# Patient Record
Sex: MALE | Race: WHITE | NOT HISPANIC OR LATINO | Employment: FULL TIME | URBAN - METROPOLITAN AREA
[De-identification: names, ages, dates, MRNs, and addresses within clinical notes are randomized per-mention and may not be internally consistent; named-entity substitution may affect disease eponyms.]

---

## 2021-05-24 ENCOUNTER — HISTORICAL (OUTPATIENT)
Dept: NEPHROLOGY | Facility: CLINIC | Age: 61
End: 2021-05-24

## 2021-05-24 LAB
ABS NEUT (OLG): 4.91 X10(3)/MCL (ref 2.1–9.2)
ALBUMIN SERPL-MCNC: 3.8 GM/DL (ref 3.5–5)
ALBUMIN/GLOB SERPL: 0.8 RATIO (ref 1.1–2)
ALP SERPL-CCNC: 78 UNIT/L (ref 40–150)
ALT SERPL-CCNC: 10 UNIT/L (ref 0–55)
APPEARANCE, UA: ABNORMAL
AST SERPL-CCNC: 11 UNIT/L (ref 5–34)
BACTERIA #/AREA URNS AUTO: ABNORMAL /HPF
BASOPHILS # BLD AUTO: 0.1 X10(3)/MCL (ref 0–0.2)
BASOPHILS NFR BLD AUTO: 1 %
BILIRUB SERPL-MCNC: 0.5 MG/DL
BILIRUB UR QL STRIP: NEGATIVE
BILIRUBIN DIRECT+TOT PNL SERPL-MCNC: 0.2 MG/DL (ref 0–0.5)
BILIRUBIN DIRECT+TOT PNL SERPL-MCNC: 0.3 MG/DL (ref 0–0.8)
BUN SERPL-MCNC: 53.4 MG/DL (ref 8.4–25.7)
CALCIUM SERPL-MCNC: 8.9 MG/DL (ref 8.4–10.2)
CHLORIDE SERPL-SCNC: 108 MMOL/L (ref 98–107)
CO2 SERPL-SCNC: 19 MMOL/L (ref 22–29)
COLOR UR: ABNORMAL
CREAT SERPL-MCNC: 4.39 MG/DL (ref 0.73–1.18)
CREAT UR-MCNC: 53.6 MG/DL (ref 58–161)
DEPRECATED CALCIDIOL+CALCIFEROL SERPL-MC: 21.6 NG/ML (ref 30–80)
EOSINOPHIL # BLD AUTO: 0.3 X10(3)/MCL (ref 0–0.9)
EOSINOPHIL NFR BLD AUTO: 4 %
ERYTHROCYTE [DISTWIDTH] IN BLOOD BY AUTOMATED COUNT: 13.9 % (ref 11.5–14.5)
GLOBULIN SER-MCNC: 4.5 GM/DL (ref 2.4–3.5)
GLUCOSE (UA): NEGATIVE
GLUCOSE SERPL-MCNC: 85 MG/DL (ref 74–100)
HCT VFR BLD AUTO: 40.7 % (ref 40–51)
HGB BLD-MCNC: 13.1 GM/DL (ref 13.5–17.5)
HGB UR QL STRIP: 1 MG/DL
HYALINE CASTS #/AREA URNS LPF: ABNORMAL /LPF
IMM GRANULOCYTES # BLD AUTO: 0.03 10*3/UL
IMM GRANULOCYTES NFR BLD AUTO: 0 %
KETONES UR QL STRIP: NEGATIVE
LEUKOCYTE ESTERASE UR QL STRIP: 500 LEU/UL
LYMPHOCYTES # BLD AUTO: 1.6 X10(3)/MCL (ref 0.6–4.6)
LYMPHOCYTES NFR BLD AUTO: 21 %
MAGNESIUM SERPL-MCNC: 2.3 MG/DL (ref 1.6–2.6)
MCH RBC QN AUTO: 29.6 PG (ref 26–34)
MCHC RBC AUTO-ENTMCNC: 32.2 GM/DL (ref 31–37)
MCV RBC AUTO: 92.1 FL (ref 80–100)
MONOCYTES # BLD AUTO: 0.6 X10(3)/MCL (ref 0.1–1.3)
MONOCYTES NFR BLD AUTO: 8 %
MUCOUS THREADS URNS QL MICRO: SLIGHT
NEUTROPHILS # BLD AUTO: 4.91 X10(3)/MCL (ref 2.1–9.2)
NEUTROPHILS NFR BLD AUTO: 66 %
NITRITE UR QL STRIP: NEGATIVE
PH UR STRIP: 6 [PH] (ref 4.5–8)
PHOSPHATE SERPL-MCNC: 3.6 MG/DL (ref 2.3–4.7)
PLATELET # BLD AUTO: 247 X10(3)/MCL (ref 130–400)
PMV BLD AUTO: 10 FL (ref 7.4–10.4)
POTASSIUM SERPL-SCNC: 4.5 MMOL/L (ref 3.5–5.1)
PROT SERPL-MCNC: 8.3 GM/DL (ref 6.4–8.3)
PROT UR QL STRIP: 70 MG/DL
PROT UR STRIP-MCNC: 42 MG/DL
PROT/CREAT UR-RTO: 783.6 MG/GM CR
PTH-INTACT SERPL-MCNC: 342 PG/ML (ref 8.7–77)
RBC # BLD AUTO: 4.42 X10(6)/MCL (ref 4.5–5.9)
RBC #/AREA URNS AUTO: ABNORMAL /HPF
SODIUM SERPL-SCNC: 138 MMOL/L (ref 136–145)
SP GR UR STRIP: 1 (ref 1–1.03)
SQUAMOUS #/AREA URNS LPF: ABNORMAL /LPF
URATE SERPL-MCNC: 9 MG/DL (ref 3.5–7.2)
UROBILINOGEN UR STRIP-ACNC: NORMAL
WBC # SPEC AUTO: 7.5 X10(3)/MCL (ref 4.5–11)
WBC #/AREA URNS AUTO: >=100 /HPF

## 2021-06-14 ENCOUNTER — HISTORICAL (OUTPATIENT)
Dept: NEPHROLOGY | Facility: CLINIC | Age: 61
End: 2021-06-14

## 2021-06-14 LAB
BUN SERPL-MCNC: 39.9 MG/DL (ref 8.4–25.7)
CALCIUM SERPL-MCNC: 8.2 MG/DL (ref 8.4–10.2)
CHLORIDE SERPL-SCNC: 107 MMOL/L (ref 98–107)
CO2 SERPL-SCNC: 23 MMOL/L (ref 22–29)
CREAT SERPL-MCNC: 3.34 MG/DL (ref 0.73–1.18)
CREAT/UREA NIT SERPL: 12
GLUCOSE SERPL-MCNC: 81 MG/DL (ref 74–100)
POTASSIUM SERPL-SCNC: 5.1 MMOL/L (ref 3.5–5.1)
SODIUM SERPL-SCNC: 140 MMOL/L (ref 136–145)

## 2021-06-17 ENCOUNTER — HISTORICAL (OUTPATIENT)
Dept: RADIOLOGY | Facility: HOSPITAL | Age: 61
End: 2021-06-17

## 2022-04-10 ENCOUNTER — HISTORICAL (OUTPATIENT)
Dept: ADMINISTRATIVE | Facility: HOSPITAL | Age: 62
End: 2022-04-10

## 2022-04-11 ENCOUNTER — HISTORICAL (OUTPATIENT)
Dept: ADMINISTRATIVE | Facility: HOSPITAL | Age: 62
End: 2022-04-11

## 2022-04-28 VITALS
WEIGHT: 211.63 LBS | HEIGHT: 71 IN | BODY MASS INDEX: 29.63 KG/M2 | OXYGEN SATURATION: 100 % | SYSTOLIC BLOOD PRESSURE: 118 MMHG | DIASTOLIC BLOOD PRESSURE: 73 MMHG

## 2022-04-28 VITALS
DIASTOLIC BLOOD PRESSURE: 73 MMHG | SYSTOLIC BLOOD PRESSURE: 118 MMHG | OXYGEN SATURATION: 100 % | WEIGHT: 211.63 LBS | HEIGHT: 71 IN | BODY MASS INDEX: 29.63 KG/M2

## 2023-07-18 DIAGNOSIS — R33.9 URINARY RETENTION: Primary | ICD-10-CM

## 2023-08-11 ENCOUNTER — OFFICE VISIT (OUTPATIENT)
Dept: UROLOGY | Facility: CLINIC | Age: 63
End: 2023-08-11
Payer: COMMERCIAL

## 2023-08-11 VITALS
TEMPERATURE: 98 F | OXYGEN SATURATION: 100 % | WEIGHT: 186.81 LBS | HEIGHT: 71 IN | HEART RATE: 67 BPM | BODY MASS INDEX: 26.15 KG/M2 | DIASTOLIC BLOOD PRESSURE: 68 MMHG | RESPIRATION RATE: 20 BRPM | SYSTOLIC BLOOD PRESSURE: 114 MMHG

## 2023-08-11 DIAGNOSIS — R82.90 ABNORMAL URINALYSIS: Primary | ICD-10-CM

## 2023-08-11 DIAGNOSIS — R33.9 URINARY RETENTION: ICD-10-CM

## 2023-08-11 LAB
BILIRUB SERPL-MCNC: NEGATIVE MG/DL
BLOOD URINE, POC: NORMAL
COLOR, POC UA: NORMAL
GLUCOSE UR QL STRIP: NEGATIVE
KETONES UR QL STRIP: NEGATIVE
LEUKOCYTE ESTERASE URINE, POC: NORMAL
NITRITE, POC UA: POSITIVE
PH, POC UA: 6.5
PROTEIN, POC: 300
SPECIFIC GRAVITY, POC UA: 1.02
UROBILINOGEN, POC UA: 0.2

## 2023-08-11 PROCEDURE — 3078F PR MOST RECENT DIASTOLIC BLOOD PRESSURE < 80 MM HG: ICD-10-PCS | Mod: CPTII,,, | Performed by: NURSE PRACTITIONER

## 2023-08-11 PROCEDURE — 3074F SYST BP LT 130 MM HG: CPT | Mod: CPTII,,, | Performed by: NURSE PRACTITIONER

## 2023-08-11 PROCEDURE — 1159F MED LIST DOCD IN RCRD: CPT | Mod: CPTII,,, | Performed by: NURSE PRACTITIONER

## 2023-08-11 PROCEDURE — 99204 OFFICE O/P NEW MOD 45 MIN: CPT | Mod: S$PBB,,, | Performed by: NURSE PRACTITIONER

## 2023-08-11 PROCEDURE — 1160F PR REVIEW ALL MEDS BY PRESCRIBER/CLIN PHARMACIST DOCUMENTED: ICD-10-PCS | Mod: CPTII,,, | Performed by: NURSE PRACTITIONER

## 2023-08-11 PROCEDURE — 87077 CULTURE AEROBIC IDENTIFY: CPT | Performed by: NURSE PRACTITIONER

## 2023-08-11 PROCEDURE — 3008F PR BODY MASS INDEX (BMI) DOCUMENTED: ICD-10-PCS | Mod: CPTII,,, | Performed by: NURSE PRACTITIONER

## 2023-08-11 PROCEDURE — 99204 PR OFFICE/OUTPT VISIT, NEW, LEVL IV, 45-59 MIN: ICD-10-PCS | Mod: S$PBB,,, | Performed by: NURSE PRACTITIONER

## 2023-08-11 PROCEDURE — 3078F DIAST BP <80 MM HG: CPT | Mod: CPTII,,, | Performed by: NURSE PRACTITIONER

## 2023-08-11 PROCEDURE — 1159F PR MEDICATION LIST DOCUMENTED IN MEDICAL RECORD: ICD-10-PCS | Mod: CPTII,,, | Performed by: NURSE PRACTITIONER

## 2023-08-11 PROCEDURE — 3008F BODY MASS INDEX DOCD: CPT | Mod: CPTII,,, | Performed by: NURSE PRACTITIONER

## 2023-08-11 PROCEDURE — 81001 URINALYSIS AUTO W/SCOPE: CPT | Mod: PBBFAC | Performed by: NURSE PRACTITIONER

## 2023-08-11 PROCEDURE — 1160F RVW MEDS BY RX/DR IN RCRD: CPT | Mod: CPTII,,, | Performed by: NURSE PRACTITIONER

## 2023-08-11 PROCEDURE — 99214 OFFICE O/P EST MOD 30 MIN: CPT | Mod: PBBFAC | Performed by: NURSE PRACTITIONER

## 2023-08-11 PROCEDURE — 3074F PR MOST RECENT SYSTOLIC BLOOD PRESSURE < 130 MM HG: ICD-10-PCS | Mod: CPTII,,, | Performed by: NURSE PRACTITIONER

## 2023-08-11 NOTE — PROGRESS NOTES
Chief Complaint:   Chief Complaint   Patient presents with    Urinary Retention       HPI:  Patient is a 62-year-old male referred to Urology for urinary retention.  Patient currently on self-intermittent catheterization due to a bladder cancer in 2018 in which he had his bladder removed and a total prostatectomy.  Patient reconstructed bladder was from his stomach tissue.  Patient admits to doing self-catheterizations 4-5 times daily.  Patient recently had a UTI 1 month ago was treated with clindamycin from a urgent care.  Patient feels he is having much darker urine he suspects another UTI this time.  Allergies:  Review of patient's allergies indicates:  No Known Allergies    Medications:  No current outpatient medications on file.     No current facility-administered medications for this visit.       Review of Systems:  General: No fever, chills, fatigability, or weight loss.  Skin: No rashes, itching, or changes in color or texture of skin.  Chest: Denies DIAZ, cyanosis, wheezing, cough, and sputum production.  Abdomen: Appetite fine. No weight loss. Denies diarrhea, abdominal pain, hematemesis, or blood in stool.  Musculoskeletal: No joint stiffness or swelling. Denies back pain.  : As above.  All other review of systems negative.    PMH:  No past medical history on file.    PSH:  No past surgical history on file.    FamHx:  No family history on file.    SocHx:  Social History     Socioeconomic History    Marital status:    Tobacco Use    Smoking status: Former     Current packs/day: 1.00     Types: Cigarettes     Passive exposure: Current    Smokeless tobacco: Current     Types: Chew       Physical Exam:  Vitals:    08/11/23 0945   BP: 114/68   Pulse: 67   Resp: 20   Temp: 98.2 °F (36.8 °C)     General: A&Ox3, no apparent distress, no deformities  Neck: No masses, normal thyroid  Lungs: CTA margie, no use of accessory muscles  Heart: RRR, no arrhythmias  Abdomen: Soft, NT, ND, no masses, no hernias, no  hepatosplenomegaly  Lymphatic: Neck and groin nodes negative  Skin: The skin is warm and dry. No jaundice.  Ext: No c/c/e.    Urinalysis:        Impression:  1. Urinary retention  - Ambulatory referral/consult to Urology  - POCT URINE DIPSTICK WITH MICROSCOPE, AUTOMATED  2. Abnormal urinalysis.    Plan:  Instructed patient to continue self-intermittent catheterization 5-6 times daily.  Instructed patient will get a renal ultrasound and a ultrasound non OB, BMP to evaluate kidney function, will notify patient results.  Instructed patient return to clinic 6 months with BMP, renal ultrasound, ultrasound non OB..

## 2023-08-11 NOTE — LETTER
August 11, 2023      [facility]  [address] [address]  Phone: [phone]       Patient: Bridget Cardenas   YOB: 1960  Date of Visit: 08/11/2023    To Whom It May Concern:    Nick Cardenas  was at [facility] on 08/11/2023. The patient may return to work/school on 08/14/2023. If you have any questions or concerns, or if I can be of further assistance, please do not hesitate to contact me.    Sincerely,    Lizbeth Johnson LPN

## 2023-08-15 ENCOUNTER — TELEPHONE (OUTPATIENT)
Dept: UROLOGY | Facility: CLINIC | Age: 63
End: 2023-08-15
Payer: COMMERCIAL

## 2023-08-15 DIAGNOSIS — N30.01 ACUTE CYSTITIS WITH HEMATURIA: Primary | ICD-10-CM

## 2023-08-15 RX ORDER — LEVOFLOXACIN 750 MG/1
750 TABLET ORAL DAILY
Qty: 7 TABLET | Refills: 0 | Status: ON HOLD | OUTPATIENT
Start: 2023-08-15 | End: 2023-10-26 | Stop reason: HOSPADM

## 2023-08-15 NOTE — TELEPHONE ENCOUNTER
His wife notified of culture and sensitivity positive Klebsiella pneumoniae SSP ozaenae and Proteus vulgaris, sensitive to Levaquin, Augmentin, Macrobid, Bactrim.  In discussing with his wife we decided to go with Levaquin 750 mg p.o. daily x7 days.  Instructed patient's wife who is on the patient's chart to notify of any healthcare issues to complete meds as initiated and notify clinic if symptoms persist greater than 10-14 days.

## 2023-08-19 LAB — MAYO GENERIC ORDERABLE RESULT: ABNORMAL

## 2023-08-20 LAB
BACTERIA UR CULT: ABNORMAL
BACTERIA UR CULT: ABNORMAL

## 2023-10-20 ENCOUNTER — HOSPITAL ENCOUNTER (INPATIENT)
Facility: HOSPITAL | Age: 63
LOS: 6 days | Discharge: HOME | DRG: 375 | End: 2023-10-26
Attending: STUDENT IN AN ORGANIZED HEALTH CARE EDUCATION/TRAINING PROGRAM | Admitting: STUDENT IN AN ORGANIZED HEALTH CARE EDUCATION/TRAINING PROGRAM
Payer: COMMERCIAL

## 2023-10-20 DIAGNOSIS — R00.1 BRADYCARDIA: ICD-10-CM

## 2023-10-20 DIAGNOSIS — D64.9 SYMPTOMATIC ANEMIA: ICD-10-CM

## 2023-10-20 DIAGNOSIS — D64.9 ACUTE ANEMIA: ICD-10-CM

## 2023-10-20 DIAGNOSIS — N18.9 CHRONIC KIDNEY DISEASE, UNSPECIFIED CKD STAGE: ICD-10-CM

## 2023-10-20 DIAGNOSIS — Z85.51 HISTORY OF BLADDER CANCER: ICD-10-CM

## 2023-10-20 DIAGNOSIS — C18.4 MALIGNANT NEOPLASM OF TRANSVERSE COLON: ICD-10-CM

## 2023-10-20 DIAGNOSIS — R07.9 CHEST PAIN: ICD-10-CM

## 2023-10-20 DIAGNOSIS — R06.09 DYSPNEA ON EXERTION: Primary | ICD-10-CM

## 2023-10-20 DIAGNOSIS — I12.0 BENIGN HYPERTENSIVE CKD, STAGE 5 CHRONIC KIDNEY DISEASE OR END STAGE RENAL DISEASE: ICD-10-CM

## 2023-10-20 PROBLEM — N17.9 AKI (ACUTE KIDNEY INJURY): Status: ACTIVE | Noted: 2023-10-20

## 2023-10-20 LAB
ALBUMIN SERPL-MCNC: 3.7 G/DL (ref 3.4–4.8)
ALBUMIN/GLOB SERPL: 1.2 RATIO (ref 1.1–2)
ALP SERPL-CCNC: 52 UNIT/L (ref 40–150)
ALT SERPL-CCNC: 11 UNIT/L (ref 0–55)
ANISOCYTOSIS BLD QL SMEAR: ABNORMAL
APPEARANCE UR: CLEAR
AST SERPL-CCNC: 11 UNIT/L (ref 5–34)
BACTERIA #/AREA URNS AUTO: ABNORMAL /HPF
BASOPHILS # BLD AUTO: 0.02 X10(3)/MCL
BASOPHILS NFR BLD AUTO: 0.5 %
BILIRUB SERPL-MCNC: 0.4 MG/DL
BILIRUB UR QL STRIP.AUTO: NEGATIVE
BILIRUBIN DIRECT+TOT PNL SERPL-MCNC: 0.2 MG/DL (ref 0–?)
BNP BLD-MCNC: 197.2 PG/ML
BUN SERPL-MCNC: 75.7 MG/DL (ref 8.4–25.7)
CALCIUM SERPL-MCNC: 7.5 MG/DL (ref 8.8–10)
CHLORIDE SERPL-SCNC: 114 MMOL/L (ref 98–107)
CO2 SERPL-SCNC: 14 MMOL/L (ref 23–31)
COLOR UR AUTO: COLORLESS
CREAT SERPL-MCNC: 5.08 MG/DL (ref 0.73–1.18)
EOSINOPHIL # BLD AUTO: 0.15 X10(3)/MCL (ref 0–0.9)
EOSINOPHIL NFR BLD AUTO: 3.4 %
ERYTHROCYTE [DISTWIDTH] IN BLOOD BY AUTOMATED COUNT: 19.3 % (ref 11.5–17)
FERRITIN SERPL-MCNC: <1.98 NG/ML (ref 21.81–274.66)
FLUAV AG UPPER RESP QL IA.RAPID: NOT DETECTED
FLUBV AG UPPER RESP QL IA.RAPID: NOT DETECTED
GFR SERPLBLD CREATININE-BSD FMLA CKD-EPI: 12 MLS/MIN/1.73/M2
GLOBULIN SER-MCNC: 3 GM/DL (ref 2.4–3.5)
GLUCOSE SERPL-MCNC: 96 MG/DL (ref 82–115)
GLUCOSE UR QL STRIP.AUTO: NORMAL
GROUP & RH: NORMAL
GROUP & RH: NORMAL
HCT VFR BLD AUTO: 13.7 % (ref 42–52)
HGB BLD-MCNC: 3.5 G/DL (ref 14–18)
HYALINE CASTS #/AREA URNS LPF: ABNORMAL /LPF
HYPOCHROMIA BLD QL SMEAR: ABNORMAL
IMM GRANULOCYTES # BLD AUTO: 0.02 X10(3)/MCL (ref 0–0.04)
IMM GRANULOCYTES NFR BLD AUTO: 0.5 %
INDIRECT COOMBS GEL: NORMAL
INR PPP: 1.2
IRON SATN MFR SERPL: 3 % (ref 20–50)
IRON SERPL-MCNC: 13 UG/DL (ref 65–175)
KETONES UR QL STRIP.AUTO: NEGATIVE
LEUKOCYTE ESTERASE UR QL STRIP.AUTO: NEGATIVE
LYMPHOCYTES # BLD AUTO: 0.98 X10(3)/MCL (ref 0.6–4.6)
LYMPHOCYTES NFR BLD AUTO: 22.4 %
MCH RBC QN AUTO: 16.7 PG (ref 27–31)
MCHC RBC AUTO-ENTMCNC: 25.5 G/DL (ref 33–36)
MCV RBC AUTO: 65.6 FL (ref 80–94)
MICROCYTES BLD QL SMEAR: ABNORMAL
MONOCYTES # BLD AUTO: 0.41 X10(3)/MCL (ref 0.1–1.3)
MONOCYTES NFR BLD AUTO: 9.4 %
MUCOUS THREADS URNS QL MICRO: ABNORMAL /LPF
NEUTROPHILS # BLD AUTO: 2.79 X10(3)/MCL (ref 2.1–9.2)
NEUTROPHILS NFR BLD AUTO: 63.8 %
NITRITE UR QL STRIP.AUTO: NEGATIVE
NRBC BLD AUTO-RTO: 1.4 %
PH UR STRIP.AUTO: 6 [PH]
PLATELET # BLD AUTO: 204 X10(3)/MCL (ref 130–400)
PLATELET # BLD EST: ADEQUATE 10*3/UL
PMV BLD AUTO: 9.3 FL (ref 7.4–10.4)
POTASSIUM SERPL-SCNC: 4.7 MMOL/L (ref 3.5–5.1)
PROT SERPL-MCNC: 6.7 GM/DL (ref 5.8–7.6)
PROT UR QL STRIP.AUTO: ABNORMAL
PROTHROMBIN TIME: 15.1 SECONDS (ref 11.4–14)
RBC # BLD AUTO: 2.09 X10(6)/MCL (ref 4.7–6.1)
RBC #/AREA URNS AUTO: ABNORMAL /HPF
RBC MORPH BLD: ABNORMAL
RBC UR QL AUTO: NEGATIVE
RET# (OHS): 0.06 X10E6/UL (ref 0.03–0.1)
RETICULOCYTE COUNT AUTOMATED (OLG): 2.72 % (ref 1.1–2.1)
SARS-COV-2 RNA RESP QL NAA+PROBE: NOT DETECTED
SCHISTOCYTE (OLG): ABNORMAL
SODIUM SERPL-SCNC: 138 MMOL/L (ref 136–145)
SP GR UR STRIP.AUTO: 1.01 (ref 1–1.03)
SPECIMEN OUTDATE: NORMAL
SQUAMOUS #/AREA URNS LPF: ABNORMAL /HPF
TIBC SERPL-MCNC: 365 UG/DL (ref 69–240)
TIBC SERPL-MCNC: 378 UG/DL (ref 250–450)
TRANSFERRIN SERPL-MCNC: 338 MG/DL (ref 163–344)
TROPONIN I SERPL-MCNC: 0.03 NG/ML (ref 0–0.04)
UROBILINOGEN UR STRIP-ACNC: NORMAL
WBC # SPEC AUTO: 4.37 X10(3)/MCL (ref 4.5–11.5)
WBC #/AREA URNS AUTO: ABNORMAL /HPF

## 2023-10-20 PROCEDURE — 84484 ASSAY OF TROPONIN QUANT: CPT | Performed by: PHYSICIAN ASSISTANT

## 2023-10-20 PROCEDURE — 86900 BLOOD TYPING SEROLOGIC ABO: CPT | Mod: 91 | Performed by: STUDENT IN AN ORGANIZED HEALTH CARE EDUCATION/TRAINING PROGRAM

## 2023-10-20 PROCEDURE — 21400001 HC TELEMETRY ROOM

## 2023-10-20 PROCEDURE — 82248 BILIRUBIN DIRECT: CPT | Performed by: STUDENT IN AN ORGANIZED HEALTH CARE EDUCATION/TRAINING PROGRAM

## 2023-10-20 PROCEDURE — 86900 BLOOD TYPING SEROLOGIC ABO: CPT | Performed by: STUDENT IN AN ORGANIZED HEALTH CARE EDUCATION/TRAINING PROGRAM

## 2023-10-20 PROCEDURE — 83880 ASSAY OF NATRIURETIC PEPTIDE: CPT | Performed by: PHYSICIAN ASSISTANT

## 2023-10-20 PROCEDURE — 99285 EMERGENCY DEPT VISIT HI MDM: CPT | Mod: 25

## 2023-10-20 PROCEDURE — 0240U COVID/FLU A&B PCR: CPT | Performed by: PHYSICIAN ASSISTANT

## 2023-10-20 PROCEDURE — 86920 COMPATIBILITY TEST SPIN: CPT | Performed by: STUDENT IN AN ORGANIZED HEALTH CARE EDUCATION/TRAINING PROGRAM

## 2023-10-20 PROCEDURE — 82272 OCCULT BLD FECES 1-3 TESTS: CPT

## 2023-10-20 PROCEDURE — 93005 ELECTROCARDIOGRAM TRACING: CPT

## 2023-10-20 PROCEDURE — 36430 TRANSFUSION BLD/BLD COMPNT: CPT

## 2023-10-20 PROCEDURE — 81001 URINALYSIS AUTO W/SCOPE: CPT | Performed by: STUDENT IN AN ORGANIZED HEALTH CARE EDUCATION/TRAINING PROGRAM

## 2023-10-20 PROCEDURE — 11000001 HC ACUTE MED/SURG PRIVATE ROOM

## 2023-10-20 PROCEDURE — 85610 PROTHROMBIN TIME: CPT | Performed by: STUDENT IN AN ORGANIZED HEALTH CARE EDUCATION/TRAINING PROGRAM

## 2023-10-20 PROCEDURE — 85045 AUTOMATED RETICULOCYTE COUNT: CPT

## 2023-10-20 PROCEDURE — 51702 INSERT TEMP BLADDER CATH: CPT | Mod: 59

## 2023-10-20 PROCEDURE — 80053 COMPREHEN METABOLIC PANEL: CPT | Performed by: PHYSICIAN ASSISTANT

## 2023-10-20 PROCEDURE — 82728 ASSAY OF FERRITIN: CPT

## 2023-10-20 PROCEDURE — 85025 COMPLETE CBC W/AUTO DIFF WBC: CPT | Performed by: PHYSICIAN ASSISTANT

## 2023-10-20 PROCEDURE — P9016 RBC LEUKOCYTES REDUCED: HCPCS | Performed by: STUDENT IN AN ORGANIZED HEALTH CARE EDUCATION/TRAINING PROGRAM

## 2023-10-20 PROCEDURE — 83540 ASSAY OF IRON: CPT

## 2023-10-20 RX ORDER — GLUCAGON 1 MG
1 KIT INJECTION
Status: DISCONTINUED | OUTPATIENT
Start: 2023-10-20 | End: 2023-10-26 | Stop reason: HOSPADM

## 2023-10-20 RX ORDER — ACETAMINOPHEN 325 MG/1
650 TABLET ORAL EVERY 4 HOURS PRN
Status: DISCONTINUED | OUTPATIENT
Start: 2023-10-20 | End: 2023-10-26 | Stop reason: HOSPADM

## 2023-10-20 RX ORDER — MUPIROCIN 20 MG/G
OINTMENT TOPICAL 2 TIMES DAILY
Status: COMPLETED | OUTPATIENT
Start: 2023-10-20 | End: 2023-10-25

## 2023-10-20 RX ORDER — SODIUM CHLORIDE, SODIUM LACTATE, POTASSIUM CHLORIDE, CALCIUM CHLORIDE 600; 310; 30; 20 MG/100ML; MG/100ML; MG/100ML; MG/100ML
INJECTION, SOLUTION INTRAVENOUS CONTINUOUS
Status: DISCONTINUED | OUTPATIENT
Start: 2023-10-20 | End: 2023-10-26 | Stop reason: HOSPADM

## 2023-10-20 RX ORDER — TALC
6 POWDER (GRAM) TOPICAL NIGHTLY PRN
Status: DISCONTINUED | OUTPATIENT
Start: 2023-10-20 | End: 2023-10-26 | Stop reason: HOSPADM

## 2023-10-20 RX ORDER — SODIUM CHLORIDE 0.9 % (FLUSH) 0.9 %
10 SYRINGE (ML) INJECTION EVERY 12 HOURS PRN
Status: DISCONTINUED | OUTPATIENT
Start: 2023-10-20 | End: 2023-10-26 | Stop reason: HOSPADM

## 2023-10-20 RX ORDER — IBUPROFEN 200 MG
16 TABLET ORAL
Status: DISCONTINUED | OUTPATIENT
Start: 2023-10-20 | End: 2023-10-26 | Stop reason: HOSPADM

## 2023-10-20 RX ORDER — IBUPROFEN 200 MG
24 TABLET ORAL
Status: DISCONTINUED | OUTPATIENT
Start: 2023-10-20 | End: 2023-10-26 | Stop reason: HOSPADM

## 2023-10-20 RX ORDER — ONDANSETRON 2 MG/ML
4 INJECTION INTRAMUSCULAR; INTRAVENOUS EVERY 8 HOURS PRN
Status: DISCONTINUED | OUTPATIENT
Start: 2023-10-20 | End: 2023-10-26 | Stop reason: HOSPADM

## 2023-10-20 RX ORDER — HEPARIN SODIUM 5000 [USP'U]/ML
5000 INJECTION, SOLUTION INTRAVENOUS; SUBCUTANEOUS EVERY 8 HOURS
Status: DISCONTINUED | OUTPATIENT
Start: 2023-10-20 | End: 2023-10-21

## 2023-10-20 RX ORDER — PROCHLORPERAZINE EDISYLATE 5 MG/ML
5 INJECTION INTRAMUSCULAR; INTRAVENOUS EVERY 6 HOURS PRN
Status: DISCONTINUED | OUTPATIENT
Start: 2023-10-20 | End: 2023-10-26 | Stop reason: HOSPADM

## 2023-10-20 RX ORDER — NALOXONE HCL 0.4 MG/ML
0.02 VIAL (ML) INJECTION
Status: DISCONTINUED | OUTPATIENT
Start: 2023-10-20 | End: 2023-10-26 | Stop reason: HOSPADM

## 2023-10-20 NOTE — LETTER
October 26, 2023         [address] [address]  Phone: [phone]  Fax: [phone]       Patient: Bridget Cardenas   YOB: 1960  Date of Visit: 10/26/2023    To Whom It May Concern:    Nick Cardenas  was at [facility] on 10/26/2023. The patient may return to work/school on 10/27/2023 with no restrictions. If you have any questions or concerns, or if I can be of further assistance, please do not hesitate to contact me.    Sincerely,    Dorothy Guerrero RN

## 2023-10-20 NOTE — ED PROVIDER NOTES
Encounter Date: 10/20/2023       History     Chief Complaint   Patient presents with    Shortness of Breath     PT REPORTS SOB UPON EXERTION X 2 WKS.  DENIES CP.  HX OF BLADDER CA, FINISHED TX. PT PALE CAP REFILL > 3 SEC. EKG OBTAINED.  VSS.      62-year-old male presents to ED for dyspnea on exertion.  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  States he self catheterizes daily.  States if he is just sitting there he has no difficulty however when he attempts to ambulate he has significant shortness of breath.  No chest pain, no pleuritic component, no fever chills cough congestion.  No abdominal discomfort, no change in urination, reported mild diarrhea and stool still brown.  Family states he appears mildly jaundice.  He was no other complaints or concerns at this time.      Review of patient's allergies indicates:   Allergen Reactions    Pcn [penicillins] Hives     Past Medical History:   Diagnosis Date    Cancer      History reviewed. No pertinent surgical history.  History reviewed. No pertinent family history.  Social History     Tobacco Use    Smoking status: Former     Current packs/day: 0.00     Types: Cigarettes     Quit date:      Years since quittin.8     Passive exposure: Current    Smokeless tobacco: Current     Types: Chew     Review of Systems   Constitutional:  Negative for chills and fever.   HENT:  Negative for congestion, rhinorrhea and sore throat.    Eyes:  Negative for pain, discharge and itching.   Respiratory:  Positive for shortness of breath. Negative for chest tightness.    Cardiovascular:  Negative for chest pain and palpitations.   Gastrointestinal:  Negative for abdominal pain, nausea and vomiting.   Genitourinary:  Negative for dysuria and hematuria.   Musculoskeletal:  Negative for myalgias and neck pain.   Skin:  Negative for color change and rash.   Neurological:  Negative for dizziness, weakness and headaches.   Psychiatric/Behavioral:  Negative  for confusion. The patient is not hyperactive.        Physical Exam     Initial Vitals [10/20/23 1210]   BP Pulse Resp Temp SpO2   123/62 70 16 97.9 °F (36.6 °C) 100 %      MAP       --         Physical Exam    Constitutional: He appears well-developed and well-nourished. He is not diaphoretic. No distress.   Jaundice   HENT:   Head: Normocephalic and atraumatic.   Eyes: Conjunctivae and EOM are normal. Pupils are equal, round, and reactive to light.   Neck: Neck supple. No tracheal deviation present.   Normal range of motion.  Cardiovascular:  Normal rate, regular rhythm and normal heart sounds.           Pulmonary/Chest: Breath sounds normal. No respiratory distress.   Abdominal: Abdomen is soft. There is no abdominal tenderness. There is no rebound.   Musculoskeletal:         General: No tenderness. Normal range of motion.      Cervical back: Normal range of motion and neck supple.     Neurological: He is alert and oriented to person, place, and time. He has normal strength. GCS score is 15. GCS eye subscore is 4. GCS verbal subscore is 5. GCS motor subscore is 6.   Skin: Skin is warm and dry. Capillary refill takes less than 2 seconds. No rash noted.   Psychiatric: He has a normal mood and affect. His behavior is normal. Judgment and thought content normal.         ED Course   Critical Care    Date/Time: 10/20/2023 5:03 PM    Performed by: Wilton Carlos MD  Authorized by: Wilton Carlos MD  Total critical care time (exclusive of procedural time) : 30 minutes  Critical care time was exclusive of separately billable procedures and treating other patients.  Critical care was time spent personally by me on the following activities: evaluation of patient's response to treatment, obtaining history from patient or surrogate, ordering and review of laboratory studies, pulse oximetry, review of old charts, development of treatment plan with patient or surrogate, examination of patient, ordering and performing treatments  and interventions, ordering and review of radiographic studies and re-evaluation of patient's condition.        Labs Reviewed   COMPREHENSIVE METABOLIC PANEL - Abnormal; Notable for the following components:       Result Value    Chloride 114 (*)     Carbon Dioxide 14 (*)     Blood Urea Nitrogen 75.7 (*)     Creatinine 5.08 (*)     Calcium Level Total 7.5 (*)     All other components within normal limits   B-TYPE NATRIURETIC PEPTIDE - Abnormal; Notable for the following components:    Natriuretic Peptide 197.2 (*)     All other components within normal limits   CBC WITH DIFFERENTIAL - Abnormal; Notable for the following components:    WBC 4.37 (*)     RBC 2.09 (*)     Hgb 3.5 (*)     Hct 13.7 (*)     MCV 65.6 (*)     MCH 16.7 (*)     MCHC 25.5 (*)     RDW 19.3 (*)     All other components within normal limits    Narrative:     This is an appended report.  These results have been appended to a previously verified report.   BLOOD SMEAR MICROSCOPIC EXAM ([facility]) - Abnormal; Notable for the following components:    RBC Morph Abnormal (*)     Anisocytosis 1+ (*)     Hypochromasia 1+ (*)     Microcytosis 1+ (*)     Schistocytes 1+ (*)     All other components within normal limits   PROTIME-INR - Abnormal; Notable for the following components:    PT 15.1 (*)     All other components within normal limits   TROPONIN I - Normal   COVID/FLU A&B PCR - Normal    Narrative:     The Xpert Xpress SARS-CoV-2/FLU/RSV plus is a rapid, multiplexed real-time PCR test intended for the simultaneous qualitative detection and differentiation of SARS-CoV-2, Influenza A, Influenza B, and respiratory syncytial virus (RSV) viral RNA in either nasopharyngeal swab or nasal swab specimens.         BILIRUBIN, DIRECT - Normal   CBC W/ AUTO DIFFERENTIAL    Narrative:     The following orders were created for panel order CBC auto differential.  Procedure                               Abnormality         Status                     ---------                                -----------         ------                     CBC with Differential[[id number]]        Abnormal            Final result                 Please view results for these tests on the individual orders.   EXTRA TUBES    Narrative:     The following orders were created for panel order EXTRA TUBES.  Procedure                               Abnormality         Status                     ---------                               -----------         ------                     Light Blue Top Hold[[id number]]                             In process                 Gold Top Hold[[id number]]                                   In process                   Please view results for these tests on the individual orders.   LIGHT BLUE TOP HOLD   GOLD TOP HOLD   URINALYSIS, REFLEX TO URINE CULTURE   TYPE & SCREEN   GROUP & RH   PREPARE RBC SOFT     EKG Readings: (Independently Interpreted)   Initial Reading: No STEMI. Rhythm: Normal Sinus Rhythm. Ectopy: No Ectopy. Conduction: Normal. Axis: Normal. Clinical Impression: Normal Sinus Rhythm     ECG Results              EKG 12-lead (Shortness of Breath) Age > 50 (In process)  Result time 10/20/23 14:05:55      In process by Interface, Lab In [facility] (10/20/23 14:05:55)                   Narrative:    Test Reason : R06.02,    Vent. Rate : 070 BPM     Atrial Rate : 070 BPM     P-R Int : 124 ms          QRS Dur : 094 ms      QT Int : 412 ms       P-R-T Axes : 068 067 069 degrees     QTc Int : 444 ms    Normal sinus rhythm  Normal ECG  No previous ECGs available    Referred By: [email or web address]   SELF           Confirmed By:                                   Imaging Results              US Retroperitoneal Complete (Final result)  Result time 10/20/23 15:23:14      Final result by Cecile Agosto MD (10/20/23 15:23:14)                   Impression:      Bilateral hydronephrosis    Distended neobladder.  Correlate for urinary retention.      Electronically signed by: Cecile  Surendra  Date:    10/20/2023  Time:    15:23               Narrative:    EXAMINATION:  US RETROPERITONEAL COMPLETE    CLINICAL HISTORY:  Domenica;    TECHNIQUE:  Ultrasound of the kidneys and urinary bladder was performed including color flow and grayscale evaluation of the kidneys.    COMPARISON:  CT abdomen pelvis 06/17/2021    FINDINGS:  RIGHT KIDNEY: The right kidney measures 10.5 cm.  Moderate to severe hydronephrosis.  Renal pelvis measures 3.4 cm anterior-posterior.  Right ureter is dilated where visible proximally.  No appreciable shadowing calculus.    LEFT KIDNEY: The left kidney measures 12.4 cm. Moderate hydronephrosis.Renal pelvis measures 3.3 cm anterior-posterior.  No appreciable shadowing renal calculus.    BLADDER: Distended neobladder with calculated volume of 1000 mL.                                       X-Ray Chest AP Portable (Final result)  Result time 10/20/23 13:15:42      Final result by Kash Gunderson MD (10/20/23 13:15:42)                   Impression:      No acute pulmonary process identified.      Electronically signed by: Kash Gunderson  Date:    10/20/2023  Time:    13:15               Narrative:    EXAMINATION:  XR CHEST AP PORTABLE    CLINICAL HISTORY:  Shortness of breath    TECHNIQUE:  Frontal view(s) of the chest.    COMPARISON:  No relevant comparison studies available at the time of dictation.    FINDINGS:  Left-sided MediPort catheter tip near the SVC/RA junction.  Normal cardiac silhouette.  Mild left diaphragm elevation.  No consolidation identified.  No significant pleural effusion or discernible pneumothorax.                                       Medications - No data to display  Medical Decision Making  62-year-old male with history of bladder cancer presents ED for exertional dyspnea and jaundice    Differential.  Acute anemia, hematuria, GI bleed, anemia of chronic disease, cellular lysis syndrome    Patient hemodynamically stable.  Mildly jaundice.  No other acute exam  findings.  Labs demonstrate critical anemia of 3 and 13.  2 units ordered.  Reports having a neobladder and having to straight cath.  Baseline CKD now worsened from 3 to 5.  Medicine consulted for continued workup and admission. retroperitoneal ultrasound demonstrates significant hydro and urine retained in the bladder.  Manzo ordered and immediately output 1 L. patient remains without abdominal discomfort or pressure.  CT abdomen ordered after Manzo and awaiting results.  Did speak with Dr. Irene concerning the patient as well as resident team.  Care formally transitioned to Dr. Huddleston upon conclusion of my shift for final evaluation and disposition. (Terrance)     Amount and/or Complexity of Data Reviewed  Labs: ordered.  Radiology: ordered.               ED Course as of 10/20/23 1703   Fri Oct 20, 2023   1550 Medicine called voicing concern for hydronephrosis and elevated bladder volume.  Neobladder.  I called urologist Dr. Irene and discussed the case.  Found reasonable to place a Manzo and trend the creatinine however will not be back in the office for 4 days.  Awaiting Manzo patient on the patient for output [RZ]      ED Course User Index  [RZ] Wilton Carlos MD                    Clinical Impression:   Final diagnoses:  [R06.09] Dyspnea on exertion (Primary)  [D64.9] Acute anemia  [N18.9] Chronic kidney disease, unspecified CKD stage  [Z85.51] History of bladder cancer        ED Disposition Condition    Observation Stable                Wilton Carlos MD  10/20/23 1707

## 2023-10-20 NOTE — CONSULTS
[facility] Consult Note    Date of Admit: 10/20/2023    Chief Complaint     Shortness of Breath (PT REPORTS SOB UPON EXERTION X 2 WKS.  DENIES CP.  HX OF BLADDER CA, FINISHED TX. PT PALE CAP REFILL > 3 SEC. EKG OBTAINED.  VSS. )      Subjective:      History of Present Illness:  Bridget Cardenas Jr. is a 62 y.o. male with past medical history of bladder cancer status post total cystectomy and prostatectomy in 2018 with neobladder creation, CKD stage 4/5, chronic hydronephrosis.  He presented to the emergency room with chief complaint of shortness a breath and dyspnea on exertion ongoing for the past 2 weeks.  He denies any chest pain during these episodes.  Denies any hematochezia, hematemesis, melena.  He denies any fever or chills.  After his cystectomy, patient reports of having to self catheterization 4-5 times daily without issues reported.  He endorses having some pink tinged urine ongoing around this time but denies any gross hematuria.  Urology NP at our facility, last seen in August 2023.    In the emergency room, patient was hemodynamically stable with normal vitals.  CBC with leukopenia of 4.37, hemoglobin of 3.5 and 13.7, MCV of 65.6.  CMP with non-anion gap metabolic acidosis with bicarb of 14, BENITO with BUN/creatinine of 75.7/5.08.  BNP mildly elevated at 197.2.  UA with 0 5 RBCs, trace protein trace bacteria.  Ultrasound retroperitoneal shows bilateral hydronephrosis with distended neobladder.  Manzo catheter placed with 1200 mL of urine output.  CT abdomen without contrast pursued showing symmetric moderate bilateral hydronephrosis and hydroureter without obstructing stone which appears to be chronic per Radiology read.  Internal medicine consulted for symptomatic anemia and dyspnea on exertion.    Given patient's drop in hemoglobin without known baseline and BENITO on CKD with hydronephrosis, recommend transfer to higher level of care for urology services.  We do not have Urology Services until  10/24/2023.      Past Medical History:  Past Medical History:   Diagnosis Date    Cancer        Past Surgical History:  History reviewed. No pertinent surgical history.    Allergies:  Review of patient's allergies indicates:   Allergen Reactions    Pcn [penicillins] Hives       Home Medications:  Prior to Admission medications    Medication Sig Start Date End Date Taking? Authorizing Provider   levoFLOXacin (LEVAQUIN) 750 MG tablet Take 1 tablet (750 mg total) by mouth once daily. 8/15/23   Charles Larkin NP       Family History:  History reviewed. No pertinent family history.    Social History:  Social History     Tobacco Use    Smoking status: Former     Current packs/day: 0.00     Types: Cigarettes     Quit date:      Years since quittin.8     Passive exposure: Current    Smokeless tobacco: Current     Types: Chew       Review of Systems:  Pertinent positives and negatives listed in HPI. All other systems are reviewed and are negative.       Objective:   Last 24 Hour Vital Signs:  Vitals  BP: 122/70  Temp: 98.2 °F (36.8 °C)  Temp Source: Oral  Pulse: 88  Resp: 18  SpO2: 100 %  Weight: 87 kg (191 lb 12.8 oz)    Physical Examination:  Physical Exam  Vitals and nursing note reviewed.   Constitutional:       Appearance: He is ill-appearing.   HENT:      Head: Normocephalic and atraumatic.      Mouth/Throat:      Mouth: Mucous membranes are moist.      Pharynx: Oropharynx is clear.   Cardiovascular:      Rate and Rhythm: Normal rate and regular rhythm.      Pulses: Normal pulses.      Heart sounds: Normal heart sounds.   Pulmonary:      Effort: No respiratory distress.      Breath sounds: Normal breath sounds. No stridor.   Abdominal:      General: Bowel sounds are normal. There is no distension.      Palpations: Abdomen is soft. There is no mass.      Tenderness: There is no abdominal tenderness.      Hernia: No hernia is present.   Musculoskeletal:      Right lower leg: No edema.      Left lower leg: No  "edema.   Skin:     General: Skin is warm.      Capillary Refill: Capillary refill takes 2 to 3 seconds.      Coloration: Skin is pale.   Neurological:      General: No focal deficit present.      Mental Status: He is alert and oriented to person, place, and time. Mental status is at baseline.             Laboratory:  Most Recent Data:  CMP:  Recent Labs   Lab 10/20/23  1250   CHLORIDE 114*   CO2 14*   BUN 75.7*   CREATININE 5.08*   GLUCOSE 96   ALBUMIN 3.7   BILIDIR 0.2   AST 11   ALT 11   ALKPHOS 52     CBC:  Recent Labs   Lab 10/20/23  1250   WBC 4.37*   ABSNEUTRO 2.79   RBC 2.09*   HGB 3.5*   HCT 13.7*   MCV 65.6*   RDW 19.3*     Coags:   Lab Results   Component Value Date    INR 1.2 10/20/2023    PROTIME 15.1 (H) 10/20/2023     FLP: No results found for: "CHOL", "HDL", "LDLCALC", "TRIG", "CHOLHDL"  DM:   Lab Results   Component Value Date    CREATININE 5.08 (H) 10/20/2023     Thyroid: No results found for: "TSH", "FREET4", "J3CBRZV", "X2TUSHJ", "THYROIDAB"  Anemia: No results found for: "IRON", "TIBC", "FERRITIN", "[id number]", "FOLATE"  Cardiac:   Lab Results   Component Value Date    TROPONINI 0.028 10/20/2023    .2 (H) 10/20/2023     Urinalysis:   Lab Results   Component Value Date    LABURIN (A) 08/11/2023     >/= 100,000 colonies/ml Klebsiella pneumoniae ssp ozaenae    LABURIN 10,000 - 25,000 colonies/ml Proteus vulgaris (A) 08/11/2023    COLORU Dark Luisa 08/11/2023    SPECGRAV 1.020 08/11/2023    NITRITE Positive 08/11/2023    KETONESU Negative 08/11/2023    UROBILINOGEN 0.2 08/11/2023    WBCUA >=100 (A) 05/24/2021       Trended Cardiac Data:  Recent Labs   Lab 10/20/23  1250   TROPONINI 0.028   .2*         Radiology:  Imaging Results              CT Abdomen Pelvis  Without Contrast (Final result)  Result time 10/20/23 17:34:12      Final result by Cecile Agosto MD (10/20/23 17:34:12)                   Impression:      1. Symmetric moderate bilateral hydronephrosis and hydroureter.  " No obstructing stone.  This appears to be chronic, similar to previous exam.  2. Neobladder collapsed about a Manzo catheter.      Electronically signed by: Cecile Agosto  Date:    10/20/2023  Time:    17:34               Narrative:    EXAMINATION:  CT ABDOMEN PELVIS WITHOUT CONTRAST    CLINICAL HISTORY:  neobladder, acute anemia, worsening BENITO;    TECHNIQUE:  Helically acquired axial images, sagittal and coronal reformations were obtained from the lung bases to the pubic symphysis without the IV administration of contrast.    Automated tube current modulation, weight-based exposure dosing, and/or iterative reconstruction technique utilized to reach lowest reasonably achievable exposure rate.    DLP: 214 mGy*cm    COMPARISON:  Retroperitoneal sonogram 10/20/2023, CT abdomen pelvis 06/17/2021    FINDINGS:  HEART: There are coronary artery calcifications.    LUNG BASES: Chronic elevation the left hemidiaphragm with basilar atelectasis.    LIVER: Normal attenuation. No appreciable focal hepatic lesion.    BILIARY: No calcified gallstones.    PANCREAS: No inflammatory change.    SPLEEN: Normal in size    ADRENALS: No mass.    KIDNEYS/URETERS: Moderate bilateral hydronephrosis.  AP diameter at the right renal pelvis is 2.4 cm.  AP diameter at the left renal pelvis is 2.5 cm.  There is bilateral hydroureter.  No obstructing calculus.    GI TRACT/MESENTERY: Evaluation of the bowel is limited without contrast.  No evidence of bowel obstruction or inflammation.    PERITONEUM: No free fluid.No free air.    LYMPH NODES: There are small retroperitoneal lymph nodes, similar to previous exam.    VASCULATURE: No significant atherosclerosis or aneurysm.    BLADDER: Patient reportedly status post cystectomy with creation of neobladder.  There is a Manzo catheter within the urinary bladder which is largely collapsed.  There is expected post instrumentation intraluminal gas.    REPRODUCTIVE ORGANS: Presumed  prostatectomy.    ABDOMINAL WALL: Unremarkable.    BONES: Degenerative changes at the hips.                                       US Retroperitoneal Complete (Final result)  Result time 10/20/23 15:23:14      Final result by Cecile Agosto MD (10/20/23 15:23:14)                   Impression:      Bilateral hydronephrosis    Distended neobladder.  Correlate for urinary retention.      Electronically signed by: Cecile Agosto  Date:    10/20/2023  Time:    15:23               Narrative:    EXAMINATION:  US RETROPERITONEAL COMPLETE    CLINICAL HISTORY:  Domenica;    TECHNIQUE:  Ultrasound of the kidneys and urinary bladder was performed including color flow and grayscale evaluation of the kidneys.    COMPARISON:  CT abdomen pelvis 06/17/2021    FINDINGS:  RIGHT KIDNEY: The right kidney measures 10.5 cm.  Moderate to severe hydronephrosis.  Renal pelvis measures 3.4 cm anterior-posterior.  Right ureter is dilated where visible proximally.  No appreciable shadowing calculus.    LEFT KIDNEY: The left kidney measures 12.4 cm. Moderate hydronephrosis.Renal pelvis measures 3.3 cm anterior-posterior.  No appreciable shadowing renal calculus.    BLADDER: Distended neobladder with calculated volume of 1000 mL.                                       X-Ray Chest AP Portable (Final result)  Result time 10/20/23 13:15:42      Final result by Kash Gunderson MD (10/20/23 13:15:42)                   Impression:      No acute pulmonary process identified.      Electronically signed by: Kash Gunderson  Date:    10/20/2023  Time:    13:15               Narrative:    EXAMINATION:  XR CHEST AP PORTABLE    CLINICAL HISTORY:  Shortness of breath    TECHNIQUE:  Frontal view(s) of the chest.    COMPARISON:  No relevant comparison studies available at the time of dictation.    FINDINGS:  Left-sided MediPort catheter tip near the SVC/RA junction.  Normal cardiac silhouette.  Mild left diaphragm elevation.  No consolidation identified.  No  significant pleural effusion or discernible pneumothorax.                                           Assessment & Plan:     SOB and DIAZ  Severe microcytic anemia of H&H 3.5&13.7  Unsure chronic versus acute; patient denies any jeremiah signs of bleeding  Reports blood-tinged urine for the past week  Status post 2 units of packed red blood cells in ED  Repeat H&H post transfusion  Recommend obtaining iron panel, ferritin, retic count    Hx of bladder cancer s/p total cystectomy and prostatectomy  BENITO on CKD Stage GV  Hx of UTI's  UA w/o evidence of UTI  CT abd/pel w/o con showing symmetric moderate bilateral hydronephrosis and hydroureter; which appears to be chronic in nature  BUN/Cr of 75.7/5.08 today from 39.9/3.34 6/14/23    Elevated BNP  Consider pursuing TTE if dyspnea on exertion persists after stabilization of hemoglobin    Disposition:  Recommend higher level of care in the setting of BENITO with hydronephrosis requiring Urology Services.       Olivier Boggs DO  [facility] PGY-II

## 2023-10-20 NOTE — ED PROVIDER NOTES
Encounter Date: 10/20/2023       History     Chief Complaint   Patient presents with    Shortness of Breath     PT REPORTS SOB UPON EXERTION X 2 WKS.  DENIES CP.  HX OF BLADDER CA, FINISHED TX. PT PALE CAP REFILL > 3 SEC. EKG OBTAINED.  VSS.      HPI  Review of patient's allergies indicates:   Allergen Reactions    Pcn [penicillins] Hives     Past Medical History:   Diagnosis Date    Cancer      History reviewed. No pertinent surgical history.  History reviewed. No pertinent family history.  Social History     Tobacco Use    Smoking status: Former     Current packs/day: 0.00     Types: Cigarettes     Quit date:      Years since quittin.8     Passive exposure: Current    Smokeless tobacco: Current     Types: Chew     Review of Systems    Physical Exam     Initial Vitals [10/20/23 1210]   BP Pulse Resp Temp SpO2   123/62 70 16 97.9 °F (36.6 °C) 100 %      MAP       --         Physical Exam    ED Course   Procedures  Labs Reviewed   COMPREHENSIVE METABOLIC PANEL - Abnormal; Notable for the following components:       Result Value    Chloride 114 (*)     Carbon Dioxide 14 (*)     Blood Urea Nitrogen 75.7 (*)     Creatinine 5.08 (*)     Calcium Level Total 7.5 (*)     All other components within normal limits   B-TYPE NATRIURETIC PEPTIDE - Abnormal; Notable for the following components:    Natriuretic Peptide 197.2 (*)     All other components within normal limits   CBC WITH DIFFERENTIAL - Abnormal; Notable for the following components:    WBC 4.37 (*)     RBC 2.09 (*)     Hgb 3.5 (*)     Hct 13.7 (*)     MCV 65.6 (*)     MCH 16.7 (*)     MCHC 25.5 (*)     RDW 19.3 (*)     All other components within normal limits    Narrative:     This is an appended report.  These results have been appended to a previously verified report.   BLOOD SMEAR MICROSCOPIC EXAM ([facility]G) - Abnormal; Notable for the following components:    RBC Morph Abnormal (*)     Anisocytosis 1+ (*)     Hypochromasia 1+ (*)     Microcytosis 1+ (*)      Schistocytes 1+ (*)     All other components within normal limits   PROTIME-INR - Abnormal; Notable for the following components:    PT 15.1 (*)     All other components within normal limits   URINALYSIS, REFLEX TO URINE CULTURE - Abnormal; Notable for the following components:    Color, UA Colorless (*)     Protein, UA Trace (*)     Bacteria, UA Trace (*)     Mucous, UA Trace (*)     All other components within normal limits   TROPONIN I - Normal   COVID/FLU A&B PCR - Normal    Narrative:     The Xpert Xpress SARS-CoV-2/FLU/RSV plus is a rapid, multiplexed real-time PCR test intended for the simultaneous qualitative detection and differentiation of SARS-CoV-2, Influenza A, Influenza B, and respiratory syncytial virus (RSV) viral RNA in either nasopharyngeal swab or nasal swab specimens.         BILIRUBIN, DIRECT - Normal   CBC W/ AUTO DIFFERENTIAL    Narrative:     The following orders were created for panel order CBC auto differential.  Procedure                               Abnormality         Status                     ---------                               -----------         ------                     CBC with Differential[[id number]]        Abnormal            Final result                 Please view results for these tests on the individual orders.   EXTRA TUBES    Narrative:     The following orders were created for panel order EXTRA TUBES.  Procedure                               Abnormality         Status                     ---------                               -----------         ------                     Light Blue Top Hold[[id number]]                             In process                 Gold Top Hold[[id number]]                                   In process                   Please view results for these tests on the individual orders.   LIGHT BLUE TOP HOLD   GOLD TOP HOLD   TYPE & SCREEN   GROUP & RH   PREPARE RBC SOFT        ECG Results              EKG 12-lead (Shortness of Breath) Age > 50  (In process)  Result time 10/20/23 14:05:55      In process by Interface, Lab In [facility] (10/20/23 14:05:55)                   Narrative:    Test Reason : R06.02,    Vent. Rate : 070 BPM     Atrial Rate : 070 BPM     P-R Int : 124 ms          QRS Dur : 094 ms      QT Int : 412 ms       P-R-T Axes : 068 067 069 degrees     QTc Int : 444 ms    Normal sinus rhythm  Normal ECG  No previous ECGs available    Referred By: [email or web address]   SELF           Confirmed By:                                   Imaging Results              CT Abdomen Pelvis  Without Contrast (Final result)  Result time 10/20/23 17:34:12      Final result by Cecile Agosto MD (10/20/23 17:34:12)                   Impression:      1. Symmetric moderate bilateral hydronephrosis and hydroureter.  No obstructing stone.  This appears to be chronic, similar to previous exam.  2. Neobladder collapsed about a Manzo catheter.      Electronically signed by: Cecile Agosto  Date:    10/20/2023  Time:    17:34               Narrative:    EXAMINATION:  CT ABDOMEN PELVIS WITHOUT CONTRAST    CLINICAL HISTORY:  neobladder, acute anemia, worsening BENITO;    TECHNIQUE:  Helically acquired axial images, sagittal and coronal reformations were obtained from the lung bases to the pubic symphysis without the IV administration of contrast.    Automated tube current modulation, weight-based exposure dosing, and/or iterative reconstruction technique utilized to reach lowest reasonably achievable exposure rate.    DLP: 214 mGy*cm    COMPARISON:  Retroperitoneal sonogram 10/20/2023, CT abdomen pelvis 06/17/2021    FINDINGS:  HEART: There are coronary artery calcifications.    LUNG BASES: Chronic elevation the left hemidiaphragm with basilar atelectasis.    LIVER: Normal attenuation. No appreciable focal hepatic lesion.    BILIARY: No calcified gallstones.    PANCREAS: No inflammatory change.    SPLEEN: Normal in size    ADRENALS: No mass.    KIDNEYS/URETERS: Moderate  bilateral hydronephrosis.  AP diameter at the right renal pelvis is 2.4 cm.  AP diameter at the left renal pelvis is 2.5 cm.  There is bilateral hydroureter.  No obstructing calculus.    GI TRACT/MESENTERY: Evaluation of the bowel is limited without contrast.  No evidence of bowel obstruction or inflammation.    PERITONEUM: No free fluid.No free air.    LYMPH NODES: There are small retroperitoneal lymph nodes, similar to previous exam.    VASCULATURE: No significant atherosclerosis or aneurysm.    BLADDER: Patient reportedly status post cystectomy with creation of neobladder.  There is a Manzo catheter within the urinary bladder which is largely collapsed.  There is expected post instrumentation intraluminal gas.    REPRODUCTIVE ORGANS: Presumed prostatectomy.    ABDOMINAL WALL: Unremarkable.    BONES: Degenerative changes at the hips.                                       US Retroperitoneal Complete (Final result)  Result time 10/20/23 15:23:14      Final result by Cecile Agosto MD (10/20/23 15:23:14)                   Impression:      Bilateral hydronephrosis    Distended neobladder.  Correlate for urinary retention.      Electronically signed by: Cecile Agosto  Date:    10/20/2023  Time:    15:23               Narrative:    EXAMINATION:  US RETROPERITONEAL COMPLETE    CLINICAL HISTORY:  Domenica;    TECHNIQUE:  Ultrasound of the kidneys and urinary bladder was performed including color flow and grayscale evaluation of the kidneys.    COMPARISON:  CT abdomen pelvis 06/17/2021    FINDINGS:  RIGHT KIDNEY: The right kidney measures 10.5 cm.  Moderate to severe hydronephrosis.  Renal pelvis measures 3.4 cm anterior-posterior.  Right ureter is dilated where visible proximally.  No appreciable shadowing calculus.    LEFT KIDNEY: The left kidney measures 12.4 cm. Moderate hydronephrosis.Renal pelvis measures 3.3 cm anterior-posterior.  No appreciable shadowing renal calculus.    BLADDER: Distended neobladder with  calculated volume of 1000 mL.                                       X-Ray Chest AP Portable (Final result)  Result time 10/20/23 13:15:42      Final result by Kash Gunderson MD (10/20/23 13:15:42)                   Impression:      No acute pulmonary process identified.      Electronically signed by: Kash Gunderson  Date:    10/20/2023  Time:    13:15               Narrative:    EXAMINATION:  XR CHEST AP PORTABLE    CLINICAL HISTORY:  Shortness of breath    TECHNIQUE:  Frontal view(s) of the chest.    COMPARISON:  No relevant comparison studies available at the time of dictation.    FINDINGS:  Left-sided MediPort catheter tip near the SVC/RA junction.  Normal cardiac silhouette.  Mild left diaphragm elevation.  No consolidation identified.  No significant pleural effusion or discernible pneumothorax.                                       Medications - No data to display  Medical Decision Making  Patient signed out to me at end of shift pending CT which Radiology states is chronic in appearance with regards to his hydronephrosis.  Medicine to admit for further medical evaluation or treatment.    Amount and/or Complexity of Data Reviewed  Labs: ordered.  Radiology: ordered.               ED Course as of 10/20/23 1759   Fri Oct 20, 2023   1550 Medicine called voicing concern for hydronephrosis and elevated bladder volume.  Neobladder.  I called urologist Dr. Irene and discussed the case.  Found reasonable to place a Manzo and trend the creatinine however will not be back in the office for 4 days.  Awaiting Manzo patient on the patient for output [RZ]      ED Course User Index  [RZ] Wilton Carlos MD                    Clinical Impression:   Final diagnoses:  [R06.09] Dyspnea on exertion (Primary)  [D64.9] Acute anemia  [N18.9] Chronic kidney disease, unspecified CKD stage  [Z85.51] History of bladder cancer        ED Disposition Condition    Observation Stable                Clayton Huddleston, DO  10/20/23 1759

## 2023-10-21 LAB
ABO + RH BLD: NORMAL
ALBUMIN SERPL-MCNC: 3.4 G/DL (ref 3.4–4.8)
ALBUMIN/GLOB SERPL: 1.3 RATIO (ref 1.1–2)
ALP SERPL-CCNC: 47 UNIT/L (ref 40–150)
ALT SERPL-CCNC: 9 UNIT/L (ref 0–55)
ANION GAP SERPL CALC-SCNC: 10 MEQ/L
ANISOCYTOSIS BLD QL SMEAR: ABNORMAL
AST SERPL-CCNC: 9 UNIT/L (ref 5–34)
BASOPHILS # BLD AUTO: 0.04 X10(3)/MCL
BASOPHILS NFR BLD AUTO: 0.8 %
BILIRUB SERPL-MCNC: 0.9 MG/DL
BLD PROD TYP BPU: NORMAL
BLOOD UNIT EXPIRATION DATE: NORMAL
BLOOD UNIT TYPE CODE: 7300
BUN SERPL-MCNC: 60.7 MG/DL (ref 8.4–25.7)
BUN SERPL-MCNC: 69.1 MG/DL (ref 8.4–25.7)
CALCIUM SERPL-MCNC: 7.3 MG/DL (ref 8.8–10)
CALCIUM SERPL-MCNC: 7.3 MG/DL (ref 8.8–10)
CHLORIDE SERPL-SCNC: 112 MMOL/L (ref 98–107)
CHLORIDE SERPL-SCNC: 114 MMOL/L (ref 98–107)
CO2 SERPL-SCNC: 15 MMOL/L (ref 23–31)
CO2 SERPL-SCNC: 18 MMOL/L (ref 23–31)
CREAT SERPL-MCNC: 4.71 MG/DL (ref 0.73–1.18)
CREAT SERPL-MCNC: 4.74 MG/DL (ref 0.73–1.18)
CREAT/UREA NIT SERPL: 13
CROSSMATCH INTERPRETATION: NORMAL
DISPENSE STATUS: NORMAL
EOSINOPHIL # BLD AUTO: 0.27 X10(3)/MCL (ref 0–0.9)
EOSINOPHIL NFR BLD AUTO: 5.4 %
ERYTHROCYTE [DISTWIDTH] IN BLOOD BY AUTOMATED COUNT: 20.8 % (ref 11.5–17)
GFR SERPLBLD CREATININE-BSD FMLA CKD-EPI: 13 MLS/MIN/1.73/M2
GFR SERPLBLD CREATININE-BSD FMLA CKD-EPI: 13 MLS/MIN/1.73/M2
GLOBULIN SER-MCNC: 2.7 GM/DL (ref 2.4–3.5)
GLUCOSE SERPL-MCNC: 103 MG/DL (ref 82–115)
GLUCOSE SERPL-MCNC: 90 MG/DL (ref 82–115)
HCT VFR BLD AUTO: 17.3 % (ref 42–52)
HCT VFR BLD AUTO: 23 % (ref 42–52)
HEMOCCULT SP1 STL QL: POSITIVE
HGB BLD-MCNC: 4.8 G/DL (ref 14–18)
HGB BLD-MCNC: 6.7 G/DL (ref 14–18)
HYPOCHROMIA BLD QL SMEAR: ABNORMAL
IMM GRANULOCYTES # BLD AUTO: 0.02 X10(3)/MCL (ref 0–0.04)
IMM GRANULOCYTES NFR BLD AUTO: 0.4 %
LYMPHOCYTES # BLD AUTO: 1.03 X10(3)/MCL (ref 0.6–4.6)
LYMPHOCYTES NFR BLD AUTO: 20.6 %
MAGNESIUM SERPL-MCNC: 2 MG/DL (ref 1.6–2.6)
MCH RBC QN AUTO: 19 PG (ref 27–31)
MCHC RBC AUTO-ENTMCNC: 27.7 G/DL (ref 33–36)
MCV RBC AUTO: 68.7 FL (ref 80–94)
MONOCYTES # BLD AUTO: 0.43 X10(3)/MCL (ref 0.1–1.3)
MONOCYTES NFR BLD AUTO: 8.6 %
NEUTROPHILS # BLD AUTO: 3.22 X10(3)/MCL (ref 2.1–9.2)
NEUTROPHILS NFR BLD AUTO: 64.2 %
NRBC BLD AUTO-RTO: 0 %
PHOSPHATE SERPL-MCNC: 4.7 MG/DL (ref 2.3–4.7)
PLATELET # BLD AUTO: 188 X10(3)/MCL (ref 130–400)
PLATELET # BLD EST: NORMAL 10*3/UL
PMV BLD AUTO: 9.6 FL (ref 7.4–10.4)
POIKILOCYTOSIS BLD QL SMEAR: ABNORMAL
POTASSIUM SERPL-SCNC: 4.2 MMOL/L (ref 3.5–5.1)
POTASSIUM SERPL-SCNC: 4.3 MMOL/L (ref 3.5–5.1)
PROT SERPL-MCNC: 6.1 GM/DL (ref 5.8–7.6)
RBC # BLD AUTO: 2.52 X10(6)/MCL (ref 4.7–6.1)
RBC MORPH BLD: ABNORMAL
SODIUM SERPL-SCNC: 139 MMOL/L (ref 136–145)
SODIUM SERPL-SCNC: 140 MMOL/L (ref 136–145)
UNIT NUMBER: NORMAL
WBC # SPEC AUTO: 5.01 X10(3)/MCL (ref 4.5–11.5)

## 2023-10-21 PROCEDURE — 63600175 PHARM REV CODE 636 W HCPCS

## 2023-10-21 PROCEDURE — 25000003 PHARM REV CODE 250

## 2023-10-21 PROCEDURE — 94761 N-INVAS EAR/PLS OXIMETRY MLT: CPT

## 2023-10-21 PROCEDURE — 80053 COMPREHEN METABOLIC PANEL: CPT

## 2023-10-21 PROCEDURE — 85025 COMPLETE CBC W/AUTO DIFF WBC: CPT

## 2023-10-21 PROCEDURE — 25000003 PHARM REV CODE 250: Performed by: STUDENT IN AN ORGANIZED HEALTH CARE EDUCATION/TRAINING PROGRAM

## 2023-10-21 PROCEDURE — 21400001 HC TELEMETRY ROOM

## 2023-10-21 PROCEDURE — 36430 TRANSFUSION BLD/BLD COMPNT: CPT

## 2023-10-21 PROCEDURE — 86920 COMPATIBILITY TEST SPIN: CPT | Performed by: STUDENT IN AN ORGANIZED HEALTH CARE EDUCATION/TRAINING PROGRAM

## 2023-10-21 PROCEDURE — P9016 RBC LEUKOCYTES REDUCED: HCPCS | Performed by: STUDENT IN AN ORGANIZED HEALTH CARE EDUCATION/TRAINING PROGRAM

## 2023-10-21 PROCEDURE — 85018 HEMOGLOBIN: CPT

## 2023-10-21 PROCEDURE — 86920 COMPATIBILITY TEST SPIN: CPT

## 2023-10-21 PROCEDURE — 82270 OCCULT BLOOD FECES: CPT

## 2023-10-21 PROCEDURE — 83735 ASSAY OF MAGNESIUM: CPT

## 2023-10-21 PROCEDURE — 84100 ASSAY OF PHOSPHORUS: CPT

## 2023-10-21 RX ORDER — HYDROCODONE BITARTRATE AND ACETAMINOPHEN 500; 5 MG/1; MG/1
TABLET ORAL
Status: DISCONTINUED | OUTPATIENT
Start: 2023-10-21 | End: 2023-10-26 | Stop reason: HOSPADM

## 2023-10-21 RX ORDER — TRIAMCINOLONE ACETONIDE 1 MG/G
CREAM TOPICAL 2 TIMES DAILY
COMMUNITY
Start: 2023-10-12 | End: 2023-10-30 | Stop reason: SDUPTHER

## 2023-10-21 RX ORDER — HEPARIN SODIUM 5000 [USP'U]/ML
5000 INJECTION, SOLUTION INTRAVENOUS; SUBCUTANEOUS EVERY 12 HOURS
Status: DISCONTINUED | OUTPATIENT
Start: 2023-10-21 | End: 2023-10-24

## 2023-10-21 RX ORDER — TRIAMCINOLONE ACETONIDE 1 MG/G
CREAM TOPICAL 2 TIMES DAILY PRN
Status: DISCONTINUED | OUTPATIENT
Start: 2023-10-21 | End: 2023-10-26 | Stop reason: HOSPADM

## 2023-10-21 RX ADMIN — MELATONIN TAB 3 MG 6 MG: 3 TAB at 11:10

## 2023-10-21 RX ADMIN — MUPIROCIN: 20 OINTMENT TOPICAL at 08:10

## 2023-10-21 RX ADMIN — HEPARIN SODIUM 5000 UNITS: 5000 INJECTION, SOLUTION INTRAVENOUS; SUBCUTANEOUS at 12:10

## 2023-10-21 RX ADMIN — MUPIROCIN: 20 OINTMENT TOPICAL at 12:10

## 2023-10-21 RX ADMIN — SODIUM CHLORIDE, POTASSIUM CHLORIDE, SODIUM LACTATE AND CALCIUM CHLORIDE: 600; 310; 30; 20 INJECTION, SOLUTION INTRAVENOUS at 07:10

## 2023-10-21 RX ADMIN — SODIUM CHLORIDE, POTASSIUM CHLORIDE, SODIUM LACTATE AND CALCIUM CHLORIDE: 600; 310; 30; 20 INJECTION, SOLUTION INTRAVENOUS at 12:10

## 2023-10-21 RX ADMIN — SODIUM CHLORIDE 125 MG: 9 INJECTION, SOLUTION INTRAVENOUS at 08:10

## 2023-10-21 RX ADMIN — HEPARIN SODIUM 5000 UNITS: 5000 INJECTION, SOLUTION INTRAVENOUS; SUBCUTANEOUS at 05:10

## 2023-10-21 NOTE — CARE UPDATE
Day Team Care Update         HPI:  Bridget Cardenas Jr. is a 62 y.o. male with past medical history of bladder cancer status post total cystectomy and prostatectomy in 2018 with neobladder creation, CKD stage 4/5, chronic hydronephrosis.  He presented to the emergency room with chief complaint of shortness a breath and dyspnea on exertion ongoing for the past 2 weeks.  He denies any chest pain during these episodes.  Denies any hematochezia, hematemesis, melena.  He denies any fever or chills.  After his cystectomy, patient reports of having to self catheterization 4-5 times daily without issues reported.  He endorses having some pink tinged urine ongoing around this time but denies any gross hematuria.  Urology NP at our facility, last seen in August 2023.     In the emergency room, patient was hemodynamically stable with normal vitals.  CBC with leukopenia of 4.37, hemoglobin of 3.5 and 13.7, MCV of 65.6.  CMP with non-anion gap metabolic acidosis with bicarb of 14, BENITO with BUN/creatinine of 75.7/5.08.  BNP mildly elevated at 197.2.  UA with 0 5 RBCs, trace protein trace bacteria.  Ultrasound retroperitoneal shows bilateral hydronephrosis with distended neobladder.  Manzo catheter placed with 1200 mL of urine output.  CT abdomen without contrast pursued showing symmetric moderate bilateral hydronephrosis and hydroureter without obstructing stone which appears to be chronic per Radiology read.  Internal medicine consulted for symptomatic anemia and dyspnea on exertion.     Given patient's drop in hemoglobin without known baseline and BENITO on CKD with hydronephrosis, we recommended transfer to higher level of care for urology services, as we do not have Urology Services until 10/24/2023. However, transfer request was denied and patient is to be admitted for monitoring and Urology consult when available.     Course:   Transfusion with hemoglobin only going up to 4.8.  Severe iron-deficiency anemia.  We will transfuse 2  additional units along with IV iron.  We will continue LR for fluid resuscitation.  Patient is urinating well.  Last colonoscopy more than 10 years ago but does not recall findings.  Denies being told of having polyps at that time.  We will plan to have discussion with GI given being male and having iron-deficiency anemia which also could be secondary to CKD.        PE:  Physical Exam  Vitals and nursing note reviewed.   Constitutional:       Appearance: Normal appearance.   Cardiovascular:      Rate and Rhythm: Normal rate and regular rhythm.      Pulses: Normal pulses.      Heart sounds: Normal heart sounds.   Pulmonary:      Effort: Pulmonary effort is normal. No respiratory distress.      Breath sounds: Normal breath sounds. No stridor.   Abdominal:      General: Abdomen is flat. Bowel sounds are normal. There is no distension.      Palpations: Abdomen is soft. There is no mass.      Tenderness: There is no abdominal tenderness.      Hernia: No hernia is present.   Genitourinary:     Comments: Manzo catheter in place with clear urine in drain  Musculoskeletal:      Right lower leg: No edema.      Left lower leg: No edema.   Skin:     General: Skin is warm and dry.   Neurological:      General: No focal deficit present.      Mental Status: He is alert and oriented to person, place, and time. Mental status is at baseline.         Assessment & Plan:  Symptomatic Anemia- Severe microcytic anemia of H&H 3.5&13.7  Unsure chronic versus acute; patient denies any jeremiah signs of bleeding  Reports blood-tinged urine for the past week  Status post 2 units of packed red blood cells in ED  FOBT. Unknown hx of colonoscopy. Denies overt bleeding aside from above mentioned blood-tinged urine. No blood in UA.   Repeat H&H post transfusion  Iron panel, ferritin, retic count consistent with LILIANA     Hx of bladder cancer s/p total cystectomy and prostatectomy  Hydronephrosis, urinary retention  BENITO on CKD Stage GV  Hx of  UTI's  Non-anion gap metabolic acidosis with uremia  UA w/o evidence of UTI  Manzo to gravity  CT abd/pel w/o con showing symmetric moderate bilateral hydronephrosis and hydroureter; which appears to be chronic in nature  BUN/Cr of 69.1/4.74 10/21 from 75.7/5.08 on admission from 39.9/3.34 6/14/21  Urology consult when available   cc/h     Elevated BNP  Consider pursuing TTE if dyspnea on exertion persists after stabilization of hemoglobin        CODE STATUS: Full  Access: PIV  Antibiotics:   Diet: renal  DVT Prophylaxis: heparin ppx  Fluids: no        Disposition:  Admit for symptomatic anemia and urolgoy services for BENITO with hydronephrosis on pt with bladder cancer s/p bladder resection.  Transfusion additional 2 units along with IV iron.  Repeat H and H post transfusion.          Olivier Boggs DO  [facility] PGY-II

## 2023-10-21 NOTE — PLAN OF CARE
Problem: Infection  Goal: Absence of Infection Signs and Symptoms  Outcome: Ongoing, Progressing     Problem: Fluid and Electrolyte Imbalance (Acute Kidney Injury/Impairment)  Goal: Fluid and Electrolyte Balance  Outcome: Ongoing, Progressing     Problem: Adult Inpatient Plan of Care  Goal: Plan of Care Review  Outcome: Ongoing, Progressing  Goal: Patient-Specific Goal (Individualized)  Outcome: Ongoing, Progressing  Goal: Absence of Hospital-Acquired Illness or Injury  Outcome: Ongoing, Progressing  Goal: Optimal Comfort and Wellbeing  Outcome: Ongoing, Progressing  Goal: Readiness for Transition of Care  Outcome: Ongoing, Progressing     Problem: Renal Function Impairment (Acute Kidney Injury/Impairment)  Goal: Effective Renal Function  Outcome: Ongoing, Progressing     Problem: Oral Intake Inadequate (Acute Kidney Injury/Impairment)  Goal: Optimal Nutrition Intake  Outcome: Ongoing, Progressing

## 2023-10-21 NOTE — NURSING
Nurse notified team, spoke with Anbar about patient positive occult stool results. Patient H/H 6.7/23 post receiving 2 units of blood today. No new orders at this time.

## 2023-10-21 NOTE — H&P
[facility] History & Physical Note     Resident Team: [facility] Medicine List 3  Attending Physician: CASSY Berry MD  Resident: Jaqui    Date of Admit: 10/20/2023    Chief Complaint     Shortness of Breath (PT REPORTS SOB UPON EXERTION X 2 WKS.  DENIES CP.  HX OF BLADDER CA, FINISHED TX. PT PALE CAP REFILL > 3 SEC. EKG OBTAINED.  VSS. )     Subjective:      History of Present Illness:  Bridget Cardenas Jr. is a 62 y.o. male with past medical history of bladder cancer status post total cystectomy and prostatectomy in 2018 with neobladder creation, CKD stage 4/5, chronic hydronephrosis.  He presented to the emergency room with chief complaint of shortness a breath and dyspnea on exertion ongoing for the past 2 weeks.  He denies any chest pain during these episodes.  Denies any hematochezia, hematemesis, melena.  He denies any fever or chills.  After his cystectomy, patient reports of having to self catheterization 4-5 times daily without issues reported.  He endorses having some pink tinged urine ongoing around this time but denies any gross hematuria.  Urology NP at our facility, last seen in August 2023.     In the emergency room, patient was hemodynamically stable with normal vitals.  CBC with leukopenia of 4.37, hemoglobin of 3.5 and 13.7, MCV of 65.6.  CMP with non-anion gap metabolic acidosis with bicarb of 14, BENITO with BUN/creatinine of 75.7/5.08.  BNP mildly elevated at 197.2.  UA with 0 5 RBCs, trace protein trace bacteria.  Ultrasound retroperitoneal shows bilateral hydronephrosis with distended neobladder.  Manzo catheter placed with 1200 mL of urine output.  CT abdomen without contrast pursued showing symmetric moderate bilateral hydronephrosis and hydroureter without obstructing stone which appears to be chronic per Radiology read.  Internal medicine consulted for symptomatic anemia and dyspnea on exertion.     Given patient's drop in hemoglobin without known baseline and BENITO on CKD with hydronephrosis, we  recommended transfer to higher level of care for urology services, as we do not have Urology Services until 10/24/2023. However, transfer request was denied and patient is to be admitted for monitoring and Urology consult when available.       Past Medical History:  Past Medical History:   Diagnosis Date    Cancer        Past Surgical History:  History reviewed. No pertinent surgical history.    Family History:  History reviewed. No pertinent family history.    Social History:  Social History     Tobacco Use    Smoking status: Former     Current packs/day: 0.00     Types: Cigarettes     Quit date:      Years since quittin.8     Passive exposure: Current    Smokeless tobacco: Current     Types: Chew       Allergies:  Review of patient's allergies indicates:   Allergen Reactions    Pcn [penicillins] Hives       Home Medications:  Prior to Admission medications    Medication Sig Start Date End Date Taking? Authorizing Provider   levoFLOXacin (LEVAQUIN) 750 MG tablet Take 1 tablet (750 mg total) by mouth once daily. 8/15/23   Charles Larkin NP         Review of Systems:  See HPI.       Objective:   Last 24 Hour Vital Signs:  BP  Min: 118/64  Max: 132/72  Temp  Av.4 °F (34.1 °C)  Min: 44.2 °F (6.8 °C)  Max: 98.9 °F (37.2 °C)  Pulse  Av.6  Min: 68  Max: 94  Resp  Av.1  Min: 10  Max: 20  SpO2  Av.7 %  Min: 97 %  Max: 100 %  Weight  Av kg (191 lb 12.8 oz)  Min: 87 kg (191 lb 12.8 oz)  Max: 87 kg (191 lb 12.8 oz)  Body mass index is 26.85 kg/m².  I/O last 3 completed shifts:  In: 900 [Blood:900]  Out: 1200 [Urine:1200]    Physical Examination:  Physical Exam  Vitals and nursing note reviewed.   Constitutional:       Appearance: He is ill-appearing.   HENT:      Head: Normocephalic and atraumatic.      Mouth/Throat:      Mouth: Mucous membranes are moist.      Pharynx: Oropharynx is clear.   Cardiovascular:      Rate and Rhythm: Normal rate and regular rhythm.      Pulses: Normal pulses.       "Heart sounds: Normal heart sounds.   Pulmonary:      Effort: No respiratory distress.      Breath sounds: Normal breath sounds. No stridor.   Abdominal:      General: Bowel sounds are normal. There is no distension.      Palpations: Abdomen is soft. There is no mass.      Tenderness: There is no abdominal tenderness.      Hernia: No hernia is present.   Musculoskeletal:      Right lower leg: No edema.      Left lower leg: No edema.   Skin:     General: Skin is warm.      Capillary Refill: Capillary refill takes 2 to 3 seconds.      Coloration: Skin is pale.   Neurological:      General: No focal deficit present.      Mental Status: He is alert and oriented to person, place, and time. Mental status is at baseline.     Laboratory:  Most Recent Data:  CBC:   Lab Results   Component Value Date    WBC 4.37 (L) 10/20/2023    HGB 3.5 (LL) 10/20/2023    HCT 13.7 (LL) 10/20/2023     10/20/2023    MCV 65.6 (L) 10/20/2023    RDW 19.3 (H) 10/20/2023     WBC Differential:   Recent Labs   Lab 10/20/23  1250   WBC 4.37*   HGB 3.5*   HCT 13.7*      MCV 65.6*     BMP:   Lab Results   Component Value Date     10/20/2023    K 4.7 10/20/2023    CO2 14 (L) 10/20/2023    BUN 75.7 (H) 10/20/2023    CREATININE 5.08 (H) 10/20/2023    CALCIUM 7.5 (L) 10/20/2023    MG 2.30 05/24/2021    PHOS 3.6 05/24/2021     LFTs:   Lab Results   Component Value Date    ALBUMIN 3.7 10/20/2023    BILITOT 0.4 10/20/2023    AST 11 10/20/2023    ALKPHOS 52 10/20/2023    ALT 11 10/20/2023     Coags:   Lab Results   Component Value Date    INR 1.2 10/20/2023    PROTIME 15.1 (H) 10/20/2023     FLP: No results found for: "CHOL", "HDL", "LDLCALC", "TRIG", "CHOLHDL"  DM:   Lab Results   Component Value Date    CREATININE 5.08 (H) 10/20/2023     Thyroid: No results found for: "TSH", "Y2MPXOZ", "P7JUJND", "THYROIDAB", "FREET4"   Anemia:   Lab Results   Component Value Date    IRON 13 (L) 10/20/2023    TIBC 378 10/20/2023    FERRITIN <1.98 (L) " "10/20/2023     No results found for: "PEOJPHIV56"  No results found for: "FOLATE"     Cardiac:   Lab Results   Component Value Date    TROPONINI 0.028 10/20/2023    .2 (H) 10/20/2023     Urinalysis:   Lab Results   Component Value Date    LABURIN (A) 08/11/2023     >/= 100,000 colonies/ml Klebsiella pneumoniae ssp ozaenae    LABURIN 10,000 - 25,000 colonies/ml Proteus vulgaris (A) 08/11/2023    COLORU Dark Luisa 08/11/2023    PHUA 6.0 10/20/2023    SPECGRAV 1.020 08/11/2023    NITRITE Positive 08/11/2023    KETONESU Negative 08/11/2023    UROBILINOGEN Normal 10/20/2023    WBCUA 0-5 10/20/2023       Trended Lab Data:  Recent Labs   Lab 10/20/23  1250   WBC 4.37*   HGB 3.5*   HCT 13.7*      MCV 65.6*   RDW 19.3*      K 4.7   CO2 14*   BUN 75.7*   CREATININE 5.08*   ALBUMIN 3.7   BILITOT 0.4   AST 11   ALKPHOS 52   ALT 11       Trended Cardiac Data:  Recent Labs   Lab 10/20/23  1250   TROPONINI 0.028   .2*       Radiology:  Imaging Results              CT Abdomen Pelvis  Without Contrast (Final result)  Result time 10/20/23 17:34:12      Final result by Cecile Agosto MD (10/20/23 17:34:12)                   Impression:      1. Symmetric moderate bilateral hydronephrosis and hydroureter.  No obstructing stone.  This appears to be chronic, similar to previous exam.  2. Neobladder collapsed about a Manzo catheter.      Electronically signed by: Cecile Agosto  Date:    10/20/2023  Time:    17:34               Narrative:    EXAMINATION:  CT ABDOMEN PELVIS WITHOUT CONTRAST    CLINICAL HISTORY:  neobladder, acute anemia, worsening BENITO;    TECHNIQUE:  Helically acquired axial images, sagittal and coronal reformations were obtained from the lung bases to the pubic symphysis without the IV administration of contrast.    Automated tube current modulation, weight-based exposure dosing, and/or iterative reconstruction technique utilized to reach lowest reasonably achievable exposure " rate.    DLP: 214 mGy*cm    COMPARISON:  Retroperitoneal sonogram 10/20/2023, CT abdomen pelvis 06/17/2021    FINDINGS:  HEART: There are coronary artery calcifications.    LUNG BASES: Chronic elevation the left hemidiaphragm with basilar atelectasis.    LIVER: Normal attenuation. No appreciable focal hepatic lesion.    BILIARY: No calcified gallstones.    PANCREAS: No inflammatory change.    SPLEEN: Normal in size    ADRENALS: No mass.    KIDNEYS/URETERS: Moderate bilateral hydronephrosis.  AP diameter at the right renal pelvis is 2.4 cm.  AP diameter at the left renal pelvis is 2.5 cm.  There is bilateral hydroureter.  No obstructing calculus.    GI TRACT/MESENTERY: Evaluation of the bowel is limited without contrast.  No evidence of bowel obstruction or inflammation.    PERITONEUM: No free fluid.No free air.    LYMPH NODES: There are small retroperitoneal lymph nodes, similar to previous exam.    VASCULATURE: No significant atherosclerosis or aneurysm.    BLADDER: Patient reportedly status post cystectomy with creation of neobladder.  There is a Manzo catheter within the urinary bladder which is largely collapsed.  There is expected post instrumentation intraluminal gas.    REPRODUCTIVE ORGANS: Presumed prostatectomy.    ABDOMINAL WALL: Unremarkable.    BONES: Degenerative changes at the hips.                                       US Retroperitoneal Complete (Final result)  Result time 10/20/23 15:23:14      Final result by Cecile Agosto MD (10/20/23 15:23:14)                   Impression:      Bilateral hydronephrosis    Distended neobladder.  Correlate for urinary retention.      Electronically signed by: Cecile Agosto  Date:    10/20/2023  Time:    15:23               Narrative:    EXAMINATION:  US RETROPERITONEAL COMPLETE    CLINICAL HISTORY:  Domenica;    TECHNIQUE:  Ultrasound of the kidneys and urinary bladder was performed including color flow and grayscale evaluation of the  kidneys.    COMPARISON:  CT abdomen pelvis 06/17/2021    FINDINGS:  RIGHT KIDNEY: The right kidney measures 10.5 cm.  Moderate to severe hydronephrosis.  Renal pelvis measures 3.4 cm anterior-posterior.  Right ureter is dilated where visible proximally.  No appreciable shadowing calculus.    LEFT KIDNEY: The left kidney measures 12.4 cm. Moderate hydronephrosis.Renal pelvis measures 3.3 cm anterior-posterior.  No appreciable shadowing renal calculus.    BLADDER: Distended neobladder with calculated volume of 1000 mL.                                       X-Ray Chest AP Portable (Final result)  Result time 10/20/23 13:15:42      Final result by Kash Gunderson MD (10/20/23 13:15:42)                   Impression:      No acute pulmonary process identified.      Electronically signed by: Kash Gunderson  Date:    10/20/2023  Time:    13:15               Narrative:    EXAMINATION:  XR CHEST AP PORTABLE    CLINICAL HISTORY:  Shortness of breath    TECHNIQUE:  Frontal view(s) of the chest.    COMPARISON:  No relevant comparison studies available at the time of dictation.    FINDINGS:  Left-sided MediPort catheter tip near the SVC/RA junction.  Normal cardiac silhouette.  Mild left diaphragm elevation.  No consolidation identified.  No significant pleural effusion or discernible pneumothorax.                                       Assessment & Plan:     Symptomatic Anemia- Severe microcytic anemia of H&H 3.5&13.7  Unsure chronic versus acute; patient denies any jeremiah signs of bleeding  Reports blood-tinged urine for the past week  Status post 2 units of packed red blood cells in ED  FOBT. Unknown hx of colonoscopy. Denies overt bleeding aside from above mentioned blood-tinged urine. No blood in UA.   Repeat H&H post transfusion  Iron panel, ferritin, retic count consistent with LILIANA     Hx of bladder cancer s/p total cystectomy and prostatectomy  Hydronephrosis, urinary retention  BENITO on CKD Stage GV  Hx of UTI's  Non-anion  gap metabolic acidosis with uremia  UA w/o evidence of UTI  Manzo to gravity  CT abd/pel w/o con showing symmetric moderate bilateral hydronephrosis and hydroureter; which appears to be chronic in nature  BUN/Cr of 75.7/5.08 today from 39.9/3.34 6/14/23  Urology consult when available   cc/h     Elevated BNP  Consider pursuing TTE if dyspnea on exertion persists after stabilization of hemoglobin       CODE STATUS: Full  Access: PIV  Antibiotics:   Diet: renal  DVT Prophylaxis: heparin ppx  Fluids: no      Disposition:  Admit for symptomatic anemia and urolgoy services for BENITO with hydronephrosis on pt with bladder cancer s/p bladder resection.     Kasie Nails MD  [facility] HO-2

## 2023-10-22 LAB
ABO + RH BLD: NORMAL
ABO + RH BLD: NORMAL
ALBUMIN SERPL-MCNC: 3.7 G/DL (ref 3.4–4.8)
ALBUMIN/GLOB SERPL: 1.2 RATIO (ref 1.1–2)
ALP SERPL-CCNC: 52 UNIT/L (ref 40–150)
ALT SERPL-CCNC: 9 UNIT/L (ref 0–55)
AST SERPL-CCNC: 9 UNIT/L (ref 5–34)
BASOPHILS # BLD AUTO: 0.03 X10(3)/MCL
BASOPHILS NFR BLD AUTO: 0.6 %
BILIRUB SERPL-MCNC: 1.4 MG/DL
BLD PROD TYP BPU: NORMAL
BLD PROD TYP BPU: NORMAL
BLOOD UNIT EXPIRATION DATE: NORMAL
BLOOD UNIT EXPIRATION DATE: NORMAL
BLOOD UNIT TYPE CODE: 1700
BLOOD UNIT TYPE CODE: 9500
BUN SERPL-MCNC: 56.2 MG/DL (ref 8.4–25.7)
CALCIUM SERPL-MCNC: 7.7 MG/DL (ref 8.8–10)
CHLORIDE SERPL-SCNC: 110 MMOL/L (ref 98–107)
CO2 SERPL-SCNC: 18 MMOL/L (ref 23–31)
COLOR STL: ABNORMAL
CONSISTENCY STL: ABNORMAL
CREAT SERPL-MCNC: 4.71 MG/DL (ref 0.73–1.18)
CROSSMATCH INTERPRETATION: NORMAL
CROSSMATCH INTERPRETATION: NORMAL
DISPENSE STATUS: NORMAL
DISPENSE STATUS: NORMAL
EOSINOPHIL # BLD AUTO: 0.26 X10(3)/MCL (ref 0–0.9)
EOSINOPHIL NFR BLD AUTO: 4.9 %
ERYTHROCYTE [DISTWIDTH] IN BLOOD BY AUTOMATED COUNT: 21.8 % (ref 11.5–17)
GFR SERPLBLD CREATININE-BSD FMLA CKD-EPI: 13 MLS/MIN/1.73/M2
GLOBULIN SER-MCNC: 3.2 GM/DL (ref 2.4–3.5)
GLUCOSE SERPL-MCNC: 119 MG/DL (ref 82–115)
HCT VFR BLD AUTO: 30 % (ref 42–52)
HEMOCCULT SP2 STL QL: POSITIVE
HGB BLD-MCNC: 8.8 G/DL (ref 14–18)
IMM GRANULOCYTES # BLD AUTO: 0.02 X10(3)/MCL (ref 0–0.04)
IMM GRANULOCYTES NFR BLD AUTO: 0.4 %
LYMPHOCYTES # BLD AUTO: 0.83 X10(3)/MCL (ref 0.6–4.6)
LYMPHOCYTES NFR BLD AUTO: 15.5 %
MAGNESIUM SERPL-MCNC: 1.9 MG/DL (ref 1.6–2.6)
MCH RBC QN AUTO: 22.1 PG (ref 27–31)
MCHC RBC AUTO-ENTMCNC: 29.3 G/DL (ref 33–36)
MCV RBC AUTO: 75.4 FL (ref 80–94)
MONOCYTES # BLD AUTO: 0.53 X10(3)/MCL (ref 0.1–1.3)
MONOCYTES NFR BLD AUTO: 9.9 %
NEUTROPHILS # BLD AUTO: 3.67 X10(3)/MCL (ref 2.1–9.2)
NEUTROPHILS NFR BLD AUTO: 68.7 %
NRBC BLD AUTO-RTO: 0.7 %
PHOSPHATE SERPL-MCNC: 3.9 MG/DL (ref 2.3–4.7)
PLATELET # BLD AUTO: 203 X10(3)/MCL (ref 130–400)
PMV BLD AUTO: 9.6 FL (ref 7.4–10.4)
POTASSIUM SERPL-SCNC: 4.2 MMOL/L (ref 3.5–5.1)
PROT SERPL-MCNC: 6.9 GM/DL (ref 5.8–7.6)
RBC # BLD AUTO: 3.98 X10(6)/MCL (ref 4.7–6.1)
SODIUM SERPL-SCNC: 139 MMOL/L (ref 136–145)
UNIT NUMBER: NORMAL
UNIT NUMBER: NORMAL
WBC # SPEC AUTO: 5.34 X10(3)/MCL (ref 4.5–11.5)

## 2023-10-22 PROCEDURE — 63600175 PHARM REV CODE 636 W HCPCS

## 2023-10-22 PROCEDURE — P9017 PLASMA 1 DONOR FRZ W/IN 8 HR: HCPCS

## 2023-10-22 PROCEDURE — P9035 PLATELET PHERES LEUKOREDUCED: HCPCS

## 2023-10-22 PROCEDURE — 80053 COMPREHEN METABOLIC PANEL: CPT

## 2023-10-22 PROCEDURE — 85025 COMPLETE CBC W/AUTO DIFF WBC: CPT

## 2023-10-22 PROCEDURE — 25000003 PHARM REV CODE 250

## 2023-10-22 PROCEDURE — 86920 COMPATIBILITY TEST SPIN: CPT

## 2023-10-22 PROCEDURE — 84100 ASSAY OF PHOSPHORUS: CPT

## 2023-10-22 PROCEDURE — 36430 TRANSFUSION BLD/BLD COMPNT: CPT

## 2023-10-22 PROCEDURE — 51702 INSERT TEMP BLADDER CATH: CPT

## 2023-10-22 PROCEDURE — 21400001 HC TELEMETRY ROOM

## 2023-10-22 PROCEDURE — 83735 ASSAY OF MAGNESIUM: CPT

## 2023-10-22 PROCEDURE — 94761 N-INVAS EAR/PLS OXIMETRY MLT: CPT

## 2023-10-22 RX ORDER — HYDROCODONE BITARTRATE AND ACETAMINOPHEN 500; 5 MG/1; MG/1
TABLET ORAL
Status: DISCONTINUED | OUTPATIENT
Start: 2023-10-22 | End: 2023-10-26 | Stop reason: HOSPADM

## 2023-10-22 RX ORDER — SODIUM BICARBONATE 650 MG/1
650 TABLET ORAL DAILY
Status: DISCONTINUED | OUTPATIENT
Start: 2023-10-22 | End: 2023-10-23

## 2023-10-22 RX ORDER — SODIUM BICARBONATE 650 MG/1
650 TABLET ORAL DAILY
Status: DISCONTINUED | OUTPATIENT
Start: 2023-10-22 | End: 2023-10-22

## 2023-10-22 RX ADMIN — SODIUM CHLORIDE, POTASSIUM CHLORIDE, SODIUM LACTATE AND CALCIUM CHLORIDE: 600; 310; 30; 20 INJECTION, SOLUTION INTRAVENOUS at 08:10

## 2023-10-22 RX ADMIN — SODIUM BICARBONATE 650 MG TABLET 650 MG: at 04:10

## 2023-10-22 RX ADMIN — SODIUM CHLORIDE, POTASSIUM CHLORIDE, SODIUM LACTATE AND CALCIUM CHLORIDE: 600; 310; 30; 20 INJECTION, SOLUTION INTRAVENOUS at 10:10

## 2023-10-22 RX ADMIN — MUPIROCIN: 20 OINTMENT TOPICAL at 08:10

## 2023-10-22 RX ADMIN — MUPIROCIN: 20 OINTMENT TOPICAL at 09:10

## 2023-10-22 RX ADMIN — HEPARIN SODIUM 5000 UNITS: 5000 INJECTION, SOLUTION INTRAVENOUS; SUBCUTANEOUS at 08:10

## 2023-10-22 NOTE — PROGRESS NOTES
[facility] Internal Medicine Wards Progress Note     Resident Team: [facility] Medicine List 3  Attending Physician: Jose Berry MD  Resident: Olivier Boggs DO  Intern: Wilton Rocha MD     Subjective:      Brief HPI:  Bridget Cardenas Jr. is a 62 y.o. male with past medical history of bladder cancer status post total cystectomy and prostatectomy in 2018 with neobladder creation, CKD stage 4/5, chronic hydronephrosis.  He presented to the emergency room with chief complaint of shortness a breath and dyspnea on exertion ongoing for the past 2 weeks.  He denies any chest pain during these episodes.  Denies any hematochezia, hematemesis, melena.  He denies any fever or chills.  After his cystectomy, patient reports of having to self catheterization 4-5 times daily without issues reported.  He endorses having some pink tinged urine ongoing around this time but denies any gross hematuria.  Urology NP at our facility, last seen in August 2023.     In the emergency room, patient was hemodynamically stable with normal vitals.  CBC with leukopenia of 4.37, hemoglobin of 3.5 and 13.7, MCV of 65.6.  CMP with non-anion gap metabolic acidosis with bicarb of 14, BENITO with BUN/creatinine of 75.7/5.08.  BNP mildly elevated at 197.2.  UA with 0 5 RBCs, trace protein trace bacteria.  Ultrasound retroperitoneal shows bilateral hydronephrosis with distended neobladder.  Manzo catheter placed with 1200 mL of urine output.  CT abdomen without contrast pursued showing symmetric moderate bilateral hydronephrosis and hydroureter without obstructing stone which appears to be chronic per Radiology read.  Internal medicine consulted for symptomatic anemia and dyspnea on exertion.     Given patient's drop in hemoglobin without known baseline and BENITO on CKD with hydronephrosis, recommend transfer to higher level of care for urology services.  We do not have Urology Services until 10/24/2023.    Interval History:  Intermittently bradycardic but  asymptomatic.  Vital signs otherwise stable.  Patient initially reported dark, sticky stools yesterday evening but later backtracked saying stools were dark (green) but not sticky. Unclear nature/character of stool at this point. FOBT positive nonetheless. Continues to have some lightheadedness and weakness likely associated with his anemia (hemoglobin 6.7 today).  Will transfuse 2 units PRBCs and recheck H/H.  GI consulted for tomorrow (10/23/2023) for evaluation for possible GI bleed. Patient otherwise continues to urinate well. We will continue LR for fluid resuscitation.      Review of Systems:  Review of Systems   Constitutional:  Negative for chills, diaphoresis, fatigue and fever.   HENT:  Negative for ear pain, hearing loss, rhinorrhea, sore throat, tinnitus and trouble swallowing.    Eyes:  Negative for pain, redness and visual disturbance.   Respiratory:  Negative for cough, chest tightness and shortness of breath.    Cardiovascular:  Negative for chest pain and palpitations.   Gastrointestinal:  Positive for blood in stool. Negative for abdominal pain, constipation, diarrhea, nausea and vomiting.   Genitourinary:  Negative for difficulty urinating, dysuria, frequency, hematuria and urgency.   Musculoskeletal:  Negative for arthralgias and myalgias.   Skin:  Negative for rash and wound.   Neurological:  Negative for dizziness, seizures, syncope, weakness, light-headedness, numbness and headaches.   Psychiatric/Behavioral:  Negative for confusion.         Objective:     Last 24 Hour Vital Signs:  BP  Min: 105/69  Max: 128/61  Temp  Av °F (36.7 °C)  Min: 97 °F (36.1 °C)  Max: 98.7 °F (37.1 °C)  Pulse  Av.8  Min: 53  Max: 76  Resp  Avg: 15.6  Min: 13  Max: 20  SpO2  Av.7 %  Min: 96 %  Max: 100 %  I/O last 3 completed shifts:  In: 5090 [P.O.:540; I.V.:2340.2; Blood:2111.7; IV Piggyback:98.1]  Out: 7150 [Urine:7150]    Physical Examination:  Physical Exam  Vitals reviewed.   Constitutional:        General: He is not in acute distress.     Appearance: Normal appearance. He is normal weight. He is not ill-appearing.   HENT:      Head: Normocephalic and atraumatic.      Right Ear: External ear normal.      Left Ear: External ear normal.   Eyes:      General: No scleral icterus.        Right eye: No discharge.         Left eye: No discharge.      Extraocular Movements: Extraocular movements intact.      Conjunctiva/sclera: Conjunctivae normal.      Pupils: Pupils are equal, round, and reactive to light.   Cardiovascular:      Rate and Rhythm: Normal rate and regular rhythm.      Pulses: Normal pulses.      Heart sounds: Normal heart sounds. No murmur heard.     No friction rub. No gallop.   Pulmonary:      Effort: Pulmonary effort is normal. No respiratory distress.      Breath sounds: Normal breath sounds. No stridor. No wheezing, rhonchi or rales.   Abdominal:      General: Abdomen is flat. Bowel sounds are normal. There is no distension.      Palpations: Abdomen is soft.      Tenderness: There is no abdominal tenderness. There is no guarding.   Musculoskeletal:         General: No swelling, tenderness, deformity or signs of injury. Normal range of motion.      Right lower leg: No edema.      Left lower leg: No edema.   Skin:     General: Skin is warm and dry.      Capillary Refill: Capillary refill takes less than 2 seconds.      Coloration: Skin is not jaundiced.      Findings: No bruising, erythema or rash.   Neurological:      Mental Status: He is alert.      Comments: AAO to person, place, time, and situation; CN II-XII grossly intact         Laboratory:  Most Recent Data:  CBC:   Lab Results   Component Value Date    WBC 5.01 10/21/2023    HGB 6.7 (L) 10/21/2023    HCT 23.0 (L) 10/21/2023     10/21/2023    MCV 68.7 (L) 10/21/2023    RDW 20.8 (H) 10/21/2023     WBC Differential:   Recent Labs   Lab 10/20/23  1250 10/21/23  0503 10/21/23  1747   WBC 4.37* 5.01  --    HGB 3.5* 4.8* 6.7*   HCT 13.7*  "17.3* 23.0*    188  --    MCV 65.6* 68.7*  --      BMP:   Lab Results   Component Value Date     10/21/2023    K 4.2 10/21/2023    CO2 18 (L) 10/21/2023    BUN 60.7 (H) 10/21/2023    CREATININE 4.71 (H) 10/21/2023    CALCIUM 7.3 (L) 10/21/2023    MG 2.00 10/21/2023    PHOS 4.7 10/21/2023     LFTs:   Lab Results   Component Value Date    ALBUMIN 3.4 10/21/2023    BILITOT 0.9 10/21/2023    AST 9 10/21/2023    ALKPHOS 47 10/21/2023    ALT 9 10/21/2023     Coags:   Lab Results   Component Value Date    INR 1.2 10/20/2023    PROTIME 15.1 (H) 10/20/2023     FLP: No results found for: "CHOL", "HDL", "LDLCALC", "TRIG", "CHOLHDL"  DM:   Lab Results   Component Value Date    CREATININE 4.71 (H) 10/21/2023     Thyroid: No results found for: "TSH", "FREET4", "D5QKIML", "N9XOILL", "THYROIDAB"  Anemia:   Lab Results   Component Value Date    IRON 13 (L) 10/20/2023    TIBC 378 10/20/2023    FERRITIN <1.98 (L) 10/20/2023     Cardiac:   Lab Results   Component Value Date    TROPONINI 0.028 10/20/2023    .2 (H) 10/20/2023     Urinalysis:   Lab Results   Component Value Date    LABURIN (A) 08/11/2023     >/= 100,000 colonies/ml Klebsiella pneumoniae ssp ozaenae    LABURIN 10,000 - 25,000 colonies/ml Proteus vulgaris (A) 08/11/2023    COLORU Dark Luisa 08/11/2023    PHUA 6.0 10/20/2023    SPECGRAV 1.020 08/11/2023    NITRITE Positive 08/11/2023    KETONESU Negative 08/11/2023    UROBILINOGEN Normal 10/20/2023    WBCUA 0-5 10/20/2023       Radiology:  Imaging Results              CT Abdomen Pelvis  Without Contrast (Final result)  Result time 10/20/23 17:34:12      Final result by Cecile Agosto MD (10/20/23 17:34:12)                   Impression:      1. Symmetric moderate bilateral hydronephrosis and hydroureter.  No obstructing stone.  This appears to be chronic, similar to previous exam.  2. Neobladder collapsed about a Manzo catheter.      Electronically signed by: Cecile" Surendra  Date:    10/20/2023  Time:    17:34               Narrative:    EXAMINATION:  CT ABDOMEN PELVIS WITHOUT CONTRAST    CLINICAL HISTORY:  neobladder, acute anemia, worsening BENITO;    TECHNIQUE:  Helically acquired axial images, sagittal and coronal reformations were obtained from the lung bases to the pubic symphysis without the IV administration of contrast.    Automated tube current modulation, weight-based exposure dosing, and/or iterative reconstruction technique utilized to reach lowest reasonably achievable exposure rate.    DLP: 214 mGy*cm    COMPARISON:  Retroperitoneal sonogram 10/20/2023, CT abdomen pelvis 06/17/2021    FINDINGS:  HEART: There are coronary artery calcifications.    LUNG BASES: Chronic elevation the left hemidiaphragm with basilar atelectasis.    LIVER: Normal attenuation. No appreciable focal hepatic lesion.    BILIARY: No calcified gallstones.    PANCREAS: No inflammatory change.    SPLEEN: Normal in size    ADRENALS: No mass.    KIDNEYS/URETERS: Moderate bilateral hydronephrosis.  AP diameter at the right renal pelvis is 2.4 cm.  AP diameter at the left renal pelvis is 2.5 cm.  There is bilateral hydroureter.  No obstructing calculus.    GI TRACT/MESENTERY: Evaluation of the bowel is limited without contrast.  No evidence of bowel obstruction or inflammation.    PERITONEUM: No free fluid.No free air.    LYMPH NODES: There are small retroperitoneal lymph nodes, similar to previous exam.    VASCULATURE: No significant atherosclerosis or aneurysm.    BLADDER: Patient reportedly status post cystectomy with creation of neobladder.  There is a Manzo catheter within the urinary bladder which is largely collapsed.  There is expected post instrumentation intraluminal gas.    REPRODUCTIVE ORGANS: Presumed prostatectomy.    ABDOMINAL WALL: Unremarkable.    BONES: Degenerative changes at the hips.                                       US Retroperitoneal Complete (Final result)  Result time  10/20/23 15:23:14      Final result by Cecile Agosto MD (10/20/23 15:23:14)                   Impression:      Bilateral hydronephrosis    Distended neobladder.  Correlate for urinary retention.      Electronically signed by: Cecile Agosto  Date:    10/20/2023  Time:    15:23               Narrative:    EXAMINATION:  US RETROPERITONEAL COMPLETE    CLINICAL HISTORY:  Domenica;    TECHNIQUE:  Ultrasound of the kidneys and urinary bladder was performed including color flow and grayscale evaluation of the kidneys.    COMPARISON:  CT abdomen pelvis 06/17/2021    FINDINGS:  RIGHT KIDNEY: The right kidney measures 10.5 cm.  Moderate to severe hydronephrosis.  Renal pelvis measures 3.4 cm anterior-posterior.  Right ureter is dilated where visible proximally.  No appreciable shadowing calculus.    LEFT KIDNEY: The left kidney measures 12.4 cm. Moderate hydronephrosis.Renal pelvis measures 3.3 cm anterior-posterior.  No appreciable shadowing renal calculus.    BLADDER: Distended neobladder with calculated volume of 1000 mL.                                       X-Ray Chest AP Portable (Final result)  Result time 10/20/23 13:15:42      Final result by Kash Gunderson MD (10/20/23 13:15:42)                   Impression:      No acute pulmonary process identified.      Electronically signed by: Kash Gunderson  Date:    10/20/2023  Time:    13:15               Narrative:    EXAMINATION:  XR CHEST AP PORTABLE    CLINICAL HISTORY:  Shortness of breath    TECHNIQUE:  Frontal view(s) of the chest.    COMPARISON:  No relevant comparison studies available at the time of dictation.    FINDINGS:  Left-sided MediPort catheter tip near the SVC/RA junction.  Normal cardiac silhouette.  Mild left diaphragm elevation.  No consolidation identified.  No significant pleural effusion or discernible pneumothorax.                                    Current Medications:     Infusions:   lactated ringers Stopped (10/21/23 2016)         Scheduled:   heparin (porcine)  5,000 Units Subcutaneous Q12H    mupirocin   Nasal BID        PRN:  0.9%  NaCl infusion (for blood administration), 0.9%  NaCl infusion (for blood administration), 0.9%  NaCl infusion (for blood administration), 0.9%  NaCl infusion (for blood administration), 0.9%  NaCl infusion (for blood administration), 0.9%  NaCl infusion (for blood administration), acetaminophen, dextrose 10%, dextrose 10%, glucagon (human recombinant), glucose, glucose, influenza, melatonin, naloxone, ondansetron, pneumoc 20-stanford conj-dip cr(PF), prochlorperazine, sodium chloride 0.9%, triamcinolone acetonide 0.1%  Assessment & Plan:     Symptomatic iron-deficiency anemia  Hemoglobin 6.7  MCV 68.7  -iron panel, ferritin, reticulocyte count consistent with iron-deficiency anemia  -reported blood-tinged urine that ceased approximately 2 weeks prior to presentation; now reporting melanotic stools?  -FOBT positive  -transfused 2 units of packed red blood cells overnight; status post 6 units PRBCs, 1 unit platelets, and 1 unit FFP total to date  -consulted GI for tomorrow (10/23/2023) for evaluation of melanotic stools     History of bladder cancer status post total cystectomy and prostatectomy in 2018 with neobladder creation  Urinary retention  Chronic bilateral hydronephrosis of ureter and kidneys  BENITO on CKD stage V  History of UTIs  -urinary analysis without evidence of UTI  -CT abdomen-pelvis without contrast showed symmetric, moderate bilateral hydronephrosis and hydroureter which appears to be chronic in nature  -continue Manzo to gravity    Normal anion gap metabolic acidosis  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    Elevated BNP  .2  Troponin 0.028  -no history of heart disease per patient and chart review  -consider pursuing  TTE if DIAZ persists to evaluate heart function in the setting of elevated BNP with unknown baseline    Rash  -patient presented with rash that was being treated as outpatient  -rash has since markedly improved such that can only minimally appreciate prior areas of prior rash on back  -continuing outpatient steroid ointment as prescribed    Code Status:  Full code  GI Access:  PO  Feeding:  Renal nondialysis diet  IV Access:  PIV  Fluids:  None  Thromboprophylaxis:  Heparin    Disposition:  Continue inpatient status.  Patient hemoglobin improving but still requiring blood transfusions.  Fecal occult blood positive.  Consulted GI for evaluation of possible GI bleed.    The patient was seen with and plan was discussed with Dr. Berry, who agrees.    Wilton Rocha MD  [facility] Internal Medicine, PGY-1

## 2023-10-22 NOTE — NURSING
Discussed orders for PRBCs placed this am with Dr Boggs, stated to hold off until am labs rechecked at 0900. Called blood bank to make aware

## 2023-10-22 NOTE — NURSING
1st unit of PRBCs started, pt wife at bedside with cream that other provider ordered prior to admit.  Stated they apply it at home as needed for rash and itching.  Area to upper back, shoulder blades.  No raised area however pt stated it itches.  Wife to apply cream.  MD was notified and is ok with pt using home med. Discussed signs of transfusion reaction and pt is aware to call staff for any changes.

## 2023-10-22 NOTE — NURSING
Patient reported to Nurse he was feeling bloated with abdominal distension. No output in jones catheter, patient has history of bladder ca and urinary retention. Notified Team, given order to remove jones catheter and insert new one. Patient tolerated removal and insertion of catheter well. Output after insertion 2600ml.

## 2023-10-23 PROBLEM — I12.0 BENIGN HYPERTENSIVE CKD, STAGE 5 CHRONIC KIDNEY DISEASE OR END STAGE RENAL DISEASE: Status: ACTIVE | Noted: 2023-10-23

## 2023-10-23 LAB
ALBUMIN SERPL-MCNC: 3.1 G/DL (ref 3.4–4.8)
ALBUMIN/GLOB SERPL: 1.1 RATIO (ref 1.1–2)
ALP SERPL-CCNC: 45 UNIT/L (ref 40–150)
ALT SERPL-CCNC: 8 UNIT/L (ref 0–55)
AST SERPL-CCNC: 9 UNIT/L (ref 5–34)
BASOPHILS # BLD AUTO: 0.06 X10(3)/MCL
BASOPHILS NFR BLD AUTO: 1.1 %
BILIRUB SERPL-MCNC: 0.6 MG/DL
BUN SERPL-MCNC: 54.5 MG/DL (ref 8.4–25.7)
CALCIUM SERPL-MCNC: 7.3 MG/DL (ref 8.8–10)
CHLORIDE SERPL-SCNC: 112 MMOL/L (ref 98–107)
CO2 SERPL-SCNC: 18 MMOL/L (ref 23–31)
CREAT SERPL-MCNC: 4.76 MG/DL (ref 0.73–1.18)
EOSINOPHIL # BLD AUTO: 0.36 X10(3)/MCL (ref 0–0.9)
EOSINOPHIL NFR BLD AUTO: 6.5 %
ERYTHROCYTE [DISTWIDTH] IN BLOOD BY AUTOMATED COUNT: 22.6 % (ref 11.5–17)
GFR SERPLBLD CREATININE-BSD FMLA CKD-EPI: 13 MLS/MIN/1.73/M2
GLOBULIN SER-MCNC: 2.7 GM/DL (ref 2.4–3.5)
GLUCOSE SERPL-MCNC: 88 MG/DL (ref 82–115)
HCT VFR BLD AUTO: 27.8 % (ref 42–52)
HEMOCCULT SP3 STL QL: POSITIVE
HGB BLD-MCNC: 8.2 G/DL (ref 14–18)
IMM GRANULOCYTES # BLD AUTO: 0.02 X10(3)/MCL (ref 0–0.04)
IMM GRANULOCYTES NFR BLD AUTO: 0.4 %
LYMPHOCYTES # BLD AUTO: 1.79 X10(3)/MCL (ref 0.6–4.6)
LYMPHOCYTES NFR BLD AUTO: 32.5 %
MAGNESIUM SERPL-MCNC: 1.8 MG/DL (ref 1.6–2.6)
MCH RBC QN AUTO: 22.2 PG (ref 27–31)
MCHC RBC AUTO-ENTMCNC: 29.5 G/DL (ref 33–36)
MCV RBC AUTO: 75.3 FL (ref 80–94)
MONOCYTES # BLD AUTO: 0.68 X10(3)/MCL (ref 0.1–1.3)
MONOCYTES NFR BLD AUTO: 12.4 %
NEUTROPHILS # BLD AUTO: 2.59 X10(3)/MCL (ref 2.1–9.2)
NEUTROPHILS NFR BLD AUTO: 47.1 %
NRBC BLD AUTO-RTO: 0.5 %
PHOSPHATE SERPL-MCNC: 4.7 MG/DL (ref 2.3–4.7)
PLATELET # BLD AUTO: 195 X10(3)/MCL (ref 130–400)
PMV BLD AUTO: 10.2 FL (ref 7.4–10.4)
POTASSIUM SERPL-SCNC: 4.6 MMOL/L (ref 3.5–5.1)
PROT SERPL-MCNC: 5.8 GM/DL (ref 5.8–7.6)
RBC # BLD AUTO: 3.69 X10(6)/MCL (ref 4.7–6.1)
SODIUM SERPL-SCNC: 140 MMOL/L (ref 136–145)
WBC # SPEC AUTO: 5.5 X10(3)/MCL (ref 4.5–11.5)

## 2023-10-23 PROCEDURE — 63600175 PHARM REV CODE 636 W HCPCS

## 2023-10-23 PROCEDURE — 84100 ASSAY OF PHOSPHORUS: CPT

## 2023-10-23 PROCEDURE — 83735 ASSAY OF MAGNESIUM: CPT

## 2023-10-23 PROCEDURE — 25000003 PHARM REV CODE 250

## 2023-10-23 PROCEDURE — 80053 COMPREHEN METABOLIC PANEL: CPT

## 2023-10-23 PROCEDURE — 25000003 PHARM REV CODE 250: Performed by: INTERNAL MEDICINE

## 2023-10-23 PROCEDURE — 94761 N-INVAS EAR/PLS OXIMETRY MLT: CPT

## 2023-10-23 PROCEDURE — 21400001 HC TELEMETRY ROOM

## 2023-10-23 PROCEDURE — 85025 COMPLETE CBC W/AUTO DIFF WBC: CPT

## 2023-10-23 RX ORDER — MAGNESIUM SULFATE 1 G/100ML
1 INJECTION INTRAVENOUS ONCE
Status: COMPLETED | OUTPATIENT
Start: 2023-10-23 | End: 2023-10-23

## 2023-10-23 RX ORDER — POLYETHYLENE GLYCOL 3350, SODIUM SULFATE ANHYDROUS, SODIUM BICARBONATE, SODIUM CHLORIDE, POTASSIUM CHLORIDE 236; 22.74; 6.74; 5.86; 2.97 G/4L; G/4L; G/4L; G/4L; G/4L
2000 POWDER, FOR SOLUTION ORAL
Status: DISPENSED | OUTPATIENT
Start: 2023-10-24 | End: 2023-10-25

## 2023-10-23 RX ORDER — POLYETHYLENE GLYCOL 3350, SODIUM SULFATE ANHYDROUS, SODIUM BICARBONATE, SODIUM CHLORIDE, POTASSIUM CHLORIDE 236; 22.74; 6.74; 5.86; 2.97 G/4L; G/4L; G/4L; G/4L; G/4L
4000 POWDER, FOR SOLUTION ORAL ONCE
Status: DISCONTINUED | OUTPATIENT
Start: 2023-10-24 | End: 2023-10-23

## 2023-10-23 RX ORDER — SODIUM BICARBONATE 650 MG/1
650 TABLET ORAL 2 TIMES DAILY
Status: DISCONTINUED | OUTPATIENT
Start: 2023-10-23 | End: 2023-10-24

## 2023-10-23 RX ADMIN — SODIUM BICARBONATE 650 MG TABLET 650 MG: at 08:10

## 2023-10-23 RX ADMIN — SODIUM CHLORIDE, POTASSIUM CHLORIDE, SODIUM LACTATE AND CALCIUM CHLORIDE: 600; 310; 30; 20 INJECTION, SOLUTION INTRAVENOUS at 06:10

## 2023-10-23 RX ADMIN — MUPIROCIN: 20 OINTMENT TOPICAL at 09:10

## 2023-10-23 RX ADMIN — HEPARIN SODIUM 5000 UNITS: 5000 INJECTION, SOLUTION INTRAVENOUS; SUBCUTANEOUS at 09:10

## 2023-10-23 RX ADMIN — HEPARIN SODIUM 5000 UNITS: 5000 INJECTION, SOLUTION INTRAVENOUS; SUBCUTANEOUS at 08:10

## 2023-10-23 RX ADMIN — SODIUM BICARBONATE 650 MG TABLET 650 MG: at 09:10

## 2023-10-23 RX ADMIN — SODIUM CHLORIDE, POTASSIUM CHLORIDE, SODIUM LACTATE AND CALCIUM CHLORIDE: 600; 310; 30; 20 INJECTION, SOLUTION INTRAVENOUS at 04:10

## 2023-10-23 RX ADMIN — MAGNESIUM SULFATE IN DEXTROSE 1 G: 10 INJECTION, SOLUTION INTRAVENOUS at 09:10

## 2023-10-23 NOTE — CONSULTS
Nephrology Consult Note    Chief Complaint:    Chief Complaint   Patient presents with    Shortness of Breath     PT REPORTS SOB UPON EXERTION X 2 WKS.  DENIES CP.  HX OF BLADDER CA, FINISHED TX. PT PALE CAP REFILL > 3 SEC. EKG OBTAINED.  VSS.       Reason for Consult:  CKD    HPI:   Bridget Cardenas Jr. is a 62 y.o. year old male with PMH of bladder cancer status post total cystectomy and prostatectomy in 2018 with neobladder creation, chronic hydronephrosis, CKD stage 5.  Patient was admitted to the hospital for shortness of breath dyspnea on exertion.  Nephrology is being consulted for CKD.        PMH:   Past Medical History:   Diagnosis Date    Cancer      PSH: History reviewed. No pertinent surgical history.  FH:History reviewed. No pertinent family history.  SH:   Social History     Socioeconomic History    Marital status:    Tobacco Use    Smoking status: Former     Current packs/day: 0.00     Types: Cigarettes     Quit date:      Years since quittin.8     Passive exposure: Current    Smokeless tobacco: Current     Types: Chew     Social Determinants of Health     Financial Resource Strain: Low Risk  (10/23/2023)    Overall Financial Resource Strain (CARDIA)     Difficulty of Paying Living Expenses: Not hard at all   Food Insecurity: No Food Insecurity (10/23/2023)    Hunger Vital Sign     Worried About Running Out of Food in the Last Year: Never true     Ran Out of Food in the Last Year: Never true   Transportation Needs: No Transportation Needs (10/23/2023)    PRAPARE - Transportation     Lack of Transportation (Medical): No     Lack of Transportation (Non-Medical): No   Physical Activity: Inactive (10/23/2023)    Exercise Vital Sign     Days of Exercise per Week: 0 days     Minutes of Exercise per Session: 0 min   Stress: No Stress Concern Present (10/23/2023)    [place] [employer] - Occupational Stress Questionnaire     Feeling of Stress : Not at all   Social  Connections: Moderately Isolated (10/23/2023)    Social Connection and Isolation Panel [NHANES]     Frequency of Communication with Friends and Family: More than three times a week     Frequency of Social Gatherings with Friends and Family: More than three times a week     Attends [employer] Services: Never     Active Member of Clubs or Organizations: No     Attends Club or Organization Meetings: Never     Marital Status:    Housing Stability: Unknown (10/23/2023)    Housing Stability Vital Sign     Unable to Pay for Housing in the Last Year: No     Unstable Housing in the Last Year: No     Allergies:   Review of patient's allergies indicates:   Allergen Reactions    Pcn [penicillins] Hives      Medications:   Scheduled Meds:   heparin (porcine)  5,000 Units Subcutaneous Q12H    mupirocin   Nasal BID    sodium bicarbonate  650 mg Oral Daily      PRN Meds: 0.9%  NaCl infusion (for blood administration), 0.9%  NaCl infusion (for blood administration), 0.9%  NaCl infusion (for blood administration), 0.9%  NaCl infusion (for blood administration), 0.9%  NaCl infusion (for blood administration), 0.9%  NaCl infusion (for blood administration), acetaminophen, dextrose 10%, dextrose 10%, glucagon (human recombinant), glucose, glucose, influenza, melatonin, naloxone, ondansetron, pneumoc 20-stanford conj-dip cr(PF), prochlorperazine, sodium chloride 0.9%, triamcinolone acetonide 0.1%  Continuous Infusions:   lactated ringers 100 mL/hr at 10/23/23 0614        Review of Systems   Constitutional:  Negative for chills, fever, malaise/fatigue and weight loss.   Respiratory:  Negative for shortness of breath.    Cardiovascular:  Negative for chest pain and leg swelling.   Gastrointestinal:  Negative for abdominal pain, blood in stool, constipation, diarrhea, melena, nausea and vomiting.   Genitourinary:  Negative for dysuria and hematuria.       Physical Exam  Vitals:    10/23/23 1153   BP: 135/76   Pulse: (!) 58   Resp: 18    Temp: 97.9 °F (36.6 °C)     Physical Exam  Vitals and nursing note reviewed.   Constitutional:       General: He is not in acute distress.     Appearance: Normal appearance. He is normal weight. He is not ill-appearing.   HENT:      Head: Normocephalic and atraumatic.   Eyes:      Extraocular Movements: Extraocular movements intact.      Conjunctiva/sclera: Conjunctivae normal.      Pupils: Pupils are equal, round, and reactive to light.   Cardiovascular:      Rate and Rhythm: Normal rate and regular rhythm.      Pulses: Normal pulses.      Heart sounds: No murmur heard.  Pulmonary:      Effort: Pulmonary effort is normal. No respiratory distress.   Musculoskeletal:         General: No swelling.      Right lower leg: No edema.      Left lower leg: No edema.   Skin:     General: Skin is warm and dry.   Neurological:      General: No focal deficit present.      Mental Status: He is alert and oriented to person, place, and time.         Labs:  Recent Results (from the past 24 hour(s))   Comprehensive Metabolic Panel (CMP)    Collection Time: 10/23/23  3:36 AM   Result Value Ref Range    Sodium Level 140 136 - 145 mmol/L    Potassium Level 4.6 3.5 - 5.1 mmol/L    Chloride 112 (H) 98 - 107 mmol/L    Carbon Dioxide 18 (L) 23 - 31 mmol/L    Glucose Level 88 82 - 115 mg/dL    Blood Urea Nitrogen 54.5 (H) 8.4 - 25.7 mg/dL    Creatinine 4.76 (H) 0.73 - 1.18 mg/dL    Calcium Level Total 7.3 (L) 8.8 - 10.0 mg/dL    Protein Total 5.8 5.8 - 7.6 gm/dL    Albumin Level 3.1 (L) 3.4 - 4.8 g/dL    Globulin 2.7 2.4 - 3.5 gm/dL    Albumin/Globulin Ratio 1.1 1.1 - 2.0 ratio    Bilirubin Total 0.6 <=1.5 mg/dL    Alkaline Phosphatase 45 40 - 150 unit/L    Alanine Aminotransferase 8 0 - 55 unit/L    Aspartate Aminotransferase 9 5 - 34 unit/L    eGFR 13 mls/min/1.73/m2   Magnesium    Collection Time: 10/23/23  3:36 AM   Result Value Ref Range    Magnesium Level 1.80 1.60 - 2.60 mg/dL   Phosphorus    Collection Time: 10/23/23  3:36 AM    Result Value Ref Range    Phosphorus Level 4.7 2.3 - 4.7 mg/dL   CBC with Differential    Collection Time: 10/23/23  3:36 AM   Result Value Ref Range    WBC 5.50 4.50 - 11.50 x10(3)/mcL    RBC 3.69 (L) 4.70 - 6.10 x10(6)/mcL    Hgb 8.2 (L) 14.0 - 18.0 g/dL    Hct 27.8 (L) 42.0 - 52.0 %    MCV 75.3 (L) 80.0 - 94.0 fL    MCH 22.2 (L) 27.0 - 31.0 pg    MCHC 29.5 (L) 33.0 - 36.0 g/dL    RDW 22.6 (H) 11.5 - 17.0 %    Platelet 195 130 - 400 x10(3)/mcL    MPV 10.2 7.4 - 10.4 fL    Neut % 47.1 %    Lymph % 32.5 %    Mono % 12.4 %    Eos % 6.5 %    Basophil % 1.1 %    Lymph # 1.79 0.6 - 4.6 x10(3)/mcL    Neut # 2.59 2.1 - 9.2 x10(3)/mcL    Mono # 0.68 0.1 - 1.3 x10(3)/mcL    Eos # 0.36 0 - 0.9 x10(3)/mcL    Baso # 0.06 <=0.2 x10(3)/mcL    IG# 0.02 0 - 0.04 x10(3)/mcL    IG% 0.4 %    NRBC% 0.5 %   OCCULT BLOOD STOOL, CA SCREEN 3RD SPEC    Collection Time: 10/23/23 12:38 PM   Result Value Ref Range    Occult Blood Stool 3 Positive (A) Negative, N/A       Imaging:  None in past 24 hours  CT abdomen pelvis without contrast 10/20/2023 with bilateral hydronephrosis that appears chronic  Ultrasound retroperitoneal 10/20/2023 again with bilateral hydronephrosis    Assessment:  CKD stage 5  Iron-deficiency anemia  Non-anion gap metabolic acidosis  Hx of Prostate and Bladder cancer s/p bladder and prostate resection with reconstruction of bladder (2018)    Plan:  Pretty significant iron def Anemia. Primary consult to GI in place  Agree with Sodium Bicarb but would do BID  Continue IVF  CKD was being followed by our clinic here but patient was lost to follow-up.  Creatinine appears to be at baseline.  Primary team appears to be doing appropriate management with additional above.  Will need to discuss renal replacement therapy options for the future.  Will need follow up in clinic    Staff: Dr. Kodi Plaza, DO  Internal Medicine - PGY-3       Addendum:  Agree NAGMA likely RTA possibly type 1.  Significant iron  deficiency: consider IV iron.  Ivette Mariee M.D.  Nephrology

## 2023-10-23 NOTE — PROGRESS NOTES
"Inpatient Nutrition Evaluation    Admit Date: 10/20/2023   Total duration of encounter: 3 days    Nutrition Recommendation/Prescription     Renal, 80 gm protein diet  Monitor Weight Biweekly     Nutrition Assessment     Chart Review    Reason Seen: continuous nutrition monitoring    Malnutrition Screening Tool Results   Have you recently lost weight without trying?: No  Have you been eating poorly because of a decreased appetite?: No   MST Score: 0     Diagnosis:  Symptomatic Iron Deficiency Anemia, h/o bladder cancer, Urinary retention, Chronic margie hydronephrosis of ureter & kidneys, BENITO on CKD, Rash, Elevated BNP    Relevant Medical History: Bladder cancer, CKD, Chronic hydronephrosis    Nutrition-Related Medications: LR @ 100 ml/hr, Mag Sulfate    Nutrition-Related Labs:  10/23/23 -- H/H 8.2/27.8 L, Glu 88, K 4.6, BUN 54.5 H, Cr 4.7 H, Phos 4.7    Diet Order: DIET RENAL NON-DIALYSIS  Oral Supplement Order: none  Appetite/Oral Intake: good/% of meals  Factors Affecting Nutritional Intake: none identified  Food/Gnosticist/Cultural Preferences: none reported  Food Allergies: none reported       Wound(s):   skin intact    Comments    10/23/23 -- Pt reports good appetite, requesting more food; will order 80 gm protein Renal diet; pt denies wt loss reporting UBW ~190 lb; no n/v, LBM 10/22; H/H (L) - GI Consulted for possible GI bleed noted    Anthropometrics    Height: 5' 10" (177.8 cm) Height Method: Stated  Last Weight: 86.7 kg (191 lb 3.2 oz) (10/21/23 0041) Weight Method: Standard Scale  BMI (Calculated): 27.4  BMI Classification: overweight (BMI 25-29.9)        Ideal Body Weight (IBW), Male: 166 lb     % Ideal Body Weight, Male (lb): 115.18 %                 Usual Body Weight (UBW), k.4 kg  % Usual Body Weight: 100.59     Usual Weight Provided By: patient and patient denies unintentional weight loss    Wt Readings from Last 5 Encounters:   10/21/23 86.7 kg (191 lb 3.2 oz)   23 84.7 kg (186 lb 12.8 " oz)   07/29/21 96 kg (211 lb 10.3 oz)   07/29/21 96 kg (211 lb 10.3 oz)     Weight Change(s) Since Admission:  Admit Weight: 87 kg (191 lb 12.8 oz) (10/20/23 1210)      Patient Education    Not applicable.    Monitoring & Evaluation     Dietitian will monitor food and beverage intake, weight change, electrolyte/renal panel, and gastrointestinal profile.  Nutrition Risk/Follow-Up: low (follow-up in 5-7 days)  Patients assigned 'low nutrition risk' status do not qualify for a full nutritional assessment but will be monitored and re-evaluated in a 5-7 day time period. Please consult if re-evaluation needed sooner.

## 2023-10-23 NOTE — CONSULTS
Gastroenterology/Hepatology Initial Consult Note    Patient Name: Bridget Cardenas Jr.  Age: 62 y.o.  : 1960  MRN: [id number]  Admission Date: 10/20/2023    Reason for Consult:      Medical Management  Chief Complaint   Patient presents with    Shortness of Breath     PT REPORTS SOB UPON EXERTION X 2 WKS.  DENIES CP.  HX OF BLADDER CA, FINISHED TX. PT PALE CAP REFILL > 3 SEC. EKG OBTAINED.  VSS.          Self, Aaareferral    HPI:     Bridget Cardenas Jr. is a 62 y.o. male with a complex PMH significant for bladder cancer status post total cystectomy and prostatectomy in 2018 with neobladder creation, CKD stage 5, and chronic hydronephrosis who presented to [facility] ED on 10/20/23 with a complaint of SOB/DIAZ x3 weeks as well as leg cramps x6 months, often worse at night, and rash on back x3 weeks which is improving with prescribed steroid cream. He denies chest pain, palpitations, n/v, diaphoresis, syncope, abdominal pain, trouble swallowing, weight loss, melena, hematochezia as well as fever, chills, or HA. Pt maintains good appetite and denies any family history of colon cancer. Normal daily BM's which he states have been dark/green recently. He does note that he had mild hematuria (pink tinged) but no gross hematuria. 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    In the ED, labs were concerning for extreme microcytic anemia, H&H 3.5/13.7 with MCV 65.6; normal platelet 204 and mild leukopenia 4.37. Iron panel consistent with LILIANA, ferritin undetectable, retic count WNL. NAGMA with CO2 14 as well, and possible BENITO on CKD with BUN 75.7 and Cr 5.08 though baseline appears around 40/4. Chart review shows labs from  with CKD 4/5, at that time had mild normocytic anemia  13.1/41.5. CT imaging of his abdomen/pelvis and US retroperitoneal both showed symmetric moderate hydronephrosis. The patient was admitted for symptomatic anemia and has received a total of 6 unit pRBC, 1 unit platelet and 1 FFP since admission bringing H&H up to 8.8/30 yesterday which is 8.2/27.8 today. During his stay, there has been questionable BM that were dark and tarry, and FOBT was performed x2 which were hemoccult positive so GI has been consulted for possible GI bleed.    Patient denies every having colonoscopy or EGD performed.         PCP: [facility] -    Past Medical History:   Diagnosis Date    Cancer         History reviewed. No pertinent surgical history.     History reviewed. No pertinent family history.    Social History     Tobacco Use    Smoking status: Former     Current packs/day: 0.00     Types: Cigarettes     Quit date:      Years since quittin.8     Passive exposure: Current    Smokeless tobacco: Current     Types: Chew   Substance Use Topics    Alcohol use: Not on file         Review of patient's allergies indicates:   Allergen Reactions    Pcn [penicillins] Hives        Medications Prior to Admission   Medication Sig Dispense Refill Last Dose    levoFLOXacin (LEVAQUIN) 750 MG tablet Take 1 tablet (750 mg total) by mouth once daily. 7 tablet 0     triamcinolone acetonide 0.1% (KENALOG) 0.1 % cream Apply topically 2 (two) times daily.            INPATIENT MEDICATIONS    Scheduled Meds:   heparin (porcine)  5,000 Units Subcutaneous Q12H    magnesium sulfate IVPB  1 g Intravenous Once    mupirocin   Nasal BID    sodium bicarbonate  650 mg Oral Daily     Continuous Infusions:   lactated ringers 100 mL/hr at 10/23/23 0614     PRN Meds:  0.9%  NaCl infusion (for blood administration), 0.9%  NaCl infusion (for blood administration), 0.9%  NaCl infusion (for blood administration), 0.9%  NaCl infusion (for blood administration), 0.9%  NaCl infusion (for blood  administration), 0.9%  NaCl infusion (for blood administration), acetaminophen, dextrose 10%, dextrose 10%, glucagon (human recombinant), glucose, glucose, influenza, melatonin, naloxone, ondansetron, pneumoc 20-stanford conj-dip cr(PF), prochlorperazine, sodium chloride 0.9%, triamcinolone acetonide 0.1%          Review of Systems:       Review of Systems   Constitutional:  Positive for fatigue. Negative for activity change, appetite change, chills, diaphoresis, fever and unexpected weight change.   Respiratory:  Positive for shortness of breath. Negative for apnea, cough, choking, chest tightness, wheezing and stridor.    Cardiovascular:  Negative for chest pain.   Gastrointestinal:  Negative for abdominal distention, abdominal pain, anal bleeding, blood in stool, change in bowel habit, constipation, diarrhea, nausea, rectal pain, vomiting, reflux and fecal incontinence.   Genitourinary:  Positive for difficulty urinating (self caths several times per day) and hematuria. Negative for dysuria, flank pain and urgency.   Musculoskeletal:  Positive for leg pain (occasional leg cramping worse at night x 6 months).   Neurological:  Negative for syncope and light-headedness.          Objective:       VITAL SIGNS: 24 HR MIN & MAX LAST    Temp  Min: 97.4 °F (36.3 °C)  Max: 98.2 °F (36.8 °C)  97.6 °F (36.4 °C)        BP  Min: 108/70  Max: 128/75  112/70     Pulse  Min: 54  Max: 67  (!) 55     Resp  Min: 14  Max: 18  16    SpO2  Min: 97 %  Max: 98 %  97 %        Physical Exam  Constitutional:       General: He is not in acute distress.     Appearance: Normal appearance. He is normal weight. He is not ill-appearing, toxic-appearing or diaphoretic.   HENT:      Head: Normocephalic and atraumatic.      Nose: Nose normal.      Mouth/Throat:      Mouth: Mucous membranes are moist.   Eyes:      Extraocular Movements: Extraocular movements intact.      Conjunctiva/sclera: Conjunctivae normal.      Pupils: Pupils are equal, round, and  reactive to light.   Cardiovascular:      Rate and Rhythm: Normal rate and regular rhythm.      Pulses: Normal pulses.      Heart sounds: Normal heart sounds. No murmur heard.     No friction rub. No gallop.   Pulmonary:      Effort: Pulmonary effort is normal. No respiratory distress.      Breath sounds: Normal breath sounds. No wheezing, rhonchi or rales.   Abdominal:      General: Abdomen is flat. Bowel sounds are normal. There is no distension.      Palpations: Abdomen is soft.      Tenderness: There is no abdominal tenderness. There is no right CVA tenderness, left CVA tenderness or guarding.   Genitourinary:     Comments: Manzo catheter in place  Musculoskeletal:         General: No swelling, deformity or signs of injury. Normal range of motion.      Cervical back: Normal range of motion.      Right lower leg: No edema.      Left lower leg: No edema.   Skin:     General: Skin is warm and dry.      Capillary Refill: Capillary refill takes 2 to 3 seconds.      Findings: No lesion or rash.   Neurological:      General: No focal deficit present.      Mental Status: He is alert and oriented to person, place, and time. Mental status is at baseline.      Cranial Nerves: No cranial nerve deficit.      Sensory: No sensory deficit.   Psychiatric:         Mood and Affect: Mood normal.         Behavior: Behavior normal.              LABS:      Recent Labs   Lab 10/20/23  1250 10/21/23  0503 10/21/23  1747 10/22/23  0927 10/23/23  0336   WBC 4.37* 5.01  --  5.34 5.50   HGB 3.5* 4.8* 6.7* 8.8* 8.2*   HCT 13.7* 17.3* 23.0* 30.0* 27.8*    188  --  203 195   MCV 65.6* 68.7*  --  75.4* 75.3*       Recent Labs   Lab 10/20/23  1250 10/21/23  0503 10/21/23  1747 10/22/23  0927 10/23/23  0336   HGB 3.5* 4.8* 6.7* 8.8* 8.2*   HCT 13.7* 17.3* 23.0* 30.0* 27.8*        Recent Labs   Lab 10/20/23  1250 10/21/23  0503 10/21/23  1747 10/22/23  0927 10/23/23  0336    139 140 139 140   K 4.7 4.3 4.2 4.2 4.6   CO2 14* 15* 18*  18* 18*   BUN 75.7* 69.1* 60.7* 56.2* 54.5*   CREATININE 5.08* 4.74* 4.71* 4.71* 4.76*   BILITOT 0.4 0.9  --  1.4 0.6   BILIDIR 0.2  --   --   --   --    ALKPHOS 52 47  --  52 45   AST 11 9  --  9 9   ALT 11 9  --  9 8   LABPROT 6.7 6.1  --  6.9 5.8   ALBUMIN 3.7 3.4  --  3.7 3.1*        Recent Labs   Lab 10/20/23  1350   INR 1.2   PROTIME 15.1*        Recent Labs   Lab 10/20/23  1250   IRON 13*   FERRITIN <1.98*          IMAGING:   CT Abdomen Pelvis  Without Contrast    Result Date: 10/20/2023  EXAMINATION: CT ABDOMEN PELVIS WITHOUT CONTRAST CLINICAL HISTORY: neobladder, acute anemia, worsening BENITO; TECHNIQUE: Helically acquired axial images, sagittal and coronal reformations were obtained from the lung bases to the pubic symphysis without the IV administration of contrast. Automated tube current modulation, weight-based exposure dosing, and/or iterative reconstruction technique utilized to reach lowest reasonably achievable exposure rate. DLP: 214 mGy*cm COMPARISON: Retroperitoneal sonogram 10/20/2023, CT abdomen pelvis 06/17/2021 FINDINGS: HEART: There are coronary artery calcifications. LUNG BASES: Chronic elevation the left hemidiaphragm with basilar atelectasis. LIVER: Normal attenuation. No appreciable focal hepatic lesion. BILIARY: No calcified gallstones. PANCREAS: No inflammatory change. SPLEEN: Normal in size ADRENALS: No mass. KIDNEYS/URETERS: Moderate bilateral hydronephrosis.  AP diameter at the right renal pelvis is 2.4 cm.  AP diameter at the left renal pelvis is 2.5 cm.  There is bilateral hydroureter.  No obstructing calculus. GI TRACT/MESENTERY: Evaluation of the bowel is limited without contrast.  No evidence of bowel obstruction or inflammation. PERITONEUM: No free fluid.No free air. LYMPH NODES: There are small retroperitoneal lymph nodes, similar to previous exam. VASCULATURE: No significant atherosclerosis or aneurysm. BLADDER: Patient reportedly status post cystectomy with creation of  neobladder.  There is a Manzo catheter within the urinary bladder which is largely collapsed.  There is expected post instrumentation intraluminal gas. REPRODUCTIVE ORGANS: Presumed prostatectomy. ABDOMINAL WALL: Unremarkable. BONES: Degenerative changes at the hips.     1. Symmetric moderate bilateral hydronephrosis and hydroureter.  No obstructing stone.  This appears to be chronic, similar to previous exam. 2. Neobladder collapsed about a Manzo catheter. Electronically signed by: Cecile Agosto Date:    10/20/2023 Time:    17:34    US Retroperitoneal Complete    Result Date: 10/20/2023  EXAMINATION: US RETROPERITONEAL COMPLETE CLINICAL HISTORY: Domenica; TECHNIQUE: Ultrasound of the kidneys and urinary bladder was performed including color flow and grayscale evaluation of the kidneys. COMPARISON: CT abdomen pelvis 06/17/2021 FINDINGS: RIGHT KIDNEY: The right kidney measures 10.5 cm.  Moderate to severe hydronephrosis.  Renal pelvis measures 3.4 cm anterior-posterior.  Right ureter is dilated where visible proximally.  No appreciable shadowing calculus. LEFT KIDNEY: The left kidney measures 12.4 cm. Moderate hydronephrosis.Renal pelvis measures 3.3 cm anterior-posterior.  No appreciable shadowing renal calculus. BLADDER: Distended neobladder with calculated volume of 1000 mL.     Bilateral hydronephrosis Distended neobladder.  Correlate for urinary retention. Electronically signed by: Cecile Agosto Date:    10/20/2023 Time:    15:23    X-Ray Chest AP Portable    Result Date: 10/20/2023  EXAMINATION: XR CHEST AP PORTABLE CLINICAL HISTORY: Shortness of breath TECHNIQUE: Frontal view(s) of the chest. COMPARISON: No relevant comparison studies available at the time of dictation. FINDINGS: Left-sided MediPort catheter tip near the SVC/RA junction.  Normal cardiac silhouette.  Mild left diaphragm elevation.  No consolidation identified.  No significant pleural effusion or discernible pneumothorax.     No acute pulmonary  process identified. Electronically signed by: Kash Gunderson Date:    10/20/2023 Time:    13:15        Assessment / Plan:       Iron Deficient Anemia  - H&H 3.5/13.7 with MCV 65.6 ---> 8.2/27.8 MCV 75.3  - s/p 6 units pRBC and 1 FFP, 1 platelet  - Iron 13, Ferritin undetectable, iron sat 3, TIBC 365; normal absolute retic count 0.0588  - S/p Ferrlecit 125 mg on 10/21 and again today 10/23; would recommend an additional IV iron while inpatient. Then would benefit from PO iron upon discharge  - S/p 2018 during cystectomy and prostatectomy/neobladder creation  - Pt has had persistent/chronic uremia which could increase likelihood of GI angiodysplasia; no recent scopes to date  - Given severe iron deficient anemia, would likely benefit from urgent endoscopy/colonoscopy  - Will plan for clear liquid diet tomorrow and colon prep, EGD/colonoscopy on 10/25/23. If no clear site of bleeding will likely follow up with pill-camera endoscopy as well      Enrike Coleman MD  [employer] IM PGY III

## 2023-10-23 NOTE — PROGRESS NOTES
[facility] Wards Progress Note     Resident Team: [facility] Medicine List 3  Attending Physician: Claudette Mitchell MD   Resident: Caitlin  Intern: Bertin    Subjective:      Brief HPI:  Bridget Cardenas Jr. is a 62 y.o. male with past medical history of bladder cancer status post total cystectomy and prostatectomy in 2018 with neobladder creation, CKD stage 4/5, chronic hydronephrosis.  He presented to the emergency room with chief complaint of shortness a breath and dyspnea on exertion ongoing for the past 2 weeks.  He denies any chest pain during these episodes.  Denies any hematochezia, hematemesis, melena.  He denies any fever or chills.  After his cystectomy, patient reports of having to self catheterization 4-5 times daily without issues reported.  He endorses having some pink tinged urine ongoing around this time but denies any gross hematuria.  Urology NP at our facility, last seen in August 2023.     In the emergency room, patient was hemodynamically stable with normal vitals.  CBC with leukopenia of 4.37, hemoglobin of 3.5 and 13.7, MCV of 65.6.  CMP with non-anion gap metabolic acidosis with bicarb of 14, BENITO with BUN/creatinine of 75.7/5.08.  BNP mildly elevated at 197.2.  UA with 0 5 RBCs, trace protein trace bacteria.  Ultrasound retroperitoneal shows bilateral hydronephrosis with distended neobladder.  Manzo catheter placed with 1200 mL of urine output.  CT abdomen without contrast pursued showing symmetric moderate bilateral hydronephrosis and hydroureter without obstructing stone which appears to be chronic per Radiology read.  Internal medicine consulted for symptomatic anemia and dyspnea on exertion.     Given patient's drop in hemoglobin without known baseline and BENITO on CKD with hydronephrosis, recommend transfer to higher level of care for urology services.  We do not have Urology Services until 10/24/2023.    Interval History:  Mildly bradycardic this AM but asymptomatic, VSS otherwise and  AF. NAEO documented. Patient resting in bed this AM, reports no change in lightheadedness and dizziness upon standing from seated position. Manzo draining clear yellow urine. RUBY negative for melena and rectal bleeding. Denies chest pain, SOB, abdominal pain, hematuria, CVA tenderness, melena and rectal bleeding.      Review of Systems:  Review of Systems   Constitutional:  Negative for chills, diaphoresis, fatigue and fever.   HENT:  Negative for ear pain, hearing loss, rhinorrhea, sore throat, tinnitus and trouble swallowing.    Eyes:  Negative for pain, redness and visual disturbance.   Respiratory:  Negative for cough, chest tightness and shortness of breath.    Cardiovascular:  Negative for chest pain and palpitations.   Gastrointestinal:  Positive for blood in stool. Negative for abdominal pain, constipation, diarrhea, nausea and vomiting.   Genitourinary:  Negative for difficulty urinating, dysuria, frequency, hematuria and urgency.   Musculoskeletal:  Negative for arthralgias and myalgias.   Skin:  Negative for rash and wound.   Neurological:  Negative for dizziness, seizures, syncope, weakness, light-headedness, numbness and headaches.   Psychiatric/Behavioral:  Negative for confusion.         Objective:     Last 24 Hour Vital Signs:  BP  Min: 108/70  Max: 135/76  Temp  Av.9 °F (36.6 °C)  Min: 97.5 °F (36.4 °C)  Max: 98.2 °F (36.8 °C)  Pulse  Av.6  Min: 54  Max: 65  Resp  Av.4  Min: 14  Max: 18  SpO2  Av.3 %  Min: 97 %  Max: 99 %  I/O last 3 completed shifts:  In: 3586.6 [P.O.:500; I.V.:2493.6; Blood:593]  Out: 87389 [Urine:82732]    Physical Examination:  Physical Exam  Vitals reviewed.   Constitutional:       General: He is not in acute distress.     Appearance: Normal appearance. He is normal weight. He is not ill-appearing.   HENT:      Head: Normocephalic and atraumatic.      Right Ear: External ear normal.      Left Ear: External ear normal.   Eyes:      General: No scleral  icterus.        Right eye: No discharge.         Left eye: No discharge.      Extraocular Movements: Extraocular movements intact.      Conjunctiva/sclera: Conjunctivae normal.      Pupils: Pupils are equal, round, and reactive to light.   Cardiovascular:      Rate and Rhythm: Normal rate and regular rhythm.      Pulses: Normal pulses.      Heart sounds: Normal heart sounds. No murmur heard.     No friction rub. No gallop.   Pulmonary:      Effort: Pulmonary effort is normal. No respiratory distress.      Breath sounds: Normal breath sounds. No stridor. No wheezing, rhonchi or rales.   Abdominal:      General: Abdomen is flat. Bowel sounds are normal. There is no distension.      Palpations: Abdomen is soft.      Tenderness: There is no abdominal tenderness. There is no guarding.   Musculoskeletal:         General: No swelling, tenderness, deformity or signs of injury. Normal range of motion.      Right lower leg: No edema.      Left lower leg: No edema.   Skin:     General: Skin is warm and dry.      Capillary Refill: Capillary refill takes less than 2 seconds.      Coloration: Skin is not jaundiced.      Findings: No bruising, erythema or rash.   Neurological:      Mental Status: He is alert.      Comments: AAO to person, place, time, and situation; CN II-XII grossly intact         Laboratory:  Most Recent Data:  CBC:   Lab Results   Component Value Date    WBC 5.50 10/23/2023    HGB 8.2 (L) 10/23/2023    HCT 27.8 (L) 10/23/2023     10/23/2023    MCV 75.3 (L) 10/23/2023    RDW 22.6 (H) 10/23/2023     WBC Differential:   Recent Labs   Lab 10/20/23  1250 10/21/23  0503 10/21/23  1747 10/22/23  0927 10/23/23  0336   WBC 4.37* 5.01  --  5.34 5.50   HGB 3.5* 4.8* 6.7* 8.8* 8.2*   HCT 13.7* 17.3* 23.0* 30.0* 27.8*    188  --  203 195   MCV 65.6* 68.7*  --  75.4* 75.3*       BMP:   Lab Results   Component Value Date     10/23/2023    K 4.6 10/23/2023    CO2 18 (L) 10/23/2023    BUN 54.5 (H) 10/23/2023  "   CREATININE 4.76 (H) 10/23/2023    CALCIUM 7.3 (L) 10/23/2023    MG 1.80 10/23/2023    PHOS 4.7 10/23/2023     LFTs:   Lab Results   Component Value Date    ALBUMIN 3.1 (L) 10/23/2023    BILITOT 0.6 10/23/2023    AST 9 10/23/2023    ALKPHOS 45 10/23/2023    ALT 8 10/23/2023     Coags:   Lab Results   Component Value Date    INR 1.2 10/20/2023    PROTIME 15.1 (H) 10/20/2023     FLP: No results found for: "CHOL", "HDL", "LDLCALC", "TRIG", "CHOLHDL"  DM:   Lab Results   Component Value Date    CREATININE 4.76 (H) 10/23/2023     Thyroid: No results found for: "TSH", "FREET4", "N6BILDX", "Q9PKKLB", "THYROIDAB"  Anemia:   Lab Results   Component Value Date    IRON 13 (L) 10/20/2023    TIBC 378 10/20/2023    FERRITIN <1.98 (L) 10/20/2023     Cardiac:   Lab Results   Component Value Date    TROPONINI 0.028 10/20/2023    .2 (H) 10/20/2023     Urinalysis:   Lab Results   Component Value Date    LABURIN (A) 08/11/2023     >/= 100,000 colonies/ml Klebsiella pneumoniae ssp ozaenae    LABURIN 10,000 - 25,000 colonies/ml Proteus vulgaris (A) 08/11/2023    COLORU Dark Luisa 08/11/2023    PHUA 6.0 10/20/2023    SPECGRAV 1.020 08/11/2023    NITRITE Positive 08/11/2023    KETONESU Negative 08/11/2023    UROBILINOGEN Normal 10/20/2023    WBCUA 0-5 10/20/2023       Radiology:  Imaging Results              CT Abdomen Pelvis  Without Contrast (Final result)  Result time 10/20/23 17:34:12      Final result by Cecile Agosto MD (10/20/23 17:34:12)                   Impression:      1. Symmetric moderate bilateral hydronephrosis and hydroureter.  No obstructing stone.  This appears to be chronic, similar to previous exam.  2. Neobladder collapsed about a Manzo catheter.      Electronically signed by: Cecile Agosto  Date:    10/20/2023  Time:    17:34               Narrative:    EXAMINATION:  CT ABDOMEN PELVIS WITHOUT CONTRAST    CLINICAL HISTORY:  neobladder, acute anemia, worsening BENITO;    TECHNIQUE:  Helically acquired " axial images, sagittal and coronal reformations were obtained from the lung bases to the pubic symphysis without the IV administration of contrast.    Automated tube current modulation, weight-based exposure dosing, and/or iterative reconstruction technique utilized to reach lowest reasonably achievable exposure rate.    DLP: 214 mGy*cm    COMPARISON:  Retroperitoneal sonogram 10/20/2023, CT abdomen pelvis 06/17/2021    FINDINGS:  HEART: There are coronary artery calcifications.    LUNG BASES: Chronic elevation the left hemidiaphragm with basilar atelectasis.    LIVER: Normal attenuation. No appreciable focal hepatic lesion.    BILIARY: No calcified gallstones.    PANCREAS: No inflammatory change.    SPLEEN: Normal in size    ADRENALS: No mass.    KIDNEYS/URETERS: Moderate bilateral hydronephrosis.  AP diameter at the right renal pelvis is 2.4 cm.  AP diameter at the left renal pelvis is 2.5 cm.  There is bilateral hydroureter.  No obstructing calculus.    GI TRACT/MESENTERY: Evaluation of the bowel is limited without contrast.  No evidence of bowel obstruction or inflammation.    PERITONEUM: No free fluid.No free air.    LYMPH NODES: There are small retroperitoneal lymph nodes, similar to previous exam.    VASCULATURE: No significant atherosclerosis or aneurysm.    BLADDER: Patient reportedly status post cystectomy with creation of neobladder.  There is a Manzo catheter within the urinary bladder which is largely collapsed.  There is expected post instrumentation intraluminal gas.    REPRODUCTIVE ORGANS: Presumed prostatectomy.    ABDOMINAL WALL: Unremarkable.    BONES: Degenerative changes at the hips.                                       US Retroperitoneal Complete (Final result)  Result time 10/20/23 15:23:14      Final result by Cecile Agosto MD (10/20/23 15:23:14)                   Impression:      Bilateral hydronephrosis    Distended neobladder.  Correlate for urinary retention.      Electronically  signed by: Cecile Agosto  Date:    10/20/2023  Time:    15:23               Narrative:    EXAMINATION:  US RETROPERITONEAL COMPLETE    CLINICAL HISTORY:  Domenica;    TECHNIQUE:  Ultrasound of the kidneys and urinary bladder was performed including color flow and grayscale evaluation of the kidneys.    COMPARISON:  CT abdomen pelvis 06/17/2021    FINDINGS:  RIGHT KIDNEY: The right kidney measures 10.5 cm.  Moderate to severe hydronephrosis.  Renal pelvis measures 3.4 cm anterior-posterior.  Right ureter is dilated where visible proximally.  No appreciable shadowing calculus.    LEFT KIDNEY: The left kidney measures 12.4 cm. Moderate hydronephrosis.Renal pelvis measures 3.3 cm anterior-posterior.  No appreciable shadowing renal calculus.    BLADDER: Distended neobladder with calculated volume of 1000 mL.                                       X-Ray Chest AP Portable (Final result)  Result time 10/20/23 13:15:42      Final result by Kash Gunderson MD (10/20/23 13:15:42)                   Impression:      No acute pulmonary process identified.      Electronically signed by: Kash Gunderson  Date:    10/20/2023  Time:    13:15               Narrative:    EXAMINATION:  XR CHEST AP PORTABLE    CLINICAL HISTORY:  Shortness of breath    TECHNIQUE:  Frontal view(s) of the chest.    COMPARISON:  No relevant comparison studies available at the time of dictation.    FINDINGS:  Left-sided MediPort catheter tip near the SVC/RA junction.  Normal cardiac silhouette.  Mild left diaphragm elevation.  No consolidation identified.  No significant pleural effusion or discernible pneumothorax.                                    Current Medications:     Infusions:   lactated ringers 100 mL/hr at 10/23/23 0614        Scheduled:   heparin (porcine)  5,000 Units Subcutaneous Q12H    mupirocin   Nasal BID    sodium bicarbonate  650 mg Oral Daily        PRN:  0.9%  NaCl infusion (for blood administration), 0.9%  NaCl infusion (for blood  administration), 0.9%  NaCl infusion (for blood administration), 0.9%  NaCl infusion (for blood administration), 0.9%  NaCl infusion (for blood administration), 0.9%  NaCl infusion (for blood administration), acetaminophen, dextrose 10%, dextrose 10%, glucagon (human recombinant), glucose, glucose, influenza, melatonin, naloxone, ondansetron, pneumoc 20-stanford conj-dip cr(PF), prochlorperazine, sodium chloride 0.9%, triamcinolone acetonide 0.1%  Assessment & Plan:   Symptomatic iron-deficiency anemia  -H/H stable this AM, currently 8.2/27.8   -Iron panel, ferritin, reticulocyte count consistent with iron-deficiency anemia, will require additional IV iron supplementation   -Reported blood-tinged urine that ceased approximately 2 weeks prior to presentation; now reporting melanotic stools vs green stools?   -FOBT positive x 3, however RUBY negative for melena or rectal bleeding   -S/p 6 units PRBCs, 1 unit platelets, and 1 unit FFP total to date, did not require any blood products overnight   -GI consulted for possible GI bleed     History of bladder cancer status post total cystectomy and prostatectomy in 2018 with neobladder creation  Urinary retention  Chronic bilateral hydronephrosis of ureter and kidneys  BENITO on CKD stage V  History of UTIs  -CT abdomen-pelvis without contrast showed symmetric, moderate bilateral hydronephrosis and hydroureter which appears to be chronic in nature  -UA negative for UTI, Manzo output is clear and yellow   -Continue Manzo to gravity  -Consulted Nephrology for CKD   -Plan to consult Urology tomorrow AM     Normal anion gap metabolic acidosis  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    Elevated BNP  -.2 and Troponin 0.028 on admission, possibly related to renal status   -EKG wnl   -No history of heart disease per patient and chart review  -Consider pursuing TTE if DIAZ persists to evaluate heart function     Rash  -Continue  outpatient steroid ointment as prescribed    Access: Peripheral   Antibiotics: None   Diet: Renal Non-dialysis   DVT Prophylaxis: Heparin   GI Prophylaxis: None  Fluids: LR 100cc/hr   Code Status: Full     Disposition:  62 y.o. male with past medical history of bladder cancer status post total cystectomy and prostatectomy in 2018 with neobladder creation, CKD stage 4/5, chronic hydronephrosis admitted for symptomatic anemia. H/H stable this AM. GI and Nephrology consulted today, plan to consult Urology tomorrow AM.       Syed Denton MD  [facility] HO-1

## 2023-10-23 NOTE — PLAN OF CARE
10/23/23 1100   Discharge Assessment   Assessment Type Discharge Planning Assessment   Confirmed/corrected address, phone number and insurance Yes   Confirmed Demographics Correct on Facesheet   Source of Information patient   When was your last doctors appointment?   ([facility])   Reason For Admission CP, dyspnea on exertion   People in Home spouse  (Isadora Cardenas (Spouse)   [phone])   Facility Arrived From: home   Do you expect to return to your current living situation? Yes   Do you have help at home or someone to help you manage your care at home? Yes   Who are your caregiver(s) and their phone number(s)? Isadora Cardenas (Spouse)   [phone]   Prior to hospitilization cognitive status: Unable to Assess   Current cognitive status: Alert/Oriented   Equipment Currently Used at Home none   Readmission within 30 days? No   Patient currently being followed by outpatient case management? No   Do you currently have service(s) that help you manage your care at home? No   Do you take prescription medications? Yes   Do you have prescription coverage? Yes   Coverage BLUE CROSS BLUE SHIELD - [employer] BLUE SAVER PPO - [place]   Do you have any problems affording any of your prescribed medications? No   Is the patient taking medications as prescribed? yes   Who is going to help you get home at discharge? spouse   How do you get to doctors appointments? car, drives self   Are you on dialysis? No   Do you take coumadin? No   DME Needed Upon Discharge  none   Discharge Plan discussed with: Patient   Discharge Plan A Home with family   Physical Activity   On average, how many days per week do you engage in moderate to strenuous exercise (like a brisk walk)? 0 days   On average, how many minutes do you engage in exercise at this level? 0 min   Financial Resource Strain   How hard is it for you to pay for the very basics like food, housing, medical care, and heating? Not hard   Housing Stability   In the last 12  months, was there a time when you were not able to pay the mortgage or rent on time? N   In the last 12 months, was there a time when you did not have a steady place to sleep or slept in a shelter (including now)? N   Transportation Needs   In the past 12 months, has lack of transportation kept you from medical appointments or from getting medications? no   In the past 12 months, has lack of transportation kept you from meetings, work, or from getting things needed for daily living? No   Food Insecurity   Within the past 12 months, you worried that your food would run out before you got the money to buy more. Never true   Within the past 12 months, the food you bought just didn't last and you didn't have money to get more. Never true   Stress   Do you feel stress - tense, restless, nervous, or anxious, or unable to sleep at night because your mind is troubled all the time - these days? Not at all   Social Connections   In a typical week, how many times do you talk on the phone with family, friends, or neighbors? More than 3   How often do you get together with friends or relatives? More than 3   How often do you attend Latter day or Voodoo services? Never   Do you belong to any clubs or organizations such as Latter day groups, unions, fraternal or athletic groups, or school groups? No   How often do you attend meetings of the clubs or organizations you belong to? Never   Are you , , , , never , or living with a partner?    Alcohol Use   Q1: How often do you have a drink containing alcohol? Never   Q2: How many drinks containing alcohol do you have on a typical day when you are drinking? None   Q3: How often do you have six or more drinks on one occasion? Never

## 2023-10-24 PROBLEM — Z85.51 HISTORY OF BLADDER CANCER: Status: ACTIVE | Noted: 2023-10-24

## 2023-10-24 PROBLEM — D64.9 ACUTE ANEMIA: Status: ACTIVE | Noted: 2023-10-24

## 2023-10-24 PROBLEM — N18.9 CHRONIC KIDNEY DISEASE: Status: ACTIVE | Noted: 2023-10-24

## 2023-10-24 PROBLEM — R06.09 DYSPNEA ON EXERTION: Status: ACTIVE | Noted: 2023-10-24

## 2023-10-24 LAB
ABO + RH BLD: NORMAL
ABO + RH BLD: NORMAL
ALBUMIN SERPL-MCNC: 3.1 G/DL (ref 3.4–4.8)
ALBUMIN/GLOB SERPL: 1.1 RATIO (ref 1.1–2)
ALP SERPL-CCNC: 47 UNIT/L (ref 40–150)
ALT SERPL-CCNC: 7 UNIT/L (ref 0–55)
AST SERPL-CCNC: 10 UNIT/L (ref 5–34)
BASOPHILS # BLD AUTO: 0.05 X10(3)/MCL
BASOPHILS NFR BLD AUTO: 0.9 %
BILIRUB SERPL-MCNC: 0.5 MG/DL
BLD PROD TYP BPU: NORMAL
BLD PROD TYP BPU: NORMAL
BLOOD UNIT EXPIRATION DATE: NORMAL
BLOOD UNIT EXPIRATION DATE: NORMAL
BLOOD UNIT TYPE CODE: 7300
BLOOD UNIT TYPE CODE: 7300
BUN SERPL-MCNC: 51.5 MG/DL (ref 8.4–25.7)
CALCIUM SERPL-MCNC: 7.6 MG/DL (ref 8.8–10)
CHLORIDE SERPL-SCNC: 110 MMOL/L (ref 98–107)
CO2 SERPL-SCNC: 19 MMOL/L (ref 23–31)
CREAT SERPL-MCNC: 4.39 MG/DL (ref 0.73–1.18)
CROSSMATCH INTERPRETATION: NORMAL
CROSSMATCH INTERPRETATION: NORMAL
DISPENSE STATUS: NORMAL
DISPENSE STATUS: NORMAL
EOSINOPHIL # BLD AUTO: 0.34 X10(3)/MCL (ref 0–0.9)
EOSINOPHIL NFR BLD AUTO: 6 %
ERYTHROCYTE [DISTWIDTH] IN BLOOD BY AUTOMATED COUNT: 23.2 % (ref 11.5–17)
GFR SERPLBLD CREATININE-BSD FMLA CKD-EPI: 15 MLS/MIN/1.73/M2
GLOBULIN SER-MCNC: 2.7 GM/DL (ref 2.4–3.5)
GLUCOSE SERPL-MCNC: 90 MG/DL (ref 82–115)
HCT VFR BLD AUTO: 28.6 % (ref 42–52)
HGB BLD-MCNC: 8.3 G/DL (ref 14–18)
IMM GRANULOCYTES # BLD AUTO: 0.02 X10(3)/MCL (ref 0–0.04)
IMM GRANULOCYTES NFR BLD AUTO: 0.4 %
LYMPHOCYTES # BLD AUTO: 1.73 X10(3)/MCL (ref 0.6–4.6)
LYMPHOCYTES NFR BLD AUTO: 30.4 %
MAGNESIUM SERPL-MCNC: 1.8 MG/DL (ref 1.6–2.6)
MCH RBC QN AUTO: 22 PG (ref 27–31)
MCHC RBC AUTO-ENTMCNC: 29 G/DL (ref 33–36)
MCV RBC AUTO: 75.7 FL (ref 80–94)
MONOCYTES # BLD AUTO: 0.6 X10(3)/MCL (ref 0.1–1.3)
MONOCYTES NFR BLD AUTO: 10.5 %
NEUTROPHILS # BLD AUTO: 2.95 X10(3)/MCL (ref 2.1–9.2)
NEUTROPHILS NFR BLD AUTO: 51.8 %
NRBC BLD AUTO-RTO: 0.4 %
PHOSPHATE SERPL-MCNC: 4.7 MG/DL (ref 2.3–4.7)
PLATELET # BLD AUTO: 204 X10(3)/MCL (ref 130–400)
PMV BLD AUTO: 10.1 FL (ref 7.4–10.4)
POTASSIUM SERPL-SCNC: 4.6 MMOL/L (ref 3.5–5.1)
PROT SERPL-MCNC: 5.8 GM/DL (ref 5.8–7.6)
RBC # BLD AUTO: 3.78 X10(6)/MCL (ref 4.7–6.1)
RBCS: NORMAL
SODIUM SERPL-SCNC: 138 MMOL/L (ref 136–145)
UNIT NUMBER: NORMAL
UNIT NUMBER: NORMAL
WBC # SPEC AUTO: 5.69 X10(3)/MCL (ref 4.5–11.5)

## 2023-10-24 PROCEDURE — 80053 COMPREHEN METABOLIC PANEL: CPT

## 2023-10-24 PROCEDURE — S4991 NICOTINE PATCH NONLEGEND: HCPCS

## 2023-10-24 PROCEDURE — 25000003 PHARM REV CODE 250

## 2023-10-24 PROCEDURE — 83735 ASSAY OF MAGNESIUM: CPT

## 2023-10-24 PROCEDURE — 25000003 PHARM REV CODE 250: Performed by: INTERNAL MEDICINE

## 2023-10-24 PROCEDURE — 25000003 PHARM REV CODE 250: Performed by: STUDENT IN AN ORGANIZED HEALTH CARE EDUCATION/TRAINING PROGRAM

## 2023-10-24 PROCEDURE — 85025 COMPLETE CBC W/AUTO DIFF WBC: CPT

## 2023-10-24 PROCEDURE — 94761 N-INVAS EAR/PLS OXIMETRY MLT: CPT

## 2023-10-24 PROCEDURE — 21400001 HC TELEMETRY ROOM

## 2023-10-24 PROCEDURE — 84100 ASSAY OF PHOSPHORUS: CPT

## 2023-10-24 PROCEDURE — 63600175 PHARM REV CODE 636 W HCPCS

## 2023-10-24 RX ORDER — SODIUM BICARBONATE 650 MG/1
1300 TABLET ORAL 2 TIMES DAILY
Status: DISCONTINUED | OUTPATIENT
Start: 2023-10-24 | End: 2023-10-26 | Stop reason: HOSPADM

## 2023-10-24 RX ORDER — IBUPROFEN 200 MG
1 TABLET ORAL DAILY
Status: DISCONTINUED | OUTPATIENT
Start: 2023-10-24 | End: 2023-10-26 | Stop reason: HOSPADM

## 2023-10-24 RX ORDER — MAGNESIUM SULFATE 1 G/100ML
1 INJECTION INTRAVENOUS ONCE
Status: DISCONTINUED | OUTPATIENT
Start: 2023-10-24 | End: 2023-10-24

## 2023-10-24 RX ADMIN — MUPIROCIN: 20 OINTMENT TOPICAL at 09:10

## 2023-10-24 RX ADMIN — SODIUM BICARBONATE 650 MG TABLET 650 MG: at 08:10

## 2023-10-24 RX ADMIN — SODIUM CHLORIDE, POTASSIUM CHLORIDE, SODIUM LACTATE AND CALCIUM CHLORIDE: 600; 310; 30; 20 INJECTION, SOLUTION INTRAVENOUS at 12:10

## 2023-10-24 RX ADMIN — NICOTINE 1 PATCH: 21 PATCH, EXTENDED RELEASE TRANSDERMAL at 09:10

## 2023-10-24 RX ADMIN — SODIUM CHLORIDE, POTASSIUM CHLORIDE, SODIUM LACTATE AND CALCIUM CHLORIDE: 600; 310; 30; 20 INJECTION, SOLUTION INTRAVENOUS at 10:10

## 2023-10-24 RX ADMIN — MUPIROCIN: 20 OINTMENT TOPICAL at 08:10

## 2023-10-24 RX ADMIN — HEPARIN SODIUM 5000 UNITS: 5000 INJECTION, SOLUTION INTRAVENOUS; SUBCUTANEOUS at 08:10

## 2023-10-24 RX ADMIN — SODIUM BICARBONATE 650 MG TABLET 1300 MG: at 08:10

## 2023-10-24 RX ADMIN — POLYETHYLENE GLYCOL 3350, SODIUM SULFATE ANHYDROUS, SODIUM BICARBONATE, SODIUM CHLORIDE, POTASSIUM CHLORIDE 2000 ML: 236; 22.74; 6.74; 5.86; 2.97 POWDER, FOR SOLUTION ORAL at 05:10

## 2023-10-24 NOTE — PROGRESS NOTES
Gastroenterology/Hepatology Initial Consult Note    Patient Name: Bridget Cardenas Jr.  Age: 62 y.o.  : 1960  MRN: [id number]  Admission Date: 10/20/2023    Reason for Consult:      Medical Management  Chief Complaint   Patient presents with    Shortness of Breath     PT REPORTS SOB UPON EXERTION X 2 WKS.  DENIES CP.  HX OF BLADDER CA, FINISHED TX. PT PALE CAP REFILL > 3 SEC. EKG OBTAINED.  VSS.          Self, Aaareferral    HPI:     Bridget Cardenas Jr. is a 62 y.o. male with a complex PMH significant for bladder cancer status post total cystectomy and prostatectomy in 2018 with neobladder creation, CKD stage 5, and chronic hydronephrosis who presented to [facility] ED on 10/20/23 with a complaint of SOB/DIAZ x3 weeks as well as leg cramps x6 months, often worse at night, and rash on back x3 weeks which is improving with prescribed steroid cream. He denies chest pain, palpitations, n/v, diaphoresis, syncope, abdominal pain, trouble swallowing, weight loss, melena, hematochezia as well as fever, chills, or HA. Pt maintains good appetite and denies any family history of colon cancer. Normal daily BM's which he states have been dark/green recently. He does note that he had mild hematuria (pink tinged) but no gross hematuria. 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    In the ED, labs were concerning for extreme microcytic anemia, H&H 3.5/13.7 with MCV 65.6; normal platelet 204 and mild leukopenia 4.37. Iron panel consistent with LILIANA, ferritin undetectable, retic count WNL. NAGMA with CO2 14 as well, and possible BENITO on CKD with BUN 75.7 and Cr 5.08 though baseline appears around 40/4. Chart review shows labs from  with CKD 4/5, at that time had mild normocytic anemia  13.1/41.5. CT imaging of his abdomen/pelvis and US retroperitoneal both showed symmetric moderate hydronephrosis. The patient was admitted for symptomatic anemia and has received a total of 6 unit pRBC, 1 unit platelet and 1 FFP since admission bringing H&H up to 8.8/30 yesterday which is 8.2/27.8 today. During his stay, there has been questionable BM that were dark and tarry, and FOBT was performed x2 which were hemoccult positive so GI has been consulted for possible GI bleed.    Patient denies every having colonoscopy or EGD performed.     Interval Hx:  NAEO. Patient denies any complaints at this time; denies abdominal pain, melena, hematochezia. On clear liquids, plan for EGD/colonoscopy tomorrow.      PCP: [facility] -    Past Medical History:   Diagnosis Date    Cancer         History reviewed. No pertinent surgical history.     History reviewed. No pertinent family history.    Social History     Tobacco Use    Smoking status: Former     Current packs/day: 0.00     Types: Cigarettes     Quit date:      Years since quittin.8     Passive exposure: Current    Smokeless tobacco: Current     Types: Chew   Substance Use Topics    Alcohol use: Not on file         Review of patient's allergies indicates:   Allergen Reactions    Pcn [penicillins] Hives        Medications Prior to Admission   Medication Sig Dispense Refill Last Dose    levoFLOXacin (LEVAQUIN) 750 MG tablet Take 1 tablet (750 mg total) by mouth once daily. 7 tablet 0     triamcinolone acetonide 0.1% (KENALOG) 0.1 % cream Apply topically 2 (two) times daily.            INPATIENT MEDICATIONS    Scheduled Meds:   heparin (porcine)  5,000 Units Subcutaneous Q12H    mupirocin   Nasal BID    nicotine  1 patch Transdermal Daily    polyethylene glycol  2,000 mL Oral Q8H    sodium bicarbonate  650 mg Oral BID     Continuous Infusions:   lactated ringers 100 mL/hr at 10/24/23 0656     PRN Meds:  0.9%  NaCl infusion (for blood  administration), 0.9%  NaCl infusion (for blood administration), 0.9%  NaCl infusion (for blood administration), 0.9%  NaCl infusion (for blood administration), 0.9%  NaCl infusion (for blood administration), 0.9%  NaCl infusion (for blood administration), acetaminophen, dextrose 10%, dextrose 10%, glucagon (human recombinant), glucose, glucose, influenza, melatonin, naloxone, ondansetron, pneumoc 20-stanford conj-dip cr(PF), prochlorperazine, sodium chloride 0.9%, triamcinolone acetonide 0.1%          Review of Systems:       Review of Systems   Constitutional:  Negative for activity change, appetite change, chills, diaphoresis, fatigue, fever and unexpected weight change.   Respiratory:  Negative for apnea, cough, choking, chest tightness, shortness of breath, wheezing and stridor.    Cardiovascular:  Negative for chest pain.   Gastrointestinal:  Negative for abdominal distention, abdominal pain, anal bleeding, blood in stool, change in bowel habit, constipation, diarrhea, nausea, rectal pain, vomiting, reflux and fecal incontinence.   Genitourinary:  Positive for difficulty urinating (self caths several times per day) and hematuria. Negative for dysuria, flank pain and urgency.   Musculoskeletal:  Positive for leg pain (occasional leg cramping worse at night x 6 months).   Neurological:  Negative for syncope and light-headedness.          Objective:       VITAL SIGNS: 24 HR MIN & MAX LAST    Temp  Min: 97.5 °F (36.4 °C)  Max: 98.4 °F (36.9 °C)  97.5 °F (36.4 °C)        BP  Min: 112/70  Max: 145/79  (!) 145/79     Pulse  Min: 54  Max: 65  (!) 56     Resp  Min: 16  Max: 18  18    SpO2  Min: 97 %  Max: 99 %  98 %        Physical Exam  Constitutional:       General: He is not in acute distress.     Appearance: Normal appearance. He is normal weight. He is not ill-appearing, toxic-appearing or diaphoretic.   HENT:      Head: Normocephalic and atraumatic.      Nose: Nose normal.      Mouth/Throat:      Mouth: Mucous  membranes are moist.   Eyes:      Extraocular Movements: Extraocular movements intact.      Conjunctiva/sclera: Conjunctivae normal.      Pupils: Pupils are equal, round, and reactive to light.   Cardiovascular:      Rate and Rhythm: Normal rate and regular rhythm.      Pulses: Normal pulses.      Heart sounds: Normal heart sounds. No murmur heard.     No friction rub. No gallop.   Pulmonary:      Effort: Pulmonary effort is normal. No respiratory distress.      Breath sounds: Normal breath sounds. No wheezing, rhonchi or rales.   Abdominal:      General: Abdomen is flat. Bowel sounds are normal. There is no distension.      Palpations: Abdomen is soft.      Tenderness: There is no abdominal tenderness. There is no right CVA tenderness, left CVA tenderness or guarding.   Genitourinary:     Comments: Manzo catheter in place  Musculoskeletal:         General: No swelling, deformity or signs of injury. Normal range of motion.      Cervical back: Normal range of motion.      Right lower leg: No edema.      Left lower leg: No edema.   Skin:     General: Skin is warm and dry.      Capillary Refill: Capillary refill takes 2 to 3 seconds.      Findings: No lesion or rash.   Neurological:      General: No focal deficit present.      Mental Status: He is alert and oriented to person, place, and time. Mental status is at baseline.      Cranial Nerves: No cranial nerve deficit.      Sensory: No sensory deficit.   Psychiatric:         Mood and Affect: Mood normal.         Behavior: Behavior normal.              LABS:      Recent Labs   Lab 10/20/23  1250 10/21/23  0503 10/21/23  1747 10/22/23  0927 10/23/23  0336 10/24/23  0408   WBC 4.37* 5.01  --  5.34 5.50 5.69   HGB 3.5* 4.8* 6.7* 8.8* 8.2* 8.3*   HCT 13.7* 17.3* 23.0* 30.0* 27.8* 28.6*    188  --  203 195 204   MCV 65.6* 68.7*  --  75.4* 75.3* 75.7*         Recent Labs   Lab 10/20/23  1250 10/21/23  0503 10/21/23  1747 10/22/23  0927 10/23/23  0336 10/24/23  0408    HGB 3.5* 4.8* 6.7* 8.8* 8.2* 8.3*   HCT 13.7* 17.3* 23.0* 30.0* 27.8* 28.6*          Recent Labs   Lab 10/20/23  1250 10/21/23  0503 10/21/23  1747 10/22/23  0927 10/23/23  0336 10/24/23  0408    139 140 139 140 138   K 4.7 4.3 4.2 4.2 4.6 4.6   CO2 14* 15* 18* 18* 18* 19*   BUN 75.7* 69.1* 60.7* 56.2* 54.5* 51.5*   CREATININE 5.08* 4.74* 4.71* 4.71* 4.76* 4.39*   BILITOT 0.4 0.9  --  1.4 0.6 0.5   BILIDIR 0.2  --   --   --   --   --    ALKPHOS 52 47  --  52 45 47   AST 11 9  --  9 9 10   ALT 11 9  --  9 8 7   LABPROT 6.7 6.1  --  6.9 5.8 5.8   ALBUMIN 3.7 3.4  --  3.7 3.1* 3.1*          Recent Labs   Lab 10/20/23  1350   INR 1.2   PROTIME 15.1*          Recent Labs   Lab 10/20/23  1250   IRON 13*   FERRITIN <1.98*            IMAGING:   CT Abdomen Pelvis  Without Contrast    Result Date: 10/20/2023  EXAMINATION: CT ABDOMEN PELVIS WITHOUT CONTRAST CLINICAL HISTORY: neobladder, acute anemia, worsening BENITO; TECHNIQUE: Helically acquired axial images, sagittal and coronal reformations were obtained from the lung bases to the pubic symphysis without the IV administration of contrast. Automated tube current modulation, weight-based exposure dosing, and/or iterative reconstruction technique utilized to reach lowest reasonably achievable exposure rate. DLP: 214 mGy*cm COMPARISON: Retroperitoneal sonogram 10/20/2023, CT abdomen pelvis 06/17/2021 FINDINGS: HEART: There are coronary artery calcifications. LUNG BASES: Chronic elevation the left hemidiaphragm with basilar atelectasis. LIVER: Normal attenuation. No appreciable focal hepatic lesion. BILIARY: No calcified gallstones. PANCREAS: No inflammatory change. SPLEEN: Normal in size ADRENALS: No mass. KIDNEYS/URETERS: Moderate bilateral hydronephrosis.  AP diameter at the right renal pelvis is 2.4 cm.  AP diameter at the left renal pelvis is 2.5 cm.  There is bilateral hydroureter.  No obstructing calculus. GI TRACT/MESENTERY: Evaluation of the bowel is limited  without contrast.  No evidence of bowel obstruction or inflammation. PERITONEUM: No free fluid.No free air. LYMPH NODES: There are small retroperitoneal lymph nodes, similar to previous exam. VASCULATURE: No significant atherosclerosis or aneurysm. BLADDER: Patient reportedly status post cystectomy with creation of neobladder.  There is a Manzo catheter within the urinary bladder which is largely collapsed.  There is expected post instrumentation intraluminal gas. REPRODUCTIVE ORGANS: Presumed prostatectomy. ABDOMINAL WALL: Unremarkable. BONES: Degenerative changes at the hips.     1. Symmetric moderate bilateral hydronephrosis and hydroureter.  No obstructing stone.  This appears to be chronic, similar to previous exam. 2. Neobladder collapsed about a Manzo catheter. Electronically signed by: Cecile Agosto Date:    10/20/2023 Time:    17:34    US Retroperitoneal Complete    Result Date: 10/20/2023  EXAMINATION: US RETROPERITONEAL COMPLETE CLINICAL HISTORY: Domenica; TECHNIQUE: Ultrasound of the kidneys and urinary bladder was performed including color flow and grayscale evaluation of the kidneys. COMPARISON: CT abdomen pelvis 06/17/2021 FINDINGS: RIGHT KIDNEY: The right kidney measures 10.5 cm.  Moderate to severe hydronephrosis.  Renal pelvis measures 3.4 cm anterior-posterior.  Right ureter is dilated where visible proximally.  No appreciable shadowing calculus. LEFT KIDNEY: The left kidney measures 12.4 cm. Moderate hydronephrosis.Renal pelvis measures 3.3 cm anterior-posterior.  No appreciable shadowing renal calculus. BLADDER: Distended neobladder with calculated volume of 1000 mL.     Bilateral hydronephrosis Distended neobladder.  Correlate for urinary retention. Electronically signed by: Cecile Agosto Date:    10/20/2023 Time:    15:23    X-Ray Chest AP Portable    Result Date: 10/20/2023  EXAMINATION: XR CHEST AP PORTABLE CLINICAL HISTORY: Shortness of breath TECHNIQUE: Frontal view(s) of the chest.  COMPARISON: No relevant comparison studies available at the time of dictation. FINDINGS: Left-sided MediPort catheter tip near the SVC/RA junction.  Normal cardiac silhouette.  Mild left diaphragm elevation.  No consolidation identified.  No significant pleural effusion or discernible pneumothorax.     No acute pulmonary process identified. Electronically signed by: Kash Gunderson Date:    10/20/2023 Time:    13:15        Assessment / Plan:       Iron Deficient Anemia  - H&H 3.5/13.7 with MCV 65.6 ---> 8.3/28.6 MCV 75.7; stable H&H  - s/p 6 units pRBC and 1 FFP, 1 platelet  - Iron 13, Ferritin undetectable, iron sat 3, TIBC 365; normal absolute retic count 0.0588  - S/p Ferrlecit 125 mg on 10/21 and again today 10/23; would recommend an additional IV iron while inpatient. Then would benefit from PO iron upon discharge  - S/p 2018 during cystectomy and prostatectomy/neobladder creation  - Pt has had persistent/chronic uremia which could increase likelihood of GI angiodysplasia; no recent scopes to date  - Given severe iron deficient anemia, would likely benefit from urgent endoscopy/colonoscopy  - Clear liquids today, colon prep tonight, EGD/colonoscopy tomorrow 10/25/23. If no clear site of bleeding will likely follow up with pill-camera endoscopy as well      Enrike Coleman MD  [employer] IM PGY III

## 2023-10-24 NOTE — PROGRESS NOTES
[facility] Wards Progress Note     Resident Team: [facility] Medicine List 3  Attending Physician: Claudette Mitchell MD   Resident: Caitlin  Intern: Betrin    Subjective:      Brief HPI:  Bridget Cardenas Jr. is a 62 y.o. male with past medical history of bladder cancer status post total cystectomy and prostatectomy in 2018 with neobladder creation, CKD stage 4/5, chronic hydronephrosis.  He presented to the emergency room with chief complaint of shortness a breath and dyspnea on exertion ongoing for the past 2 weeks.  He denies any chest pain during these episodes.  Denies any hematochezia, hematemesis, melena.  He denies any fever or chills.  After his cystectomy, patient reports of having to self catheterization 4-5 times daily without issues reported.  He endorses having some pink tinged urine ongoing around this time but denies any gross hematuria.  Urology NP at our facility, last seen in August 2023.     In the emergency room, patient was hemodynamically stable with normal vitals.  CBC with leukopenia of 4.37, hemoglobin of 3.5 and 13.7, MCV of 65.6.  CMP with non-anion gap metabolic acidosis with bicarb of 14, BENITO with BUN/creatinine of 75.7/5.08.  BNP mildly elevated at 197.2.  UA with 0 5 RBCs, trace protein trace bacteria.  Ultrasound retroperitoneal shows bilateral hydronephrosis with distended neobladder.  Manzo catheter placed with 1200 mL of urine output.  CT abdomen without contrast pursued showing symmetric moderate bilateral hydronephrosis and hydroureter without obstructing stone which appears to be chronic per Radiology read.  Internal medicine consulted for symptomatic anemia and dyspnea on exertion.     Given patient's drop in hemoglobin without known baseline and BENITO on CKD with hydronephrosis, recommend transfer to higher level of care for urology services.  We do not have Urology Services until 10/24/2023.    Interval History:  VSS and AF. NAEO documented. Patient resting in bed this AM, no  change to symptoms. Manzo draining clear yellow urine. GI and Nephrology following. Denies chest pain, SOB, abdominal pain, hematuria, CVA tenderness, melena and rectal bleeding.      Review of Systems:  Review of Systems   Constitutional:  Negative for chills, diaphoresis, fatigue and fever.   HENT:  Negative for ear pain, hearing loss, rhinorrhea, sore throat, tinnitus and trouble swallowing.    Eyes:  Negative for pain, redness and visual disturbance.   Respiratory:  Negative for cough, chest tightness and shortness of breath.    Cardiovascular:  Negative for chest pain and palpitations.   Gastrointestinal:  Positive for blood in stool. Negative for abdominal pain, constipation, diarrhea, nausea and vomiting.   Genitourinary:  Negative for difficulty urinating, dysuria, frequency, hematuria and urgency.   Musculoskeletal:  Negative for arthralgias and myalgias.   Skin:  Negative for rash and wound.   Neurological:  Negative for dizziness, seizures, syncope, weakness, light-headedness, numbness and headaches.   Psychiatric/Behavioral:  Negative for confusion.         Objective:     Last 24 Hour Vital Signs:  BP  Min: 117/72  Max: 145/81  Temp  Av °F (36.7 °C)  Min: 97.5 °F (36.4 °C)  Max: 98.4 °F (36.9 °C)  Pulse  Av.6  Min: 54  Max: 65  Resp  Av.3  Min: 16  Max: 18  SpO2  Av.4 %  Min: 97 %  Max: 100 %  I/O last 3 completed shifts:  In: 3688.2 [I.V.:3688.2]  Out: 8200 [Urine:8200]    Physical Examination:  Physical Exam  Vitals reviewed.   Constitutional:       General: He is not in acute distress.     Appearance: Normal appearance. He is normal weight. He is not ill-appearing.   HENT:      Head: Normocephalic and atraumatic.      Right Ear: External ear normal.      Left Ear: External ear normal.   Eyes:      General: No scleral icterus.        Right eye: No discharge.         Left eye: No discharge.      Extraocular Movements: Extraocular movements intact.      Conjunctiva/sclera:  Conjunctivae normal.      Pupils: Pupils are equal, round, and reactive to light.   Cardiovascular:      Rate and Rhythm: Normal rate and regular rhythm.      Pulses: Normal pulses.      Heart sounds: Normal heart sounds. No murmur heard.     No friction rub. No gallop.   Pulmonary:      Effort: Pulmonary effort is normal. No respiratory distress.      Breath sounds: Normal breath sounds. No stridor. No wheezing, rhonchi or rales.   Abdominal:      General: Abdomen is flat. Bowel sounds are normal. There is no distension.      Palpations: Abdomen is soft.      Tenderness: There is no abdominal tenderness. There is no guarding.   Musculoskeletal:         General: No swelling, tenderness, deformity or signs of injury. Normal range of motion.      Right lower leg: No edema.      Left lower leg: No edema.   Skin:     General: Skin is warm and dry.      Capillary Refill: Capillary refill takes less than 2 seconds.      Coloration: Skin is not jaundiced.      Findings: No bruising, erythema or rash.   Neurological:      Mental Status: He is alert.      Comments: AAO to person, place, time, and situation; CN II-XII grossly intact         Laboratory:  Most Recent Data:  CBC:   Lab Results   Component Value Date    WBC 5.69 10/24/2023    HGB 8.3 (L) 10/24/2023    HCT 28.6 (L) 10/24/2023     10/24/2023    MCV 75.7 (L) 10/24/2023    RDW 23.2 (H) 10/24/2023     WBC Differential:   Recent Labs   Lab 10/20/23  1250 10/21/23  0503 10/21/23  1747 10/22/23  0927 10/23/23  0336 10/24/23  0408   WBC 4.37* 5.01  --  5.34 5.50 5.69   HGB 3.5* 4.8* 6.7* 8.8* 8.2* 8.3*   HCT 13.7* 17.3* 23.0* 30.0* 27.8* 28.6*    188  --  203 195 204   MCV 65.6* 68.7*  --  75.4* 75.3* 75.7*       BMP:   Lab Results   Component Value Date     10/24/2023    K 4.6 10/24/2023    CO2 19 (L) 10/24/2023    BUN 51.5 (H) 10/24/2023    CREATININE 4.39 (H) 10/24/2023    CALCIUM 7.6 (L) 10/24/2023    MG 1.80 10/24/2023    PHOS 4.7 10/24/2023  "    LFTs:   Lab Results   Component Value Date    ALBUMIN 3.1 (L) 10/24/2023    BILITOT 0.5 10/24/2023    AST 10 10/24/2023    ALKPHOS 47 10/24/2023    ALT 7 10/24/2023     Coags:   Lab Results   Component Value Date    INR 1.2 10/20/2023    PROTIME 15.1 (H) 10/20/2023     FLP: No results found for: "CHOL", "HDL", "LDLCALC", "TRIG", "CHOLHDL"  DM:   Lab Results   Component Value Date    CREATININE 4.39 (H) 10/24/2023     Thyroid: No results found for: "TSH", "FREET4", "A0DUXMK", "A7HCHGO", "THYROIDAB"  Anemia:   Lab Results   Component Value Date    IRON 13 (L) 10/20/2023    TIBC 378 10/20/2023    FERRITIN <1.98 (L) 10/20/2023     Cardiac:   Lab Results   Component Value Date    TROPONINI 0.028 10/20/2023    .2 (H) 10/20/2023     Urinalysis:   Lab Results   Component Value Date    LABURIN (A) 08/11/2023     >/= 100,000 colonies/ml Klebsiella pneumoniae ssp ozaenae    LABURIN 10,000 - 25,000 colonies/ml Proteus vulgaris (A) 08/11/2023    COLORU Dark Luisa 08/11/2023    PHUA 6.0 10/20/2023    SPECGRAV 1.020 08/11/2023    NITRITE Positive 08/11/2023    KETONESU Negative 08/11/2023    UROBILINOGEN Normal 10/20/2023    WBCUA 0-5 10/20/2023       Radiology:  Imaging Results              CT Abdomen Pelvis  Without Contrast (Final result)  Result time 10/20/23 17:34:12      Final result by Cecile Agosto MD (10/20/23 17:34:12)                   Impression:      1. Symmetric moderate bilateral hydronephrosis and hydroureter.  No obstructing stone.  This appears to be chronic, similar to previous exam.  2. Neobladder collapsed about a Manzo catheter.      Electronically signed by: Cecile Agosto  Date:    10/20/2023  Time:    17:34               Narrative:    EXAMINATION:  CT ABDOMEN PELVIS WITHOUT CONTRAST    CLINICAL HISTORY:  neobladder, acute anemia, worsening BENITO;    TECHNIQUE:  Helically acquired axial images, sagittal and coronal reformations were obtained from the lung bases to the pubic symphysis " without the IV administration of contrast.    Automated tube current modulation, weight-based exposure dosing, and/or iterative reconstruction technique utilized to reach lowest reasonably achievable exposure rate.    DLP: 214 mGy*cm    COMPARISON:  Retroperitoneal sonogram 10/20/2023, CT abdomen pelvis 06/17/2021    FINDINGS:  HEART: There are coronary artery calcifications.    LUNG BASES: Chronic elevation the left hemidiaphragm with basilar atelectasis.    LIVER: Normal attenuation. No appreciable focal hepatic lesion.    BILIARY: No calcified gallstones.    PANCREAS: No inflammatory change.    SPLEEN: Normal in size    ADRENALS: No mass.    KIDNEYS/URETERS: Moderate bilateral hydronephrosis.  AP diameter at the right renal pelvis is 2.4 cm.  AP diameter at the left renal pelvis is 2.5 cm.  There is bilateral hydroureter.  No obstructing calculus.    GI TRACT/MESENTERY: Evaluation of the bowel is limited without contrast.  No evidence of bowel obstruction or inflammation.    PERITONEUM: No free fluid.No free air.    LYMPH NODES: There are small retroperitoneal lymph nodes, similar to previous exam.    VASCULATURE: No significant atherosclerosis or aneurysm.    BLADDER: Patient reportedly status post cystectomy with creation of neobladder.  There is a Manzo catheter within the urinary bladder which is largely collapsed.  There is expected post instrumentation intraluminal gas.    REPRODUCTIVE ORGANS: Presumed prostatectomy.    ABDOMINAL WALL: Unremarkable.    BONES: Degenerative changes at the hips.                                       US Retroperitoneal Complete (Final result)  Result time 10/20/23 15:23:14      Final result by Cecile Agosto MD (10/20/23 15:23:14)                   Impression:      Bilateral hydronephrosis    Distended neobladder.  Correlate for urinary retention.      Electronically signed by: Cecile Agosto  Date:    10/20/2023  Time:    15:23               Narrative:     EXAMINATION:  US RETROPERITONEAL COMPLETE    CLINICAL HISTORY:  Domenica;    TECHNIQUE:  Ultrasound of the kidneys and urinary bladder was performed including color flow and grayscale evaluation of the kidneys.    COMPARISON:  CT abdomen pelvis 06/17/2021    FINDINGS:  RIGHT KIDNEY: The right kidney measures 10.5 cm.  Moderate to severe hydronephrosis.  Renal pelvis measures 3.4 cm anterior-posterior.  Right ureter is dilated where visible proximally.  No appreciable shadowing calculus.    LEFT KIDNEY: The left kidney measures 12.4 cm. Moderate hydronephrosis.Renal pelvis measures 3.3 cm anterior-posterior.  No appreciable shadowing renal calculus.    BLADDER: Distended neobladder with calculated volume of 1000 mL.                                       X-Ray Chest AP Portable (Final result)  Result time 10/20/23 13:15:42      Final result by Kash Gunderson MD (10/20/23 13:15:42)                   Impression:      No acute pulmonary process identified.      Electronically signed by: Kash Gunderson  Date:    10/20/2023  Time:    13:15               Narrative:    EXAMINATION:  XR CHEST AP PORTABLE    CLINICAL HISTORY:  Shortness of breath    TECHNIQUE:  Frontal view(s) of the chest.    COMPARISON:  No relevant comparison studies available at the time of dictation.    FINDINGS:  Left-sided MediPort catheter tip near the SVC/RA junction.  Normal cardiac silhouette.  Mild left diaphragm elevation.  No consolidation identified.  No significant pleural effusion or discernible pneumothorax.                                    Current Medications:     Infusions:   lactated ringers 100 mL/hr at 10/24/23 1007        Scheduled:   heparin (porcine)  5,000 Units Subcutaneous Q12H    mupirocin   Nasal BID    nicotine  1 patch Transdermal Daily    polyethylene glycol  2,000 mL Oral Q8H    sodium bicarbonate  1,300 mg Oral BID        PRN:  0.9%  NaCl infusion (for blood administration), 0.9%  NaCl infusion (for blood administration),  0.9%  NaCl infusion (for blood administration), 0.9%  NaCl infusion (for blood administration), 0.9%  NaCl infusion (for blood administration), 0.9%  NaCl infusion (for blood administration), acetaminophen, dextrose 10%, dextrose 10%, glucagon (human recombinant), glucose, glucose, influenza, melatonin, naloxone, ondansetron, pneumoc 20-stanford conj-dip cr(PF), prochlorperazine, sodium chloride 0.9%, triamcinolone acetonide 0.1%  Assessment & Plan:   Symptomatic iron-deficiency anemia  -H/H stable this AM, currently 8.3/28.6   -Iron panel, ferritin, reticulocyte count consistent with iron-deficiency anemia, will require additional IV iron supplementation   -FOBT positive x 3, however RUBY negative for melena or rectal bleeding   -S/p 6 units PRBCs, 1 unit platelets, and 1 unit FFP total to date, did not require any blood products overnight   -GI consulted, recommended clear liquid diet today, Coliprep for colonoscopy tomorrow, NPO at midnight. Appreciate assistance.     History of bladder cancer status post total cystectomy and prostatectomy in 2018 with neobladder creation  Urinary retention  Chronic bilateral hydronephrosis of ureter and kidneys  BENITO on CKD stage V  History of UTIs  -CT abdomen-pelvis without contrast showed symmetric, moderate bilateral hydronephrosis and hydroureter which appears to be chronic in nature  -UA negative for UTI, Manzo output is clear and yellow   -Continue Manzo to gravity  -Consulted Nephrology for CKD, recommended increasing sodium bicarb to BID dosing and re-establishing outpatient follow-up . Appreciate recommendations.   -Plan to consult Urology tomorrow AM     Normal anion gap metabolic acidosis  Likely Renal tubular acidosis type I  -AG 10 on AM labs   -Increased sodium bicarb dosing as stated above     Elevated BNP  -.2 and Troponin 0.028 on admission, possibly related to renal status   -EKG wnl   -No history of heart disease per patient and chart review  -Consider  pursuing TTE if DIAZ persists to evaluate heart function     Rash  -Continue outpatient steroid ointment as prescribed    Access: Peripheral   Antibiotics: None   Diet: Clear liquids with NPO at midnight   DVT Prophylaxis: Heparin   GI Prophylaxis: None  Fluids: LR 100cc/hr   Code Status: Full     Disposition:  62 y.o. male with past medical history of bladder cancer status post total cystectomy and prostatectomy in 2018 with neobladder creation, CKD stage 4/5, chronic hydronephrosis admitted for symptomatic anemia. H/H stable this AM. GI and Nephrology consulted today, plan to consult Urology tomorrow AM. NPO at midnight for colonoscopy tomorrow.       Syed Denton MD  [facility] HO-1

## 2023-10-24 NOTE — CONSULTS
Nephrology Consult Note    Chief Complaint:    Chief Complaint   Patient presents with    Shortness of Breath     PT REPORTS SOB UPON EXERTION X 2 WKS.  DENIES CP.  HX OF BLADDER CA, FINISHED TX. PT PALE CAP REFILL > 3 SEC. EKG OBTAINED.  VSS.       Reason for Consult:  CKD    HPI:   Bridget Cardenas Jr. is a 62 y.o. year old male with PMH of bladder cancer status post total cystectomy and prostatectomy in 2018 with neobladder creation, chronic hydronephrosis, CKD stage 5.  Patient was admitted to the hospital for shortness of breath dyspnea on exertion.  Nephrology is being consulted for CKD.    Interval history:  Doing well.  No acute complaints    PMH:   Past Medical History:   Diagnosis Date    Cancer      PSH: History reviewed. No pertinent surgical history.  FH:History reviewed. No pertinent family history.  SH:   Social History     Socioeconomic History    Marital status:    Tobacco Use    Smoking status: Former     Current packs/day: 0.00     Types: Cigarettes     Quit date:      Years since quittin.8     Passive exposure: Current    Smokeless tobacco: Current     Types: Chew     Social Determinants of Health     Financial Resource Strain: Low Risk  (10/23/2023)    Overall Financial Resource Strain (CARDIA)     Difficulty of Paying Living Expenses: Not hard at all   Food Insecurity: No Food Insecurity (10/23/2023)    Hunger Vital Sign     Worried About Running Out of Food in the Last Year: Never true     Ran Out of Food in the Last Year: Never true   Transportation Needs: No Transportation Needs (10/23/2023)    PRAPARE - Transportation     Lack of Transportation (Medical): No     Lack of Transportation (Non-Medical): No   Physical Activity: Inactive (10/23/2023)    Exercise Vital Sign     Days of Exercise per Week: 0 days     Minutes of Exercise per Session: 0 min   Stress: No Stress Concern Present (10/23/2023)    [employer] - Occupational Stress Questionnaire      Feeling of Stress : Not at all   Social Connections: Moderately Isolated (10/23/2023)    Social Connection and Isolation Panel [NHANES]     Frequency of Communication with Friends and Family: More than three times a week     Frequency of Social Gatherings with Friends and Family: More than three times a week     Attends [employer] Services: Never     Active Member of Clubs or Organizations: No     Attends Club or Organization Meetings: Never     Marital Status:    Housing Stability: Unknown (10/23/2023)    Housing Stability Vital Sign     Unable to Pay for Housing in the Last Year: No     Unstable Housing in the Last Year: No     Allergies:   Review of patient's allergies indicates:   Allergen Reactions    Pcn [penicillins] Hives      Medications:   Scheduled Meds:   heparin (porcine)  5,000 Units Subcutaneous Q12H    mupirocin   Nasal BID    polyethylene glycol  2,000 mL Oral Q8H    sodium bicarbonate  650 mg Oral BID      PRN Meds: 0.9%  NaCl infusion (for blood administration), 0.9%  NaCl infusion (for blood administration), 0.9%  NaCl infusion (for blood administration), 0.9%  NaCl infusion (for blood administration), 0.9%  NaCl infusion (for blood administration), 0.9%  NaCl infusion (for blood administration), acetaminophen, dextrose 10%, dextrose 10%, glucagon (human recombinant), glucose, glucose, influenza, melatonin, naloxone, ondansetron, pneumoc 20-stanford conj-dip cr(PF), prochlorperazine, sodium chloride 0.9%, triamcinolone acetonide 0.1%  Continuous Infusions:   lactated ringers 100 mL/hr at 10/24/23 0656          Physical Exam  Vitals:    10/24/23 0757   BP: (!) 145/79   Pulse: (!) 56   Resp: 18   Temp: 97.5 °F (36.4 °C)     Physical Exam  Vitals and nursing note reviewed.   Constitutional:       General: He is not in acute distress.     Appearance: Normal appearance. He is normal weight. He is not ill-appearing.   HENT:      Head: Normocephalic and atraumatic.   Eyes:      Extraocular  Movements: Extraocular movements intact.      Conjunctiva/sclera: Conjunctivae normal.      Pupils: Pupils are equal, round, and reactive to light.   Cardiovascular:      Rate and Rhythm: Normal rate and regular rhythm.      Pulses: Normal pulses.      Heart sounds: No murmur heard.  Pulmonary:      Effort: Pulmonary effort is normal. No respiratory distress.   Musculoskeletal:         General: No swelling.      Right lower leg: No edema.      Left lower leg: No edema.   Skin:     General: Skin is warm and dry.   Neurological:      General: No focal deficit present.      Mental Status: He is alert and oriented to person, place, and time.         Labs:  Recent Results (from the past 24 hour(s))   OCCULT BLOOD STOOL, CA SCREEN 3RD SPEC    Collection Time: 10/23/23 12:38 PM   Result Value Ref Range    Occult Blood Stool 3 Positive (A) Negative, N/A   Comprehensive Metabolic Panel (CMP)    Collection Time: 10/24/23  4:08 AM   Result Value Ref Range    Sodium Level 138 136 - 145 mmol/L    Potassium Level 4.6 3.5 - 5.1 mmol/L    Chloride 110 (H) 98 - 107 mmol/L    Carbon Dioxide 19 (L) 23 - 31 mmol/L    Glucose Level 90 82 - 115 mg/dL    Blood Urea Nitrogen 51.5 (H) 8.4 - 25.7 mg/dL    Creatinine 4.39 (H) 0.73 - 1.18 mg/dL    Calcium Level Total 7.6 (L) 8.8 - 10.0 mg/dL    Protein Total 5.8 5.8 - 7.6 gm/dL    Albumin Level 3.1 (L) 3.4 - 4.8 g/dL    Globulin 2.7 2.4 - 3.5 gm/dL    Albumin/Globulin Ratio 1.1 1.1 - 2.0 ratio    Bilirubin Total 0.5 <=1.5 mg/dL    Alkaline Phosphatase 47 40 - 150 unit/L    Alanine Aminotransferase 7 0 - 55 unit/L    Aspartate Aminotransferase 10 5 - 34 unit/L    eGFR 15 mls/min/1.73/m2   Magnesium    Collection Time: 10/24/23  4:08 AM   Result Value Ref Range    Magnesium Level 1.80 1.60 - 2.60 mg/dL   Phosphorus    Collection Time: 10/24/23  4:08 AM   Result Value Ref Range    Phosphorus Level 4.7 2.3 - 4.7 mg/dL   CBC with Differential    Collection Time: 10/24/23  4:08 AM   Result Value Ref  Range    WBC 5.69 4.50 - 11.50 x10(3)/mcL    RBC 3.78 (L) 4.70 - 6.10 x10(6)/mcL    Hgb 8.3 (L) 14.0 - 18.0 g/dL    Hct 28.6 (L) 42.0 - 52.0 %    MCV 75.7 (L) 80.0 - 94.0 fL    MCH 22.0 (L) 27.0 - 31.0 pg    MCHC 29.0 (L) 33.0 - 36.0 g/dL    RDW 23.2 (H) 11.5 - 17.0 %    Platelet 204 130 - 400 x10(3)/mcL    MPV 10.1 7.4 - 10.4 fL    Neut % 51.8 %    Lymph % 30.4 %    Mono % 10.5 %    Eos % 6.0 %    Basophil % 0.9 %    Lymph # 1.73 0.6 - 4.6 x10(3)/mcL    Neut # 2.95 2.1 - 9.2 x10(3)/mcL    Mono # 0.60 0.1 - 1.3 x10(3)/mcL    Eos # 0.34 0 - 0.9 x10(3)/mcL    Baso # 0.05 <=0.2 x10(3)/mcL    IG# 0.02 0 - 0.04 x10(3)/mcL    IG% 0.4 %    NRBC% 0.4 %       Imaging:  None in past 24 hours  CT abdomen pelvis without contrast 10/20/2023 with bilateral hydronephrosis that appears chronic  Ultrasound retroperitoneal 10/20/2023 again with bilateral hydronephrosis    Assessment:  CKD stage 5  Iron-deficiency anemia  Non-anion gap metabolic acidosis  Hx of Prostate and Bladder cancer s/p bladder and prostate resection with reconstruction of bladder (2018)    Plan:  Continue hydration with sodium bicarb b.i.d..    Spoke with patient regarding RRT.  Recommend continuing IV Ferrlecit for total of 8 doses    Staff: Dr. Kodi Plaza, DO  Internal Medicine - PGY-3

## 2023-10-25 ENCOUNTER — ANESTHESIA EVENT (OUTPATIENT)
Dept: ENDOSCOPY | Facility: HOSPITAL | Age: 63
DRG: 375 | End: 2023-10-25
Payer: COMMERCIAL

## 2023-10-25 ENCOUNTER — ANESTHESIA (OUTPATIENT)
Dept: ENDOSCOPY | Facility: HOSPITAL | Age: 63
DRG: 375 | End: 2023-10-25
Payer: COMMERCIAL

## 2023-10-25 LAB
ALBUMIN SERPL-MCNC: 3.1 G/DL (ref 3.4–4.8)
ALBUMIN/GLOB SERPL: 1.2 RATIO (ref 1.1–2)
ALP SERPL-CCNC: 42 UNIT/L (ref 40–150)
ALT SERPL-CCNC: 8 UNIT/L (ref 0–55)
AST SERPL-CCNC: 12 UNIT/L (ref 5–34)
BASOPHILS # BLD AUTO: 0.07 X10(3)/MCL
BASOPHILS NFR BLD AUTO: 1.4 %
BILIRUB SERPL-MCNC: 0.7 MG/DL
BUN SERPL-MCNC: 40.9 MG/DL (ref 8.4–25.7)
CALCIUM SERPL-MCNC: 7.8 MG/DL (ref 8.8–10)
CHLORIDE SERPL-SCNC: 110 MMOL/L (ref 98–107)
CO2 SERPL-SCNC: 19 MMOL/L (ref 23–31)
CREAT SERPL-MCNC: 4.15 MG/DL (ref 0.73–1.18)
EOSINOPHIL # BLD AUTO: 0.31 X10(3)/MCL (ref 0–0.9)
EOSINOPHIL NFR BLD AUTO: 6.2 %
ERYTHROCYTE [DISTWIDTH] IN BLOOD BY AUTOMATED COUNT: 23.7 % (ref 11.5–17)
GFR SERPLBLD CREATININE-BSD FMLA CKD-EPI: 16 MLS/MIN/1.73/M2
GLOBULIN SER-MCNC: 2.6 GM/DL (ref 2.4–3.5)
GLUCOSE SERPL-MCNC: 83 MG/DL (ref 82–115)
HCT VFR BLD AUTO: 28.3 % (ref 42–52)
HGB BLD-MCNC: 8.2 G/DL (ref 14–18)
IMM GRANULOCYTES # BLD AUTO: 0.01 X10(3)/MCL (ref 0–0.04)
IMM GRANULOCYTES NFR BLD AUTO: 0.2 %
LYMPHOCYTES # BLD AUTO: 1.8 X10(3)/MCL (ref 0.6–4.6)
LYMPHOCYTES NFR BLD AUTO: 36 %
MAGNESIUM SERPL-MCNC: 1.7 MG/DL (ref 1.6–2.6)
MCH RBC QN AUTO: 22.3 PG (ref 27–31)
MCHC RBC AUTO-ENTMCNC: 29 G/DL (ref 33–36)
MCV RBC AUTO: 77.1 FL (ref 80–94)
MONOCYTES # BLD AUTO: 0.45 X10(3)/MCL (ref 0.1–1.3)
MONOCYTES NFR BLD AUTO: 9 %
NEUTROPHILS # BLD AUTO: 2.36 X10(3)/MCL (ref 2.1–9.2)
NEUTROPHILS NFR BLD AUTO: 47.2 %
NRBC BLD AUTO-RTO: 0 %
PHOSPHATE SERPL-MCNC: 3.4 MG/DL (ref 2.3–4.7)
PLATELET # BLD AUTO: 193 X10(3)/MCL (ref 130–400)
PMV BLD AUTO: 10.5 FL (ref 7.4–10.4)
POTASSIUM SERPL-SCNC: 4.7 MMOL/L (ref 3.5–5.1)
PROT SERPL-MCNC: 5.7 GM/DL (ref 5.8–7.6)
RBC # BLD AUTO: 3.67 X10(6)/MCL (ref 4.7–6.1)
SODIUM SERPL-SCNC: 138 MMOL/L (ref 136–145)
WBC # SPEC AUTO: 5 X10(3)/MCL (ref 4.5–11.5)

## 2023-10-25 PROCEDURE — 25000003 PHARM REV CODE 250: Performed by: INTERNAL MEDICINE

## 2023-10-25 PROCEDURE — 88341 IMHCHEM/IMCYTCHM EA ADD ANTB: CPT | Performed by: PATHOLOGY

## 2023-10-25 PROCEDURE — 25000003 PHARM REV CODE 250

## 2023-10-25 PROCEDURE — 83735 ASSAY OF MAGNESIUM: CPT

## 2023-10-25 PROCEDURE — 80053 COMPREHEN METABOLIC PANEL: CPT

## 2023-10-25 PROCEDURE — 63600175 PHARM REV CODE 636 W HCPCS: Performed by: ANESTHESIOLOGY

## 2023-10-25 PROCEDURE — S4991 NICOTINE PATCH NONLEGEND: HCPCS

## 2023-10-25 PROCEDURE — 45381 COLONOSCOPY SUBMUCOUS NJX: CPT | Performed by: INTERNAL MEDICINE

## 2023-10-25 PROCEDURE — D9220A PRA ANESTHESIA: Mod: ,,, | Performed by: NURSE ANESTHETIST, CERTIFIED REGISTERED

## 2023-10-25 PROCEDURE — 45385 COLONOSCOPY W/LESION REMOVAL: CPT | Performed by: INTERNAL MEDICINE

## 2023-10-25 PROCEDURE — 94761 N-INVAS EAR/PLS OXIMETRY MLT: CPT

## 2023-10-25 PROCEDURE — 88342 IMHCHEM/IMCYTCHM 1ST ANTB: CPT

## 2023-10-25 PROCEDURE — 88381 MICRODISSECTION MANUAL: CPT | Performed by: PATHOLOGY

## 2023-10-25 PROCEDURE — 37000008 HC ANESTHESIA 1ST 15 MINUTES: Performed by: INTERNAL MEDICINE

## 2023-10-25 PROCEDURE — 85025 COMPLETE CBC W/AUTO DIFF WBC: CPT

## 2023-10-25 PROCEDURE — 25000003 PHARM REV CODE 250: Performed by: NURSE ANESTHETIST, CERTIFIED REGISTERED

## 2023-10-25 PROCEDURE — 63600175 PHARM REV CODE 636 W HCPCS

## 2023-10-25 PROCEDURE — 81301 MICROSATELLITE INSTABILITY: CPT

## 2023-10-25 PROCEDURE — 27201423 OPTIME MED/SURG SUP & DEVICES STERILE SUPPLY: Performed by: INTERNAL MEDICINE

## 2023-10-25 PROCEDURE — 88305 TISSUE EXAM BY PATHOLOGIST: CPT | Mod: TC | Performed by: INTERNAL MEDICINE

## 2023-10-25 PROCEDURE — 37000009 HC ANESTHESIA EA ADD 15 MINS: Performed by: INTERNAL MEDICINE

## 2023-10-25 PROCEDURE — 93005 ELECTROCARDIOGRAM TRACING: CPT

## 2023-10-25 PROCEDURE — 84100 ASSAY OF PHOSPHORUS: CPT

## 2023-10-25 PROCEDURE — 63600175 PHARM REV CODE 636 W HCPCS: Performed by: NURSE ANESTHETIST, CERTIFIED REGISTERED

## 2023-10-25 PROCEDURE — 21400001 HC TELEMETRY ROOM

## 2023-10-25 PROCEDURE — 63600175 PHARM REV CODE 636 W HCPCS: Performed by: STUDENT IN AN ORGANIZED HEALTH CARE EDUCATION/TRAINING PROGRAM

## 2023-10-25 PROCEDURE — D9220A PRA ANESTHESIA: ICD-10-PCS | Mod: ,,, | Performed by: NURSE ANESTHETIST, CERTIFIED REGISTERED

## 2023-10-25 PROCEDURE — 45380 COLONOSCOPY AND BIOPSY: CPT | Mod: 59 | Performed by: INTERNAL MEDICINE

## 2023-10-25 RX ORDER — MAGNESIUM SULFATE HEPTAHYDRATE 40 MG/ML
2 INJECTION, SOLUTION INTRAVENOUS ONCE
Status: COMPLETED | OUTPATIENT
Start: 2023-10-25 | End: 2023-10-25

## 2023-10-25 RX ORDER — HEPARIN SODIUM 5000 [USP'U]/ML
5000 INJECTION, SOLUTION INTRAVENOUS; SUBCUTANEOUS EVERY 12 HOURS
Status: DISCONTINUED | OUTPATIENT
Start: 2023-10-25 | End: 2023-10-26 | Stop reason: HOSPADM

## 2023-10-25 RX ORDER — PROPOFOL 10 MG/ML
VIAL (ML) INTRAVENOUS
Status: DISCONTINUED | OUTPATIENT
Start: 2023-10-25 | End: 2023-10-25

## 2023-10-25 RX ORDER — LIDOCAINE HYDROCHLORIDE 10 MG/ML
1 INJECTION, SOLUTION EPIDURAL; INFILTRATION; INTRACAUDAL; PERINEURAL ONCE
Status: DISCONTINUED | OUTPATIENT
Start: 2023-10-25 | End: 2023-10-25 | Stop reason: HOSPADM

## 2023-10-25 RX ORDER — SODIUM CHLORIDE, SODIUM LACTATE, POTASSIUM CHLORIDE, CALCIUM CHLORIDE 600; 310; 30; 20 MG/100ML; MG/100ML; MG/100ML; MG/100ML
INJECTION, SOLUTION INTRAVENOUS CONTINUOUS
Status: DISCONTINUED | OUTPATIENT
Start: 2023-10-25 | End: 2023-10-26 | Stop reason: HOSPADM

## 2023-10-25 RX ORDER — LIDOCAINE HYDROCHLORIDE 20 MG/ML
INJECTION INTRAVENOUS
Status: DISCONTINUED | OUTPATIENT
Start: 2023-10-25 | End: 2023-10-25

## 2023-10-25 RX ADMIN — NICOTINE 1 PATCH: 21 PATCH, EXTENDED RELEASE TRANSDERMAL at 08:10

## 2023-10-25 RX ADMIN — HEPARIN SODIUM 5000 UNITS: 5000 INJECTION, SOLUTION INTRAVENOUS; SUBCUTANEOUS at 08:10

## 2023-10-25 RX ADMIN — SODIUM CHLORIDE, POTASSIUM CHLORIDE, SODIUM LACTATE AND CALCIUM CHLORIDE: 600; 310; 30; 20 INJECTION, SOLUTION INTRAVENOUS at 02:10

## 2023-10-25 RX ADMIN — LIDOCAINE HYDROCHLORIDE 100 MG: 20 INJECTION INTRAVENOUS at 03:10

## 2023-10-25 RX ADMIN — SODIUM CHLORIDE 125 MG: 9 INJECTION, SOLUTION INTRAVENOUS at 08:10

## 2023-10-25 RX ADMIN — MUPIROCIN: 20 OINTMENT TOPICAL at 08:10

## 2023-10-25 RX ADMIN — PROPOFOL 140 MG: 10 INJECTION, EMULSION INTRAVENOUS at 03:10

## 2023-10-25 RX ADMIN — SODIUM BICARBONATE 650 MG TABLET 1300 MG: at 08:10

## 2023-10-25 RX ADMIN — PROPOFOL 50 MG: 10 INJECTION, EMULSION INTRAVENOUS at 03:10

## 2023-10-25 RX ADMIN — MAGNESIUM SULFATE HEPTAHYDRATE 2 G: 40 INJECTION, SOLUTION INTRAVENOUS at 06:10

## 2023-10-25 NOTE — PROGRESS NOTES
Nephrology Consult Note    Chief Complaint:    Chief Complaint   Patient presents with    Shortness of Breath     PT REPORTS SOB UPON EXERTION X 2 WKS.  DENIES CP.  HX OF BLADDER CA, FINISHED TX. PT PALE CAP REFILL > 3 SEC. EKG OBTAINED.  VSS.       Reason for Consult:  CKD    HPI:   Bridget Cardenas Jr. is a 62 y.o. year old male with PMH of bladder cancer status post total cystectomy and prostatectomy in 2018 with neobladder creation, chronic hydronephrosis, CKD stage 5.  Patient was admitted to the hospital for shortness of breath dyspnea on exertion.  Nephrology is being consulted for CKD.    Interval history:  Doing well.  No acute complaints.  Going for colonoscopy today    PMH:   Past Medical History:   Diagnosis Date    Cancer      PSH: History reviewed. No pertinent surgical history.  FH:History reviewed. No pertinent family history.  SH:   Social History     Socioeconomic History    Marital status:    Tobacco Use    Smoking status: Former     Current packs/day: 0.00     Types: Cigarettes     Quit date:      Years since quittin.8     Passive exposure: Current    Smokeless tobacco: Current     Types: Chew     Social Determinants of Health     Financial Resource Strain: Low Risk  (10/23/2023)    Overall Financial Resource Strain (CARDIA)     Difficulty of Paying Living Expenses: Not hard at all   Food Insecurity: No Food Insecurity (10/23/2023)    Hunger Vital Sign     Worried About Running Out of Food in the Last Year: Never true     Ran Out of Food in the Last Year: Never true   Transportation Needs: No Transportation Needs (10/23/2023)    PRAPARE - Transportation     Lack of Transportation (Medical): No     Lack of Transportation (Non-Medical): No   Physical Activity: Inactive (10/23/2023)    Exercise Vital Sign     Days of Exercise per Week: 0 days     Minutes of Exercise per Session: 0 min   Stress: No Stress Concern Present (10/23/2023)    [employer] -  Occupational Stress Questionnaire     Feeling of Stress : Not at all   Social Connections: Moderately Isolated (10/23/2023)    Social Connection and Isolation Panel [NHANES]     Frequency of Communication with Friends and Family: More than three times a week     Frequency of Social Gatherings with Friends and Family: More than three times a week     Attends Zoroastrian Services: Never     Active Member of Clubs or Organizations: No     Attends Club or Organization Meetings: Never     Marital Status:    Housing Stability: Unknown (10/23/2023)    Housing Stability Vital Sign     Unable to Pay for Housing in the Last Year: No     Unstable Housing in the Last Year: No     Allergies:   Review of patient's allergies indicates:   Allergen Reactions    Pcn [penicillins] Hives      Medications:   Scheduled Meds:   ferric gluconate (FERRLECIT) 125 mg in sodium chloride 0.9% 100 mL IVPB  125 mg Intravenous 1 time in Clinic/HOD    nicotine  1 patch Transdermal Daily    polyethylene glycol  2,000 mL Oral Q8H    sodium bicarbonate  1,300 mg Oral BID      PRN Meds: 0.9%  NaCl infusion (for blood administration), 0.9%  NaCl infusion (for blood administration), 0.9%  NaCl infusion (for blood administration), 0.9%  NaCl infusion (for blood administration), 0.9%  NaCl infusion (for blood administration), 0.9%  NaCl infusion (for blood administration), acetaminophen, dextrose 10%, dextrose 10%, glucagon (human recombinant), glucose, glucose, influenza, melatonin, naloxone, ondansetron, pneumoc 20-stanford conj-dip cr(PF), prochlorperazine, sodium chloride 0.9%, triamcinolone acetonide 0.1%  Continuous Infusions:   lactated ringers 100 mL/hr at 10/24/23 1007          Physical Exam  Vitals:    10/25/23 0742   BP: 126/67   Pulse: (!) 47   Resp:    Temp: 98 °F (36.7 °C)     Physical Exam  Vitals and nursing note reviewed.   Constitutional:       General: He is not in acute distress.     Appearance: Normal appearance. He is normal weight.  He is not ill-appearing.   HENT:      Head: Normocephalic and atraumatic.   Eyes:      Extraocular Movements: Extraocular movements intact.      Conjunctiva/sclera: Conjunctivae normal.      Pupils: Pupils are equal, round, and reactive to light.   Cardiovascular:      Rate and Rhythm: Normal rate and regular rhythm.      Pulses: Normal pulses.      Heart sounds: No murmur heard.  Pulmonary:      Effort: Pulmonary effort is normal. No respiratory distress.   Musculoskeletal:         General: No swelling.      Right lower leg: No edema.      Left lower leg: No edema.   Skin:     General: Skin is warm and dry.   Neurological:      General: No focal deficit present.      Mental Status: He is alert and oriented to person, place, and time.         Labs:  Recent Results (from the past 24 hour(s))   Comprehensive Metabolic Panel (CMP)    Collection Time: 10/25/23  4:06 AM   Result Value Ref Range    Sodium Level 138 136 - 145 mmol/L    Potassium Level 4.7 3.5 - 5.1 mmol/L    Chloride 110 (H) 98 - 107 mmol/L    Carbon Dioxide 19 (L) 23 - 31 mmol/L    Glucose Level 83 82 - 115 mg/dL    Blood Urea Nitrogen 40.9 (H) 8.4 - 25.7 mg/dL    Creatinine 4.15 (H) 0.73 - 1.18 mg/dL    Calcium Level Total 7.8 (L) 8.8 - 10.0 mg/dL    Protein Total 5.7 (L) 5.8 - 7.6 gm/dL    Albumin Level 3.1 (L) 3.4 - 4.8 g/dL    Globulin 2.6 2.4 - 3.5 gm/dL    Albumin/Globulin Ratio 1.2 1.1 - 2.0 ratio    Bilirubin Total 0.7 <=1.5 mg/dL    Alkaline Phosphatase 42 40 - 150 unit/L    Alanine Aminotransferase 8 0 - 55 unit/L    Aspartate Aminotransferase 12 5 - 34 unit/L    eGFR 16 mls/min/1.73/m2   Magnesium    Collection Time: 10/25/23  4:06 AM   Result Value Ref Range    Magnesium Level 1.70 1.60 - 2.60 mg/dL   Phosphorus    Collection Time: 10/25/23  4:06 AM   Result Value Ref Range    Phosphorus Level 3.4 2.3 - 4.7 mg/dL   CBC with Differential    Collection Time: 10/25/23  4:06 AM   Result Value Ref Range    WBC 5.00 4.50 - 11.50 x10(3)/mcL    RBC  3.67 (L) 4.70 - 6.10 x10(6)/mcL    Hgb 8.2 (L) 14.0 - 18.0 g/dL    Hct 28.3 (L) 42.0 - 52.0 %    MCV 77.1 (L) 80.0 - 94.0 fL    MCH 22.3 (L) 27.0 - 31.0 pg    MCHC 29.0 (L) 33.0 - 36.0 g/dL    RDW 23.7 (H) 11.5 - 17.0 %    Platelet 193 130 - 400 x10(3)/mcL    MPV 10.5 (H) 7.4 - 10.4 fL    Neut % 47.2 %    Lymph % 36.0 %    Mono % 9.0 %    Eos % 6.2 %    Basophil % 1.4 %    Lymph # 1.80 0.6 - 4.6 x10(3)/mcL    Neut # 2.36 2.1 - 9.2 x10(3)/mcL    Mono # 0.45 0.1 - 1.3 x10(3)/mcL    Eos # 0.31 0 - 0.9 x10(3)/mcL    Baso # 0.07 <=0.2 x10(3)/mcL    IG# 0.01 0 - 0.04 x10(3)/mcL    IG% 0.2 %    NRBC% 0.0 %       Imaging:  None in past 24 hours  CT abdomen pelvis without contrast 10/20/2023 with bilateral hydronephrosis that appears chronic  Ultrasound retroperitoneal 10/20/2023 again with bilateral hydronephrosis    Assessment:  CKD stage 5  Iron-deficiency anemia  Non-anion gap metabolic acidosis  Hx of Prostate and Bladder cancer s/p bladder and prostate resection with reconstruction of bladder (2018)    Plan:  Continue hydration with sodium bicarb b.i.d..    Recommend continuing IV Ferrlecit for total of 8 doses    Staff: Dr. Kodi Plaza, DO  Internal Medicine - PGY-3

## 2023-10-25 NOTE — ANESTHESIA POSTPROCEDURE EVALUATION
Anesthesia Post Evaluation    Patient: Bridget Cardenas     Procedure(s) Performed: Procedure(s) (LRB):  COLONOSCOPY, WITH POLYPECTOMY USING SNARE (Left)  EGD (ESOPHAGOGASTRODUODENOSCOPY) (Left)  COLONOSCOPY, WITH 1 OR MORE BIOPSIES (N/A)  COLONOSCOPY, WITH HEMORRHAGE CONTROL (N/A)    Final Anesthesia Type: general      Patient location during evaluation: GI PACU  Patient participation: Yes- Able to Participate  Level of consciousness: awake and alert  Post-procedure vital signs: reviewed and stable  Pain management: adequate  Airway patency: patent    PONV status at discharge: No PONV  Anesthetic complications: no      Cardiovascular status: stable  Respiratory status: spontaneous ventilation and unassisted  Hydration status: euvolemic  Follow-up not needed.

## 2023-10-25 NOTE — PROVATION PATIENT INSTRUCTIONS
Discharge Summary/Instructions after an Endoscopic Procedure  Patient Name: Bridget Cardenas  Patient MRN: [id number]  Patient YOB: 1960 Wednesday, October 25, 2023  Angy Mcguire MD  Dear patient,  As a result of recent federal legislation (The Federal Cures Act), you may   receive lab or pathology results from your procedure in your [facility]   account before your physician is able to contact you. Your physician or   their representative will relay the results to you with their   recommendations at their soonest availability.  Thank you,  RESTRICTIONS:  During your procedure today, you received medications for sedation.  These   medications may affect your judgment, balance and coordination.  Therefore,   for 24 hours, you have the following restrictions:   - DO NOT drive a car, operate machinery, make legal/financial decisions,   sign important papers or drink alcohol.    ACTIVITY:  Today: no heavy lifting, straining or running due to procedural   sedation/anesthesia.  The following day: return to full activity including work.  DIET:  Eat and drink normally unless instructed otherwise.     TREATMENT FOR COMMON SIDE EFFECTS:  - Mild abdominal pain, nausea, belching, bloating or excessive gas:  rest,   eat lightly and use a heating pad.  - Sore Throat: treat with throat lozenges and/or gargle with warm salt   water.  - Because air was used during the procedure, expelling large amounts of air   from your rectum or belching is normal.  - If a bowel prep was taken, you may not have a bowel movement for 1-3 days.    This is normal.  SYMPTOMS TO WATCH FOR AND REPORT TO YOUR PHYSICIAN:  1. Abdominal pain or bloating, other than gas cramps.  2. Chest pain.  3. Back pain.  4. Signs of infection such as: chills or fever occurring within 24 hours   after the procedure.  5. Rectal bleeding, which would show as bright red, maroon, or black stools.   (A tablespoon of blood from the rectum is not serious,  especially if   hemorrhoids are present.)  6. Vomiting.  7. Weakness or dizziness.  GO DIRECTLY TO THE NEAREST EMERGENCY ROOM IF YOU HAVE ANY OF THE FOLLOWING:      Difficulty breathing              Chills and/or fever over 101 F   Persistent vomiting and/or vomiting blood   Severe abdominal pain   Severe chest pain   Black, tarry stools   Bleeding- more than one tablespoon   Any other symptom or condition that you feel may need urgent attention  Your doctor recommends these additional instructions:  If any biopsies were taken, your doctors clinic will contact you in 1 to 2   weeks with any results.  - Return patient to hospital brady for ongoing care.   - Continue present medications.   - Recommend an iron supplement.  For questions, problems or results please call your physician - Angy Mcguire MD at Work:  [phone], Work:  [phone].  [facility] , EMERGENCY ROOM PHONE NUMBER: [phone]  IF A COMPLICATION OR EMERGENCY SITUATION ARISES AND YOU ARE UNABLE TO REACH   YOUR PHYSICIAN - GO DIRECTLY TO THE EMERGENCY ROOM.  Angy Mcguire MD  10/25/2023 6:34:14 PM  This report has been verified and signed electronically.  Dear patient,  As a result of recent federal legislation (The Federal Cures Act), you may   receive lab or pathology results from your procedure in your [facility]   account before your physician is able to contact you. Your physician or   their representative will relay the results to you with their   recommendations at their soonest availability.  Thank you,  PROVATION

## 2023-10-25 NOTE — ANESTHESIA PREPROCEDURE EVALUATION
10/25/2023  Bridget Cardenas Jr. is a 62 y.o., male with PMHx of prostate/bladder CA s/p resection wit neobladder construction, CKD presents for EGD/colonoscopy secondary to anemia.    NO BETA BLOCKER USE    Active Ambulatory Problems     Diagnosis Date Noted    Urinary retention 08/11/2023    Abnormal urinalysis 08/11/2023     Resolved Ambulatory Problems     Diagnosis Date Noted    No Resolved Ambulatory Problems     Past Medical History:   Diagnosis Date    Cancer            Pre-op Assessment    I have reviewed the NPO Status.      Review of Systems  Anesthesia Hx:  No problems with previous Anesthesia    Social:  Non-Smoker    Hematology/Oncology:         -- Cancer in past history: Other (see Oncology comments) chemotherapy and surgery    Cardiovascular:  Cardiovascular Normal     Pulmonary:  Pulmonary Normal    Renal/:   Chronic Renal Disease, CKD    Hepatic/GI:  Hepatic/GI Normal    Neurological:  Neurology Normal    Endocrine:  Endocrine Normal      Vitals:    10/25/23 0500 10/25/23 0700 10/25/23 0742 10/25/23 1128   BP:   126/67 (!) 148/77   Pulse:   (!) 58 (!) 51   Resp:   18    Temp:   36.7 °C (98 °F) 36.4 °C (97.6 °F)   TempSrc:   Oral Oral   SpO2: 96% 96% 97% 100%   Weight:       Height:             Physical Exam  General: Alert, Cooperative and Well nourished    Airway:  Mallampati: II   Mouth Opening: Normal  TM Distance: Normal  Tongue: Normal  Neck ROM: Normal ROM    Dental:  Intact    Chest/Lungs:  Clear to auscultation, Normal Respiratory Rate    Heart:  Rate: Normal  Rhythm: Regular Rhythm  Sounds: Normal      Lab Results   Component Value Date    WBC 5.00 10/25/2023    HGB 8.2 (L) 10/25/2023    HCT 28.3 (L) 10/25/2023    MCV 77.1 (L) 10/25/2023     10/25/2023       CMP  Sodium Level   Date Value Ref Range Status   10/25/2023 138 136 - 145 mmol/L Final     Potassium Level   Date  Value Ref Range Status   10/25/2023 4.7 3.5 - 5.1 mmol/L Final     Carbon Dioxide   Date Value Ref Range Status   10/25/2023 19 (L) 23 - 31 mmol/L Final     Blood Urea Nitrogen   Date Value Ref Range Status   10/25/2023 40.9 (H) 8.4 - 25.7 mg/dL Final     Creatinine   Date Value Ref Range Status   10/25/2023 4.15 (H) 0.73 - 1.18 mg/dL Final     Calcium Level Total   Date Value Ref Range Status   10/25/2023 7.8 (L) 8.8 - 10.0 mg/dL Final     Albumin Level   Date Value Ref Range Status   10/25/2023 3.1 (L) 3.4 - 4.8 g/dL Final     Bilirubin Total   Date Value Ref Range Status   10/25/2023 0.7 <=1.5 mg/dL Final     Alkaline Phosphatase   Date Value Ref Range Status   10/25/2023 42 40 - 150 unit/L Final     Aspartate Aminotransferase   Date Value Ref Range Status   10/25/2023 12 5 - 34 unit/L Final     Alanine Aminotransferase   Date Value Ref Range Status   10/25/2023 8 0 - 55 unit/L Final     eGFR   Date Value Ref Range Status   10/25/2023 16 mls/min/1.73/m2 Final     Lab Results   Component Value Date    INR 1.2 10/20/2023    PROTIME 15.1 (H) 10/20/2023           Anesthesia Plan  Type of Anesthesia, risks & benefits discussed:    Anesthesia Type: Gen Natural Airway  Intra-op Monitoring Plan: Standard ASA Monitors  Post Op Pain Control Plan: IV/PO Opioids PRN  Induction:  IV  Informed Consent: Informed consent signed with the Patient and all parties understand the risks and agree with anesthesia plan.  All questions answered.   ASA Score: 3  Day of Surgery Review of History & Physical: H&P Update referred to the surgeon/provider.    Ready For Surgery From Anesthesia Perspective.     .

## 2023-10-25 NOTE — TRANSFER OF CARE
Anesthesia Transfer of Care Note    Patient: Bridget Cardenas Jr.    Procedure(s) Performed: Procedure(s) (LRB):  COLONOSCOPY, WITH POLYPECTOMY USING SNARE (Left)  EGD (ESOPHAGOGASTRODUODENOSCOPY) (Left)  COLONOSCOPY, WITH 1 OR MORE BIOPSIES (N/A)  COLONOSCOPY, WITH HEMORRHAGE CONTROL (N/A)    Patient location: GI    Anesthesia Type: general    Transport from OR: Transported from OR on room air with adequate spontaneous ventilation    Post pain: adequate analgesia    Post assessment: no apparent anesthetic complications and tolerated procedure well    Post vital signs: stable    Level of consciousness: awake    Nausea/Vomiting: no nausea/vomiting    Complications: none    Transfer of care protocol was followed

## 2023-10-25 NOTE — PROVATION PATIENT INSTRUCTIONS
Discharge Summary/Instructions after an Endoscopic Procedure  Patient Name: Bridget Cardenas  Patient MRN: [id number]  Patient YOB: 1960 Wednesday, October 25, 2023  Angy Mcguire MD  Dear patient,  As a result of recent federal legislation (The Federal Cures Act), you may   receive lab or pathology results from your procedure in your [facility]   account before your physician is able to contact you. Your physician or   their representative will relay the results to you with their   recommendations at their soonest availability.  Thank you,  RESTRICTIONS:  During your procedure today, you received medications for sedation.  These   medications may affect your judgment, balance and coordination.  Therefore,   for 24 hours, you have the following restrictions:   - DO NOT drive a car, operate machinery, make legal/financial decisions,   sign important papers or drink alcohol.    ACTIVITY:  Today: no heavy lifting, straining or running due to procedural   sedation/anesthesia.  The following day: return to full activity including work.  DIET:  Eat and drink normally unless instructed otherwise.     TREATMENT FOR COMMON SIDE EFFECTS:  - Mild abdominal pain, nausea, belching, bloating or excessive gas:  rest,   eat lightly and use a heating pad.  - Sore Throat: treat with throat lozenges and/or gargle with warm salt   water.  - Because air was used during the procedure, expelling large amounts of air   from your rectum or belching is normal.  - If a bowel prep was taken, you may not have a bowel movement for 1-3 days.    This is normal.  SYMPTOMS TO WATCH FOR AND REPORT TO YOUR PHYSICIAN:  1. Abdominal pain or bloating, other than gas cramps.  2. Chest pain.  3. Back pain.  4. Signs of infection such as: chills or fever occurring within 24 hours   after the procedure.  5. Rectal bleeding, which would show as bright red, maroon, or black stools.   (A tablespoon of blood from the rectum is not serious,  especially if   hemorrhoids are present.)  6. Vomiting.  7. Weakness or dizziness.  GO DIRECTLY TO THE NEAREST EMERGENCY ROOM IF YOU HAVE ANY OF THE FOLLOWING:      Difficulty breathing              Chills and/or fever over 101 F   Persistent vomiting and/or vomiting blood   Severe abdominal pain   Severe chest pain   Black, tarry stools   Bleeding- more than one tablespoon   Any other symptom or condition that you feel may need urgent attention  Your doctor recommends these additional instructions:  If any biopsies were taken, your doctors clinic will contact you in 1 to 2   weeks with any results.  - Return patient to hospital brady for ongoing care.   - Advance diet as tolerated.   - Continue present medications.   - Await pathology results.   - Repeat colonoscopy in 1 year following resection for surveillance.   - Consult a surgeon.   - Would recommend cross sectional imaging to evaluate for metastatic   disease.  If not able to do contrasted CT scan, may need to consider PET   CT.  Defer to surgery/oncology. Check CEA.  For questions, problems or results please call your physician - Angy Mcguire MD at Work:  [phone], Work:  [phone].  [facility] , EMERGENCY ROOM PHONE NUMBER: [phone]  IF A COMPLICATION OR EMERGENCY SITUATION ARISES AND YOU ARE UNABLE TO REACH   YOUR PHYSICIAN - GO DIRECTLY TO THE EMERGENCY ROOM.  Angy Mcguire MD  10/25/2023 6:32:53 PM  This report has been verified and signed electronically.  Dear patient,  As a result of recent federal legislation (The Federal Cures Act), you may   receive lab or pathology results from your procedure in your [facility]   account before your physician is able to contact you. Your physician or   their representative will relay the results to you with their   recommendations at their soonest availability.  Thank you,  PROVATION

## 2023-10-25 NOTE — PLAN OF CARE
Problem: Infection  Goal: Absence of Infection Signs and Symptoms  10/25/2023 0503 by Anali Domínguez RN  Outcome: Ongoing, Progressing  10/25/2023 0503 by Anali Domínguez RN  Outcome: Ongoing, Progressing     Problem: Adult Inpatient Plan of Care  Goal: Plan of Care Review  10/25/2023 0503 by Anali Domínguez RN  Outcome: Ongoing, Progressing  10/25/2023 0503 by Anali Domínguez RN  Outcome: Ongoing, Progressing  Goal: Patient-Specific Goal (Individualized)  10/25/2023 0503 by Anali Domínguez RN  Outcome: Ongoing, Progressing  10/25/2023 0503 by Anali Domínguez RN  Outcome: Ongoing, Progressing  Goal: Absence of Hospital-Acquired Illness or Injury  10/25/2023 0503 by Anali Domínguez RN  Outcome: Ongoing, Progressing  10/25/2023 0503 by Anali Domínguez RN  Outcome: Ongoing, Progressing  Goal: Optimal Comfort and Wellbeing  10/25/2023 0503 by Anali Domínguez RN  Outcome: Ongoing, Progressing  10/25/2023 0503 by Anali Domínguez RN  Outcome: Ongoing, Progressing  Goal: Readiness for Transition of Care  10/25/2023 0503 by Anali Domínguez RN  Outcome: Ongoing, Progressing  10/25/2023 0503 by Anali Domínguez RN  Outcome: Ongoing, Progressing     Problem: Fluid and Electrolyte Imbalance (Acute Kidney Injury/Impairment)  Goal: Fluid and Electrolyte Balance  10/25/2023 0503 by Anali Domínguez RN  Outcome: Ongoing, Progressing  10/25/2023 0503 by Anali Domínguez RN  Outcome: Ongoing, Progressing     Problem: Oral Intake Inadequate (Acute Kidney Injury/Impairment)  Goal: Optimal Nutrition Intake  10/25/2023 0503 by Anali Domínguez RN  Outcome: Ongoing, Progressing  10/25/2023 0503 by Anali Domínguez RN  Outcome: Ongoing, Progressing     Problem: Renal Function Impairment (Acute Kidney Injury/Impairment)  Goal: Effective Renal Function  10/25/2023 0503 by Anali Domínguez RN  Outcome: Ongoing, Progressing  10/25/2023 0503 by Anali Domínguez, RN  Outcome: Ongoing, Progressing

## 2023-10-25 NOTE — PROGRESS NOTES
Gastroenterology/Hepatology Initial Consult Note    Patient Name: Bridget Cardenas Jr.  Age: 62 y.o.  : 1960  MRN: [id number]  Admission Date: 10/20/2023    Reason for Consult:      Medical Management  Chief Complaint   Patient presents with    Shortness of Breath     PT REPORTS SOB UPON EXERTION X 2 WKS.  DENIES CP.  HX OF BLADDER CA, FINISHED TX. PT PALE CAP REFILL > 3 SEC. EKG OBTAINED.  VSS.          Self, Aaareferral    HPI:     Bridget Cardenas Jr. is a 62 y.o. male with a complex PMH significant for bladder cancer status post total cystectomy and prostatectomy in 2018 with neobladder creation, CKD stage 5, and chronic hydronephrosis who presented to [facility] ED on 10/20/23 with a complaint of SOB/DIAZ x3 weeks as well as leg cramps x6 months, often worse at night, and rash on back x3 weeks which is improving with prescribed steroid cream. He denies chest pain, palpitations, n/v, diaphoresis, syncope, abdominal pain, trouble swallowing, weight loss, melena, hematochezia as well as fever, chills, or HA. Pt maintains good appetite and denies any family history of colon cancer. Normal daily BM's which he states have been dark/green recently. He does note that he had mild hematuria (pink tinged) but no gross hematuria. 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    In the ED, labs were concerning for extreme microcytic anemia, H&H 3.5/13.7 with MCV 65.6; normal platelet 204 and mild leukopenia 4.37. Iron panel consistent with LILIANA, ferritin undetectable, retic count WNL. NAGMA with CO2 14 as well, and possible BENITO on CKD with BUN 75.7 and Cr 5.08 though baseline appears around 40/4. Chart review shows labs from  with CKD 4/5, at that time had mild normocytic anemia  13.1/41.5. CT imaging of his abdomen/pelvis and US retroperitoneal both showed symmetric moderate hydronephrosis. The patient was admitted for symptomatic anemia and has received a total of 6 unit pRBC, 1 unit platelet and 1 FFP since admission bringing H&H up to 8.8/30 yesterday which is 8.2/27.8 today. During his stay, there has been questionable BM that were dark and tarry, and FOBT was performed x2 which were hemoccult positive so GI has been consulted for possible GI bleed.    Patient denies every having colonoscopy or EGD performed.     Interval Hx:  NAEO. Pt hungry, completed Go-lytely bowel prep for EGD/colonoscopy today. No other complaints.       [facility]    Past Medical History:   Diagnosis Date    Cancer         History reviewed. No pertinent surgical history.     History reviewed. No pertinent family history.    Social History     Tobacco Use    Smoking status: Former     Current packs/day: 0.00     Types: Cigarettes     Quit date:      Years since quittin.8     Passive exposure: Current    Smokeless tobacco: Current     Types: Chew   Substance Use Topics    Alcohol use: Not on file         Review of patient's allergies indicates:   Allergen Reactions    Pcn [penicillins] Hives        Medications Prior to Admission   Medication Sig Dispense Refill Last Dose    levoFLOXacin (LEVAQUIN) 750 MG tablet Take 1 tablet (750 mg total) by mouth once daily. 7 tablet 0     triamcinolone acetonide 0.1% (KENALOG) 0.1 % cream Apply topically 2 (two) times daily.            INPATIENT MEDICATIONS    Scheduled Meds:   nicotine  1 patch Transdermal Daily    sodium bicarbonate  1,300 mg Oral BID     Continuous Infusions:   lactated ringers 100 mL/hr at 10/24/23 1007     PRN Meds:  0.9%  NaCl infusion (for blood administration), 0.9%  NaCl infusion (for blood administration), 0.9%  NaCl infusion (for blood administration), 0.9%  NaCl infusion (for blood administration), 0.9%  NaCl  infusion (for blood administration), 0.9%  NaCl infusion (for blood administration), acetaminophen, dextrose 10%, dextrose 10%, glucagon (human recombinant), glucose, glucose, influenza, melatonin, naloxone, ondansetron, pneumoc 20-stanford conj-dip cr(PF), prochlorperazine, sodium chloride 0.9%, triamcinolone acetonide 0.1%          Review of Systems:       Review of Systems   Constitutional:  Negative for activity change, appetite change, chills, diaphoresis, fatigue, fever and unexpected weight change.   Respiratory:  Negative for apnea, cough, choking, chest tightness, shortness of breath, wheezing and stridor.    Cardiovascular:  Negative for chest pain.   Gastrointestinal:  Negative for abdominal distention, abdominal pain, anal bleeding, blood in stool, change in bowel habit, constipation, diarrhea, nausea, rectal pain, vomiting, reflux and fecal incontinence.   Genitourinary:  Negative for difficulty urinating (self caths several times per day), dysuria, flank pain, hematuria and urgency.   Musculoskeletal:  Positive for leg pain (occasional leg cramping worse at night x 6 months).   Neurological:  Negative for syncope and light-headedness.          Objective:       VITAL SIGNS: 24 HR MIN & MAX LAST    Temp  Min: 97.5 °F (36.4 °C)  Max: 98.3 °F (36.8 °C)  97.6 °F (36.4 °C)        BP  Min: 116/63  Max: 153/89  (!) 148/77     Pulse  Min: 51  Max: 61  (!) 51     Resp  Min: 16  Max: 18  18    SpO2  Min: 96 %  Max: 100 %  100 %        Physical Exam  Constitutional:       General: He is not in acute distress.     Appearance: Normal appearance. He is normal weight. He is not ill-appearing, toxic-appearing or diaphoretic.   HENT:      Head: Normocephalic and atraumatic.      Nose: Nose normal.      Mouth/Throat:      Mouth: Mucous membranes are moist.   Eyes:      Extraocular Movements: Extraocular movements intact.      Conjunctiva/sclera: Conjunctivae normal.      Pupils: Pupils are equal, round, and reactive to light.    Cardiovascular:      Rate and Rhythm: Normal rate and regular rhythm.      Pulses: Normal pulses.      Heart sounds: Normal heart sounds. No murmur heard.     No friction rub. No gallop.   Pulmonary:      Effort: Pulmonary effort is normal. No respiratory distress.      Breath sounds: Normal breath sounds. No wheezing, rhonchi or rales.   Abdominal:      General: Abdomen is flat. Bowel sounds are normal. There is no distension.      Palpations: Abdomen is soft.      Tenderness: There is no abdominal tenderness. There is no right CVA tenderness, left CVA tenderness or guarding.   Genitourinary:     Comments: Manzo catheter in place  Musculoskeletal:         General: No swelling, deformity or signs of injury. Normal range of motion.      Cervical back: Normal range of motion.      Right lower leg: No edema.      Left lower leg: No edema.   Skin:     General: Skin is warm and dry.      Capillary Refill: Capillary refill takes 2 to 3 seconds.      Findings: No lesion or rash.   Neurological:      General: No focal deficit present.      Mental Status: He is alert and oriented to person, place, and time. Mental status is at baseline.      Cranial Nerves: No cranial nerve deficit.      Sensory: No sensory deficit.   Psychiatric:         Mood and Affect: Mood normal.         Behavior: Behavior normal.              LABS:      Recent Labs   Lab 10/21/23  0503 10/21/23  1747 10/22/23  0927 10/23/23  0336 10/24/23  0408 10/25/23  0406   WBC 5.01  --  5.34 5.50 5.69 5.00   HGB 4.8* 6.7* 8.8* 8.2* 8.3* 8.2*   HCT 17.3* 23.0* 30.0* 27.8* 28.6* 28.3*     --  203 195 204 193   MCV 68.7*  --  75.4* 75.3* 75.7* 77.1*         Recent Labs   Lab 10/20/23  1250 10/21/23  0503 10/21/23  1747 10/22/23  0927 10/23/23  0336 10/24/23  0408 10/25/23  0406   HGB 3.5* 4.8* 6.7* 8.8* 8.2* 8.3* 8.2*   HCT 13.7* 17.3* 23.0* 30.0* 27.8* 28.6* 28.3*          Recent Labs   Lab 10/20/23  1250 10/21/23  0503 10/21/23  1747 10/22/23  0937  10/23/23  0336 10/24/23  0408 10/25/23  0406      < > 140 139 140 138 138   K 4.7   < > 4.2 4.2 4.6 4.6 4.7   CO2 14*   < > 18* 18* 18* 19* 19*   BUN 75.7*   < > 60.7* 56.2* 54.5* 51.5* 40.9*   CREATININE 5.08*   < > 4.71* 4.71* 4.76* 4.39* 4.15*   BILITOT 0.4   < >  --  1.4 0.6 0.5 0.7   BILIDIR 0.2  --   --   --   --   --   --    ALKPHOS 52   < >  --  52 45 47 42   AST 11   < >  --  9 9 10 12   ALT 11   < >  --  9 8 7 8   LABPROT 6.7   < >  --  6.9 5.8 5.8 5.7*   ALBUMIN 3.7   < >  --  3.7 3.1* 3.1* 3.1*    < > = values in this interval not displayed.          Recent Labs   Lab 10/20/23  1350   INR 1.2   PROTIME 15.1*          Recent Labs   Lab 10/20/23  1250   IRON 13*   FERRITIN <1.98*            IMAGING:   CT Abdomen Pelvis  Without Contrast    Result Date: 10/20/2023  EXAMINATION: CT ABDOMEN PELVIS WITHOUT CONTRAST CLINICAL HISTORY: neobladder, acute anemia, worsening BENITO; TECHNIQUE: Helically acquired axial images, sagittal and coronal reformations were obtained from the lung bases to the pubic symphysis without the IV administration of contrast. Automated tube current modulation, weight-based exposure dosing, and/or iterative reconstruction technique utilized to reach lowest reasonably achievable exposure rate. DLP: 214 mGy*cm COMPARISON: Retroperitoneal sonogram 10/20/2023, CT abdomen pelvis 06/17/2021 FINDINGS: HEART: There are coronary artery calcifications. LUNG BASES: Chronic elevation the left hemidiaphragm with basilar atelectasis. LIVER: Normal attenuation. No appreciable focal hepatic lesion. BILIARY: No calcified gallstones. PANCREAS: No inflammatory change. SPLEEN: Normal in size ADRENALS: No mass. KIDNEYS/URETERS: Moderate bilateral hydronephrosis.  AP diameter at the right renal pelvis is 2.4 cm.  AP diameter at the left renal pelvis is 2.5 cm.  There is bilateral hydroureter.  No obstructing calculus. GI TRACT/MESENTERY: Evaluation of the bowel is limited without contrast.  No evidence of  bowel obstruction or inflammation. PERITONEUM: No free fluid.No free air. LYMPH NODES: There are small retroperitoneal lymph nodes, similar to previous exam. VASCULATURE: No significant atherosclerosis or aneurysm. BLADDER: Patient reportedly status post cystectomy with creation of neobladder.  There is a Manzo catheter within the urinary bladder which is largely collapsed.  There is expected post instrumentation intraluminal gas. REPRODUCTIVE ORGANS: Presumed prostatectomy. ABDOMINAL WALL: Unremarkable. BONES: Degenerative changes at the hips.     1. Symmetric moderate bilateral hydronephrosis and hydroureter.  No obstructing stone.  This appears to be chronic, similar to previous exam. 2. Neobladder collapsed about a Manzo catheter. Electronically signed by: Cecile Agosto Date:    10/20/2023 Time:    17:34    US Retroperitoneal Complete    Result Date: 10/20/2023  EXAMINATION: US RETROPERITONEAL COMPLETE CLINICAL HISTORY: Domenica; TECHNIQUE: Ultrasound of the kidneys and urinary bladder was performed including color flow and grayscale evaluation of the kidneys. COMPARISON: CT abdomen pelvis 06/17/2021 FINDINGS: RIGHT KIDNEY: The right kidney measures 10.5 cm.  Moderate to severe hydronephrosis.  Renal pelvis measures 3.4 cm anterior-posterior.  Right ureter is dilated where visible proximally.  No appreciable shadowing calculus. LEFT KIDNEY: The left kidney measures 12.4 cm. Moderate hydronephrosis.Renal pelvis measures 3.3 cm anterior-posterior.  No appreciable shadowing renal calculus. BLADDER: Distended neobladder with calculated volume of 1000 mL.     Bilateral hydronephrosis Distended neobladder.  Correlate for urinary retention. Electronically signed by: Cecile Agosto Date:    10/20/2023 Time:    15:23    X-Ray Chest AP Portable    Result Date: 10/20/2023  EXAMINATION: XR CHEST AP PORTABLE CLINICAL HISTORY: Shortness of breath TECHNIQUE: Frontal view(s) of the chest. COMPARISON: No relevant comparison  studies available at the time of dictation. FINDINGS: Left-sided MediPort catheter tip near the SVC/RA junction.  Normal cardiac silhouette.  Mild left diaphragm elevation.  No consolidation identified.  No significant pleural effusion or discernible pneumothorax.     No acute pulmonary process identified. Electronically signed by: Kash Gunderson Date:    10/20/2023 Time:    13:15        Assessment / Plan:       Iron Deficient Anemia  - H&H 3.5/13.7 with MCV 65.6 ---> 8.3/28.6 MCV 75.7; stable H&H  - s/p 6 units pRBC and 1 FFP, 1 platelet  - Iron 13, Ferritin undetectable, iron sat 3, TIBC 365; normal absolute retic count 0.0588  - S/p Ferrlecit 125 mg on 10/21 and again today 10/23; would recommend an additional IV iron while inpatient. Then would benefit from PO iron upon discharge  - S/p 2018 during cystectomy and prostatectomy/neobladder creation  - Pt has had persistent/chronic uremia which could increase likelihood of GI angiodysplasia; no recent scopes to date  - Given severe iron deficient anemia, pt underwent EGD and colonoscopy today  - Upper EGD WNL, unfortunately there were several polyps removed from the colon and a mass seen which was biopsied, appearance consistent with malignancy  - Will need to discuss plan with team as pt cannot have contrasted scans to assess for metastasis, would need PET scan outpatient prior to possible colon resection; will need urgent surgery referral possibly to colorectal surgeon, and oncology  - Okay to restart DVT ppx post-procedure      Enrike Colmean MD  [employer] IM PGY III

## 2023-10-25 NOTE — PROGRESS NOTES
[facility] Wards Progress Note     Resident Team: [facility] Medicine List 3  Attending Physician: Claudette Mitchell MD   Resident: Caitlin  Intern: Bertin    Subjective:      Brief HPI:  Bridget Cardenas Jr. is a 62 y.o. male with past medical history of bladder cancer status post total cystectomy and prostatectomy in 2018 with neobladder creation, CKD stage 4/5, chronic hydronephrosis.  He presented to the emergency room with chief complaint of shortness a breath and dyspnea on exertion ongoing for the past 2 weeks.  He denies any chest pain during these episodes.  Denies any hematochezia, hematemesis, melena.  He denies any fever or chills.  After his cystectomy, patient reports of having to self catheterization 4-5 times daily without issues reported.  He endorses having some pink tinged urine ongoing around this time but denies any gross hematuria.  Urology NP at our facility, last seen in August 2023.     In the emergency room, patient was hemodynamically stable with normal vitals.  CBC with leukopenia of 4.37, hemoglobin of 3.5 and 13.7, MCV of 65.6.  CMP with non-anion gap metabolic acidosis with bicarb of 14, BENITO with BUN/creatinine of 75.7/5.08.  BNP mildly elevated at 197.2.  UA with 0 5 RBCs, trace protein trace bacteria.  Ultrasound retroperitoneal shows bilateral hydronephrosis with distended neobladder.  Manzo catheter placed with 1200 mL of urine output.  CT abdomen without contrast pursued showing symmetric moderate bilateral hydronephrosis and hydroureter without obstructing stone which appears to be chronic per Radiology read.  Internal medicine consulted for symptomatic anemia and dyspnea on exertion.     Given patient's drop in hemoglobin without known baseline and BENITO on CKD with hydronephrosis, recommend transfer to higher level of care for urology services.  We do not have Urology Services until 10/24/2023.    Interval History:  VSS and AF. NAEO documented. Patient resting in bed this AM, no  change to symptoms. Manzo draining clear yellow urine. GI and Nephrology following. Currently NPO awaiting EGD and colonoscopy today. Denies chest pain, SOB, abdominal pain, hematuria, CVA tenderness, melena and rectal bleeding.       Review of Systems:  Review of Systems   Constitutional:  Negative for chills, diaphoresis, fatigue and fever.   HENT:  Negative for ear pain, hearing loss, rhinorrhea, sore throat, tinnitus and trouble swallowing.    Eyes:  Negative for pain, redness and visual disturbance.   Respiratory:  Negative for cough, chest tightness and shortness of breath.    Cardiovascular:  Negative for chest pain and palpitations.   Gastrointestinal:  Positive for blood in stool. Negative for abdominal pain, constipation, diarrhea, nausea and vomiting.   Genitourinary:  Negative for difficulty urinating, dysuria, frequency, hematuria and urgency.   Musculoskeletal:  Negative for arthralgias and myalgias.   Skin:  Negative for rash and wound.   Neurological:  Negative for dizziness, seizures, syncope, weakness, light-headedness, numbness and headaches.   Psychiatric/Behavioral:  Negative for confusion.         Objective:     Last 24 Hour Vital Signs:  BP  Min: 116/63  Max: 153/89  Temp  Av.8 °F (36.6 °C)  Min: 97.5 °F (36.4 °C)  Max: 98.3 °F (36.8 °C)  Pulse  Av.3  Min: 51  Max: 61  Resp  Av.5  Min: 16  Max: 18  SpO2  Av.1 %  Min: 96 %  Max: 100 %  I/O last 3 completed shifts:  In: 2556.5 [I.V.:2556.5]  Out: 7400 [Urine:7400]    Physical Examination:  Physical Exam  Vitals reviewed.   Constitutional:       General: He is not in acute distress.     Appearance: Normal appearance. He is normal weight. He is not ill-appearing.   HENT:      Head: Normocephalic and atraumatic.      Right Ear: External ear normal.      Left Ear: External ear normal.   Eyes:      General: No scleral icterus.        Right eye: No discharge.         Left eye: No discharge.      Extraocular Movements: Extraocular  movements intact.      Conjunctiva/sclera: Conjunctivae normal.      Pupils: Pupils are equal, round, and reactive to light.   Cardiovascular:      Rate and Rhythm: Normal rate and regular rhythm.      Pulses: Normal pulses.      Heart sounds: Normal heart sounds. No murmur heard.     No friction rub. No gallop.   Pulmonary:      Effort: Pulmonary effort is normal. No respiratory distress.      Breath sounds: Normal breath sounds. No stridor. No wheezing, rhonchi or rales.   Abdominal:      General: Abdomen is flat. Bowel sounds are normal. There is no distension.      Palpations: Abdomen is soft.      Tenderness: There is no abdominal tenderness. There is no guarding.   Musculoskeletal:         General: No swelling, tenderness, deformity or signs of injury. Normal range of motion.      Right lower leg: No edema.      Left lower leg: No edema.   Skin:     General: Skin is warm and dry.      Capillary Refill: Capillary refill takes less than 2 seconds.      Coloration: Skin is not jaundiced.      Findings: No bruising, erythema or rash.   Neurological:      Mental Status: He is alert.      Comments: AAO to person, place, time, and situation; CN II-XII grossly intact         Laboratory:  Most Recent Data:  CBC:   Lab Results   Component Value Date    WBC 5.00 10/25/2023    HGB 8.2 (L) 10/25/2023    HCT 28.3 (L) 10/25/2023     10/25/2023    MCV 77.1 (L) 10/25/2023    RDW 23.7 (H) 10/25/2023     WBC Differential:   Recent Labs   Lab 10/21/23  0503 10/21/23  1747 10/22/23  0927 10/23/23  0336 10/24/23  0408 10/25/23  0406   WBC 5.01  --  5.34 5.50 5.69 5.00   HGB 4.8* 6.7* 8.8* 8.2* 8.3* 8.2*   HCT 17.3* 23.0* 30.0* 27.8* 28.6* 28.3*     --  203 195 204 193   MCV 68.7*  --  75.4* 75.3* 75.7* 77.1*       BMP:   Lab Results   Component Value Date     10/25/2023    K 4.7 10/25/2023    CO2 19 (L) 10/25/2023    BUN 40.9 (H) 10/25/2023    CREATININE 4.15 (H) 10/25/2023    CALCIUM 7.8 (L) 10/25/2023    MG  "1.70 10/25/2023    PHOS 3.4 10/25/2023     LFTs:   Lab Results   Component Value Date    ALBUMIN 3.1 (L) 10/25/2023    BILITOT 0.7 10/25/2023    AST 12 10/25/2023    ALKPHOS 42 10/25/2023    ALT 8 10/25/2023     Coags:   Lab Results   Component Value Date    INR 1.2 10/20/2023    PROTIME 15.1 (H) 10/20/2023     FLP: No results found for: "CHOL", "HDL", "LDLCALC", "TRIG", "CHOLHDL"  DM:   Lab Results   Component Value Date    CREATININE 4.15 (H) 10/25/2023     Thyroid: No results found for: "TSH", "FREET4", "M8VPJSG", "M5YJHPO", "THYROIDAB"  Anemia:   Lab Results   Component Value Date    IRON 13 (L) 10/20/2023    TIBC 378 10/20/2023    FERRITIN <1.98 (L) 10/20/2023     Cardiac:   Lab Results   Component Value Date    TROPONINI 0.028 10/20/2023    .2 (H) 10/20/2023     Urinalysis:   Lab Results   Component Value Date    LABURIN (A) 08/11/2023     >/= 100,000 colonies/ml Klebsiella pneumoniae ssp ozaenae    LABURIN 10,000 - 25,000 colonies/ml Proteus vulgaris (A) 08/11/2023    COLORU Dark Luisa 08/11/2023    PHUA 6.0 10/20/2023    SPECGRAV 1.020 08/11/2023    NITRITE Positive 08/11/2023    KETONESU Negative 08/11/2023    UROBILINOGEN Normal 10/20/2023    WBCUA 0-5 10/20/2023       Radiology:  Imaging Results              CT Abdomen Pelvis  Without Contrast (Final result)  Result time 10/20/23 17:34:12      Final result by Cecile Agosto MD (10/20/23 17:34:12)                   Impression:      1. Symmetric moderate bilateral hydronephrosis and hydroureter.  No obstructing stone.  This appears to be chronic, similar to previous exam.  2. Neobladder collapsed about a Manzo catheter.      Electronically signed by: Cecile Agosto  Date:    10/20/2023  Time:    17:34               Narrative:    EXAMINATION:  CT ABDOMEN PELVIS WITHOUT CONTRAST    CLINICAL HISTORY:  neobladder, acute anemia, worsening BENITO;    TECHNIQUE:  Helically acquired axial images, sagittal and coronal reformations were obtained from the " lung bases to the pubic symphysis without the IV administration of contrast.    Automated tube current modulation, weight-based exposure dosing, and/or iterative reconstruction technique utilized to reach lowest reasonably achievable exposure rate.    DLP: 214 mGy*cm    COMPARISON:  Retroperitoneal sonogram 10/20/2023, CT abdomen pelvis 06/17/2021    FINDINGS:  HEART: There are coronary artery calcifications.    LUNG BASES: Chronic elevation the left hemidiaphragm with basilar atelectasis.    LIVER: Normal attenuation. No appreciable focal hepatic lesion.    BILIARY: No calcified gallstones.    PANCREAS: No inflammatory change.    SPLEEN: Normal in size    ADRENALS: No mass.    KIDNEYS/URETERS: Moderate bilateral hydronephrosis.  AP diameter at the right renal pelvis is 2.4 cm.  AP diameter at the left renal pelvis is 2.5 cm.  There is bilateral hydroureter.  No obstructing calculus.    GI TRACT/MESENTERY: Evaluation of the bowel is limited without contrast.  No evidence of bowel obstruction or inflammation.    PERITONEUM: No free fluid.No free air.    LYMPH NODES: There are small retroperitoneal lymph nodes, similar to previous exam.    VASCULATURE: No significant atherosclerosis or aneurysm.    BLADDER: Patient reportedly status post cystectomy with creation of neobladder.  There is a Manzo catheter within the urinary bladder which is largely collapsed.  There is expected post instrumentation intraluminal gas.    REPRODUCTIVE ORGANS: Presumed prostatectomy.    ABDOMINAL WALL: Unremarkable.    BONES: Degenerative changes at the hips.                                       US Retroperitoneal Complete (Final result)  Result time 10/20/23 15:23:14      Final result by Cecile Agosto MD (10/20/23 15:23:14)                   Impression:      Bilateral hydronephrosis    Distended neobladder.  Correlate for urinary retention.      Electronically signed by: Cecile Agosto  Date:    10/20/2023  Time:    15:23                Narrative:    EXAMINATION:  US RETROPERITONEAL COMPLETE    CLINICAL HISTORY:  Domenica;    TECHNIQUE:  Ultrasound of the kidneys and urinary bladder was performed including color flow and grayscale evaluation of the kidneys.    COMPARISON:  CT abdomen pelvis 06/17/2021    FINDINGS:  RIGHT KIDNEY: The right kidney measures 10.5 cm.  Moderate to severe hydronephrosis.  Renal pelvis measures 3.4 cm anterior-posterior.  Right ureter is dilated where visible proximally.  No appreciable shadowing calculus.    LEFT KIDNEY: The left kidney measures 12.4 cm. Moderate hydronephrosis.Renal pelvis measures 3.3 cm anterior-posterior.  No appreciable shadowing renal calculus.    BLADDER: Distended neobladder with calculated volume of 1000 mL.                                       X-Ray Chest AP Portable (Final result)  Result time 10/20/23 13:15:42      Final result by Kash Gunderson MD (10/20/23 13:15:42)                   Impression:      No acute pulmonary process identified.      Electronically signed by: Kash Gunderson  Date:    10/20/2023  Time:    13:15               Narrative:    EXAMINATION:  XR CHEST AP PORTABLE    CLINICAL HISTORY:  Shortness of breath    TECHNIQUE:  Frontal view(s) of the chest.    COMPARISON:  No relevant comparison studies available at the time of dictation.    FINDINGS:  Left-sided MediPort catheter tip near the SVC/RA junction.  Normal cardiac silhouette.  Mild left diaphragm elevation.  No consolidation identified.  No significant pleural effusion or discernible pneumothorax.                                    Current Medications:     Infusions:   lactated ringers 100 mL/hr at 10/24/23 1007        Scheduled:   ferric gluconate (FERRLECIT) 125 mg in sodium chloride 0.9% 100 mL IVPB  125 mg Intravenous 1 time in Clinic/HOD    magnesium sulfate IVPB  2 g Intravenous Once    mupirocin   Nasal BID    nicotine  1 patch Transdermal Daily    polyethylene glycol  2,000 mL Oral Q8H    sodium  bicarbonate  1,300 mg Oral BID        PRN:  0.9%  NaCl infusion (for blood administration), 0.9%  NaCl infusion (for blood administration), 0.9%  NaCl infusion (for blood administration), 0.9%  NaCl infusion (for blood administration), 0.9%  NaCl infusion (for blood administration), 0.9%  NaCl infusion (for blood administration), acetaminophen, dextrose 10%, dextrose 10%, glucagon (human recombinant), glucose, glucose, influenza, melatonin, naloxone, ondansetron, pneumoc 20-stanford conj-dip cr(PF), prochlorperazine, sodium chloride 0.9%, triamcinolone acetonide 0.1%  Assessment & Plan:   Symptomatic iron-deficiency anemia  -H/H stable this AM, currently 8.2/28.3   -Iron panel, ferritin, reticulocyte count consistent with iron-deficiency anemia, will require additional IV iron supplementation - Nephrology recommended 8 total doses inpatient. So far he has received 2 doses - 10/21 and 10/25   -FOBT positive x 3, however RUBY negative for melena or rectal bleeding   -S/p 6 units PRBCs, 1 unit platelets, and 1 unit FFP total to date, did not require any blood products overnight   -GI consulted, currently NPO for EGD and colonscopy planned for today. Appreciate assistance.     History of bladder cancer status post total cystectomy and prostatectomy in 2018 with neobladder creation  Urinary retention  Chronic bilateral hydronephrosis of ureter and kidneys  BENITO on CKD stage V  History of UTIs  -CT abdomen-pelvis without contrast showed symmetric, moderate bilateral hydronephrosis and hydroureter which appears to be chronic in nature  -UA negative for UTI, Manzo output is clear and yellow   -Continue Manzo to gravity  -Consulted Nephrology for CKD, recommended increasing sodium bicarb to 1300 BID dosing and re-establishing outpatient follow-up . Appreciate recommendations.   -No inpatient urological procedure at this time, was referred to [facility] Urology outpatient, last seen in clinic on 8/11/23. US pelvis and retroperitoneal  scheduled for 11/13/23 with follow-up scheduled for 2/9/24.   Normal anion gap metabolic acidosis  Likely Renal tubular acidosis type I  -AG 10 on AM labs   -Increased sodium bicarb dosing as stated above     Elevated BNP  -.2 and Troponin 0.028 on admission, possibly related to renal status   -EKG wnl   -No history of heart disease per patient and chart review  -Consider pursuing TTE if DIAZ persists to evaluate heart function     Rash  -Continue outpatient steroid ointment as prescribed    Access: Peripheral   Antibiotics: None   Diet: NPO  DVT Prophylaxis: SCDs  GI Prophylaxis: None  Fluids: LR 100cc/hr   Code Status: Full     Disposition:  62 y.o. male with past medical history of bladder cancer status post total cystectomy and prostatectomy in 2018 with neobladder creation, CKD stage 4/5, chronic hydronephrosis admitted for symptomatic anemia. H/H stable this AM. GI and Nephrology following. NPO awaiting EGD and colonoscopy later today. Possible DC tomorrow.     Syed Denton MD  [facility] HO-1

## 2023-10-26 VITALS
HEART RATE: 74 BPM | RESPIRATION RATE: 20 BRPM | BODY MASS INDEX: 27.37 KG/M2 | OXYGEN SATURATION: 96 % | DIASTOLIC BLOOD PRESSURE: 67 MMHG | TEMPERATURE: 98 F | HEIGHT: 70 IN | SYSTOLIC BLOOD PRESSURE: 136 MMHG | WEIGHT: 191.19 LBS

## 2023-10-26 DIAGNOSIS — C18.4 MALIGNANT TUMOR OF TRANSVERSE COLON: Primary | ICD-10-CM

## 2023-10-26 LAB
ALBUMIN SERPL-MCNC: 3.1 G/DL (ref 3.4–4.8)
ALBUMIN/GLOB SERPL: 1.2 RATIO (ref 1.1–2)
ALP SERPL-CCNC: 55 UNIT/L (ref 40–150)
ALT SERPL-CCNC: 10 UNIT/L (ref 0–55)
AST SERPL-CCNC: 15 UNIT/L (ref 5–34)
BASOPHILS # BLD AUTO: 0.05 X10(3)/MCL
BASOPHILS NFR BLD AUTO: 0.6 %
BILIRUB SERPL-MCNC: 0.6 MG/DL
BUN SERPL-MCNC: 46 MG/DL (ref 8.4–25.7)
CALCIUM SERPL-MCNC: 8.1 MG/DL (ref 8.8–10)
CEA SERPL-MCNC: 1.98 NG/ML (ref 0–3)
CHLORIDE SERPL-SCNC: 109 MMOL/L (ref 98–107)
CO2 SERPL-SCNC: 21 MMOL/L (ref 23–31)
CREAT SERPL-MCNC: 4.15 MG/DL (ref 0.73–1.18)
EOSINOPHIL # BLD AUTO: 0.28 X10(3)/MCL (ref 0–0.9)
EOSINOPHIL NFR BLD AUTO: 3.2 %
ERYTHROCYTE [DISTWIDTH] IN BLOOD BY AUTOMATED COUNT: 24 % (ref 11.5–17)
GFR SERPLBLD CREATININE-BSD FMLA CKD-EPI: 16 MLS/MIN/1.73/M2
GLOBULIN SER-MCNC: 2.6 GM/DL (ref 2.4–3.5)
GLUCOSE SERPL-MCNC: 102 MG/DL (ref 82–115)
HCT VFR BLD AUTO: 28 % (ref 42–52)
HGB BLD-MCNC: 8.1 G/DL (ref 14–18)
IMM GRANULOCYTES # BLD AUTO: 0.03 X10(3)/MCL (ref 0–0.04)
IMM GRANULOCYTES NFR BLD AUTO: 0.3 %
LYMPHOCYTES # BLD AUTO: 0.89 X10(3)/MCL (ref 0.6–4.6)
LYMPHOCYTES NFR BLD AUTO: 10.3 %
MAGNESIUM SERPL-MCNC: 1.9 MG/DL (ref 1.6–2.6)
MCH RBC QN AUTO: 22.4 PG (ref 27–31)
MCHC RBC AUTO-ENTMCNC: 28.9 G/DL (ref 33–36)
MCV RBC AUTO: 77.3 FL (ref 80–94)
MONOCYTES # BLD AUTO: 0.71 X10(3)/MCL (ref 0.1–1.3)
MONOCYTES NFR BLD AUTO: 8.2 %
NEUTROPHILS # BLD AUTO: 6.69 X10(3)/MCL (ref 2.1–9.2)
NEUTROPHILS NFR BLD AUTO: 77.4 %
NRBC BLD AUTO-RTO: 0 %
PHOSPHATE SERPL-MCNC: 3.7 MG/DL (ref 2.3–4.7)
PLATELET # BLD AUTO: 186 X10(3)/MCL (ref 130–400)
PMV BLD AUTO: 10.8 FL (ref 7.4–10.4)
POTASSIUM SERPL-SCNC: 5.1 MMOL/L (ref 3.5–5.1)
PROT SERPL-MCNC: 5.7 GM/DL (ref 5.8–7.6)
RBC # BLD AUTO: 3.62 X10(6)/MCL (ref 4.7–6.1)
SODIUM SERPL-SCNC: 139 MMOL/L (ref 136–145)
WBC # SPEC AUTO: 8.65 X10(3)/MCL (ref 4.5–11.5)

## 2023-10-26 PROCEDURE — S4991 NICOTINE PATCH NONLEGEND: HCPCS

## 2023-10-26 PROCEDURE — 25000003 PHARM REV CODE 250: Performed by: INTERNAL MEDICINE

## 2023-10-26 PROCEDURE — 94761 N-INVAS EAR/PLS OXIMETRY MLT: CPT

## 2023-10-26 PROCEDURE — 82378 CARCINOEMBRYONIC ANTIGEN: CPT | Performed by: INTERNAL MEDICINE

## 2023-10-26 PROCEDURE — 25000003 PHARM REV CODE 250

## 2023-10-26 PROCEDURE — 63600175 PHARM REV CODE 636 W HCPCS

## 2023-10-26 PROCEDURE — 85025 COMPLETE CBC W/AUTO DIFF WBC: CPT

## 2023-10-26 PROCEDURE — 63600175 PHARM REV CODE 636 W HCPCS: Performed by: STUDENT IN AN ORGANIZED HEALTH CARE EDUCATION/TRAINING PROGRAM

## 2023-10-26 PROCEDURE — 83735 ASSAY OF MAGNESIUM: CPT

## 2023-10-26 PROCEDURE — 80053 COMPREHEN METABOLIC PANEL: CPT

## 2023-10-26 PROCEDURE — 84100 ASSAY OF PHOSPHORUS: CPT

## 2023-10-26 RX ORDER — FERROUS SULFATE 325(65) MG
325 TABLET ORAL
Qty: 90 TABLET | Refills: 0 | Status: SHIPPED | OUTPATIENT
Start: 2023-10-26 | End: 2023-11-29

## 2023-10-26 RX ORDER — SODIUM BICARBONATE 650 MG/1
1300 TABLET ORAL 2 TIMES DAILY
Qty: 120 TABLET | Refills: 11 | Status: SHIPPED | OUTPATIENT
Start: 2023-10-26 | End: 2023-10-26 | Stop reason: SDUPTHER

## 2023-10-26 RX ORDER — SODIUM BICARBONATE 650 MG/1
1300 TABLET ORAL 2 TIMES DAILY
Qty: 120 TABLET | Refills: 11 | Status: SHIPPED | OUTPATIENT
Start: 2023-10-26 | End: 2024-10-25

## 2023-10-26 RX ORDER — FERROUS SULFATE 325(65) MG
325 TABLET ORAL
Qty: 90 TABLET | Refills: 0 | Status: SHIPPED | OUTPATIENT
Start: 2023-10-26 | End: 2023-10-26 | Stop reason: SDUPTHER

## 2023-10-26 RX ADMIN — SODIUM BICARBONATE 650 MG TABLET 1300 MG: at 08:10

## 2023-10-26 RX ADMIN — SODIUM CHLORIDE 125 MG: 9 INJECTION, SOLUTION INTRAVENOUS at 09:10

## 2023-10-26 RX ADMIN — NICOTINE 1 PATCH: 21 PATCH, EXTENDED RELEASE TRANSDERMAL at 08:10

## 2023-10-26 RX ADMIN — HEPARIN SODIUM 5000 UNITS: 5000 INJECTION, SOLUTION INTRAVENOUS; SUBCUTANEOUS at 08:10

## 2023-10-26 NOTE — DISCHARGE SUMMARY
[facility] Discharge Summary    Admitting Physician: Jose Berry MD  Attending Physician: Claudette Mitchell MD   Resident: Caitlin   Intern: Bertin  Date of Admit: 10/20/2023  Date of Discharge: 10/26/2023    Condition: Stable  Outcome: Patient tolerated treatment/procedure well without complication and is now ready for discharge.  DISPOSITION: Home or Self Care    Discharge Diagnoses     Patient Active Problem List   Diagnosis    Urinary retention    Abnormal urinalysis    Symptomatic anemia    BENITO (acute kidney injury)    Benign hypertensive CKD, stage 5 chronic kidney disease or end stage renal disease    Dyspnea on exertion    Acute anemia    Chronic kidney disease    History of bladder cancer       Principal Problem:  Symptomatic anemia    Consultants and Procedures     Consultants:  IP CONSULT TO HOSPITAL MEDICINE  IP CONSULT TO GI  IP CONSULT TO NEPHROLOGY    Procedures:   Procedure(s) (LRB):  COLONOSCOPY, WITH POLYPECTOMY USING SNARE (Left)  EGD (ESOPHAGOGASTRODUODENOSCOPY) (Left)  COLONOSCOPY, WITH 1 OR MORE BIOPSIES (N/A)  COLONOSCOPY, WITH HEMORRHAGE CONTROL (N/A)     Brief Admission History      Bridget Cardenas Jr. is a 62 y.o. male with past medical history of bladder cancer status post total cystectomy and prostatectomy in 2018 with neobladder creation, CKD stage 4/5, chronic hydronephrosis.  He presented to the emergency room with chief complaint of shortness a breath and dyspnea on exertion ongoing for the past 2 weeks.  He denies any chest pain during these episodes.  Denies any hematochezia, hematemesis, melena.  He denies any fever or chills.  After his cystectomy, patient reports of having to self catheterization 4-5 times daily without issues reported.  He endorses having some pink tinged urine ongoing around this time but denies any gross hematuria.  Urology NP at our facility, last seen in August 2023.     In the emergency room, patient was hemodynamically stable with normal vitals.   CBC with leukopenia of 4.37, hemoglobin of 3.5 and 13.7, MCV of 65.6.  CMP with non-anion gap metabolic acidosis with bicarb of 14, BENITO with BUN/creatinine of 75.7/5.08.  BNP mildly elevated at 197.2.  UA with 0 5 RBCs, trace protein trace bacteria.  Ultrasound retroperitoneal shows bilateral hydronephrosis with distended neobladder.  Manzo catheter placed with 1200 mL of urine output.  CT abdomen without contrast pursued showing symmetric moderate bilateral hydronephrosis and hydroureter without obstructing stone which appears to be chronic per Radiology read.  Internal medicine consulted for symptomatic anemia and dyspnea on exertion.     Given patient's drop in hemoglobin without known baseline and BENITO on CKD with hydronephrosis, recommend transfer to higher level of care for urology services.  We do not have Urology Services until 10/24/2023.    Hospital Course with Pertinent Findings     Patient was hospitalized for a duration of 6 days for symptomatic anemia. Patient was transfused a total of 6u pRBC, 1u FFP, and 1u platelets while hospitalized. FOBT x 3 + but RUBY negative. Services consulted during stay included GI for GI bleed and Nephrology for CKD. Patient was previously seen by [facility] Nephrology however was lost to follow-up. He was started on NaBicarb 1,300mg BID for non-anion gap metabolic acidosis and will be re-established with [facility] Nephrology clinic upon discharge. GI team performed EGD and colonoscopy. Significant findings included transverse colon mass, samples taken and pathology is pending. Sigmoid polyp was also removed. Dr. Mcguire discussed case with Dr. Aris Scott, surgical oncologist, who patient will be referred to for surgical evaluation and treatment of colon cancer. Dr. Scott will obtain body scan to evaluate for metastasis as this cannot be done at [facility]. Pathology reports will be sent to Dr. Nuñez. H/H was stable at discharge and patient received 3 doses of IV iron total over 3  "days during his stay, 8 doses were recommended however patient has benign CKD 5 not requiring dialysis as of now. Ferrous sulfate oral supplement provided at discharge.     Discharge physical exam:  Vitals  BP: 127/72  Temp: 99.1 °F (37.3 °C)  Temp Source: Oral  Pulse: 79  Resp: 20  SpO2: 97 %  Height: 5' 10" (177.8 cm)  Weight: 86.7 kg (191 lb 3.2 oz)    Physical Exam  Vitals reviewed.   Constitutional:       General: He is not in acute distress.     Appearance: Normal appearance. He is normal weight. He is not ill-appearing.   HENT:      Head: Normocephalic and atraumatic.      Right Ear: External ear normal.      Left Ear: External ear normal.   Eyes:      General: No scleral icterus.        Right eye: No discharge.         Left eye: No discharge.      Extraocular Movements: Extraocular movements intact.      Conjunctiva/sclera: Conjunctivae normal.      Pupils: Pupils are equal, round, and reactive to light.   Cardiovascular:      Rate and Rhythm: Normal rate and regular rhythm.      Pulses: Normal pulses.      Heart sounds: Normal heart sounds. No murmur heard.     No friction rub. No gallop.   Pulmonary:      Effort: Pulmonary effort is normal. No respiratory distress.      Breath sounds: Normal breath sounds. No stridor. No wheezing, rhonchi or rales.   Abdominal:      General: Abdomen is flat. Bowel sounds are normal. There is no distension.      Palpations: Abdomen is soft.      Tenderness: There is no abdominal tenderness. There is no guarding.   Musculoskeletal:         General: No swelling, tenderness, deformity or signs of injury. Normal range of motion.      Right lower leg: No edema.      Left lower leg: No edema.   Skin:     General: Skin is warm and dry.      Capillary Refill: Capillary refill takes less than 2 seconds.      Coloration: Skin is not jaundiced.      Findings: No bruising, erythema or rash.   Neurological:      Mental Status: He is alert.      Comments: AAO to person, place, time, and " situation; CN II-XII grossly intact       TIME SPENT ON DISCHARGE: 60 minutes    Discharge Medications        Medication List        START taking these medications      ferrous sulfate 325 mg (65 mg iron) Tab tablet  Commonly known as: FEOSOL  Take 1 tablet (325 mg total) by mouth daily with breakfast.     sodium bicarbonate 650 MG tablet  Take 2 tablets (1,300 mg total) by mouth 2 (two) times daily.            CONTINUE taking these medications      triamcinolone acetonide 0.1% 0.1 % cream  Commonly known as: KENALOG               Where to Get Your Medications        These medications were sent to [address] [address]      Phone: [phone]   ferrous sulfate 325 mg (65 mg iron) Tab tablet  sodium bicarbonate 650 MG tablet         Discharge Information:      Follow-up Information       [facility] -. Schedule an appointment as soon as possible for a visit in 1 week(s).    Contact information:  [address] [facility] [place] [address] [phone]               [facility] - Emergency Dept. Go to.    Specialty: Emergency Medicine  Why: If symptoms worsen  Contact information:  [facility] [address] [phone]             Aris Scott MD. Call.    Specialty: Surgical Oncology  Contact information:  [address] [address] [phone]               [facility] - Nephrology Follow up.    Specialty: Nephrology  Why:  will call patient to make an appointment  Contact information:  [address] [place] [address] [phone]  Additional information:  Entrance 1             [facility] - Hematology and Oncology Follow up.    Specialty: Hematology and Oncology  Why:  will call patient to make an appointment  Contact information:  [facility]  [address]  [phone]                            ED precautions given.     Syed Denton MD  [facility] Internal Medicine HO-1

## 2023-10-26 NOTE — PROGRESS NOTES
Gastroenterology/Hepatology Initial Consult Note    Patient Name: Bridget Cardenas Jr.  Age: 62 y.o.  : 1960  MRN: [id number]  Admission Date: 10/20/2023    Reason for Consult:      Medical Management  Chief Complaint   Patient presents with    Shortness of Breath     PT REPORTS SOB UPON EXERTION X 2 WKS.  DENIES CP.  HX OF BLADDER CA, FINISHED TX. PT PALE CAP REFILL > 3 SEC. EKG OBTAINED.  VSS.          Self, Aaareferral    HPI:     Bridget Cardenas Jr. is a 62 y.o. male with a complex PMH significant for bladder cancer status post total cystectomy and prostatectomy in 2018 with neobladder creation, CKD stage 5, and chronic hydronephrosis who presented to [facility] ED on 10/20/23 with a complaint of SOB/DIAZ x3 weeks as well as leg cramps x6 months, often worse at night, and rash on back x3 weeks which is improving with prescribed steroid cream. He denies chest pain, palpitations, n/v, diaphoresis, syncope, abdominal pain, trouble swallowing, weight loss, melena, hematochezia as well as fever, chills, or HA. Pt maintains good appetite and denies any family history of colon cancer. Normal daily BM's which he states have been dark/green recently. He does note that he had mild hematuria (pink tinged) but no gross hematuria. 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    In the ED, labs were concerning for extreme microcytic anemia, H&H 3.5/13.7 with MCV 65.6; normal platelet 204 and mild leukopenia 4.37. Iron panel consistent with LILIANA, ferritin undetectable, retic count WNL. NAGMA with CO2 14 as well, and possible BENITO on CKD with BUN 75.7 and Cr 5.08 though baseline appears around 40/4. Chart review shows labs from  with CKD 4/5, at that time had mild normocytic anemia  13.1/41.5. CT imaging of his abdomen/pelvis and US retroperitoneal both showed symmetric moderate hydronephrosis. The patient was admitted for symptomatic anemia and has received a total of 6 unit pRBC, 1 unit platelet and 1 FFP since admission bringing H&H up to 8.8/30 yesterday which is 8.2/27.8 today. During his stay, there has been questionable BM that were dark and tarry, and FOBT was performed x2 which were hemoccult positive so GI has been consulted for possible GI bleed.    Patient denies every having colonoscopy or EGD performed.     Interval Hx:  NAEO. Pt denies any complaints today, concerned for next steps, discussed referral to surgical oncologist. All questions answered at this time.       PCP: [facility] -    Past Medical History:   Diagnosis Date    Cancer         Past Surgical History:   Procedure Laterality Date    COLONOSCOPY N/A 10/25/2023    Procedure: COLONOSCOPY, WITH HEMORRHAGE CONTROL;  Surgeon: Angy Mcguire MD;  Location: [facility] ENDOSCOPY;  Service: Gastroenterology;  Laterality: N/A;  sigmoid clips    COLONOSCOPY, WITH 1 OR MORE BIOPSIES N/A 10/25/2023    Procedure: COLONOSCOPY, WITH 1 OR MORE BIOPSIES;  Surgeon: Angy Mcguire MD;  Location: [facility] ENDOSCOPY;  Service: Gastroenterology;  Laterality: N/A;  transverse colon mass    COLONOSCOPY, WITH POLYPECTOMY USING SNARE Left 10/25/2023    Procedure: COLONOSCOPY, WITH POLYPECTOMY USING SNARE;  Surgeon: Angy Mcguire MD;  Location: [facility] ENDOSCOPY;  Service: Gastroenterology;  Laterality: Left;    ESOPHAGOGASTRODUODENOSCOPY Left 10/25/2023    Procedure: EGD (ESOPHAGOGASTRODUODENOSCOPY);  Surgeon: Angy Mcguire MD;  Location: [facility] ENDOSCOPY;  Service: Gastroenterology;  Laterality: Left;        History reviewed. No pertinent family history.    Social History     Tobacco Use    Smoking status: Former     Current packs/day: 0.00     Types: Cigarettes     Quit date: 1987     Years since  quittin.8     Passive exposure: Current    Smokeless tobacco: Current     Types: Chew   Substance Use Topics    Alcohol use: Not on file         Review of patient's allergies indicates:   Allergen Reactions    Pcn [penicillins] Hives        Medications Prior to Admission   Medication Sig Dispense Refill Last Dose    levoFLOXacin (LEVAQUIN) 750 MG tablet Take 1 tablet (750 mg total) by mouth once daily. 7 tablet 0     triamcinolone acetonide 0.1% (KENALOG) 0.1 % cream Apply topically 2 (two) times daily.            INPATIENT MEDICATIONS    Scheduled Meds:   ferric gluconate (FERRLECIT) 125 mg in sodium chloride 0.9% 100 mL IVPB  125 mg Intravenous 1 time in Clinic/HOD    heparin (porcine)  5,000 Units Subcutaneous Q12H    nicotine  1 patch Transdermal Daily    sodium bicarbonate  1,300 mg Oral BID     Continuous Infusions:   lactated ringers 100 mL/hr at 10/25/23 1851    lactated ringers 10 mL/hr at 10/25/23 1416     PRN Meds:  0.9%  NaCl infusion (for blood administration), 0.9%  NaCl infusion (for blood administration), 0.9%  NaCl infusion (for blood administration), 0.9%  NaCl infusion (for blood administration), 0.9%  NaCl infusion (for blood administration), 0.9%  NaCl infusion (for blood administration), acetaminophen, dextrose 10%, dextrose 10%, glucagon (human recombinant), glucose, glucose, influenza, melatonin, naloxone, ondansetron, pneumoc 20-stanford conj-dip cr(PF), prochlorperazine, sodium chloride 0.9%, triamcinolone acetonide 0.1%          Review of Systems:       Review of Systems   Constitutional:  Negative for activity change, appetite change, chills, diaphoresis, fatigue, fever and unexpected weight change.   Respiratory:  Negative for apnea, cough, choking, chest tightness, shortness of breath, wheezing and stridor.    Cardiovascular:  Negative for chest pain.   Gastrointestinal:  Negative for abdominal distention, abdominal pain, anal bleeding, blood in stool, change in bowel habit,  constipation, diarrhea, nausea, rectal pain, vomiting, reflux and fecal incontinence.   Genitourinary:  Negative for difficulty urinating (self caths several times per day), dysuria, flank pain, hematuria and urgency.   Musculoskeletal:  Positive for leg pain (occasional leg cramping worse at night x 6 months).   Neurological:  Negative for syncope and light-headedness.          Objective:       VITAL SIGNS: 24 HR MIN & MAX LAST    Temp  Min: 97.6 °F (36.4 °C)  Max: 98.7 °F (37.1 °C)  98.7 °F (37.1 °C)        BP  Min: 110/74  Max: 148/85  117/68     Pulse  Min: 38  Max: 86  70     Resp  Min: 16  Max: 18  16    SpO2  Min: 93 %  Max: 100 %  95 %        Physical Exam  Constitutional:       General: He is not in acute distress.     Appearance: Normal appearance. He is normal weight. He is not ill-appearing, toxic-appearing or diaphoretic.   HENT:      Head: Normocephalic and atraumatic.      Nose: Nose normal.      Mouth/Throat:      Mouth: Mucous membranes are moist.   Eyes:      Extraocular Movements: Extraocular movements intact.      Conjunctiva/sclera: Conjunctivae normal.      Pupils: Pupils are equal, round, and reactive to light.   Cardiovascular:      Rate and Rhythm: Normal rate and regular rhythm.      Pulses: Normal pulses.      Heart sounds: Normal heart sounds. No murmur heard.     No friction rub. No gallop.   Pulmonary:      Effort: Pulmonary effort is normal. No respiratory distress.      Breath sounds: Normal breath sounds. No wheezing, rhonchi or rales.   Abdominal:      General: Abdomen is flat. Bowel sounds are normal. There is no distension.      Palpations: Abdomen is soft.      Tenderness: There is no abdominal tenderness. There is no right CVA tenderness, left CVA tenderness or guarding.   Genitourinary:     Comments: Manzo catheter in place  Musculoskeletal:         General: No swelling, deformity or signs of injury. Normal range of motion.      Cervical back: Normal range of motion.      Right  lower leg: No edema.      Left lower leg: No edema.   Skin:     General: Skin is warm and dry.      Capillary Refill: Capillary refill takes 2 to 3 seconds.      Findings: No lesion or rash.   Neurological:      General: No focal deficit present.      Mental Status: He is alert and oriented to person, place, and time. Mental status is at baseline.      Cranial Nerves: No cranial nerve deficit.      Sensory: No sensory deficit.   Psychiatric:         Mood and Affect: Mood normal.         Behavior: Behavior normal.              LABS:      Recent Labs   Lab 10/22/23  0927 10/23/23  0336 10/24/23  0408 10/25/23  0406 10/26/23  0448   WBC 5.34 5.50 5.69 5.00 8.65   HGB 8.8* 8.2* 8.3* 8.2* 8.1*   HCT 30.0* 27.8* 28.6* 28.3* 28.0*    195 204 193 186   MCV 75.4* 75.3* 75.7* 77.1* 77.3*         Recent Labs   Lab 10/20/23  1250 10/21/23  0503 10/21/23  1747 10/22/23  0927 10/23/23  0336 10/24/23  0408 10/25/23  0406 10/26/23  0448   HGB 3.5* 4.8* 6.7* 8.8* 8.2* 8.3* 8.2* 8.1*   HCT 13.7* 17.3* 23.0* 30.0* 27.8* 28.6* 28.3* 28.0*          Recent Labs   Lab 10/20/23  1250 10/21/23  0503 10/22/23  0927 10/23/23  0336 10/24/23  0408 10/25/23  0406 10/26/23  0448      < > 139 140 138 138 139   K 4.7   < > 4.2 4.6 4.6 4.7 5.1   CO2 14*   < > 18* 18* 19* 19* 21*   BUN 75.7*   < > 56.2* 54.5* 51.5* 40.9* 46.0*   CREATININE 5.08*   < > 4.71* 4.76* 4.39* 4.15* 4.15*   BILITOT 0.4   < > 1.4 0.6 0.5 0.7 0.6   BILIDIR 0.2  --   --   --   --   --   --    ALKPHOS 52   < > 52 45 47 42 55   AST 11   < > 9 9 10 12 15   ALT 11   < > 9 8 7 8 10   LABPROT 6.7   < > 6.9 5.8 5.8 5.7* 5.7*   ALBUMIN 3.7   < > 3.7 3.1* 3.1* 3.1* 3.1*    < > = values in this interval not displayed.          Recent Labs   Lab 10/20/23  1350   INR 1.2   PROTIME 15.1*          Recent Labs   Lab 10/20/23  1250   IRON 13*   FERRITIN <1.98*            IMAGING:   CT Abdomen Pelvis  Without Contrast    Result Date: 10/20/2023  EXAMINATION: CT ABDOMEN PELVIS  WITHOUT CONTRAST CLINICAL HISTORY: neobladder, acute anemia, worsening BENITO; TECHNIQUE: Helically acquired axial images, sagittal and coronal reformations were obtained from the lung bases to the pubic symphysis without the IV administration of contrast. Automated tube current modulation, weight-based exposure dosing, and/or iterative reconstruction technique utilized to reach lowest reasonably achievable exposure rate. DLP: 214 mGy*cm COMPARISON: Retroperitoneal sonogram 10/20/2023, CT abdomen pelvis 06/17/2021 FINDINGS: HEART: There are coronary artery calcifications. LUNG BASES: Chronic elevation the left hemidiaphragm with basilar atelectasis. LIVER: Normal attenuation. No appreciable focal hepatic lesion. BILIARY: No calcified gallstones. PANCREAS: No inflammatory change. SPLEEN: Normal in size ADRENALS: No mass. KIDNEYS/URETERS: Moderate bilateral hydronephrosis.  AP diameter at the right renal pelvis is 2.4 cm.  AP diameter at the left renal pelvis is 2.5 cm.  There is bilateral hydroureter.  No obstructing calculus. GI TRACT/MESENTERY: Evaluation of the bowel is limited without contrast.  No evidence of bowel obstruction or inflammation. PERITONEUM: No free fluid.No free air. LYMPH NODES: There are small retroperitoneal lymph nodes, similar to previous exam. VASCULATURE: No significant atherosclerosis or aneurysm. BLADDER: Patient reportedly status post cystectomy with creation of neobladder.  There is a Manzo catheter within the urinary bladder which is largely collapsed.  There is expected post instrumentation intraluminal gas. REPRODUCTIVE ORGANS: Presumed prostatectomy. ABDOMINAL WALL: Unremarkable. BONES: Degenerative changes at the hips.     1. Symmetric moderate bilateral hydronephrosis and hydroureter.  No obstructing stone.  This appears to be chronic, similar to previous exam. 2. Neobladder collapsed about a Manzo catheter. Electronically signed by: Cecile Agosto Date:    10/20/2023  Time:    17:34    US Retroperitoneal Complete    Result Date: 10/20/2023  EXAMINATION: US RETROPERITONEAL COMPLETE CLINICAL HISTORY: Domenica; TECHNIQUE: Ultrasound of the kidneys and urinary bladder was performed including color flow and grayscale evaluation of the kidneys. COMPARISON: CT abdomen pelvis 06/17/2021 FINDINGS: RIGHT KIDNEY: The right kidney measures 10.5 cm.  Moderate to severe hydronephrosis.  Renal pelvis measures 3.4 cm anterior-posterior.  Right ureter is dilated where visible proximally.  No appreciable shadowing calculus. LEFT KIDNEY: The left kidney measures 12.4 cm. Moderate hydronephrosis.Renal pelvis measures 3.3 cm anterior-posterior.  No appreciable shadowing renal calculus. BLADDER: Distended neobladder with calculated volume of 1000 mL.     Bilateral hydronephrosis Distended neobladder.  Correlate for urinary retention. Electronically signed by: Cecile Agosto Date:    10/20/2023 Time:    15:23    X-Ray Chest AP Portable    Result Date: 10/20/2023  EXAMINATION: XR CHEST AP PORTABLE CLINICAL HISTORY: Shortness of breath TECHNIQUE: Frontal view(s) of the chest. COMPARISON: No relevant comparison studies available at the time of dictation. FINDINGS: Left-sided MediPort catheter tip near the SVC/RA junction.  Normal cardiac silhouette.  Mild left diaphragm elevation.  No consolidation identified.  No significant pleural effusion or discernible pneumothorax.     No acute pulmonary process identified. Electronically signed by: Kash Gunderson Date:    10/20/2023 Time:    13:15        Assessment / Plan:       Iron Deficient Anemia  - H&H 3.5/13.7 with MCV 65.6 ---> 8.3/28.6 MCV 75.7; stable H&H  - s/p 6 units pRBC and 1 FFP, 1 platelet  - Iron 13, Ferritin undetectable, iron sat 3, TIBC 365; normal absolute retic count 0.0588  - S/p Ferrlecit 125 mg on 10/21 and again today 10/23; would recommend an additional IV iron while inpatient. Then would benefit from PO iron upon discharge  - S/p 2018 during  cystectomy and prostatectomy/neobladder creation  - Pt has had persistent/chronic uremia which could increase likelihood of GI angiodysplasia; no recent scopes to date  - Given severe iron deficient anemia, pt underwent EGD and colonoscopy today  - Upper EGD WNL, unfortunately there were several polyps removed from the colon and a mass seen which was biopsied, appearance consistent with malignancy  - Pt will need staging imaging with outpatient PET  - CEA done today  - Referral sent to Dr. Aris Scott, surgical oncology for staging and resection  - Okay to discharge home form GI standpoint      Enrike Coleman MD  [employer] IM PGY III

## 2023-10-26 NOTE — NURSING
Met MD at the bedside, patient requested to the doctor to get his jones replaced. MD agreed and I acknowledged verbal order to replace jones. Jones replaced.

## 2023-10-26 NOTE — PROGRESS NOTES
Nephrology Consult Note    Chief Complaint:    Chief Complaint   Patient presents with    Shortness of Breath     PT REPORTS SOB UPON EXERTION X 2 WKS.  DENIES CP.  HX OF BLADDER CA, FINISHED TX. PT PALE CAP REFILL > 3 SEC. EKG OBTAINED.  VSS.       Reason for Consult:  CKD    HPI:   Bridget Cardenas Jr. is a 62 y.o. year old male with PMH of bladder cancer status post total cystectomy and prostatectomy in 2018 with neobladder creation, chronic hydronephrosis, CKD stage 5.  Patient was admitted to the hospital for shortness of breath dyspnea on exertion.  Nephrology is being consulted for CKD.    Interval history:  No acute events overnight.  Vital signs stable.  No acute complaints this morning    PMH:   Past Medical History:   Diagnosis Date    Cancer      PSH:   Past Surgical History:   Procedure Laterality Date    COLONOSCOPY N/A 10/25/2023    Procedure: COLONOSCOPY, WITH HEMORRHAGE CONTROL;  Surgeon: Angy Mcguire MD;  Location: [facility] ENDOSCOPY;  Service: Gastroenterology;  Laterality: N/A;  sigmoid clips    COLONOSCOPY, WITH 1 OR MORE BIOPSIES N/A 10/25/2023    Procedure: COLONOSCOPY, WITH 1 OR MORE BIOPSIES;  Surgeon: Angy Mcguire MD;  Location: [facility] ENDOSCOPY;  Service: Gastroenterology;  Laterality: N/A;  transverse colon mass    COLONOSCOPY, WITH POLYPECTOMY USING SNARE Left 10/25/2023    Procedure: COLONOSCOPY, WITH POLYPECTOMY USING SNARE;  Surgeon: Angy Mcguire MD;  Location: [facility] ENDOSCOPY;  Service: Gastroenterology;  Laterality: Left;    ESOPHAGOGASTRODUODENOSCOPY Left 10/25/2023    Procedure: EGD (ESOPHAGOGASTRODUODENOSCOPY);  Surgeon: Angy Mcguire MD;  Location: [facility] ENDOSCOPY;  Service: Gastroenterology;  Laterality: Left;     FH:History reviewed. No pertinent family history.  SH:   Social History     Socioeconomic History    Marital status:    Tobacco Use    Smoking status: Former     Current packs/day: 0.00     Types: Cigarettes     Quit date: 1987      Years since quittin.8     Passive exposure: Current    Smokeless tobacco: Current     Types: Chew     Social Determinants of Health     Financial Resource Strain: Low Risk  (10/23/2023)    Overall Financial Resource Strain (CARDIA)     Difficulty of Paying Living Expenses: Not hard at all   Food Insecurity: No Food Insecurity (10/23/2023)    Hunger Vital Sign     Worried About Running Out of Food in the Last Year: Never true     Ran Out of Food in the Last Year: Never true   Transportation Needs: No Transportation Needs (10/23/2023)    PRAPARE - Transportation     Lack of Transportation (Medical): No     Lack of Transportation (Non-Medical): No   Physical Activity: Inactive (10/23/2023)    Exercise Vital Sign     Days of Exercise per Week: 0 days     Minutes of Exercise per Session: 0 min   Stress: No Stress Concern Present (10/23/2023)    [employer] - Occupational Stress Questionnaire     Feeling of Stress : Not at all   Social Connections: Moderately Isolated (10/23/2023)    Social Connection and Isolation Panel [NHANES]     Frequency of Communication with Friends and Family: More than three times a week     Frequency of Social Gatherings with Friends and Family: More than three times a week     Attends Catholic Services: Never     Active Member of Clubs or Organizations: No     Attends Club or Organization Meetings: Never     Marital Status:    Housing Stability: Unknown (10/23/2023)    Housing Stability Vital Sign     Unable to Pay for Housing in the Last Year: No     Unstable Housing in the Last Year: No     Allergies:   Review of patient's allergies indicates:   Allergen Reactions    Pcn [penicillins] Hives      Medications:   Scheduled Meds:   heparin (porcine)  5,000 Units Subcutaneous Q12H    nicotine  1 patch Transdermal Daily    sodium bicarbonate  1,300 mg Oral BID      PRN Meds: 0.9%  NaCl infusion (for blood administration), 0.9%  NaCl infusion (for blood  administration), 0.9%  NaCl infusion (for blood administration), 0.9%  NaCl infusion (for blood administration), 0.9%  NaCl infusion (for blood administration), 0.9%  NaCl infusion (for blood administration), acetaminophen, dextrose 10%, dextrose 10%, glucagon (human recombinant), glucose, glucose, influenza, melatonin, naloxone, ondansetron, pneumoc 20-stanford conj-dip cr(PF), prochlorperazine, sodium chloride 0.9%, triamcinolone acetonide 0.1%  Continuous Infusions:   lactated ringers 125 mL/hr at 10/26/23 1002    lactated ringers 10 mL/hr at 10/25/23 1416          Physical Exam  Vitals:    10/26/23 0740   BP: 117/68   Pulse: 70   Resp:    Temp: 98.7 °F (37.1 °C)     Physical Exam  Vitals and nursing note reviewed.   Constitutional:       General: He is not in acute distress.     Appearance: Normal appearance. He is normal weight. He is not ill-appearing.   HENT:      Head: Normocephalic and atraumatic.   Eyes:      Extraocular Movements: Extraocular movements intact.      Conjunctiva/sclera: Conjunctivae normal.      Pupils: Pupils are equal, round, and reactive to light.   Cardiovascular:      Rate and Rhythm: Normal rate and regular rhythm.      Pulses: Normal pulses.      Heart sounds: No murmur heard.  Pulmonary:      Effort: Pulmonary effort is normal. No respiratory distress.   Musculoskeletal:         General: No swelling.      Right lower leg: No edema.      Left lower leg: No edema.   Skin:     General: Skin is warm and dry.   Neurological:      General: No focal deficit present.      Mental Status: He is alert and oriented to person, place, and time.         Labs:  Recent Results (from the past 24 hour(s))   Comprehensive Metabolic Panel (CMP)    Collection Time: 10/26/23  4:48 AM   Result Value Ref Range    Sodium Level 139 136 - 145 mmol/L    Potassium Level 5.1 3.5 - 5.1 mmol/L    Chloride 109 (H) 98 - 107 mmol/L    Carbon Dioxide 21 (L) 23 - 31 mmol/L    Glucose Level 102 82 - 115 mg/dL    Blood Urea  Nitrogen 46.0 (H) 8.4 - 25.7 mg/dL    Creatinine 4.15 (H) 0.73 - 1.18 mg/dL    Calcium Level Total 8.1 (L) 8.8 - 10.0 mg/dL    Protein Total 5.7 (L) 5.8 - 7.6 gm/dL    Albumin Level 3.1 (L) 3.4 - 4.8 g/dL    Globulin 2.6 2.4 - 3.5 gm/dL    Albumin/Globulin Ratio 1.2 1.1 - 2.0 ratio    Bilirubin Total 0.6 <=1.5 mg/dL    Alkaline Phosphatase 55 40 - 150 unit/L    Alanine Aminotransferase 10 0 - 55 unit/L    Aspartate Aminotransferase 15 5 - 34 unit/L    eGFR 16 mls/min/1.73/m2   Magnesium    Collection Time: 10/26/23  4:48 AM   Result Value Ref Range    Magnesium Level 1.90 1.60 - 2.60 mg/dL   Phosphorus    Collection Time: 10/26/23  4:48 AM   Result Value Ref Range    Phosphorus Level 3.7 2.3 - 4.7 mg/dL   CEA (in-house)    Collection Time: 10/26/23  4:48 AM   Result Value Ref Range    Carcinoembryonic Antigen 1.98 0.00 - 3.00 ng/mL   CBC with Differential    Collection Time: 10/26/23  4:48 AM   Result Value Ref Range    WBC 8.65 4.50 - 11.50 x10(3)/mcL    RBC 3.62 (L) 4.70 - 6.10 x10(6)/mcL    Hgb 8.1 (L) 14.0 - 18.0 g/dL    Hct 28.0 (L) 42.0 - 52.0 %    MCV 77.3 (L) 80.0 - 94.0 fL    MCH 22.4 (L) 27.0 - 31.0 pg    MCHC 28.9 (L) 33.0 - 36.0 g/dL    RDW 24.0 (H) 11.5 - 17.0 %    Platelet 186 130 - 400 x10(3)/mcL    MPV 10.8 (H) 7.4 - 10.4 fL    Neut % 77.4 %    Lymph % 10.3 %    Mono % 8.2 %    Eos % 3.2 %    Basophil % 0.6 %    Lymph # 0.89 0.6 - 4.6 x10(3)/mcL    Neut # 6.69 2.1 - 9.2 x10(3)/mcL    Mono # 0.71 0.1 - 1.3 x10(3)/mcL    Eos # 0.28 0 - 0.9 x10(3)/mcL    Baso # 0.05 <=0.2 x10(3)/mcL    IG# 0.03 0 - 0.04 x10(3)/mcL    IG% 0.3 %    NRBC% 0.0 %       Imaging:  None in past 24 hours  CT abdomen pelvis without contrast 10/20/2023 with bilateral hydronephrosis that appears chronic  Ultrasound retroperitoneal 10/20/2023 again with bilateral hydronephrosis    Assessment:  CKD stage 5  Iron-deficiency anemia  Non-anion gap metabolic acidosis  Hx of Prostate and Bladder cancer s/p bladder and prostate resection  with reconstruction of bladder (2018)    Plan:  Continue IV iron replacement and sodium bicarb  Ifplan to do contrast study recommend giving 250 mL bolus of fluid pre and post and continue maintenance fluids there is always a chance that patient may have an acute kidney injury 48 hours post contrast study.    Staff: Dr. Kodi Plaza, DO  Internal Medicine - PGY-3       Addendum:  Agree patient may need IV contrast.  Will give IV fluids pre post and continue IV fluid resuscitation.  Will monitor renal function closely.  I agree that it is risks versus benefit and if patient needs IV contrast for proceed judiciously hydrate and monitor renal function closely.  Patient understands there is risk for possible need of renal replacement therapy.  Ivette Mariee M.D.  Nephrology

## 2023-10-28 ENCOUNTER — PATIENT MESSAGE (OUTPATIENT)
Dept: ADMINISTRATIVE | Facility: OTHER | Age: 63
End: 2023-10-28
Payer: COMMERCIAL

## 2023-10-30 ENCOUNTER — OFFICE VISIT (OUTPATIENT)
Dept: INTERNAL MEDICINE | Facility: CLINIC | Age: 63
End: 2023-10-30
Payer: COMMERCIAL

## 2023-10-30 VITALS
HEIGHT: 70 IN | DIASTOLIC BLOOD PRESSURE: 70 MMHG | BODY MASS INDEX: 28.23 KG/M2 | SYSTOLIC BLOOD PRESSURE: 122 MMHG | OXYGEN SATURATION: 100 % | WEIGHT: 197.19 LBS | HEART RATE: 66 BPM | RESPIRATION RATE: 20 BRPM | TEMPERATURE: 98 F

## 2023-10-30 DIAGNOSIS — Z00.00 HEALTHCARE MAINTENANCE: Primary | ICD-10-CM

## 2023-10-30 DIAGNOSIS — C18.4 ADENOCARCINOMA OF TRANSVERSE COLON: ICD-10-CM

## 2023-10-30 DIAGNOSIS — R06.09 DYSPNEA ON EXERTION: ICD-10-CM

## 2023-10-30 DIAGNOSIS — L28.2 PRURITIC RASH: ICD-10-CM

## 2023-10-30 DIAGNOSIS — D12.8 TUBULOVILLOUS ADENOMA OF RECTUM: ICD-10-CM

## 2023-10-30 DIAGNOSIS — I12.0 BENIGN HYPERTENSIVE CKD, STAGE 5 CHRONIC KIDNEY DISEASE OR END STAGE RENAL DISEASE: ICD-10-CM

## 2023-10-30 DIAGNOSIS — D12.6 TUBULAR ADENOMA OF COLON: ICD-10-CM

## 2023-10-30 PROCEDURE — 99215 OFFICE O/P EST HI 40 MIN: CPT | Mod: PBBFAC | Performed by: STUDENT IN AN ORGANIZED HEALTH CARE EDUCATION/TRAINING PROGRAM

## 2023-10-30 RX ORDER — TRIAMCINOLONE ACETONIDE 1 MG/G
CREAM TOPICAL 2 TIMES DAILY
Qty: 453.6 G | Refills: 3 | Status: SHIPPED | OUTPATIENT
Start: 2023-10-30 | End: 2023-11-15

## 2023-10-30 NOTE — PROGRESS NOTES
[facility] Visit    Chief Complaint:      No chief complaint on file.     Subjective:     HPI:  Bridget Cardenas Jr. is a 62 y.o. male with a PMH significant for bladder cancer status post total cystectomy and prostatectomy in 2018 with neobladder creation, CKD stage 5, chronic hydronephrosis, and recent diagnosis of colorectal cancer here for post-wards visit from recent hospital stay, discharged 10/26/23. He was hospitalized recently for severe, symptomatic anemia requiring several units of pRBC. During that stay he had EGD and colonoscopy, EGD was WNL but colonoscopy revealed some polyps and a large transverse colon mass; biopsy of polyp still pending, however pt referred to Dr. Aris Scott, surgical oncologist for colon resection/scans. He denies any complaints since discharge, no melena, hematochezia, dizziness, fatigue, DIAZ/SOB, chest pain, palpitations, fever, chills, n/v, abdominal pain. Needs to establish care with PCP here in clinic.        Review of Systems  Constitutional: no fever, fatigue, weakness  Eye: no vision loss, eye redness, drainage, or pain  ENMT: no sore throat, ear pain, sinus pain/congestion, nasal congestion/drainage  Respiratory: no cough, no wheezing, no shortness of breath  Cardiovascular: no chest pain, no palpitations, no edema  Gastrointestinal: no nausea, vomiting, or diarrhea. No abdominal pain  Genitourinary: no dysuria, no urinary frequency or urgency, no hematuria  Hema/Lymph: no abnormal bruising or bleeding  Endocrine: no heat or cold intolerance, no excessive thirst or excessive urination  Musculoskeletal: no muscle or joint pain, no joint swelling  Integumentary: no skin rash or abnormal lesion  Neurologic: no headache, no dizziness, no weakness or numbness    Objective:   Last 24 Hour Vital Signs:       Physical Examination:    General: Well nourished, well developed in NAD  HEENT: PERRLA, NC/AT, MMM  Respiratory: CTAB, normal respiratory  "effort  Cardiovascular: RRR, no m/r/g  Gastrointestinal: S, NT, ND, active BS, no masses palpable  Musculoskeletal: full range of motion of all extremities/spine without limitation or discomfort  Integumentary: no rashes or skin lesions present  Neurologic: AAOx4, CN II-XII grossly intact, normal gait    Labs  BMP:   Lab Results   Component Value Date    CHLORIDE 109 (H) 10/26/2023    CO2 21 (L) 10/26/2023    BUN 46.0 (H) 10/26/2023    CREATININE 4.15 (H) 10/26/2023    GLUCOSE 102 10/26/2023    CALCIUM 8.1 (L) 10/26/2023     CBC:   Lab Results   Component Value Date    WBC 8.65 10/26/2023    HGB 8.1 (L) 10/26/2023    HCT 28.0 (L) 10/26/2023    MCV 77.3 (L) 10/26/2023    RDW 24.0 (H) 10/26/2023     LFTs:   Lab Results   Component Value Date    LABPROT 5.7 (L) 10/26/2023    ALBUMIN 3.1 (L) 10/26/2023    AST 15 10/26/2023    ALT 10 10/26/2023    ALKPHOS 55 10/26/2023     FLP: No results found for: "CHOL", "HDL", "LDL", "TRIG", "TOTALCHOLEST"  DM:   Lab Results   Component Value Date    CREATININE 4.15 (H) 10/26/2023    CREATRANDUR 53.6 (L) 05/24/2021    PROTEINURINE 42.0 05/24/2021     Thyroid: No results found for: "TSH", "USASBH8PLXU", "Q4CAOFG", "D3GSDAY", "THYROIDAB"  Anemia:   Lab Results   Component Value Date    IRON 13 (L) 10/20/2023    TIBC 378 10/20/2023    FERRITIN <1.98 (L) 10/20/2023             Assessment & Plan:       Colorectal Cancer  -Several polyps removed from the colon and a mass seen which was biopsied, appearance consistent with malignancy  -Biopsy pending  -Referred to Dr. Aris Scott, surgical oncology for staging scans and colon resection    Iron Deficient Anemia  - H&H 3.5/13.7 with MCV 65.6 ---> 8.3/28.6 MCV 75.7; stable H&H  - s/p 6 units pRBC and 1 FFP, 1 platelet  - Iron 13, Ferritin undetectable, iron sat 3, TIBC 365; normal absolute retic count 0.0588    To be addressed at next follow-up  -***      Follow-ups  -Follow-up medicine clinic in ***      Enrike Coleman MD  [facility] PGY " II

## 2023-10-30 NOTE — PROGRESS NOTES
INTERNAL MEDICINE RESIDENT CLINIC  CLINIC NOTE    Patient Name: Bridget Cardenas Jr.  YOB: 1960  Chief Complaint: Follow-up (Pt here today for f/u visit. Doing ok. )     PRESENTING HISTORY   History of Present Illness:  Mr. Bridget Cardenas Jr. is a 62 y.o. male w/ PMH significant for bladder cancer status post total cystectomy and prostatectomy in 2018 with neobladder creation, CKD stage 5, chronic hydronephrosis, and recent diagnosis of colorectal cancer here for post-wards visit from recent hospital stay, discharged 10/26/23. He was hospitalized recently for severe, symptomatic anemia requiring several units of pRBC and IV iron transfusion. During that stay he had EGD and colonoscopy, EGD was WNL but colonoscopy revealed some polyps and a large transverse colon mass; polyps from the transverse colon and descending colon were tubular adenoma, polyp from sigmoid and retal were tubulovillous adenoma and biopsy of the transverse colon mass was positive for colonic adenocarcinoma. Pt has been referred to Dr. Aris Scott, surgical oncologist for colon resection/scans. He co pruritic rash on the back and upper extremities. Denies any insect bites, woods/camping or any new detergents. He did try calamine lotion and benadryl with minimal relief. Was prescribed Cefuroxime at urgent care for the rash. He denies any complaints since discharge, no melena, hematochezia, dizziness, fatigue, DIAZ/SOB, chest pain, palpitations, fever, chills, n/v, abdominal pain. Needs to establish care with PCP here in clinic.      Review of Systems:  Constitutional: no fever, fatigue, weakness  Eye: no vision loss, eye redness, drainage, or pain  ENMT: no sore throat, ear pain, sinus pain/congestion, nasal congestion/drainage  Respiratory: no cough, no wheezing, no shortness of breath  Cardiovascular: no chest pain, no palpitations, no edema  Gastrointestinal: no nausea, vomiting, or diarrhea. No abdominal pain  Genitourinary: no  Patient Name: Lazaro Garza     : 1991    MRN: [id number]     Subjective:     Patient ID: Lazaro Garza is a 32 y.o. male.    Chief Complaint:   Chief Complaint   Patient presents with    Follow-up        HPI: 2023:  Patient presents to clinic today for routine follow-up of hypertension as well as headaches.  States that he has been well-controlled with medication, takes metoprolol daily and is utilizing 5 mg of amlodipine as needed. Patient denies chest pain, palpitations, and shortness of breath.  Patient denies fever, night sweats, chills, nausea, vomiting, diarrhea, constipation, weight loss, and changes in appetite.  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    2023:   Patient presents to clinic today for wellness labs.  Patient was seen by previous PCP who started him on metoprolol for headaches as well as hypertension, patient also utilizing amlodipine 5 mg.  Patient normotensive in clinic today.  Patient also enquiring about COVID or flu test as his girlfriend is at home sick as well.  Patient has occasional cough and just overall fatigue.  Denies fever sputum production and sore throat.  No additional complaints from patient today      ROS:    12 point review of systems conducted, negative except as stated in the history of present illness. See HPI for details.    History:     Past Medical History:   Diagnosis Date    Hypertension         History reviewed. No pertinent surgical history.    History reviewed. No pertinent family history.     Social History     Tobacco Use    Smoking status: Every Day     Packs/day: 1.00     Types: Cigarettes    Smokeless tobacco: Never   Substance and Sexual Activity    Alcohol use: Yes     Comment: occ 2-3 beers    Drug use: Yes     Types: Marijuana    Sexual activity: Yes     Partners: Female       Current Outpatient Medications   Medication Instructions    amLODIPine (NORVASC) 5 mg, Oral, Daily    metoprolol succinate  "(TOPROL-XL) 50 mg, Oral, Daily        Review of patient's allergies indicates:  No Known Allergies    Objective:     Visit Vitals  /84 (BP Location: Left arm, Patient Position: Sitting)   Pulse 90   Temp 98.4 °F (36.9 °C) (Oral)   Resp 20   Ht 5' 11" (1.803 m)   Wt 57.9 kg (127 lb 9.6 oz)   SpO2 99%   BMI 17.80 kg/m²       Physical Examination:     Physical Exam  Vitals reviewed.   Constitutional:       Appearance: Normal appearance. He is normal weight.   HENT:      Head: Normocephalic.      Right Ear: Tympanic membrane, ear canal and external ear normal.      Left Ear: Tympanic membrane, ear canal and external ear normal.      Nose: Nose normal.      Mouth/Throat:      Mouth: Mucous membranes are moist.      Pharynx: Oropharynx is clear.   Eyes:      Extraocular Movements: Extraocular movements intact.      Conjunctiva/sclera: Conjunctivae normal.      Pupils: Pupils are equal, round, and reactive to light.   Cardiovascular:      Rate and Rhythm: Normal rate and regular rhythm.      Pulses: Normal pulses.      Heart sounds: Normal heart sounds.   Pulmonary:      Effort: Pulmonary effort is normal.      Breath sounds: Normal breath sounds.   Abdominal:      General: Abdomen is flat. Bowel sounds are normal.      Palpations: Abdomen is soft.   Musculoskeletal:         General: Normal range of motion.      Cervical back: Normal range of motion and neck supple.   Skin:     General: Skin is warm and dry.   Neurological:      General: No focal deficit present.      Mental Status: He is alert and oriented to person, place, and time.   Psychiatric:         Mood and Affect: Mood normal.         Behavior: Behavior normal.       Lab Results:     Chemistry:  Lab Results   Component Value Date     01/12/2023    K 3.4 (L) 01/12/2023    CHLORIDE 102 01/12/2023    BUN 13.5 01/12/2023    CREATININE 0.93 01/12/2023    EGFRNORACEVR >60 01/12/2023    GLUCOSE 88 01/12/2023    CALCIUM 9.7 01/12/2023    ALKPHOS 74 01/12/2023 " dysuria, no urinary frequency or urgency, no hematuria  Hema/Lymph: no abnormal bruising or bleeding  Endocrine: no heat or cold intolerance, no excessive thirst or excessive urination  Musculoskeletal: no muscle or joint pain, no joint swelling  Integumentary: rash on the back; no abnormal lesion  Neurologic: no headache, no dizziness, no weakness or numbness    PAST HISTORY:     Past Medical History:   Diagnosis Date    Cancer         Past Surgical History:   Procedure Laterality Date    COLONOSCOPY N/A 10/25/2023    Procedure: COLONOSCOPY, WITH HEMORRHAGE CONTROL;  Surgeon: Angy Mcguire MD;  Location: [facility] ENDOSCOPY;  Service: Gastroenterology;  Laterality: N/A;  sigmoid clips    COLONOSCOPY, WITH 1 OR MORE BIOPSIES N/A 10/25/2023    Procedure: COLONOSCOPY, WITH 1 OR MORE BIOPSIES;  Surgeon: Angy Mcguire MD;  Location: [facility] ENDOSCOPY;  Service: Gastroenterology;  Laterality: N/A;  transverse colon mass    COLONOSCOPY, WITH POLYPECTOMY USING SNARE Left 10/25/2023    Procedure: COLONOSCOPY, WITH POLYPECTOMY USING SNARE;  Surgeon: Angy Mcguire MD;  Location: [facility] ENDOSCOPY;  Service: Gastroenterology;  Laterality: Left;    ESOPHAGOGASTRODUODENOSCOPY Left 10/25/2023    Procedure: EGD (ESOPHAGOGASTRODUODENOSCOPY);  Surgeon: Angy Mcguire MD;  Location: [facility] ENDOSCOPY;  Service: Gastroenterology;  Laterality: Left;       History reviewed. No pertinent family history.    Social History     Socioeconomic History    Marital status:    Tobacco Use    Smoking status: Former     Current packs/day: 0.00     Types: Cigarettes     Quit date:      Years since quittin.8     Passive exposure: Current    Smokeless tobacco: Current     Types: Chew     Social Determinants of Health     Financial Resource Strain: Low Risk  (10/23/2023)    Overall Financial Resource Strain (CARDIA)     Difficulty of Paying Living Expenses: Not hard at all   Food Insecurity: No Food Insecurity     LABPROT 8.4 (H) 01/12/2023    ALBUMIN 5.0 01/12/2023    BILIDIR 0.2 08/05/2021    IBILI 0.20 08/05/2021    AST 25 01/12/2023    ALT 19 01/12/2023    TSH 0.709 01/12/2023    VGZFVQ9NEHT 1.07 01/12/2023        Lab Results   Component Value Date    HGBA1C 5.3 01/12/2023        Hematology:  Lab Results   Component Value Date    WBC 7.7 01/12/2023    HGB 14.6 01/12/2023    HCT 44.9 01/12/2023     01/12/2023       Lipid Panel:  Lab Results   Component Value Date    CHOL 151 01/12/2023    HDL 50 01/12/2023    LDL 87.00 01/12/2023    TRIG 71 01/12/2023    TOTALCHOLEST 3 01/12/2023        Urine:  Lab Results   Component Value Date    COLORUA Light-Yellow 01/12/2023    APPEARANCEUA Clear 01/12/2023    SGUA 1.010 01/12/2023    PHUA 7.0 01/12/2023    PROTEINUA Negative 01/12/2023    GLUCOSEUA Normal 01/12/2023    KETONESUA Negative 01/12/2023    BLOODUA Negative 01/12/2023    NITRITESUA Negative 01/12/2023    LEUKOCYTESUR Negative 01/12/2023    RBCUA 0-5 01/12/2023    WBCUA 0-5 01/12/2023    BACTERIA None Seen 01/12/2023    SQEPUA None Seen 01/12/2023    HYALINECASTS None Seen 01/12/2023        Assessment:          ICD-10-CM ICD-9-CM   1. Hyperkalemia  E87.5 276.7   2. Encounter for wellness examination  Z00.00 V70.0   3. Hypertension, unspecified type  I10 401.9   4. Tobacco abuse  Z72.0 305.1        Plan:     1. Hyperkalemia  Comments:  CMP today with wellness labs  Orders:  -     Comprehensive Metabolic Panel; Future; Expected date: 07/20/2023    2. Encounter for wellness examination  -     TSH  -     T4, Free  -     Hemoglobin A1C  -     Lipid Panel  -     CBC Auto Differential  -     Vitamin D    3. Hypertension, unspecified type  Assessment & Plan:  Low Sodium Diet (Dash Diet - less than 2 grams of sodium per day).  Monitor Blood Pressure daily and log. Report any consistent numbers greater than 140/90.  Smoking Cessation encouraged to aid in BP reduction.  Maintain healthy weight with goal BMI <30. Exercise 30  "(10/23/2023)    Hunger Vital Sign     Worried About Running Out of Food in the Last Year: Never true     Ran Out of Food in the Last Year: Never true   Transportation Needs: No Transportation Needs (10/23/2023)    PRAPARE - Transportation     Lack of Transportation (Medical): No     Lack of Transportation (Non-Medical): No   Physical Activity: Inactive (10/23/2023)    Exercise Vital Sign     Days of Exercise per Week: 0 days     Minutes of Exercise per Session: 0 min   Stress: No Stress Concern Present (10/23/2023)    [employer] [place] [employer] - Occupational Stress Questionnaire     Feeling of Stress : Not at all   Social Connections: Moderately Isolated (10/23/2023)    Social Connection and Isolation Panel [NHANES]     Frequency of Communication with Friends and Family: More than three times a week     Frequency of Social Gatherings with Friends and Family: More than three times a week     Attends Islam Services: Never     Active Member of Clubs or Organizations: No     Attends Club or Organization Meetings: Never     Marital Status:    Housing Stability: Unknown (10/23/2023)    Housing Stability Vital Sign     Unable to Pay for Housing in the Last Year: No     Unstable Housing in the Last Year: No       MEDICATIONS:     Current Outpatient Medications   Medication Instructions    ferrous sulfate (FEOSOL) 325 mg, Oral, With breakfast    sodium bicarbonate 1,300 mg, Oral, 2 times daily    triamcinolone acetonide 0.1% (KENALOG) 0.1 % cream Topical (Top), 2 times daily        OBJECTIVE:   Vital Signs:  Vitals:    10/30/23 1403   BP: (!) 149/81   Pulse: 66   Resp: 20   Temp: 98.4 °F (36.9 °C)   TempSrc: Oral   SpO2: 100%   Weight: 89.4 kg (197 lb 3.2 oz)   Height: 5' 10" (1.778 m)        Physical Exam:     General: Well nourished, well developed in NAD  HEENT: PERRL, NC/AT, MMM  Respiratory: CTAB, normal respiratory effort  Cardiovascular: RRR, no m/r/g  Gastrointestinal: Soft, Non tender, Non " distended, active BS, no masses palpable  Musculoskeletal: full range of motion of all extremities/spine without limitation or discomfort  Integumentary: diffuse erythematous rash on the back; Mediport on the left ant chest.  no skin lesions present  Neurologic: AAOx4, CN II-XII grossly intact, normal gait      Laboratory  Lab Results   Component Value Date     10/26/2023    K 5.1 10/26/2023    CO2 21 (L) 10/26/2023    BUN 46.0 (H) 10/26/2023    CREATININE 4.15 (H) 10/26/2023    CALCIUM 8.1 (L) 10/26/2023    BILIDIR 0.2 10/20/2023    IBILI 0.20 05/27/2021    ALKPHOS 55 10/26/2023    AST 15 10/26/2023    ALT 10 10/26/2023    MG 1.90 10/26/2023    PHOS 3.7 10/26/2023        Lab Results   Component Value Date    WBC 8.65 10/26/2023    RBC 3.62 (L) 10/26/2023    HGB 8.1 (L) 10/26/2023    HCT 28.0 (L) 10/26/2023    MCV 77.3 (L) 10/26/2023    MCH 22.4 (L) 10/26/2023    MCHC 28.9 (L) 10/26/2023    RDW 24.0 (H) 10/26/2023     10/26/2023    MPV 10.8 (H) 10/26/2023        Diagnostic Results:  CT Abdomen Pelvis  Without Contrast    Result Date: 10/20/2023  EXAMINATION:  CT ABDOMEN PELVIS WITHOUT CONTRAST    CLINICAL HISTORY:  neobladder, acute anemia, worsening BENITO;    TECHNIQUE:  Helically acquired axial images, sagittal and coronal reformations were obtained from the lung bases to the pubic symphysis without the IV administration of contrast.    Automated tube current modulation, weight-based exposure dosing, and/or iterative reconstruction technique utilized to reach lowest reasonably achievable exposure rate.    DLP: 214 mGy*cm    COMPARISON:  Retroperitoneal sonogram 10/20/2023, CT abdomen pelvis 06/17/2021    FINDINGS:  HEART: There are coronary artery calcifications.    LUNG BASES: Chronic elevation the left hemidiaphragm with basilar atelectasis.    LIVER: Normal attenuation. No appreciable focal hepatic lesion.    BILIARY: No calcified gallstones.    PANCREAS: No inflammatory change.    SPLEEN: Normal in  minutes per day 5 days per week    Stable and well controlled. Continue current medication. Limit salt in diet. Monitor BP and notify clinic for consistently elevated BP >140/90.      Orders:  -     amLODIPine (NORVASC) 5 MG tablet; Take 1 tablet (5 mg total) by mouth once daily.  Dispense: 90 tablet; Refill: 3  -     metoprolol succinate (TOPROL-XL) 50 MG 24 hr tablet; Take 1 tablet (50 mg total) by mouth once daily.  Dispense: 90 tablet; Refill: 3    4. Tobacco abuse  Assessment & Plan:  Encouraged smoking cessation.  Smoking cessation discussed for 5 minutes.  Discussed benefits of quitting including improved health, decreased cardiac/vascular/pulmonary/stroke risks as well as saving money             Follow up in about 1 year (around 7/20/2024) for routine labs recheck.    Future Appointments   Date Time Provider Department Center   3/20/2024  1:30 PM KAIN King [facility]        Maria Alejandra Berg NP         size    ADRENALS: No mass.    KIDNEYS/URETERS: Moderate bilateral hydronephrosis.  AP diameter at the right renal pelvis is 2.4 cm.  AP diameter at the left renal pelvis is 2.5 cm.  There is bilateral hydroureter.  No obstructing calculus.    GI TRACT/MESENTERY: Evaluation of the bowel is limited without contrast.  No evidence of bowel obstruction or inflammation.    PERITONEUM: No free fluid.No free air.    LYMPH NODES: There are small retroperitoneal lymph nodes, similar to previous exam.    VASCULATURE: No significant atherosclerosis or aneurysm.    BLADDER: Patient reportedly status post cystectomy with creation of neobladder.  There is a Manzo catheter within the urinary bladder which is largely collapsed.  There is expected post instrumentation intraluminal gas.    REPRODUCTIVE ORGANS: Presumed prostatectomy.    ABDOMINAL WALL: Unremarkable.    BONES: Degenerative changes at the hips.        US Retroperitoneal Complete    Result Date: 10/20/2023  EXAMINATION:  US RETROPERITONEAL COMPLETE    CLINICAL HISTORY:  [place];    TECHNIQUE:  Ultrasound of the kidneys and urinary bladder was performed including color flow and grayscale evaluation of the kidneys.    COMPARISON:  CT abdomen pelvis 06/17/2021    FINDINGS:  RIGHT KIDNEY: The right kidney measures 10.5 cm.  Moderate to severe hydronephrosis.  Renal pelvis measures 3.4 cm anterior-posterior.  Right ureter is dilated where visible proximally.  No appreciable shadowing calculus.    LEFT KIDNEY: The left kidney measures 12.4 cm. Moderate hydronephrosis.Renal pelvis measures 3.3 cm anterior-posterior.  No appreciable shadowing renal calculus.    BLADDER: Distended neobladder with calculated volume of 1000 mL.        X-Ray Chest AP Portable    Result Date: 10/20/2023  EXAMINATION:  XR CHEST AP PORTABLE    CLINICAL HISTORY:  Shortness of breath    TECHNIQUE:  Frontal view(s) of the chest.    COMPARISON:  No relevant comparison studies available at the time of  dictation.    FINDINGS:  Left-sided MediPort catheter tip near the SVC/RA junction.  Normal cardiac silhouette.  Mild left diaphragm elevation.  No consolidation identified.  No significant pleural effusion or discernible pneumothorax.         No results found in the last 24 hours.     ASSESSMENT & PLAN:     Colorectal Adenocarcinoma  -Several polyps removed from the colon and a mass in the transverse colon seen which was biopsied, appearance consistent with malignancy  - polyps from the transverse colon and descending colon were tubular adenoma, polyp from sigmoid and retal were tubulovillous adenoma and biopsy of the transverse colon mass was positive for colonic adenocarcinoma.   - CEA 10/26/23 at 1.98  - denies any family history of colon cancer  - patient has a Mediport from 2018  -Referred to Dr. Aris Scott, surgical oncology for staging scans and colon resection, appointment pending     Iron Deficiency Anemia  - H&H 3.5/13.7 with MCV 65.6 that improved to H&H of 8.1/28 and MCV of 77.3 on discharge  - s/p 6 units pRBC and 1 FFP, 1 platelet  - Iron 13, Ferritin undetectable, iron sat 3, TIBC 365; normal absolute retic count 0.0588  - continue to take Ferrous sulfate 325 mg    Pruritic rash  - onset beginning of October  - no identifiable cause  - will prescribe topical triamcinolone which has been helpful      Follow up with Internal medicine clinic in 1 month or soonest available to establish care    Frank Landry  Internal Medicine - PGY-1      10/30/2023

## 2023-10-31 ENCOUNTER — OFFICE VISIT (OUTPATIENT)
Dept: SURGERY | Facility: CLINIC | Age: 63
End: 2023-10-31
Payer: COMMERCIAL

## 2023-10-31 ENCOUNTER — TELEPHONE (OUTPATIENT)
Dept: SURGERY | Facility: CLINIC | Age: 63
End: 2023-10-31

## 2023-10-31 ENCOUNTER — TELEPHONE (OUTPATIENT)
Dept: GASTROENTEROLOGY | Facility: CLINIC | Age: 63
End: 2023-10-31
Payer: COMMERCIAL

## 2023-10-31 VITALS
DIASTOLIC BLOOD PRESSURE: 83 MMHG | HEART RATE: 66 BPM | WEIGHT: 198 LBS | BODY MASS INDEX: 28.35 KG/M2 | OXYGEN SATURATION: 98 % | RESPIRATION RATE: 18 BRPM | SYSTOLIC BLOOD PRESSURE: 129 MMHG | HEIGHT: 70 IN

## 2023-10-31 DIAGNOSIS — C18.4 MALIGNANT NEOPLASM OF TRANSVERSE COLON: Primary | ICD-10-CM

## 2023-10-31 DIAGNOSIS — C18.9 COLON CANCER: Primary | ICD-10-CM

## 2023-10-31 DIAGNOSIS — Z85.51 HISTORY OF BLADDER CANCER: ICD-10-CM

## 2023-10-31 PROCEDURE — 99214 OFFICE O/P EST MOD 30 MIN: CPT | Mod: PBBFAC

## 2023-10-31 NOTE — TELEPHONE ENCOUNTER
Called and spoke to patients wife and informed her of Imaging time and location. Informed her that appt is at 9:45 am with 9:15 am arrival time. Voiced understanding.     ----- Message from Teresa Gore sent at 10/31/2023  3:30 PM CDT -----  Regarding: RE: Appt Reschedule  Pt wife called-appt r/s to 11/14/23 @ 12pm. Thanks   ----- Message -----  From: Paul Kelley LPN  Sent: 10/31/2023   3:17 PM CDT  To: Teresa Gore  Subject: Appt Reschedule                                  So this patient was scheduled originally for one week after imaging. Imaging can first available is not until 11/08/2023 @ 9:45am. Can we move appt that it can be after scan. Thank you

## 2023-10-31 NOTE — PROGRESS NOTES
[facility] GENERAL SURGERY   Clinic Note     HPI:   Bridget Cardenas Jr. is a 62 y.o. with PMHx of bladder cancer s/p chemotherapy followed by bladder removal and neobladder creation in 2018 presenting with new diagnosis of colonic adenocarcinoma. He was admitted to the hospital 10/20/23 for symptomatic anemia and received 6 units of blood and 2 units of platelets. Upper GI endoscopy showed no abnormalities. Colonoscopy showed a tubulovillous adenoma of the sigmoid colon and a colonic adenocarcinoma of the transverse colon. He received this diagnosis yesterday. The adenocarcinomatous polyp was completely resected.     Today, he has no symptoms; denies fever, weight loss, abdominal pain, headaches, jeremiah blood in the stool, change in bowel movements, bone pain, and N/V. He has had a pruritic rash for 3 weeks that started on his back and has spread to the abdomen, chest, and extremities. He obtained triamcinolone cream from an urgent care, which alleviates symptoms. No hx of colon cancer in the family. Surgical hx is significant for laparoscopic bladder removal and neobladder creation (which has since become obstructed and pt now performs self-catheterization), mediport placement (still present), collarbone repair in 2018, and inguinal hernia repair in 2010. He is allergic to penicillin. Does not smoke, drink, or use drugs. Only medications are as below.    Review of Systems:  10 point review of systems denied unless otherwise indicated above HPI    Allergies:   Review of patient's allergies indicates:   Allergen Reactions    Pcn [penicillins] Hives       Meds:     Current Outpatient Medications:     ferrous sulfate (FEOSOL) 325 mg (65 mg iron) Tab tablet, Take 1 tablet (325 mg total) by mouth daily with breakfast., Disp: 90 tablet, Rfl: 0    sodium bicarbonate 650 MG tablet, Take 2 tablets (1,300 mg total) by mouth 2 (two) times daily., Disp: 120 tablet, Rfl: 11    triamcinolone acetonide 0.1% (KENALOG) 0.1 % cream, Apply  topically 2 (two) times daily., Disp: 453.6 g, Rfl: 3    PMH:   Past Medical History:   Diagnosis Date    Cancer         Social History:   Social History     Tobacco Use    Smoking status: Former     Current packs/day: 0.00     Types: Cigarettes     Quit date:      Years since quittin.8     Passive exposure: Current    Smokeless tobacco: Current     Types: Chew       Family History:   No family history on file.    Surgical History:   Past Surgical History:   Procedure Laterality Date    COLONOSCOPY N/A 10/25/2023    Procedure: COLONOSCOPY, WITH HEMORRHAGE CONTROL;  Surgeon: Angy Mcguire MD;  Location: [facility] ENDOSCOPY;  Service: Gastroenterology;  Laterality: N/A;  sigmoid clips    COLONOSCOPY, WITH 1 OR MORE BIOPSIES N/A 10/25/2023    Procedure: COLONOSCOPY, WITH 1 OR MORE BIOPSIES;  Surgeon: Angy Mcguire MD;  Location: [facility] ENDOSCOPY;  Service: Gastroenterology;  Laterality: N/A;  transverse colon mass    COLONOSCOPY, WITH POLYPECTOMY USING SNARE Left 10/25/2023    Procedure: COLONOSCOPY, WITH POLYPECTOMY USING SNARE;  Surgeon: Angy Mcguire MD;  Location: [facility] ENDOSCOPY;  Service: Gastroenterology;  Laterality: Left;    ESOPHAGOGASTRODUODENOSCOPY Left 10/25/2023    Procedure: EGD (ESOPHAGOGASTRODUODENOSCOPY);  Surgeon: Angy Mcguire MD;  Location: [facility] ENDOSCOPY;  Service: Gastroenterology;  Laterality: Left;       Objective:  Vitals:  Vitals:    10/31/23 1310   BP: 129/83   Pulse: 66   Resp: 18        Physical Exam:  Gen: NAD, sitting comfortably  Neuro: awake, alert, answering questions appropriately  Resp: non-labored breathing  Abd: soft, non-tender.   Ext: moves all 4 spontaneously and purposefully  Skin: warm, well perfused. Papular rash with secondary excoriations present diffusely across trunk.     Pertinent Labs:      Component Ref Range & Units 5 d ago   Carcinoembryonic Antigen 0.00 - 3.00 ng/mL 1.98      Imaging:  10/20/23 US bladder  Impression:  Bilateral  hydronephrosis  Distended neobladder.  Correlate for urinary retention.    10/20/23 CT abd pelvis  Impression:  1. Symmetric moderate bilateral hydronephrosis and hydroureter.  No obstructing stone.  This appears to be chronic, similar to previous exam.  2. Neobladder collapsed about a Manzo catheter.    Micro/Path/Other:  10/25/23 Colonoscopy     Final Diagnosis      1. Transverse colon polyp, Polypectomy:  - Tubular adenoma.  - No evidence of malignancy.     2. Transverse colon mass, Biopsy:  - Colonic adenocarcinoma, see comment.     3. Descending colon polyp, Polypectomy:  - Tubular adenoma.  - No evidence of malignancy.     4. Sigmoid colon polyp, Polypectomy:  - Tubulovillous adenoma with high-grade dysplasia.  - No evidence of malignancy.     5. Rectal polyp, Polypectomy:  - Tubulovillous adenoma.  - No evidence of malignancy.     Assessment/Plan:  Bridget Cardenas Jr. is a 62 y.o. with PMHx of bladder cancer presenting with new diagnosis of colonic adenocarcinoma for evaluation for resection/colectomy. Needs staging work up however due to stage 4 CKD cannot get contrasted CT, therefore will get PET-CT for staging. Prior to surgical planning. CEA normal 1.98    - PET-CT scan whole body  - Referral to oncology  - FU in 1 week for surgical planning    Esme Jacob MS3  [facility]  10/31/2023 1:43 PM      Addendum:  Patient seen and examined with medical student, agree with above.    Sola Avelar MD  [employer] general surgery, PGY5

## 2023-10-31 NOTE — TELEPHONE ENCOUNTER
----- Message from Angy Mcguire MD sent at 10/30/2023  3:32 PM CDT -----  Spoke with wife regarding biopsy results.  Referrals pending with surgical oncology and oncology.  Will follow-up appointments.  All questions answered.  Needs surveillance colonoscopy in 1 year.

## 2023-10-31 NOTE — PROGRESS NOTES
I have reviewed the notes, assessments, and management plan performed by resident, I concur with her/his documentation of Bridget Cardenas Jr..  Date of Service: 10/30/2023  Patient awaiting surgical evaluation for recently diagnosed colon cancer.  General surgery can evaluate patient in clinic 10/31/23.  Spoke to patient and wife, they are agreeable to this plan.

## 2023-10-31 NOTE — TELEPHONE ENCOUNTER
Spoke to Dr. Scott's office last week.  Nurse was out until this week.  Referrals have to be reviewed by her first before scheduling.      Dr. Mcguire and Shazia NP notified via text. Secure chat sent to Mariposa HORTON of Dr. Scott) for referral status.

## 2023-11-02 NOTE — PHYSICIAN QUERY
PT Name: Bridget Cardenas Jr.  MR #: [id number]    DOCUMENTATION CLARIFICATION      CDS/: Yara Rae RN BSN              Contact information: [email or web address]     This form is a permanent document in the medical record.      Query Date: 2023    By submitting this query, we are merely seeking further clarification of documentation. Please utilize your independent clinical judgment when addressing the question(s) below.    The Medical Record contains the following:   Indicators  Supporting Clinical Findings Location in Medical Record   x Anemia documented Symptomatic Anemia- Severe microcytic anemia of H&H 3.5&13.7       iron-deficiency anemia which also could be secondary to CKD HM, PN, 10/21   x H&H  10/20/23 12:50 10/21/23 05:03 10/21/23 17:47 10/22/23 09:27 10/23/23 03:36 10/24/23 04:08 10/25/23 04:06 10/26/23 04:48   Hemoglobin 3.5 (LL) 4.8 (LL) 6.7 (L) 8.8 (L) 8.2 (L) 8.3 (L) 8.2 (L) 8.1 (L)   Hematocrit 13.7 (LL) 17.3 (LL) 23.0 (L) 30.0 (L) 27.8 (L) 28.6 (L) 28.3 (L) 28.0 (L)    LAB                 x BP                    HR BP Min: 118/64 Max: 132/72  Pulse Av.6 Min: 68 Max: 94    BP Min: 105/69 Max: 128/61  Pulse Av.8 Min: 53 Max: 76    BP Min: 108/70 Max: 135/76  Pulse Av.6 Min: 54 Max: 65    BP Min: 117/72 Max: 145/81  Pulse Av.6 Min: 54 Max: 65    BP Min: 116/63 Max: 153/89  Pulse Av.3 Min: 51 Max: 61         H&P, 10/20      HM, PN, 10/22      HM, PN, 10/23      HM, PN, 10/24      HM, PN, 10/25     x Bleeding Reports blood-tinged urine for the past week    reported dark, sticky stools yesterday evening but later backtracked saying stools were dark (green) but not sticky.   FOBT positive    FOBT positive   HM, PN, 10/21    HM, PN, 10/22      Occult Blood in stool 10/21,  and     Procedure/Surgery Performed/EBL     x Transfusion(s) Status post 2 units of packed red blood cells in ED    -transfused 2 units of packed red blood cells overnight; status post 6  units PRBCs, 1 unit platelets, and 1 unit FFP total to date     HM, PN, 10/21    HM, PN, 10/22   x Acute/Chronic illness Upper EGD WNL, unfortunately there were several polyps removed from the colon and a mass seen which was biopsied, appearance consistent with malignancy    Hx of bladder cancer s/p total cystectomy and prostatectomy  Hydronephrosis, urinary retention  BENITO on CKD Stage GV  Hx of UTI's  Non-anion gap metabolic acidosis with uremia  Elevated BNP       GI, consult, 10/25      H&P, 10/20   x Treatments will need urgent surgery referral possibly to colorectal surgeon, and oncology    received 3 doses of IV iron total over 3 days during his stay, 8 doses were recommended however patient has benign CKD 5 not requiring dialysis as of now. Ferrous sulfate oral supplement provided at discharge GI, consult, 10/25    DCS, 10/26   x Other Transverse colon mass, Biopsy:  - Colonic adenocarcinoma, see comment.    Specimens:   1) - Intestine Large, Transverse Colon, trans polyp                                     2) - Intestine Large, Transverse Colon, bx of transverse colon MASS                       Pathology, 10/25     Provider, please specify the Anemia diagnosis, associated with above clinical findings.  Bran all that Apply:  [   x] Anemia due to adenocarcinoma of the transverse colon   [  x ] Iron deficiency anemia due to chronic blood loss   [   ] Anemia of chronic kidney disease    [   ] Acute blood loss anemia    [   ] Anemia due to other history of neoplastic disease   [   ] Anemia due to chronic disease (please specify): _________________   [   ] Other Hematological Diagnosis (please specify): _________________   [   ] Clinically Undetermined     Please document in your progress notes daily for the duration of treatment, until resolved, and include in your discharge summary.    Form No. [id number]

## 2023-11-03 NOTE — PROGRESS NOTES
I have reviewed the notes, assessments, and/or procedures performed by the resident, I concur with her/his documentation of Bridget Cardenas Jr..     Mary Sanchez MD

## 2023-11-05 NOTE — PROGRESS NOTES
Referring physician: Dr. Angy Mcguire  Reason for referral: Colon Cancer      Subjective:       Patient ID: Bridget Cardenas Jr. is a 62 y.o. male.      1. Transverse Colon Cancer--Diagnosed 10/25/23  Biopsy/pathology:  Colonoscopy done 10/25/23--infiltrative and ulcerated non-obstructing mass in transverse colon, c/w malignant tumor, biopsied and pathology c/w colonic adenocarcinoma, PMS2 complete loss of expression, other MMR proteins intact, attempt will be made for MSI by PCR though there is scant material; internal hemorrhoids, diverticulosis in the sigmoid colon, on 9mm polyp in transverse colon, removed and c/w tubular adenoma, one 10mm polyp in descending colon, removed and c/w tubular adenoma, one 30-35mm polyp in sigmoid colon, removed and c/w tubulovillous adenoma with high-grade dysplasia, no malignancy; one 15mm polyp in rectum removed, and c/w tubulovillous adenoma, no malignancy; quality of bowel prep good, ileocecal valve, appendiceal orifice photographed.   Imaging:  CT A/P w/o contrast done 10/20/23--Symmetric moderate bilateral hydronephrosis and hydroureter.  No obstructing stone.  This appears to be chronic, similar to previous exam. Neobladder collapsed about a Manzo catheter.  PET/CT done 11/8/23--focal moderate uptake at transverse colon has max SUV 12, no appreciable hypermetabolic metastatic disease, similar chronic bilateral hydroureteronephrosis, post-surgical changes of prior right clavicle ORIF.    2. Severe Iron deficiency anemia--10/2023 (Ferritin <1.98)    3. H/o Bladder Cancer unknown stage--Diagnosed 2018    Treatment history:  1. Neoadjuvant chemotherapy unknown drugs and dates.  2. S/p total cystectomy/prostatectomy with neobladder creating done in 2018 at [facility].    CEA:   10/26/23--1.98    Procedures:  EGD 10/25/23--normal esophagus, normal stomach, normal duodenum, no specimens collected.     Current treatment plan:  IV iron in preparation for surgery--Feraheme X 2 doses  23 and 23  Surgery  Adjuvant treatment pending pathology from surgery    Chief Complaint: Other Misc (NPO)    HPI  63 yo male with h/o bladder cancer s/p total cystectomy and prostatectomy with neobladder creation done in 2018, with known chronic hydronephrosis and CKD admitted to [facility] recently with c/o . He was noted to have severe anemia with Hgb 3.5g/dL. He had endorsed some pink tinged urine, and has to self catheterize 6X per day but no gross hematuria. Also denied any BRBPR or melena. Anemia work-up revealed severe LILIANA with ferritin unmeasurable, stool for occult blood positive. Patient underwent EGD on 10/25 which was unremarkable. Colonoscopy also done the same day, showed a malignant, non-obstructing tumor in transverse colon, biopsy positive for moderately differentiated adenocarcinoma; also had several polyps removed, most benign, but large one in sigmoid 30mm and in rectum 15mm that returned as  tubulovillous adenomas, with high-grade dysplasia in the sigmoid polyp. CT C/A/P w/o contrast did not show any obvious metastatic disease. Patient was discharged on iron supplementation with referrals to Dr. Aris Scott and myself. He underwent outpatient PET/CT on 23 which showed hypermetabolic transverse colon lesion, but no other disease. Patient presents for initial clinic visit with his wife. He is doing quite well. He reports that he has upcoming appointments with nephrology and IM, all at [facility]. His CKD has worsened to the point that he may need dialysis in the future. Patient has appointment with Dr. Aris Scott next week.     Past Medical History:   Diagnosis Date    Cancer       Past Surgical History:   Procedure Laterality Date    COLONOSCOPY N/A 10/25/2023    Procedure: COLONOSCOPY, WITH HEMORRHAGE CONTROL;  Surgeon: Angy Mcguire MD;  Location: [facility] ENDOSCOPY;  Service: Gastroenterology;  Laterality: N/A;  sigmoid clips    COLONOSCOPY, WITH 1 OR MORE BIOPSIES N/A 10/25/2023     Procedure: COLONOSCOPY, WITH 1 OR MORE BIOPSIES;  Surgeon: Angy Mcguire MD;  Location: [facility] ENDOSCOPY;  Service: Gastroenterology;  Laterality: N/A;  transverse colon mass    COLONOSCOPY, WITH POLYPECTOMY USING SNARE Left 10/25/2023    Procedure: COLONOSCOPY, WITH POLYPECTOMY USING SNARE;  Surgeon: Angy Mcguire MD;  Location: [facility] ENDOSCOPY;  Service: Gastroenterology;  Laterality: Left;    ESOPHAGOGASTRODUODENOSCOPY Left 10/25/2023    Procedure: EGD (ESOPHAGOGASTRODUODENOSCOPY);  Surgeon: Angy Mcguire MD;  Location: [facility] ENDOSCOPY;  Service: Gastroenterology;  Laterality: Left;     History reviewed. No pertinent family history.  Social History     Socioeconomic History    Marital status:    Tobacco Use    Smoking status: Former     Current packs/day: 0.00     Types: Cigarettes     Quit date:      Years since quittin.8     Passive exposure: Current    Smokeless tobacco: Current     Types: Chew     Social Determinants of Health     Financial Resource Strain: Low Risk  (10/23/2023)    Overall Financial Resource Strain (CARDIA)     Difficulty of Paying Living Expenses: Not hard at all   Food Insecurity: No Food Insecurity (10/23/2023)    Hunger Vital Sign     Worried About Running Out of Food in the Last Year: Never true     Ran Out of Food in the Last Year: Never true   Transportation Needs: No Transportation Needs (10/23/2023)    PRAPARE - Transportation     Lack of Transportation (Medical): No     Lack of Transportation (Non-Medical): No   Physical Activity: Inactive (10/23/2023)    Exercise Vital Sign     Days of Exercise per Week: 0 days     Minutes of Exercise per Session: 0 min   Stress: No Stress Concern Present (10/23/2023)    [employer] - Occupational Stress Questionnaire     Feeling of Stress : Not at all   Social Connections: Moderately Isolated (10/23/2023)    Social Connection and Isolation Panel [NHANES]     Frequency of  Communication with Friends and Family: More than three times a week     Frequency of Social Gatherings with Friends and Family: More than three times a week     Attends Synagogue Services: Never     Active Member of Clubs or Organizations: No     Attends Club or Organization Meetings: Never     Marital Status:    Housing Stability: Unknown (10/23/2023)    Housing Stability Vital Sign     Unable to Pay for Housing in the Last Year: No     Unstable Housing in the Last Year: No       Review of patient's allergies indicates:   Allergen Reactions    Pcn [penicillins] Hives      Current Outpatient Medications on File Prior to Visit   Medication Sig Dispense Refill    ferrous sulfate (FEOSOL) 325 mg (65 mg iron) Tab tablet Take 1 tablet (325 mg total) by mouth daily with breakfast. 90 tablet 0    sodium bicarbonate 650 MG tablet Take 2 tablets (1,300 mg total) by mouth 2 (two) times daily. 120 tablet 11    triamcinolone acetonide 0.1% (KENALOG) 0.1 % cream Apply topically 2 (two) times daily. 453.6 g 3     No current facility-administered medications on file prior to visit.      Review of Systems   Constitutional:  Negative for appetite change and unexpected weight change.   HENT:  Negative for mouth sores.    Eyes:  Negative for visual disturbance.   Respiratory:  Negative for cough and shortness of breath.    Cardiovascular:  Negative for chest pain.   Gastrointestinal:  Negative for abdominal pain and diarrhea.   Genitourinary:  Negative for frequency.        Neobladder, h/o bladder cancer, self-catheterizes 6X per day   Musculoskeletal:  Negative for back pain.   Integumentary:  Negative for rash.   Neurological:  Negative for headaches.   Hematological:  Negative for adenopathy.   Psychiatric/Behavioral:  The patient is not nervous/anxious.             Vitals:    11/09/23 1412   BP: 126/77   Pulse: 69   Resp: 14   Temp: 98.3 °F (36.8 °C)   TempSrc: Oral   SpO2: 100%   Weight: 88.8 kg (195 lb 12.8 oz)   Height:  "5' 10" (1.778 m)      Physical Exam  Constitutional:       General: He is awake.      Appearance: Normal appearance.   HENT:      Head: Normocephalic and atraumatic.      Nose: Nose normal.      Mouth/Throat:      Mouth: Mucous membranes are moist.   Eyes:      General: Vision grossly intact.      Extraocular Movements: Extraocular movements intact.      Conjunctiva/sclera: Conjunctivae normal.   Cardiovascular:      Rate and Rhythm: Normal rate and regular rhythm.      Heart sounds: Normal heart sounds.   Pulmonary:      Effort: Pulmonary effort is normal.      Breath sounds: Normal breath sounds.   Chest:      Comments: Left chest wall mediport in place  Abdominal:      General: Bowel sounds are normal. There is no distension.      Palpations: Abdomen is soft.      Tenderness: There is no abdominal tenderness.   Musculoskeletal:      Cervical back: Normal range of motion and neck supple.      Right lower leg: No edema.      Left lower leg: No edema.   Lymphadenopathy:      Cervical: No cervical adenopathy.      Upper Body:      Right upper body: No supraclavicular adenopathy.      Left upper body: No supraclavicular adenopathy.   Skin:     General: Skin is warm.   Neurological:      Mental Status: He is alert and oriented to person, place, and time.      Motor: Motor function is intact.   Psychiatric:         Mood and Affect: Mood normal.         Speech: Speech normal.         Behavior: Behavior is cooperative.         Judgment: Judgment normal.            Assessment:       1. Colon cancer    2. History of bladder cancer    3. Iron deficiency anemia due to chronic blood loss         Plan:       Patient with h/o bladder cancer from 2018 s/p neoadjuvant chemotherapy given by Dr. Villar in [place] and s/p surgery with cystectomy/prostatectomy with creation of neobladder done at [facility] in [place].    Now with a new transverse colon cancer found on colonoscopy on 10/25/23 after patient presented to hospital " with severe iron deficiency anemia.  Patient also with Stage 5 CKD, following with nephrology at [facility].  PET shows no evidence of other disease.  Patient has appointment with Dr. Aris Scott next week for surgical consideration.    Will have patient RTC in 6 weeks for follow-up pathology from surgery and for treatment decision regarding chemotherapy.   We may only be able to to low-dose single agent Xeloda due to his poor kidney function. Oxaliplatin dosing for creatinine clearance <30 is 65mg/m2 on day 1 of 1 14 day cycle. May be risky to give.    Will schedule for IV Feraheme due to his severe iron deficiency to start next week on 11/13/23 in preparation for surgery.  Mediport has not been flushed in 5 years, will flush today.    All questions answered at this time.    Anita Wayne MD      Supportive Plan Information  OP FERUMOXYTOL   Anita Wayne MD   Upcoming Treatment Dates - OP FERUMOXYTOL    11/13/2023       Medications       ferumoxytoL (FERAHEME) 510 mg in dextrose 5 % (D5W) 100 mL IVPB  11/20/2023       Medications       ferumoxytoL (FERAHEME) 510 mg in dextrose 5 % (D5W) 100 mL IVPB    Therapy Plan Information  Flushes  heparin, porcine (PF) 100 unit/mL injection flush 500 Units  500 Units, Intravenous, On the 1st Thu of every 3 months  sodium chloride 0.9% flush 10 mL  10 mL, Intravenous, On the 1st Thu of every 3 months

## 2023-11-08 ENCOUNTER — HOSPITAL ENCOUNTER (OUTPATIENT)
Dept: RADIOLOGY | Facility: HOSPITAL | Age: 63
Discharge: HOME | End: 2023-11-08
Attending: STUDENT IN AN ORGANIZED HEALTH CARE EDUCATION/TRAINING PROGRAM
Payer: COMMERCIAL

## 2023-11-08 DIAGNOSIS — C18.4 MALIGNANT NEOPLASM OF TRANSVERSE COLON: ICD-10-CM

## 2023-11-08 LAB — BEAKER SEE SCANNED REPORT: NORMAL

## 2023-11-08 PROCEDURE — A9552 F18 FDG: HCPCS

## 2023-11-09 ENCOUNTER — INFUSION (OUTPATIENT)
Dept: INFUSION THERAPY | Facility: HOSPITAL | Age: 63
End: 2023-11-09
Attending: INTERNAL MEDICINE
Payer: COMMERCIAL

## 2023-11-09 ENCOUNTER — OFFICE VISIT (OUTPATIENT)
Dept: HEMATOLOGY/ONCOLOGY | Facility: CLINIC | Age: 63
End: 2023-11-09
Payer: COMMERCIAL

## 2023-11-09 VITALS
WEIGHT: 195.81 LBS | BODY MASS INDEX: 28.03 KG/M2 | SYSTOLIC BLOOD PRESSURE: 126 MMHG | HEART RATE: 69 BPM | DIASTOLIC BLOOD PRESSURE: 77 MMHG | RESPIRATION RATE: 14 BRPM | OXYGEN SATURATION: 100 % | HEIGHT: 70 IN | TEMPERATURE: 98 F

## 2023-11-09 DIAGNOSIS — C18.4 ADENOCARCINOMA OF TRANSVERSE COLON: Primary | ICD-10-CM

## 2023-11-09 DIAGNOSIS — Z85.51 HISTORY OF BLADDER CANCER: ICD-10-CM

## 2023-11-09 DIAGNOSIS — D50.0 IRON DEFICIENCY ANEMIA DUE TO CHRONIC BLOOD LOSS: ICD-10-CM

## 2023-11-09 DIAGNOSIS — C18.9 COLON CANCER: ICD-10-CM

## 2023-11-09 PROCEDURE — 3008F PR BODY MASS INDEX (BMI) DOCUMENTED: ICD-10-PCS | Mod: CPTII,S$GLB,, | Performed by: INTERNAL MEDICINE

## 2023-11-09 PROCEDURE — 1111F DSCHRG MED/CURRENT MED MERGE: CPT | Mod: CPTII,S$GLB,, | Performed by: INTERNAL MEDICINE

## 2023-11-09 PROCEDURE — 3074F PR MOST RECENT SYSTOLIC BLOOD PRESSURE < 130 MM HG: ICD-10-PCS | Mod: CPTII,S$GLB,, | Performed by: INTERNAL MEDICINE

## 2023-11-09 PROCEDURE — 3074F SYST BP LT 130 MM HG: CPT | Mod: CPTII,S$GLB,, | Performed by: INTERNAL MEDICINE

## 2023-11-09 PROCEDURE — 96523 IRRIG DRUG DELIVERY DEVICE: CPT

## 2023-11-09 PROCEDURE — 3008F BODY MASS INDEX DOCD: CPT | Mod: CPTII,S$GLB,, | Performed by: INTERNAL MEDICINE

## 2023-11-09 PROCEDURE — 1159F MED LIST DOCD IN RCRD: CPT | Mod: CPTII,S$GLB,, | Performed by: INTERNAL MEDICINE

## 2023-11-09 PROCEDURE — 1111F PR DISCHARGE MEDS RECONCILED W/ CURRENT OUTPATIENT MED LIST: ICD-10-PCS | Mod: CPTII,S$GLB,, | Performed by: INTERNAL MEDICINE

## 2023-11-09 PROCEDURE — 63600175 PHARM REV CODE 636 W HCPCS: Performed by: INTERNAL MEDICINE

## 2023-11-09 PROCEDURE — 99205 PR OFFICE/OUTPT VISIT, NEW, LEVL V, 60-74 MIN: ICD-10-PCS | Mod: S$GLB,,, | Performed by: INTERNAL MEDICINE

## 2023-11-09 PROCEDURE — A4216 STERILE WATER/SALINE, 10 ML: HCPCS | Performed by: INTERNAL MEDICINE

## 2023-11-09 PROCEDURE — 25000003 PHARM REV CODE 250: Performed by: INTERNAL MEDICINE

## 2023-11-09 PROCEDURE — 99999 PR PBB SHADOW E&M-EST. PATIENT-LVL IV: CPT | Mod: PBBFAC,,, | Performed by: INTERNAL MEDICINE

## 2023-11-09 PROCEDURE — 3078F DIAST BP <80 MM HG: CPT | Mod: CPTII,S$GLB,, | Performed by: INTERNAL MEDICINE

## 2023-11-09 PROCEDURE — 99205 OFFICE O/P NEW HI 60 MIN: CPT | Mod: S$GLB,,, | Performed by: INTERNAL MEDICINE

## 2023-11-09 PROCEDURE — 99999 PR PBB SHADOW E&M-EST. PATIENT-LVL IV: ICD-10-PCS | Mod: PBBFAC,,, | Performed by: INTERNAL MEDICINE

## 2023-11-09 PROCEDURE — 3078F PR MOST RECENT DIASTOLIC BLOOD PRESSURE < 80 MM HG: ICD-10-PCS | Mod: CPTII,S$GLB,, | Performed by: INTERNAL MEDICINE

## 2023-11-09 PROCEDURE — 1159F PR MEDICATION LIST DOCUMENTED IN MEDICAL RECORD: ICD-10-PCS | Mod: CPTII,S$GLB,, | Performed by: INTERNAL MEDICINE

## 2023-11-09 RX ORDER — HEPARIN 100 UNIT/ML
500 SYRINGE INTRAVENOUS
Status: DISCONTINUED | OUTPATIENT
Start: 2023-11-09 | End: 2023-11-09 | Stop reason: HOSPADM

## 2023-11-09 RX ORDER — SODIUM CHLORIDE 0.9 % (FLUSH) 0.9 %
10 SYRINGE (ML) INJECTION
Status: DISCONTINUED | OUTPATIENT
Start: 2023-11-09 | End: 2023-11-09 | Stop reason: HOSPADM

## 2023-11-09 RX ORDER — SODIUM CHLORIDE 0.9 % (FLUSH) 0.9 %
10 SYRINGE (ML) INJECTION
Status: CANCELLED | OUTPATIENT
Start: 2023-11-13

## 2023-11-09 RX ORDER — SODIUM CHLORIDE 0.9 % (FLUSH) 0.9 %
10 SYRINGE (ML) INJECTION
Status: CANCELLED | OUTPATIENT
Start: 2024-02-01

## 2023-11-09 RX ORDER — HEPARIN 100 UNIT/ML
500 SYRINGE INTRAVENOUS
Status: CANCELLED | OUTPATIENT
Start: 2023-11-09

## 2023-11-09 RX ORDER — SODIUM CHLORIDE 0.9 % (FLUSH) 0.9 %
10 SYRINGE (ML) INJECTION
Status: CANCELLED | OUTPATIENT
Start: 2023-11-09

## 2023-11-09 RX ORDER — HEPARIN 100 UNIT/ML
500 SYRINGE INTRAVENOUS
Status: CANCELLED | OUTPATIENT
Start: 2023-11-13

## 2023-11-09 RX ORDER — SODIUM CHLORIDE 0.9 % (FLUSH) 0.9 %
10 SYRINGE (ML) INJECTION
Status: CANCELLED | OUTPATIENT
Start: 2023-11-20

## 2023-11-09 RX ORDER — HEPARIN 100 UNIT/ML
500 SYRINGE INTRAVENOUS
Status: CANCELLED | OUTPATIENT
Start: 2023-11-20

## 2023-11-09 RX ORDER — HEPARIN 100 UNIT/ML
500 SYRINGE INTRAVENOUS
Status: CANCELLED | OUTPATIENT
Start: 2024-02-01

## 2023-11-09 RX ADMIN — SODIUM CHLORIDE, PRESERVATIVE FREE 10 ML: 5 INJECTION INTRAVENOUS at 03:11

## 2023-11-09 RX ADMIN — Medication 500 UNITS: at 03:11

## 2023-11-10 ENCOUNTER — PATIENT MESSAGE (OUTPATIENT)
Dept: NEPHROLOGY | Facility: CLINIC | Age: 63
End: 2023-11-10
Payer: COMMERCIAL

## 2023-11-10 LAB
C1INH SERPL-MCNC: NORMAL MG/DL
DHEA SERPL-MCNC: NORMAL
ESTRIOL SERPL-MCNC: NORMAL NG/ML
ESTROGEN SERPL-MCNC: NORMAL PG/ML
INSULIN SERPL-ACNC: NORMAL U[IU]/ML
LAB AP CLINICAL INFORMATION: NORMAL
LAB AP GROSS DESCRIPTION: NORMAL
LAB AP REPORT FOOTNOTES: NORMAL
T3RU NFR SERPL: NORMAL %

## 2023-11-13 ENCOUNTER — HOSPITAL ENCOUNTER (OUTPATIENT)
Dept: RADIOLOGY | Facility: HOSPITAL | Age: 63
Discharge: HOME | End: 2023-11-13
Attending: NURSE PRACTITIONER
Payer: COMMERCIAL

## 2023-11-13 DIAGNOSIS — R33.9 URINARY RETENTION: ICD-10-CM

## 2023-11-13 PROCEDURE — 76770 US EXAM ABDO BACK WALL COMP: CPT | Mod: TC

## 2023-11-13 NOTE — PROGRESS NOTES
History & Physical    CHIEF COMPLAINT:  COLON CANCER     History of Present Illness:  62 year-old-male referred by Dr. Angy Mcguire.  Patient presented to the ER in October with iron deficiency anemia.  CT abd/pel without contrast (due to stage 5 CKD) showed chronic bilateral hydronephrosis and hydroureter.  Colonoscopy was performed, polyps biopsied in the transverse, descending, sigmoid and rectum; a malignant appearing mass was seen in the transverse colon.  Biopsy of the transverse colon mass revealed colonic adenocarcinoma.  PET scan showed focal, moderate uptake at the distal transverse colon compatible with patient's known primary colonic malignancy; no appreciable hypermetabolic metastatic disease by PET evaluation.  CEA level 1.98.  Patient was evaluated by Dr. Wayne who recommends upfront surgery.  Of note, patient has a history of bladder cancer treated by chemotherapy, total cystectomy and prostatectomy in 2018.  Dr. Wayne plans to treat patient with IV iron infusions prior to surgery.      Greater than 60 minutes was required for complete chart review, imaging review including interpretation of imaging, coordination with referring/other physicians, patient counseling regarding diagnosis/treatment plan, answering questions, medical decision making, and documentation.    Review of patient's allergies indicates:   Allergen Reactions    Pcn [penicillins] Hives       Current Outpatient Medications   Medication Sig Dispense Refill    ferrous sulfate (FEOSOL) 325 mg (65 mg iron) Tab tablet Take 1 tablet (325 mg total) by mouth daily with breakfast. 90 tablet 0    sodium bicarbonate 650 MG tablet Take 2 tablets (1,300 mg total) by mouth 2 (two) times daily. 120 tablet 11    triamcinolone acetonide 0.1% (KENALOG) 0.1 % cream Apply topically 2 (two) times daily. 453.6 g 3     No current facility-administered medications for this visit.       Past Medical History:   Diagnosis Date    Cancer      Past  Surgical History:   Procedure Laterality Date    COLONOSCOPY N/A 10/25/2023    Procedure: COLONOSCOPY, WITH HEMORRHAGE CONTROL;  Surgeon: Angy Mcguire MD;  Location: [facility] ENDOSCOPY;  Service: Gastroenterology;  Laterality: N/A;  sigmoid clips    COLONOSCOPY, WITH 1 OR MORE BIOPSIES N/A 10/25/2023    Procedure: COLONOSCOPY, WITH 1 OR MORE BIOPSIES;  Surgeon: Angy Mcguire MD;  Location: [facility] ENDOSCOPY;  Service: Gastroenterology;  Laterality: N/A;  transverse colon mass    COLONOSCOPY, WITH POLYPECTOMY USING SNARE Left 10/25/2023    Procedure: COLONOSCOPY, WITH POLYPECTOMY USING SNARE;  Surgeon: Angy Mcguire MD;  Location: [facility] ENDOSCOPY;  Service: Gastroenterology;  Laterality: Left;    ESOPHAGOGASTRODUODENOSCOPY Left 10/25/2023    Procedure: EGD (ESOPHAGOGASTRODUODENOSCOPY);  Surgeon: Angy Mcguire MD;  Location: [facility] ENDOSCOPY;  Service: Gastroenterology;  Laterality: Left;     No family history on file.  Social History     Tobacco Use    Smoking status: Former     Current packs/day: 0.00     Types: Cigarettes     Quit date:      Years since quittin.8     Passive exposure: Current    Smokeless tobacco: Current     Types: Chew        Review of Systems:  Review of Systems   Constitutional:  Negative for activity change, appetite change, chills, diaphoresis, fatigue and fever.   HENT:  Negative for congestion, ear pain, tinnitus and trouble swallowing.    Eyes:  Negative for photophobia and pain.   Respiratory:  Negative for apnea, cough, choking, chest tightness, shortness of breath and stridor.    Cardiovascular:  Negative for chest pain, palpitations and leg swelling.   Endocrine: Negative for cold intolerance and heat intolerance.   Genitourinary:  Negative for difficulty urinating, dysuria, enuresis, flank pain, frequency and hematuria.   Musculoskeletal:  Negative for arthralgias, back pain and gait problem.   Neurological:  Negative for dizziness, seizures, syncope,  facial asymmetry, speech difficulty, weakness, light-headedness, numbness and headaches.   Psychiatric/Behavioral:  Negative for agitation, behavioral problems, confusion and decreased concentration.    All other systems reviewed and are negative.      OBJECTIVE:     Vital Signs (Most Recent)              Physical Exam:  Physical Exam  Constitutional:       General: He is not in acute distress.     Appearance: Normal appearance. He is well-developed and normal weight. He is not ill-appearing, toxic-appearing or diaphoretic.   HENT:      Head: Normocephalic and atraumatic.      Right Ear: Hearing and external ear normal.      Left Ear: Hearing and external ear normal.      Nose: No congestion or rhinorrhea.      Mouth/Throat:      Mouth: Mucous membranes are moist.      Pharynx: Oropharynx is clear. No oropharyngeal exudate.   Eyes:      General: Lids are normal. Gaze aligned appropriately. No scleral icterus.     Extraocular Movements: Extraocular movements intact.      Right eye: Normal extraocular motion and no nystagmus.      Left eye: Normal extraocular motion and no nystagmus.      Conjunctiva/sclera: Conjunctivae normal.      Right eye: Right conjunctiva is not injected.      Left eye: Left conjunctiva is not injected.      Pupils: Pupils are equal, round, and reactive to light.   Neck:      Vascular: No carotid bruit or JVD.      Trachea: Trachea and phonation normal.   Cardiovascular:      Rate and Rhythm: Normal rate and regular rhythm.      Pulses: Normal pulses.      Heart sounds: Normal heart sounds. No murmur heard.     No friction rub. No gallop.   Pulmonary:      Effort: Pulmonary effort is normal. No tachypnea, accessory muscle usage, respiratory distress or retractions.      Breath sounds: Normal breath sounds and air entry. No stridor or decreased air movement. No wheezing or rhonchi.   Chest:      Chest wall: No mass, deformity, swelling, tenderness or crepitus.   Abdominal:      General: Abdomen  is flat. Bowel sounds are normal. There is no distension. There are no signs of injury.      Palpations: Abdomen is soft. There is no shifting dullness, fluid wave, hepatomegaly, splenomegaly or mass.      Tenderness: There is no abdominal tenderness. There is no guarding or rebound.      Hernia: No hernia is present.   Musculoskeletal:         General: No swelling or tenderness.      Cervical back: Normal range of motion and neck supple. No rigidity, tenderness or crepitus.   Lymphadenopathy:      Head:      Right side of head: No submental, submandibular or occipital adenopathy.      Left side of head: No submental, submandibular or occipital adenopathy.      Cervical: No cervical adenopathy.      Right cervical: No superficial or posterior cervical adenopathy.     Left cervical: No superficial or posterior cervical adenopathy.      Upper Body:      Right upper body: No supraclavicular or axillary adenopathy.      Left upper body: No supraclavicular or axillary adenopathy.      Lower Body: No right inguinal adenopathy. No left inguinal adenopathy.   Skin:     General: Skin is warm.      Capillary Refill: Capillary refill takes less than 2 seconds.      Coloration: Skin is not cyanotic, jaundiced, mottled or pale.      Findings: No bruising, ecchymosis, erythema, lesion, petechiae or rash.      Nails: There is no clubbing.   Neurological:      General: No focal deficit present.      Mental Status: He is alert and oriented to person, place, and time.      Cranial Nerves: No cranial nerve deficit or facial asymmetry.      Sensory: No sensory deficit.      Motor: No weakness, tremor, atrophy or abnormal muscle tone.      Coordination: Coordination normal.      Gait: Gait normal.      Deep Tendon Reflexes: Reflexes normal.   Psychiatric:         Attention and Perception: Attention and perception normal.         Mood and Affect: Mood normal. Mood is not anxious or depressed. Affect is not blunt or flat.         Speech:  Speech normal. Speech is not slurred.         Behavior: Behavior normal. Behavior is cooperative.           ASSESSMENT/PLAN:     Adenocarcinoma of the distal transverse colon with no evidence of metastatic disease in this patient with previous history of cystectomy for bladder cancer in 2018 at [facility]    Diagnosis, staging, prognosis and treatment guidelines for colon cancer were discussed with the patient in detail, all questions were addressed.Using visual aids and drawings, I described the anatomy and potential surgical procedures.    Schedule laparoscopic/robotic, possible open transverse colectomy.  Anticipate possibly more difficult procedure secondary to previous cystectomy/abdominal surgery    The risks and benefits of the procedure were explained in detail using layman terms, including the pros and cons of any implant that may be used, all questions were addressed, the patient gives consent to proceed    Aris Scott MD  Surgical Oncology  Complex General, Gastrointestinal and Hepatobiliary Surgery

## 2023-11-15 ENCOUNTER — INFUSION (OUTPATIENT)
Dept: INFUSION THERAPY | Facility: HOSPITAL | Age: 63
End: 2023-11-15
Attending: INTERNAL MEDICINE
Payer: COMMERCIAL

## 2023-11-15 ENCOUNTER — OFFICE VISIT (OUTPATIENT)
Dept: SURGICAL ONCOLOGY | Facility: CLINIC | Age: 63
End: 2023-11-15
Payer: COMMERCIAL

## 2023-11-15 VITALS
DIASTOLIC BLOOD PRESSURE: 83 MMHG | HEIGHT: 71 IN | WEIGHT: 196.19 LBS | BODY MASS INDEX: 27.47 KG/M2 | SYSTOLIC BLOOD PRESSURE: 123 MMHG

## 2023-11-15 VITALS — HEART RATE: 56 BPM | TEMPERATURE: 98 F | SYSTOLIC BLOOD PRESSURE: 123 MMHG | DIASTOLIC BLOOD PRESSURE: 70 MMHG

## 2023-11-15 DIAGNOSIS — N18.5 STAGE 5 CHRONIC KIDNEY DISEASE NOT ON CHRONIC DIALYSIS: Primary | ICD-10-CM

## 2023-11-15 DIAGNOSIS — D50.0 IRON DEFICIENCY ANEMIA DUE TO CHRONIC BLOOD LOSS: ICD-10-CM

## 2023-11-15 DIAGNOSIS — C18.4 MALIGNANT TUMOR OF TRANSVERSE COLON: ICD-10-CM

## 2023-11-15 PROCEDURE — 3079F DIAST BP 80-89 MM HG: CPT | Mod: CPTII,S$GLB,, | Performed by: SURGERY

## 2023-11-15 PROCEDURE — 1111F PR DISCHARGE MEDS RECONCILED W/ CURRENT OUTPATIENT MED LIST: ICD-10-PCS | Mod: CPTII,S$GLB,, | Performed by: SURGERY

## 2023-11-15 PROCEDURE — 1159F MED LIST DOCD IN RCRD: CPT | Mod: CPTII,S$GLB,, | Performed by: SURGERY

## 2023-11-15 PROCEDURE — 96365 THER/PROPH/DIAG IV INF INIT: CPT

## 2023-11-15 PROCEDURE — 99999 PR PBB SHADOW E&M-EST. PATIENT-LVL III: CPT | Mod: PBBFAC,,, | Performed by: SURGERY

## 2023-11-15 PROCEDURE — 63600175 PHARM REV CODE 636 W HCPCS: Performed by: INTERNAL MEDICINE

## 2023-11-15 PROCEDURE — 3074F SYST BP LT 130 MM HG: CPT | Mod: CPTII,S$GLB,, | Performed by: SURGERY

## 2023-11-15 PROCEDURE — 99205 OFFICE O/P NEW HI 60 MIN: CPT | Mod: S$GLB,,, | Performed by: SURGERY

## 2023-11-15 PROCEDURE — 3008F BODY MASS INDEX DOCD: CPT | Mod: CPTII,S$GLB,, | Performed by: SURGERY

## 2023-11-15 PROCEDURE — 3008F PR BODY MASS INDEX (BMI) DOCUMENTED: ICD-10-PCS | Mod: CPTII,S$GLB,, | Performed by: SURGERY

## 2023-11-15 PROCEDURE — 3079F PR MOST RECENT DIASTOLIC BLOOD PRESSURE 80-89 MM HG: ICD-10-PCS | Mod: CPTII,S$GLB,, | Performed by: SURGERY

## 2023-11-15 PROCEDURE — 1111F DSCHRG MED/CURRENT MED MERGE: CPT | Mod: CPTII,S$GLB,, | Performed by: SURGERY

## 2023-11-15 PROCEDURE — 3074F PR MOST RECENT SYSTOLIC BLOOD PRESSURE < 130 MM HG: ICD-10-PCS | Mod: CPTII,S$GLB,, | Performed by: SURGERY

## 2023-11-15 PROCEDURE — 25000003 PHARM REV CODE 250: Performed by: INTERNAL MEDICINE

## 2023-11-15 PROCEDURE — 99205 PR OFFICE/OUTPT VISIT, NEW, LEVL V, 60-74 MIN: ICD-10-PCS | Mod: S$GLB,,, | Performed by: SURGERY

## 2023-11-15 PROCEDURE — 1159F PR MEDICATION LIST DOCUMENTED IN MEDICAL RECORD: ICD-10-PCS | Mod: CPTII,S$GLB,, | Performed by: SURGERY

## 2023-11-15 PROCEDURE — A4216 STERILE WATER/SALINE, 10 ML: HCPCS | Performed by: INTERNAL MEDICINE

## 2023-11-15 PROCEDURE — 99999 PR PBB SHADOW E&M-EST. PATIENT-LVL III: ICD-10-PCS | Mod: PBBFAC,,, | Performed by: SURGERY

## 2023-11-15 RX ORDER — HEPARIN 100 UNIT/ML
500 SYRINGE INTRAVENOUS
Status: DISCONTINUED | OUTPATIENT
Start: 2023-11-15 | End: 2023-11-15 | Stop reason: HOSPADM

## 2023-11-15 RX ORDER — SODIUM CHLORIDE 0.9 % (FLUSH) 0.9 %
10 SYRINGE (ML) INJECTION
Status: DISCONTINUED | OUTPATIENT
Start: 2023-11-15 | End: 2023-11-15 | Stop reason: HOSPADM

## 2023-11-15 RX ADMIN — Medication 500 UNITS: at 02:11

## 2023-11-15 RX ADMIN — Medication 10 ML: at 02:11

## 2023-11-15 RX ADMIN — SODIUM CHLORIDE: 9 INJECTION, SOLUTION INTRAVENOUS at 01:11

## 2023-11-15 RX ADMIN — FERUMOXYTOL 510 MG: 510 INJECTION INTRAVENOUS at 01:11

## 2023-11-16 LAB — BEAKER SEE SCANNED REPORT: NORMAL

## 2023-11-21 DIAGNOSIS — N18.9 CHRONIC KIDNEY DISEASE, UNSPECIFIED CKD STAGE: Primary | ICD-10-CM

## 2023-11-22 ENCOUNTER — INFUSION (OUTPATIENT)
Dept: INFUSION THERAPY | Facility: HOSPITAL | Age: 63
End: 2023-11-22
Attending: INTERNAL MEDICINE
Payer: COMMERCIAL

## 2023-11-22 VITALS
DIASTOLIC BLOOD PRESSURE: 68 MMHG | TEMPERATURE: 98 F | HEART RATE: 74 BPM | WEIGHT: 196 LBS | HEIGHT: 71 IN | SYSTOLIC BLOOD PRESSURE: 133 MMHG | BODY MASS INDEX: 27.44 KG/M2 | OXYGEN SATURATION: 100 % | RESPIRATION RATE: 18 BRPM

## 2023-11-22 DIAGNOSIS — D50.0 IRON DEFICIENCY ANEMIA DUE TO CHRONIC BLOOD LOSS: ICD-10-CM

## 2023-11-22 DIAGNOSIS — N18.5 STAGE 5 CHRONIC KIDNEY DISEASE NOT ON CHRONIC DIALYSIS: Primary | ICD-10-CM

## 2023-11-22 PROCEDURE — 63600175 PHARM REV CODE 636 W HCPCS: Mod: JZ,JG | Performed by: INTERNAL MEDICINE

## 2023-11-22 PROCEDURE — 25000003 PHARM REV CODE 250: Performed by: INTERNAL MEDICINE

## 2023-11-22 PROCEDURE — 96365 THER/PROPH/DIAG IV INF INIT: CPT

## 2023-11-22 RX ORDER — SODIUM CHLORIDE 0.9 % (FLUSH) 0.9 %
10 SYRINGE (ML) INJECTION
Status: DISCONTINUED | OUTPATIENT
Start: 2023-11-22 | End: 2023-11-22 | Stop reason: HOSPADM

## 2023-11-22 RX ORDER — HEPARIN 100 UNIT/ML
500 SYRINGE INTRAVENOUS
Status: DISCONTINUED | OUTPATIENT
Start: 2023-11-22 | End: 2023-11-22 | Stop reason: HOSPADM

## 2023-11-22 RX ADMIN — FERUMOXYTOL 510 MG: 510 INJECTION INTRAVENOUS at 02:11

## 2023-11-22 RX ADMIN — SODIUM CHLORIDE: 9 INJECTION, SOLUTION INTRAVENOUS at 02:11

## 2023-11-22 RX ADMIN — HEPARIN 500 UNITS: 100 SYRINGE at 02:11

## 2023-11-28 ENCOUNTER — LAB VISIT (OUTPATIENT)
Dept: LAB | Facility: HOSPITAL | Age: 63
End: 2023-11-28
Attending: NURSE PRACTITIONER
Payer: COMMERCIAL

## 2023-11-28 DIAGNOSIS — C18.9 COLON CANCER: ICD-10-CM

## 2023-11-28 DIAGNOSIS — D50.0 IRON DEFICIENCY ANEMIA DUE TO CHRONIC BLOOD LOSS: ICD-10-CM

## 2023-11-28 DIAGNOSIS — N18.9 CHRONIC KIDNEY DISEASE, UNSPECIFIED CKD STAGE: ICD-10-CM

## 2023-11-28 DIAGNOSIS — R33.9 URINARY RETENTION: ICD-10-CM

## 2023-11-28 LAB
ALBUMIN SERPL-MCNC: 3.8 G/DL (ref 3.4–4.8)
ALBUMIN/GLOB SERPL: 1.1 RATIO (ref 1.1–2)
ALP SERPL-CCNC: 65 UNIT/L (ref 40–150)
ALT SERPL-CCNC: 7 UNIT/L (ref 0–55)
ANISOCYTOSIS BLD QL SMEAR: ABNORMAL
APPEARANCE UR: ABNORMAL
AST SERPL-CCNC: 7 UNIT/L (ref 5–34)
BACTERIA #/AREA URNS AUTO: ABNORMAL /HPF
BASOPHILS # BLD AUTO: 0.04 X10(3)/MCL
BASOPHILS NFR BLD AUTO: 0.8 %
BILIRUB SERPL-MCNC: 0.4 MG/DL
BILIRUB UR QL STRIP.AUTO: NEGATIVE
BUN SERPL-MCNC: 75.1 MG/DL (ref 8.4–25.7)
CALCIUM SERPL-MCNC: 8.5 MG/DL (ref 8.8–10)
CHLORIDE SERPL-SCNC: 108 MMOL/L (ref 98–107)
CO2 SERPL-SCNC: 23 MMOL/L (ref 23–31)
COLOR UR AUTO: ABNORMAL
CREAT SERPL-MCNC: 5.11 MG/DL (ref 0.73–1.18)
CREAT UR-MCNC: 65 MG/DL (ref 63–166)
EOSINOPHIL # BLD AUTO: 0.72 X10(3)/MCL (ref 0–0.9)
EOSINOPHIL NFR BLD AUTO: 14 %
ERYTHROCYTE [DISTWIDTH] IN BLOOD BY AUTOMATED COUNT: 25.7 % (ref 11.5–17)
FERRITIN SERPL-MCNC: 358.83 NG/ML (ref 21.81–274.66)
GFR SERPLBLD CREATININE-BSD FMLA CKD-EPI: 12 MLS/MIN/1.73/M2
GLOBULIN SER-MCNC: 3.6 GM/DL (ref 2.4–3.5)
GLUCOSE SERPL-MCNC: 78 MG/DL (ref 82–115)
GLUCOSE UR QL STRIP.AUTO: NORMAL
HCT VFR BLD AUTO: 38.8 % (ref 42–52)
HGB BLD-MCNC: 11.8 G/DL (ref 14–18)
HYALINE CASTS #/AREA URNS LPF: ABNORMAL /LPF
IMM GRANULOCYTES # BLD AUTO: 0.01 X10(3)/MCL (ref 0–0.04)
IMM GRANULOCYTES NFR BLD AUTO: 0.2 %
IRON SATN MFR SERPL: 38 % (ref 20–50)
IRON SERPL-MCNC: 101 UG/DL (ref 65–175)
KETONES UR QL STRIP.AUTO: NEGATIVE
LEUKOCYTE ESTERASE UR QL STRIP.AUTO: 500
LYMPHOCYTES # BLD AUTO: 1.33 X10(3)/MCL (ref 0.6–4.6)
LYMPHOCYTES NFR BLD AUTO: 25.9 %
MCH RBC QN AUTO: 27.2 PG (ref 27–31)
MCHC RBC AUTO-ENTMCNC: 30.4 G/DL (ref 33–36)
MCV RBC AUTO: 89.4 FL (ref 80–94)
MONOCYTES # BLD AUTO: 0.38 X10(3)/MCL (ref 0.1–1.3)
MONOCYTES NFR BLD AUTO: 7.4 %
NEUTROPHILS # BLD AUTO: 2.66 X10(3)/MCL (ref 2.1–9.2)
NEUTROPHILS NFR BLD AUTO: 51.7 %
NITRITE UR QL STRIP.AUTO: ABNORMAL
NRBC BLD AUTO-RTO: 0 %
PH UR STRIP.AUTO: 6.5 [PH]
PLATELET # BLD AUTO: 176 X10(3)/MCL (ref 130–400)
PLATELET # BLD EST: ADEQUATE 10*3/UL
PMV BLD AUTO: 10.2 FL (ref 7.4–10.4)
POIKILOCYTOSIS BLD QL SMEAR: ABNORMAL
POLYCHROMASIA BLD QL SMEAR: SLIGHT
POTASSIUM SERPL-SCNC: 5.2 MMOL/L (ref 3.5–5.1)
PROT SERPL-MCNC: 7.4 GM/DL (ref 5.8–7.6)
PROT UR QL STRIP.AUTO: ABNORMAL
PROT UR STRIP-MCNC: 32.6 MG/DL
RBC # BLD AUTO: 4.34 X10(6)/MCL (ref 4.7–6.1)
RBC #/AREA URNS AUTO: ABNORMAL /HPF
RBC MORPH BLD: ABNORMAL
RBC UR QL AUTO: ABNORMAL
SCHISTOCYTE (OLG): SLIGHT
SODIUM SERPL-SCNC: 140 MMOL/L (ref 136–145)
SP GR UR STRIP.AUTO: 1.01 (ref 1–1.03)
SQUAMOUS #/AREA URNS LPF: ABNORMAL /HPF
TIBC SERPL-MCNC: 164 UG/DL (ref 69–240)
TIBC SERPL-MCNC: 265 UG/DL (ref 250–450)
TRANSFERRIN SERPL-MCNC: 212 MG/DL (ref 163–344)
URINE PROTEIN/CREATININE RATIO (OHS): 0.5
UROBILINOGEN UR STRIP-ACNC: NORMAL
WBC # SPEC AUTO: 5.14 X10(3)/MCL (ref 4.5–11.5)
WBC #/AREA URNS AUTO: >100 /HPF

## 2023-11-28 PROCEDURE — 83540 ASSAY OF IRON: CPT

## 2023-11-28 PROCEDURE — 80053 COMPREHEN METABOLIC PANEL: CPT

## 2023-11-28 PROCEDURE — 87086 URINE CULTURE/COLONY COUNT: CPT

## 2023-11-28 PROCEDURE — 81001 URINALYSIS AUTO W/SCOPE: CPT

## 2023-11-28 PROCEDURE — 85025 COMPLETE CBC W/AUTO DIFF WBC: CPT

## 2023-11-28 PROCEDURE — 82728 ASSAY OF FERRITIN: CPT

## 2023-11-28 PROCEDURE — 36415 COLL VENOUS BLD VENIPUNCTURE: CPT

## 2023-11-28 PROCEDURE — 87077 CULTURE AEROBIC IDENTIFY: CPT

## 2023-11-28 PROCEDURE — 82570 ASSAY OF URINE CREATININE: CPT

## 2023-11-29 ENCOUNTER — OFFICE VISIT (OUTPATIENT)
Dept: NEPHROLOGY | Facility: CLINIC | Age: 63
End: 2023-11-29
Payer: COMMERCIAL

## 2023-11-29 VITALS
DIASTOLIC BLOOD PRESSURE: 84 MMHG | OXYGEN SATURATION: 100 % | HEIGHT: 71 IN | TEMPERATURE: 98 F | SYSTOLIC BLOOD PRESSURE: 128 MMHG | WEIGHT: 198.38 LBS | RESPIRATION RATE: 18 BRPM | HEART RATE: 73 BPM | BODY MASS INDEX: 27.77 KG/M2

## 2023-11-29 DIAGNOSIS — E87.5 HYPERKALEMIA: ICD-10-CM

## 2023-11-29 DIAGNOSIS — N25.81 SECONDARY HYPERPARATHYROIDISM OF RENAL ORIGIN: ICD-10-CM

## 2023-11-29 DIAGNOSIS — N18.5 CKD STAGE G5/A3, GFR <15 AND ALBUMIN CREATININE RATIO >300 MG/G: Primary | ICD-10-CM

## 2023-11-29 PROCEDURE — 3074F PR MOST RECENT SYSTOLIC BLOOD PRESSURE < 130 MM HG: ICD-10-PCS | Mod: CPTII,,, | Performed by: NURSE PRACTITIONER

## 2023-11-29 PROCEDURE — 1159F MED LIST DOCD IN RCRD: CPT | Mod: CPTII,,, | Performed by: NURSE PRACTITIONER

## 2023-11-29 PROCEDURE — 1160F PR REVIEW ALL MEDS BY PRESCRIBER/CLIN PHARMACIST DOCUMENTED: ICD-10-PCS | Mod: CPTII,,, | Performed by: NURSE PRACTITIONER

## 2023-11-29 PROCEDURE — 3066F PR DOCUMENTATION OF TREATMENT FOR NEPHROPATHY: ICD-10-PCS | Mod: CPTII,,, | Performed by: NURSE PRACTITIONER

## 2023-11-29 PROCEDURE — 1160F RVW MEDS BY RX/DR IN RCRD: CPT | Mod: CPTII,,, | Performed by: NURSE PRACTITIONER

## 2023-11-29 PROCEDURE — 3074F SYST BP LT 130 MM HG: CPT | Mod: CPTII,,, | Performed by: NURSE PRACTITIONER

## 2023-11-29 PROCEDURE — 99214 PR OFFICE/OUTPT VISIT, EST, LEVL IV, 30-39 MIN: ICD-10-PCS | Mod: S$PBB,,, | Performed by: NURSE PRACTITIONER

## 2023-11-29 PROCEDURE — 99214 OFFICE O/P EST MOD 30 MIN: CPT | Mod: S$PBB,,, | Performed by: NURSE PRACTITIONER

## 2023-11-29 PROCEDURE — 3079F PR MOST RECENT DIASTOLIC BLOOD PRESSURE 80-89 MM HG: ICD-10-PCS | Mod: CPTII,,, | Performed by: NURSE PRACTITIONER

## 2023-11-29 PROCEDURE — 3008F PR BODY MASS INDEX (BMI) DOCUMENTED: ICD-10-PCS | Mod: CPTII,,, | Performed by: NURSE PRACTITIONER

## 2023-11-29 PROCEDURE — 3008F BODY MASS INDEX DOCD: CPT | Mod: CPTII,,, | Performed by: NURSE PRACTITIONER

## 2023-11-29 PROCEDURE — 3079F DIAST BP 80-89 MM HG: CPT | Mod: CPTII,,, | Performed by: NURSE PRACTITIONER

## 2023-11-29 PROCEDURE — 99215 OFFICE O/P EST HI 40 MIN: CPT | Mod: PBBFAC | Performed by: NURSE PRACTITIONER

## 2023-11-29 PROCEDURE — 1159F PR MEDICATION LIST DOCUMENTED IN MEDICAL RECORD: ICD-10-PCS | Mod: CPTII,,, | Performed by: NURSE PRACTITIONER

## 2023-11-29 PROCEDURE — 3066F NEPHROPATHY DOC TX: CPT | Mod: CPTII,,, | Performed by: NURSE PRACTITIONER

## 2023-11-29 RX ORDER — CALCITRIOL 0.25 UG/1
0.25 CAPSULE ORAL DAILY
Qty: 90 CAPSULE | Refills: 0 | Status: SHIPPED | OUTPATIENT
Start: 2023-11-29 | End: 2024-02-13

## 2023-11-29 NOTE — PROGRESS NOTES
[facility]   Kidney Care Education Program     Required Referral Information    Patients Name:  Bridget Cardenas Jr.     YOB: 1960   Home Address: [place] [address]  Contact Telephone Number: [phone] (home)  , [phone] (home)   Referring Physician/Nurse Practitioner:  Mayra Grimaldo NP, [facility]     Has dialysis treatment been initiated?   ______ Yes       X  No   eGFR:    Lab Results   Component Value Date    EGFRNORACEVR 12 11/28/2023       Date of Next Appt:  Follow up in about 5 weeks (around 1/3/2024).    Please provide:   Demographic Information   History & Physical, Nephrology Consultation & Progress Notes   Recent Lab Test Results   Current Medication List     Please direct any questions to:   Reny Snow RN BSN   K[facility] Care Advocate   [facility]   Office: [phone]   eFax: [phone]   Cell:  [phone]

## 2023-11-29 NOTE — PROGRESS NOTES
Hemodialysis access management referral form   Refer to:   [facility]  [address] [place] [address]  Phone [phone]; fax [phone]    Please fax referral form, demographic sheet, and most recent H&P to [phone]     Patient information     Name: Bridget Cardenas Jr.   YOB: 1960      Phone number: [phone] (home)  , [phone] (home)     Dialysis center: N/A (not on dialysis)   Treatment dates: N/A                   Time: N/A   Location of access: N/A                    Nephrologist: VIOLET Grimaldo NP     Reason for referral      X_New permanent access creation     _Current access dysfunction (describe the issue)     _Current temporary catheter problems (describe the issue)     _Other     Ultrasound order     _Unilateral upper extremity vein mapping 10101     X_Bilateral upper extremity vein mapping 03511     _Hemodialysis access duplex 55084      Diagnoses     X_Presurgical evaluation, Z01.818     _Mechanical complication of AVF/AVG, T82.590A      Provider name: Mayra Grimaldo NP              Phone number: [phone]      Date: 11/29/2023

## 2023-11-29 NOTE — PROGRESS NOTES
[facility]  Nephrology Clinic Note    Chief complaint: Chronic Kidney Disease (New referral)    History of present illness:   Bridget Cardenas Jr. is a 63 y.o. White male with past medical history of cancer, status post total cystectomy and prostatectomy in 2018 with neobladder creation (patient self catheterizes 6 times a day); advanced chronic kidney disease, chronic hydronephrosis, metabolic acidosis.  Patient was admitted to the hospital in October of 2023 for treatment of iron-deficiency anemia.  Further evaluation a malignant-appearing mass in the transverse colon, biopsy was positive for colonic adenocarcinoma. PET scan showed focal, moderate uptake at the distal transverse colon compatible with patient's known primary colonic malignancy; no appreciable hypermetabolic metastatic disease by PET evaluation.  Patient was evaluated by Surgical Oncology  (Dr Aris Scott), is scheduled for  laparoscopic/robotic, possible open transverse colectomy on December 14, 2023.  Patient presents to establish care in Nephrology Clinic today.  He denies complaints.  Accompanied by his wife.    Review of Systems  12 point review of systems conducted, negative except as stated in the history of present illness.    Allergies: Patient is allergic to pcn [penicillins].     Past Medical History:  has a past medical history of Cancer.    Procedure History:  has a past surgical history that includes colonoscopy, with polypectomy using snare (Left, 10/25/2023); Esophagogastroduodenoscopy (Left, 10/25/2023); colonoscopy, with 1 or more biopsies (N/A, 10/25/2023); Colonoscopy (N/A, 10/25/2023); Prostate surgery (10/2018); Small intestine surgery (10/2018); Fracture surgery (7/2018); and Hernia repair (11/2010).    Family History: family history is not on file.    Social History:  reports that he quit smoking about 36 years ago. His smoking use included cigarettes. He has been exposed to tobacco smoke. His  "smokeless tobacco use includes chew. He reports that he does not drink alcohol and does not use drugs.    Physical exam  /84 (BP Location: Left arm, Patient Position: Sitting, BP Method: Medium (Automatic))   Pulse 73   Temp 97.8 °F (36.6 °C) (Oral)   Resp 18   Ht 5' 11" (1.803 m)   Wt 90 kg (198 lb 6.4 oz)   SpO2 100%   BMI 27.67 kg/m²   General appearance: Patient is in no acute distress.  Skin: No rashes or wounds.  HEENT: PERRLA, EOMI, no scleral icterus, no JVD. Neck is supple.  Chest: Respirations are unlabored. Lungs sounds are clear.   Heart: S1, S2.   Abdomen: Benign.  : Deferred.  Extremities: No edema, peripheral pulses are palpable.   Neuro: No focal deficits.     Home Medications:    Current Outpatient Medications:     sodium bicarbonate 650 MG tablet, Take 2 tablets (1,300 mg total) by mouth 2 (two) times daily., Disp: 120 tablet, Rfl: 11    calcitRIOL (ROCALTROL) 0.25 MCG Cap, Take 1 capsule (0.25 mcg total) by mouth once daily., Disp: 90 capsule, Rfl: 0    patiromer calcium sorbitex (VELTASSA) 8.4 gram PwPk, Take 1 packet (8.4 g total) by mouth every other day., Disp: 1 packet, Rfl: 0    Laboratory data    Serum  Lab Results   Component Value Date    WBC 5.14 11/28/2023    HGB 11.8 (L) 11/28/2023    HCT 38.8 (L) 11/28/2023     11/28/2023    IRON 101 11/28/2023    TIBC 265 11/28/2023    TRANS 212 11/28/2023    LABIRON 38 11/28/2023    FERRITIN 358.83 (H) 11/28/2023     11/28/2023    K 5.2 (H) 11/28/2023    CHLORIDE 108 (H) 11/28/2023    CO2 23 11/28/2023    BUN 75.1 (H) 11/28/2023    CREATININE 5.11 (H) 11/28/2023    EGFRNORACEVR 12 11/28/2023    GLUCOSE 78 (L) 11/28/2023    CALCIUM 8.5 (L) 11/28/2023    ALKPHOS 65 11/28/2023    LABPROT 7.4 11/28/2023    ALBUMIN 3.8 11/28/2023    BILIDIR 0.2 10/20/2023    IBILI 0.20 05/27/2021    AST 7 11/28/2023    ALT 7 11/28/2023    MG 1.90 10/26/2023    PHOS 3.7 10/26/2023      Lab Results   Component Value Date    .0 (H) " 05/24/2021    YZNSVCDL07VA 21.6 (L) 05/24/2021     Urine:  Lab Results   Component Value Date    COLORUA Light-Yellow 11/28/2023    APPEARANCEUA Turbid (A) 11/28/2023    SGUA 1.010 11/28/2023    PHUA 6.5 11/28/2023    PROTEINUA 1+ (A) 11/28/2023    GLUCOSEUA Normal 11/28/2023    KETONESUA Negative 11/28/2023    BLOODUA Trace (A) 11/28/2023    NITRITESUA 1+ (A) 11/28/2023    LEUKOCYTESUR 500 (A) 11/28/2023    RBCUA 6-10 (A) 11/28/2023    WBCUA >100 (A) 11/28/2023    BACTERIA Occ (A) 11/28/2023    SQEPUA None Seen 11/28/2023    HYALINECASTS None Seen 11/28/2023    CREATRANDUR 65.0 11/28/2023    PROTEINURINE 32.6 11/28/2023    UPROTCREA 0.5 11/28/2023         Imaging  US Retroperitoneal Complete 11/13/2023  Right Kidney:  Length: 10 x 5.5 x 4.6 cm  Appearance: Normal echogenicity.  Collecting system: Mild to moderate prominence of the collecting system and renal pelvis indicating the presence of hydronephrosis  Stones: None  Cyst/Mass: None  Left Kidney: Left kidneys poorly visualize due to bowel and poor acoustic windows  Length: 10.6 x 5.6 x 4.6 cm  Appearance: Normal echogenicity.  Collecting system: No hydronephrosis  Stones: None  Cyst/Mass: None  Bladder:  Patient has a neobladder measuring 7.9 by 3.2 x 1.6 cm that appears to be unremarkable patient self catheterized 15 minutes prior to the study  Vessels:  Visualized portions of the IVC and aorta have a normal grayscale appearance.    Impression  Mild to moderate hydronephrosis of the right kidney.  Poor visualization of the left kidney likely related to overlying bowel gas and poor acoustic windows.  Neobladder  Electronically signed by: Freeman Barnes  Date:    11/13/2023  Time:    12:27      Impression    ICD-10-CM ICD-9-CM   1. CKD stage G5/A3, GFR <15 and albumin creatinine ratio >300 mg/g  N18.5 585.5   2. Secondary hyperparathyroidism of renal origin  N25.81 588.81   3. Hyperkalemia  E87.5 276.7        Plan     CKD stage G5/A3, GFR <15 and albumin  creatinine ratio >300 mg/g  -     Ambulatory referral/consult to Nephrology  -     Ambulatory referral/consult to [facility]; Future; Expected date: 12/06/2023  -     Ambulatory referral/consult to Vascular Surgery; Future; Expected date: 12/06/2023  -     Ferritin; Future; Expected date: 12/27/2023  -     Iron and TIBC; Future; Expected date: 12/27/2023  -     CBC Auto Differential; Future; Expected date: 12/27/2023  -     Comprehensive Metabolic Panel; Future; Expected date: 12/27/2023  Possibly chronic tubular interstitial disease secondary to chronic obstructive uropathy.  Estimated GFR is 12 mL/min.  Patient has multiple complications advanced kidney dysfunction (metabolic acidosis, hyperkalemia, secondary hyperparathyroidism).  We discussed his diagnosis and treatment options extensively.  Patient is not a candidate for kidney transplantation due to active malignancy, is not a candidate for peritoneal dialysis due to impending colectomy.  Indications for and benefits of hemodialysis were reviewed with the patient and his wife.  All questions were answered and concerns addressed.  Will refer to Ms Reny Garcia for more complete pre-ESRD education.   Patient is amenable to consultation with vascular surgery and AV access creation. Will refer to [facility].   Follow up in about 5 weeks (around 1/3/2024).    Secondary hyperparathyroidism of renal origin  -     calcitRIOL (ROCALTROL) 0.25 MCG Cap; Take 1 capsule (0.25 mcg total) by mouth once daily.  Dispense: 90 capsule; Refill: 0  -     Phosphorus; Future; Expected date: 12/27/2023  -     PTH, Intact; Future; Expected date: 12/27/2023  Will start Calcitriol, continue to trend Ca, PO4 and iPTH levels periodically.     Hyperkalemia  -     patiromer calcium sorbitex (VELTASSA) 8.4 gram PwPk; Take 1 packet (8.4 g total) by mouth every other day.  Dispense: 1 packet; Refill: 0  Patient will need to start a potassium binder to prevent development of  life-threatening hyperkalemia.  He is not a candidate for Kayexalate due to potential side effect of bowel necrosis. Will start Veltassa 8.4 gm p.o. every other day.         11/29/2023  Mayra Grimaldo NP  [facility] Nephrology

## 2023-11-30 DIAGNOSIS — C18.4 MALIGNANT TUMOR OF TRANSVERSE COLON: Primary | ICD-10-CM

## 2023-11-30 DIAGNOSIS — Z01.818 PREOP TESTING: ICD-10-CM

## 2023-11-30 DIAGNOSIS — C18.9 COLON CANCER: ICD-10-CM

## 2023-11-30 LAB — BACTERIA UR CULT: ABNORMAL

## 2023-11-30 RX ORDER — ENOXAPARIN SODIUM 300 MG/3ML
30 INJECTION INTRAVENOUS; SUBCUTANEOUS
Status: CANCELLED | OUTPATIENT
Start: 2023-12-14 | End: 2023-12-14

## 2023-11-30 RX ORDER — CLINDAMYCIN PHOSPHATE 150 MG/ML
600 INJECTION, SOLUTION INTRAVENOUS ONCE
Status: CANCELLED | OUTPATIENT
Start: 2023-12-14

## 2023-11-30 RX ORDER — ALVIMOPAN 12 MG/1
12 CAPSULE ORAL ONCE
Status: CANCELLED | OUTPATIENT
Start: 2023-12-14 | End: 2023-12-20

## 2023-12-06 ENCOUNTER — OFFICE VISIT (OUTPATIENT)
Dept: SURGICAL ONCOLOGY | Facility: CLINIC | Age: 63
End: 2023-12-06
Payer: COMMERCIAL

## 2023-12-06 VITALS
BODY MASS INDEX: 33.12 KG/M2 | HEIGHT: 65 IN | SYSTOLIC BLOOD PRESSURE: 145 MMHG | WEIGHT: 198.81 LBS | DIASTOLIC BLOOD PRESSURE: 88 MMHG

## 2023-12-06 DIAGNOSIS — C18.4 MALIGNANT TUMOR OF TRANSVERSE COLON: Primary | ICD-10-CM

## 2023-12-06 PROCEDURE — 3066F PR DOCUMENTATION OF TREATMENT FOR NEPHROPATHY: ICD-10-PCS | Mod: CPTII,S$GLB,, | Performed by: NURSE PRACTITIONER

## 2023-12-06 PROCEDURE — 3066F NEPHROPATHY DOC TX: CPT | Mod: CPTII,S$GLB,, | Performed by: NURSE PRACTITIONER

## 2023-12-06 PROCEDURE — 3008F BODY MASS INDEX DOCD: CPT | Mod: CPTII,S$GLB,, | Performed by: NURSE PRACTITIONER

## 2023-12-06 PROCEDURE — 99213 OFFICE O/P EST LOW 20 MIN: CPT | Mod: S$GLB,,, | Performed by: NURSE PRACTITIONER

## 2023-12-06 PROCEDURE — 3079F DIAST BP 80-89 MM HG: CPT | Mod: CPTII,S$GLB,, | Performed by: NURSE PRACTITIONER

## 2023-12-06 PROCEDURE — 3077F PR MOST RECENT SYSTOLIC BLOOD PRESSURE >= 140 MM HG: ICD-10-PCS | Mod: CPTII,S$GLB,, | Performed by: NURSE PRACTITIONER

## 2023-12-06 PROCEDURE — 99213 PR OFFICE/OUTPT VISIT, EST, LEVL III, 20-29 MIN: ICD-10-PCS | Mod: S$GLB,,, | Performed by: NURSE PRACTITIONER

## 2023-12-06 PROCEDURE — 99999 PR PBB SHADOW E&M-EST. PATIENT-LVL III: ICD-10-PCS | Mod: PBBFAC,,, | Performed by: SURGERY

## 2023-12-06 PROCEDURE — 3077F SYST BP >= 140 MM HG: CPT | Mod: CPTII,S$GLB,, | Performed by: NURSE PRACTITIONER

## 2023-12-06 PROCEDURE — 99999 PR PBB SHADOW E&M-EST. PATIENT-LVL III: CPT | Mod: PBBFAC,,, | Performed by: SURGERY

## 2023-12-06 PROCEDURE — 3008F PR BODY MASS INDEX (BMI) DOCUMENTED: ICD-10-PCS | Mod: CPTII,S$GLB,, | Performed by: NURSE PRACTITIONER

## 2023-12-06 PROCEDURE — 3079F PR MOST RECENT DIASTOLIC BLOOD PRESSURE 80-89 MM HG: ICD-10-PCS | Mod: CPTII,S$GLB,, | Performed by: NURSE PRACTITIONER

## 2023-12-06 RX ORDER — NEOMYCIN SULFATE 500 MG/1
TABLET ORAL
Qty: 6 TABLET | Refills: 0 | Status: SHIPPED | OUTPATIENT
Start: 2023-12-06 | End: 2024-03-26

## 2023-12-06 RX ORDER — METRONIDAZOLE 250 MG/1
TABLET ORAL
Qty: 6 TABLET | Refills: 0 | Status: SHIPPED | OUTPATIENT
Start: 2023-12-06 | End: 2024-03-26

## 2023-12-06 NOTE — PROGRESS NOTES
History & Physical    CHIEF COMPLAINT:  COLON CANCER     History of Present Illness:  62 year-old-male referred by Dr. Angy Mcguire.  Patient presented to the ER in October with iron deficiency anemia.  CT abd/pel without contrast (due to stage 5 CKD) showed chronic bilateral hydronephrosis and hydroureter.  Colonoscopy was performed, polyps biopsied in the transverse, descending, sigmoid and rectum; a malignant appearing mass was seen in the transverse colon.  Biopsy of the transverse colon mass revealed colonic adenocarcinoma.  PET scan showed focal, moderate uptake at the distal transverse colon compatible with patient's known primary colonic malignancy; no appreciable hypermetabolic metastatic disease by PET evaluation.  CEA level 1.98.  Patient was evaluated by Dr. Wayne who recommends upfront surgery.  Of note, patient has a history of bladder cancer treated by chemotherapy, total cystectomy and prostatectomy in 2018.  Dr. Wayne plans to treat patient with IV iron infusions prior to surgery.      Greater than 60 minutes was required for complete chart review, imaging review including interpretation of imaging, coordination with referring/other physicians, patient counseling regarding diagnosis/treatment plan, answering questions, medical decision making, and documentation.    PRE OPERATIVE EVALUATION    PT REPORTS HE IS DOING WELL  WIFE WITH HIM TODAY   TELLS ME HE RECENTLY SAW HIS NEPHROLOGIST AT [facility] DUE TO ADVANCED CKD AND WAS INFORMED THAT PT WOULD LIKELY NEED DIALYSIS IN NEAR FUTURE DUE TO EXTENT OF RENAL DISEASE  HE HAS BEEN REFERRED BY NEPHROLOGY FOR PRE-ESRD EDUCATION    HE ALSO REPORTS THAT HE COMPLETED 2 IRON INFUSIONS AS PLANNED BY DR HENNY WAYNE     OTHERWISE PT TELLS ME THAT THERE ARE NO CHANGES IN MEDICAL HX  NO NEW MEDICATIONS  NO NEW SURGERIES  NOT ON BLOOD THINNERS  Review of patient's allergies indicates:   Allergen Reactions    Pcn [penicillins] Hives       Current Outpatient  Medications   Medication Sig Dispense Refill    calcitRIOL (ROCALTROL) 0.25 MCG Cap Take 1 capsule (0.25 mcg total) by mouth once daily. 90 capsule 0    patiromer calcium sorbitex (VELTASSA) 8.4 gram PwPk Take 1 packet (8.4 g total) by mouth every other day. 45 packet 1    sodium bicarbonate 650 MG tablet Take 2 tablets (1,300 mg total) by mouth 2 (two) times daily. 120 tablet 11     No current facility-administered medications for this visit.       Past Medical History:   Diagnosis Date    Cancer      Past Surgical History:   Procedure Laterality Date    COLONOSCOPY N/A 10/25/2023    Procedure: COLONOSCOPY, WITH HEMORRHAGE CONTROL;  Surgeon: Angy Mcguire MD;  Location: [facility] ENDOSCOPY;  Service: Gastroenterology;  Laterality: N/A;  sigmoid clips    COLONOSCOPY, WITH 1 OR MORE BIOPSIES N/A 10/25/2023    Procedure: COLONOSCOPY, WITH 1 OR MORE BIOPSIES;  Surgeon: Angy Mcguire MD;  Location: [facility] ENDOSCOPY;  Service: Gastroenterology;  Laterality: N/A;  transverse colon mass    COLONOSCOPY, WITH POLYPECTOMY USING SNARE Left 10/25/2023    Procedure: COLONOSCOPY, WITH POLYPECTOMY USING SNARE;  Surgeon: Angy Mcguire MD;  Location: [facility] ENDOSCOPY;  Service: Gastroenterology;  Laterality: Left;    ESOPHAGOGASTRODUODENOSCOPY Left 10/25/2023    Procedure: EGD (ESOPHAGOGASTRODUODENOSCOPY);  Surgeon: Angy Mcguire MD;  Location: [facility] ENDOSCOPY;  Service: Gastroenterology;  Laterality: Left;    FRACTURE SURGERY  2018    Collar none    HERNIA REPAIR  2010    PROSTATE SURGERY  10/2018    SMALL INTESTINE SURGERY  10/2018     History reviewed. No pertinent family history.  Social History     Tobacco Use    Smoking status: Former     Current packs/day: 0.00     Types: Cigarettes     Quit date:      Years since quittin.9     Passive exposure: Current    Smokeless tobacco: Current     Types: Chew   Substance Use Topics    Alcohol use: Never    Drug use: Never        Review of  "Systems:  Review of Systems   Constitutional:  Negative for activity change, appetite change, chills, diaphoresis, fatigue and fever.   HENT:  Negative for congestion, ear pain, tinnitus and trouble swallowing.    Eyes:  Negative for photophobia and pain.   Respiratory:  Negative for apnea, cough, choking, chest tightness, shortness of breath and stridor.    Cardiovascular:  Negative for chest pain, palpitations and leg swelling.   Endocrine: Negative for cold intolerance and heat intolerance.   Genitourinary:  Negative for difficulty urinating, dysuria, enuresis, flank pain, frequency and hematuria.   Musculoskeletal:  Negative for arthralgias, back pain and gait problem.   Neurological:  Negative for dizziness, seizures, syncope, facial asymmetry, speech difficulty, weakness, light-headedness, numbness and headaches.   Psychiatric/Behavioral:  Negative for agitation, behavioral problems, confusion and decreased concentration.    All other systems reviewed and are negative.      OBJECTIVE:     Vital Signs (Most Recent)  Pulse: (P) 63 (12/06/23 1512)  BP: (!) 145/88 (12/06/23 1512)  5' 5" (1.651 m)  90.2 kg (198 lb 12.8 oz)     Physical Exam:  Physical Exam  Constitutional:       General: He is not in acute distress.     Appearance: Normal appearance. He is well-developed and normal weight. He is not ill-appearing, toxic-appearing or diaphoretic.   HENT:      Head: Normocephalic and atraumatic.      Right Ear: Hearing and external ear normal.      Left Ear: Hearing and external ear normal.      Nose: No congestion or rhinorrhea.      Mouth/Throat:      Mouth: Mucous membranes are moist.      Pharynx: Oropharynx is clear. No oropharyngeal exudate.   Eyes:      General: Lids are normal. Gaze aligned appropriately. No scleral icterus.     Extraocular Movements: Extraocular movements intact.      Right eye: Normal extraocular motion and no nystagmus.      Left eye: Normal extraocular motion and no nystagmus.      " Conjunctiva/sclera: Conjunctivae normal.      Right eye: Right conjunctiva is not injected.      Left eye: Left conjunctiva is not injected.      Pupils: Pupils are equal, round, and reactive to light.   Neck:      Vascular: No carotid bruit or JVD.      Trachea: Trachea and phonation normal.   Cardiovascular:      Rate and Rhythm: Normal rate and regular rhythm.      Pulses: Normal pulses.      Heart sounds: Normal heart sounds. No murmur heard.     No friction rub. No gallop.   Pulmonary:      Effort: Pulmonary effort is normal. No tachypnea, accessory muscle usage, respiratory distress or retractions.      Breath sounds: Normal breath sounds and air entry. No stridor or decreased air movement. No wheezing or rhonchi.   Chest:      Chest wall: No mass, deformity, swelling, tenderness or crepitus.   Abdominal:      General: Abdomen is flat. Bowel sounds are normal. There is no distension. There are no signs of injury.      Palpations: Abdomen is soft. There is no shifting dullness, fluid wave, hepatomegaly, splenomegaly or mass.      Tenderness: There is no abdominal tenderness. There is no guarding or rebound.      Hernia: No hernia is present.   Musculoskeletal:         General: No swelling or tenderness.      Cervical back: Normal range of motion and neck supple. No rigidity, tenderness or crepitus.   Lymphadenopathy:      Head:      Right side of head: No submental, submandibular or occipital adenopathy.      Left side of head: No submental, submandibular or occipital adenopathy.      Cervical: No cervical adenopathy.      Right cervical: No superficial or posterior cervical adenopathy.     Left cervical: No superficial or posterior cervical adenopathy.      Upper Body:      Right upper body: No supraclavicular or axillary adenopathy.      Left upper body: No supraclavicular or axillary adenopathy.      Lower Body: No right inguinal adenopathy. No left inguinal adenopathy.   Skin:     General: Skin is warm.       Capillary Refill: Capillary refill takes less than 2 seconds.      Coloration: Skin is not cyanotic, jaundiced, mottled or pale.      Findings: No bruising, ecchymosis, erythema, lesion, petechiae or rash.      Nails: There is no clubbing.   Neurological:      General: No focal deficit present.      Mental Status: He is alert and oriented to person, place, and time.      Cranial Nerves: No cranial nerve deficit or facial asymmetry.      Sensory: No sensory deficit.      Motor: No weakness, tremor, atrophy or abnormal muscle tone.      Coordination: Coordination normal.      Gait: Gait normal.      Deep Tendon Reflexes: Reflexes normal.   Psychiatric:         Attention and Perception: Attention and perception normal.         Mood and Affect: Mood normal. Mood is not anxious or depressed. Affect is not blunt or flat.         Speech: Speech normal. Speech is not slurred.         Behavior: Behavior normal. Behavior is cooperative.           ASSESSMENT/PLAN:     Adenocarcinoma of the distal transverse colon with no evidence of metastatic disease in this patient with previous history of cystectomy for bladder cancer in 2018 at [facility]    Diagnosis, staging, prognosis and treatment guidelines for colon cancer were discussed with the patient in detail, all questions were addressed.Using visual aids and drawings, I described the anatomy and potential surgical procedures.    Schedule laparoscopic/robotic, possible open transverse colectomy.  Anticipate possibly more difficult procedure secondary to previous cystectomy/abdominal surgery    The risks and benefits of the procedure were explained in detail using layman terms, including the pros and cons of any implant that may be used, all questions were addressed, the patient gives consent to proceed    Aris Barajas MD  Surgical Oncology  Complex General, Gastrointestinal and Hepatobiliary Surgery      ALL ABOVE DISCUSSED WITH DR BARAJAS INCLUDING RENAL FINDINGS    LONG  DISCUSSION WITH PT ABOUT DEGREE OF RENAL DISEASE AND THAT STRESS OF SURGERY COULD ACCELERATE HIS NEED FOR DIALYSIS; HE AND WIFE EXPRESS UNDERSTANDING; NEPHROLOGY  NOTE FROM [facility] REVIEWED; PT WILL LIKELY NEED INPATIENT NEPHROLOGY CONSULT POST OPERATIVELY    PROCEDURE, RISKS, AND BENEFITS DISCUSSED IN DETAIL WITH PT AND QUESTIONS ANSWERED; HE WISHES TO PROCEED; WILL CHECK K LEVEL AND CREATNINE MORNING OF SURGERY    SX DATE 12/14/2023

## 2023-12-06 NOTE — H&P (VIEW-ONLY)
History & Physical    CHIEF COMPLAINT:  COLON CANCER     History of Present Illness:  62 year-old-male referred by Dr. Angy Mcguire.  Patient presented to the ER in October with iron deficiency anemia.  CT abd/pel without contrast (due to stage 5 CKD) showed chronic bilateral hydronephrosis and hydroureter.  Colonoscopy was performed, polyps biopsied in the transverse, descending, sigmoid and rectum; a malignant appearing mass was seen in the transverse colon.  Biopsy of the transverse colon mass revealed colonic adenocarcinoma.  PET scan showed focal, moderate uptake at the distal transverse colon compatible with patient's known primary colonic malignancy; no appreciable hypermetabolic metastatic disease by PET evaluation.  CEA level 1.98.  Patient was evaluated by Dr. Wayne who recommends upfront surgery.  Of note, patient has a history of bladder cancer treated by chemotherapy, total cystectomy and prostatectomy in 2018.  Dr. Wayne plans to treat patient with IV iron infusions prior to surgery.      Greater than 60 minutes was required for complete chart review, imaging review including interpretation of imaging, coordination with referring/other physicians, patient counseling regarding diagnosis/treatment plan, answering questions, medical decision making, and documentation.    PRE OPERATIVE EVALUATION    PT REPORTS HE IS DOING WELL  WIFE WITH HIM TODAY   TELLS ME HE RECENTLY SAW HIS NEPHROLOGIST AT [facility] DUE TO ADVANCED CKD AND WAS INFORMED THAT PT WOULD LIKELY NEED DIALYSIS IN NEAR FUTURE DUE TO EXTENT OF RENAL DISEASE  HE HAS BEEN REFERRED BY NEPHROLOGY FOR PRE-ESRD EDUCATION    HE ALSO REPORTS THAT HE COMPLETED 2 IRON INFUSIONS AS PLANNED BY DR HENNY WAYNE     OTHERWISE PT TELLS ME THAT THERE ARE NO CHANGES IN MEDICAL HX  NO NEW MEDICATIONS  NO NEW SURGERIES  NOT ON BLOOD THINNERS  Review of patient's allergies indicates:   Allergen Reactions    Pcn [penicillins] Hives       Current Outpatient  Medications   Medication Sig Dispense Refill    calcitRIOL (ROCALTROL) 0.25 MCG Cap Take 1 capsule (0.25 mcg total) by mouth once daily. 90 capsule 0    patiromer calcium sorbitex (VELTASSA) 8.4 gram PwPk Take 1 packet (8.4 g total) by mouth every other day. 45 packet 1    sodium bicarbonate 650 MG tablet Take 2 tablets (1,300 mg total) by mouth 2 (two) times daily. 120 tablet 11     No current facility-administered medications for this visit.       Past Medical History:   Diagnosis Date    Cancer      Past Surgical History:   Procedure Laterality Date    COLONOSCOPY N/A 10/25/2023    Procedure: COLONOSCOPY, WITH HEMORRHAGE CONTROL;  Surgeon: Angy Mcguire MD;  Location: [facility] ENDOSCOPY;  Service: Gastroenterology;  Laterality: N/A;  sigmoid clips    COLONOSCOPY, WITH 1 OR MORE BIOPSIES N/A 10/25/2023    Procedure: COLONOSCOPY, WITH 1 OR MORE BIOPSIES;  Surgeon: Angy Mcguire MD;  Location: [facility] ENDOSCOPY;  Service: Gastroenterology;  Laterality: N/A;  transverse colon mass    COLONOSCOPY, WITH POLYPECTOMY USING SNARE Left 10/25/2023    Procedure: COLONOSCOPY, WITH POLYPECTOMY USING SNARE;  Surgeon: Angy Mcguire MD;  Location: [facility] ENDOSCOPY;  Service: Gastroenterology;  Laterality: Left;    ESOPHAGOGASTRODUODENOSCOPY Left 10/25/2023    Procedure: EGD (ESOPHAGOGASTRODUODENOSCOPY);  Surgeon: Angy Mcguire MD;  Location: [facility] ENDOSCOPY;  Service: Gastroenterology;  Laterality: Left;    FRACTURE SURGERY  2018    Collar none    HERNIA REPAIR  2010    PROSTATE SURGERY  10/2018    SMALL INTESTINE SURGERY  10/2018     History reviewed. No pertinent family history.  Social History     Tobacco Use    Smoking status: Former     Current packs/day: 0.00     Types: Cigarettes     Quit date:      Years since quittin.9     Passive exposure: Current    Smokeless tobacco: Current     Types: Chew   Substance Use Topics    Alcohol use: Never    Drug use: Never        Review of  "Systems:  Review of Systems   Constitutional:  Negative for activity change, appetite change, chills, diaphoresis, fatigue and fever.   HENT:  Negative for congestion, ear pain, tinnitus and trouble swallowing.    Eyes:  Negative for photophobia and pain.   Respiratory:  Negative for apnea, cough, choking, chest tightness, shortness of breath and stridor.    Cardiovascular:  Negative for chest pain, palpitations and leg swelling.   Endocrine: Negative for cold intolerance and heat intolerance.   Genitourinary:  Negative for difficulty urinating, dysuria, enuresis, flank pain, frequency and hematuria.   Musculoskeletal:  Negative for arthralgias, back pain and gait problem.   Neurological:  Negative for dizziness, seizures, syncope, facial asymmetry, speech difficulty, weakness, light-headedness, numbness and headaches.   Psychiatric/Behavioral:  Negative for agitation, behavioral problems, confusion and decreased concentration.    All other systems reviewed and are negative.      OBJECTIVE:     Vital Signs (Most Recent)  Pulse: (P) 63 (12/06/23 1512)  BP: (!) 145/88 (12/06/23 1512)  5' 5" (1.651 m)  90.2 kg (198 lb 12.8 oz)     Physical Exam:  Physical Exam  Constitutional:       General: He is not in acute distress.     Appearance: Normal appearance. He is well-developed and normal weight. He is not ill-appearing, toxic-appearing or diaphoretic.   HENT:      Head: Normocephalic and atraumatic.      Right Ear: Hearing and external ear normal.      Left Ear: Hearing and external ear normal.      Nose: No congestion or rhinorrhea.      Mouth/Throat:      Mouth: Mucous membranes are moist.      Pharynx: Oropharynx is clear. No oropharyngeal exudate.   Eyes:      General: Lids are normal. Gaze aligned appropriately. No scleral icterus.     Extraocular Movements: Extraocular movements intact.      Right eye: Normal extraocular motion and no nystagmus.      Left eye: Normal extraocular motion and no nystagmus.      " Conjunctiva/sclera: Conjunctivae normal.      Right eye: Right conjunctiva is not injected.      Left eye: Left conjunctiva is not injected.      Pupils: Pupils are equal, round, and reactive to light.   Neck:      Vascular: No carotid bruit or JVD.      Trachea: Trachea and phonation normal.   Cardiovascular:      Rate and Rhythm: Normal rate and regular rhythm.      Pulses: Normal pulses.      Heart sounds: Normal heart sounds. No murmur heard.     No friction rub. No gallop.   Pulmonary:      Effort: Pulmonary effort is normal. No tachypnea, accessory muscle usage, respiratory distress or retractions.      Breath sounds: Normal breath sounds and air entry. No stridor or decreased air movement. No wheezing or rhonchi.   Chest:      Chest wall: No mass, deformity, swelling, tenderness or crepitus.   Abdominal:      General: Abdomen is flat. Bowel sounds are normal. There is no distension. There are no signs of injury.      Palpations: Abdomen is soft. There is no shifting dullness, fluid wave, hepatomegaly, splenomegaly or mass.      Tenderness: There is no abdominal tenderness. There is no guarding or rebound.      Hernia: No hernia is present.   Musculoskeletal:         General: No swelling or tenderness.      Cervical back: Normal range of motion and neck supple. No rigidity, tenderness or crepitus.   Lymphadenopathy:      Head:      Right side of head: No submental, submandibular or occipital adenopathy.      Left side of head: No submental, submandibular or occipital adenopathy.      Cervical: No cervical adenopathy.      Right cervical: No superficial or posterior cervical adenopathy.     Left cervical: No superficial or posterior cervical adenopathy.      Upper Body:      Right upper body: No supraclavicular or axillary adenopathy.      Left upper body: No supraclavicular or axillary adenopathy.      Lower Body: No right inguinal adenopathy. No left inguinal adenopathy.   Skin:     General: Skin is warm.       Capillary Refill: Capillary refill takes less than 2 seconds.      Coloration: Skin is not cyanotic, jaundiced, mottled or pale.      Findings: No bruising, ecchymosis, erythema, lesion, petechiae or rash.      Nails: There is no clubbing.   Neurological:      General: No focal deficit present.      Mental Status: He is alert and oriented to person, place, and time.      Cranial Nerves: No cranial nerve deficit or facial asymmetry.      Sensory: No sensory deficit.      Motor: No weakness, tremor, atrophy or abnormal muscle tone.      Coordination: Coordination normal.      Gait: Gait normal.      Deep Tendon Reflexes: Reflexes normal.   Psychiatric:         Attention and Perception: Attention and perception normal.         Mood and Affect: Mood normal. Mood is not anxious or depressed. Affect is not blunt or flat.         Speech: Speech normal. Speech is not slurred.         Behavior: Behavior normal. Behavior is cooperative.           ASSESSMENT/PLAN:     Adenocarcinoma of the distal transverse colon with no evidence of metastatic disease in this patient with previous history of cystectomy for bladder cancer in 2018 at [facility]    Diagnosis, staging, prognosis and treatment guidelines for colon cancer were discussed with the patient in detail, all questions were addressed.Using visual aids and drawings, I described the anatomy and potential surgical procedures.    Schedule laparoscopic/robotic, possible open transverse colectomy.  Anticipate possibly more difficult procedure secondary to previous cystectomy/abdominal surgery    The risks and benefits of the procedure were explained in detail using layman terms, including the pros and cons of any implant that may be used, all questions were addressed, the patient gives consent to proceed    Aris Barajas MD  Surgical Oncology  Complex General, Gastrointestinal and Hepatobiliary Surgery      ALL ABOVE DISCUSSED WITH DR BARAJAS INCLUDING RENAL FINDINGS    LONG  DISCUSSION WITH PT ABOUT DEGREE OF RENAL DISEASE AND THAT STRESS OF SURGERY COULD ACCELERATE HIS NEED FOR DIALYSIS; HE AND WIFE EXPRESS UNDERSTANDING; NEPHROLOGY  NOTE FROM [facility] REVIEWED; PT WILL LIKELY NEED INPATIENT NEPHROLOGY CONSULT POST OPERATIVELY    PROCEDURE, RISKS, AND BENEFITS DISCUSSED IN DETAIL WITH PT AND QUESTIONS ANSWERED; HE WISHES TO PROCEED; WILL CHECK K LEVEL AND CREATNINE MORNING OF SURGERY    SX DATE 12/14/2023

## 2023-12-07 DIAGNOSIS — C18.4 MALIGNANT TUMOR OF TRANSVERSE COLON: Primary | ICD-10-CM

## 2023-12-11 ENCOUNTER — DOCUMENTATION ONLY (OUTPATIENT)
Dept: SURGICAL ONCOLOGY | Facility: CLINIC | Age: 63
End: 2023-12-11

## 2023-12-11 ENCOUNTER — OFFICE VISIT (OUTPATIENT)
Dept: INTERNAL MEDICINE | Facility: CLINIC | Age: 63
End: 2023-12-11
Payer: COMMERCIAL

## 2023-12-11 ENCOUNTER — ANESTHESIA EVENT (OUTPATIENT)
Dept: SURGERY | Facility: HOSPITAL | Age: 63
DRG: 330 | End: 2023-12-11
Payer: COMMERCIAL

## 2023-12-11 VITALS
OXYGEN SATURATION: 100 % | WEIGHT: 198 LBS | TEMPERATURE: 98 F | SYSTOLIC BLOOD PRESSURE: 131 MMHG | HEART RATE: 68 BPM | RESPIRATION RATE: 20 BRPM | DIASTOLIC BLOOD PRESSURE: 80 MMHG | BODY MASS INDEX: 32.95 KG/M2

## 2023-12-11 DIAGNOSIS — Z00.00 HEALTHCARE MAINTENANCE: ICD-10-CM

## 2023-12-11 DIAGNOSIS — Z23 NEED FOR VACCINATION: ICD-10-CM

## 2023-12-11 DIAGNOSIS — Z76.89 ENCOUNTER TO ESTABLISH CARE WITH NEW DOCTOR: Primary | ICD-10-CM

## 2023-12-11 DIAGNOSIS — F17.229 CHEWING TOBACCO NICOTINE DEPENDENCE WITH NICOTINE-INDUCED DISORDER: ICD-10-CM

## 2023-12-11 LAB — HBA1C MFR BLD: 4.7 %

## 2023-12-11 PROCEDURE — 90686 IIV4 VACC NO PRSV 0.5 ML IM: CPT | Mod: PBBFAC

## 2023-12-11 PROCEDURE — 90677 PCV20 VACCINE IM: CPT | Mod: PBBFAC

## 2023-12-11 PROCEDURE — 90471 IMMUNIZATION ADMIN: CPT | Mod: PBBFAC

## 2023-12-11 PROCEDURE — 83036 HEMOGLOBIN GLYCOSYLATED A1C: CPT | Mod: PBBFAC

## 2023-12-11 PROCEDURE — 99214 OFFICE O/P EST MOD 30 MIN: CPT | Mod: PBBFAC

## 2023-12-11 RX ADMIN — INFLUENZA VIRUS VACCINE 0.5 ML: 15; 15; 15; 15 SUSPENSION INTRAMUSCULAR at 02:12

## 2023-12-11 NOTE — PROGRESS NOTES
Completed patients out of work [place] paperwork; mailing patient a copy and faxing OG to [employer] at [phone]. Contacted patient and notified him of completion. cpl

## 2023-12-11 NOTE — PROGRESS NOTES
Clinic Note    Patient Name: Bridget Cardenas Jr.  YOB: 1960   MRN: [id number]  Date: 12/13/2023  Home Address: [address] [address]      Subjective:     Chief Complaint:   Chief Complaint   Patient presents with    [facility] [place] [facility]     Dx  WITH CANCER ON THE 10/28/2023- Dr. Scott have surgery 12/14/2023 at [facility]        HPI:  Bridget Cardenas Jr. is a 62 y.o. male with past medical history of bladder cancer status post total cystectomy and prostatectomy in 2018 with neobladder creation, CKD stage 4/5, chronic hydronephrosis, and newly diagnosed transverse colon mass who presents to [facility] today to establish care as a new patient. Patient has no acute complaints today. He is medication compliant and would like to refill medications.      Available notes and lab results since last visit were reviewed.     Care Team   External Surgical Oncology- referred to Dr. Aris Scott for evaluation and treatment of transverse colon mass. Plan for laparoscopic vs open resection in place on 12/14/24 with possible chemo treatments tentatively starting in 02/2024.   [facility] Urology- follows for chronic hydronephrosis. Next appointment 2/9/24.   [facility] Nephrology- referred for non-anion gap metabolic acidosis and CKD. Appointment scheduled for 1/4/24.     Past Medical History:   Diagnosis Date    Cancer     hx of bladder cancer    Malignant neoplasm of transverse colon     Obesity     Renal disorder     CKD 5        Past Surgical History:   Procedure Laterality Date    COLONOSCOPY N/A 10/25/2023    Procedure: COLONOSCOPY, WITH HEMORRHAGE CONTROL;  Surgeon: Angy Mcguire MD;  Location: [facility] ENDOSCOPY;  Service: Gastroenterology;  Laterality: N/A;  sigmoid clips    COLONOSCOPY, WITH 1 OR MORE BIOPSIES N/A 10/25/2023    Procedure: COLONOSCOPY, WITH 1 OR MORE BIOPSIES;  Surgeon: Agny Mcguire MD;  Location: [facility] ENDOSCOPY;  Service: Gastroenterology;  Laterality: N/A;  transverse colon mass     COLONOSCOPY, WITH POLYPECTOMY USING SNARE Left 10/25/2023    Procedure: COLONOSCOPY, WITH POLYPECTOMY USING SNARE;  Surgeon: Angy Mcguire MD;  Location: [facility] ENDOSCOPY;  Service: Gastroenterology;  Laterality: Left;    CYSTECTOMY      ESOPHAGOGASTRODUODENOSCOPY Left 10/25/2023    Procedure: EGD (ESOPHAGOGASTRODUODENOSCOPY);  Surgeon: Angy Mcguire MD;  Location: [facility] ENDOSCOPY;  Service: Gastroenterology;  Laterality: Left;    FRACTURE SURGERY  7/2018    Collar none    HERNIA REPAIR  11/2010    ILEO LOOP NEOBLADDER      MEDIPORT INSERTION, SINGLE      PROSTATE SURGERY  10/2018    SMALL INTESTINE SURGERY  10/2018       Allergy:  Review of patient's allergies indicates:   Allergen Reactions    Pcn [penicillins] Hives        Current Medications:    Current Outpatient Medications:     calcitRIOL (ROCALTROL) 0.25 MCG Cap, Take 1 capsule (0.25 mcg total) by mouth once daily., Disp: 90 capsule, Rfl: 0    metroNIDAZOLE (FLAGYL) 250 MG tablet, TAKE 2 TABLETS AT 1PM, 5PM AND 9PM THE EVENING PRIOR TO SURGERY, Disp: 6 tablet, Rfl: 0    neomycin (MYCIFRADIN) 500 mg Tab, TAKE 2 TABLETS AT 1PM, 5PM AND 9PM THE EVENING PRIOR TO SURGERY, Disp: 6 tablet, Rfl: 0    patiromer calcium sorbitex (VELTASSA) 8.4 gram PwPk, Take 1 packet (8.4 g total) by mouth every other day., Disp: 45 packet, Rfl: 1    sodium bicarbonate 650 MG tablet, Take 2 tablets (1,300 mg total) by mouth 2 (two) times daily., Disp: 120 tablet, Rfl: 11     Social History:   reports that he quit smoking about 36 years ago. His smoking use included cigarettes. He has been exposed to tobacco smoke. His smokeless tobacco use includes chew. He reports that he does not drink alcohol and does not use drugs.   Patient is on short term disability as of now. Employed with [employer] as a .     Does not smoke tobacco currently, quit smoking in 1987, but does use chewing tobacco since 1987 and uses 4 times per day. Denies EtOH and recreational drug  use.     Lives in home with wife and cat. No children.     Family History: Father, Brother - CHF     Immunization History   Administered Date(s) Administered    COVID-19, vector-nr, rS-Ad26, PF (Live) 04/07/2021    Influenza - Quadrivalent - PF *Preferred* (6 months and older) 12/11/2023    Pneumococcal Conjugate - 20 Valent 12/11/2023       Review of Systems   HENT:  Negative for hearing loss.    Eyes:  Negative for discharge.   Respiratory:  Negative for wheezing.    Cardiovascular:  Negative for chest pain and palpitations.   Gastrointestinal:  Negative for blood in stool, constipation, diarrhea and vomiting.   Genitourinary:  Negative for hematuria and urgency.   Musculoskeletal:  Negative for neck pain.   Neurological:  Negative for weakness and headaches.   Endo/Heme/Allergies:  Negative for polydipsia.       Objective:      Physical Exam  Vitals:    12/11/23 1348   BP: 131/80   BP Location: Right arm   Patient Position: Sitting   BP Method: Medium (Automatic)   Pulse: 68   Resp: 20   Temp: 98.2 °F (36.8 °C)   TempSrc: Oral   SpO2: 100%   Weight: 89.8 kg (198 lb)        Wt Readings from Last 3 Encounters:   12/11/23 89.8 kg (198 lb)   12/06/23 90.2 kg (198 lb 12.8 oz)   11/29/23 90 kg (198 lb 6.4 oz)       Physical Exam  Vitals and nursing note reviewed.   Constitutional:       General: He is not in acute distress.     Appearance: He is not ill-appearing.   HENT:      Head: Normocephalic and atraumatic.      Nose: No congestion.      Mouth/Throat:      Mouth: Mucous membranes are moist.      Pharynx: Oropharynx is clear. No oropharyngeal exudate.   Eyes:      Extraocular Movements: Extraocular movements intact.      Conjunctiva/sclera: Conjunctivae normal.      Pupils: Pupils are equal, round, and reactive to light.   Cardiovascular:      Rate and Rhythm: Normal rate and regular rhythm.      Pulses: Normal pulses.      Heart sounds: No murmur heard.  Pulmonary:      Effort: Pulmonary effort is normal. No  respiratory distress.      Breath sounds: No wheezing, rhonchi or rales.   Abdominal:      General: Abdomen is flat. Bowel sounds are normal. There is no distension.      Palpations: Abdomen is soft. There is no mass.      Tenderness: There is no abdominal tenderness. There is no guarding.   Musculoskeletal:      Cervical back: Normal range of motion.      Right lower leg: No edema.      Left lower leg: No edema.   Lymphadenopathy:      Cervical: No cervical adenopathy.   Skin:     General: Skin is warm and dry.      Capillary Refill: Capillary refill takes less than 2 seconds.      Findings: No rash.   Neurological:      General: No focal deficit present.      Mental Status: He is alert and oriented to person, place, and time. Mental status is at baseline.      Motor: No weakness.          Body mass index is 32.95 kg/m².      Lab Visit on 12/11/2023   Component Date Value Ref Range Status    Sodium Level 12/11/2023 141  136 - 145 mmol/L Final    Potassium Level 12/11/2023 5.2 (H)  3.5 - 5.1 mmol/L Final    Chloride 12/11/2023 112 (H)  98 - 107 mmol/L Final    Carbon Dioxide 12/11/2023 20 (L)  23 - 31 mmol/L Final    Glucose Level 12/11/2023 94  82 - 115 mg/dL Final    Blood Urea Nitrogen 12/11/2023 63.1 (H)  8.4 - 25.7 mg/dL Final    Creatinine 12/11/2023 4.69 (H)  0.73 - 1.18 mg/dL Final    Calcium Level Total 12/11/2023 9.3  8.8 - 10.0 mg/dL Final    Protein Total 12/11/2023 7.9 (H)  5.8 - 7.6 gm/dL Final    Albumin Level 12/11/2023 3.9  3.4 - 4.8 g/dL Final    Globulin 12/11/2023 4.0 (H)  2.4 - 3.5 gm/dL Final    Albumin/Globulin Ratio 12/11/2023 1.0 (L)  1.1 - 2.0 ratio Final    Bilirubin Total 12/11/2023 0.3  <=1.5 mg/dL Final    Alkaline Phosphatase 12/11/2023 70  40 - 150 unit/L Final    Alanine Aminotransferase 12/11/2023 6  0 - 55 unit/L Final    Aspartate Aminotransferase 12/11/2023 7  5 - 34 unit/L Final    eGFR 12/11/2023 13  mls/min/1.73/m2 Final    HIV 12/11/2023 Nonreactive  Nonreactive Final    Hep  C Ab Interp 12/11/2023 Nonreactive  Nonreactive Final    Hepatitis B Surface Antigen 12/11/2023 Nonreactive  Nonreactive Final    Hepatitis B Core Antibody 12/11/2023 Nonreactive  Nonreactive Final    Cholesterol Total 12/11/2023 140  <=200 mg/dL Final    HDL Cholesterol 12/11/2023 48  35 - 60 mg/dL Final    Triglyceride 12/11/2023 54  34 - 140 mg/dL Final    Cholesterol/HDL Ratio 12/11/2023 3  0 - 5 Final    Very Low Density Lipoprotein 12/11/2023 11   Final    LDL Cholesterol 12/11/2023 81.00  50.00 - 140.00 mg/dL Final    Vit D 25 OH 12/11/2023 23.9 (L)  30.0 - 80.0 ng/mL Final    WBC 12/11/2023 6.84  4.50 - 11.50 x10(3)/mcL Final    RBC 12/11/2023 4.61 (L)  4.70 - 6.10 x10(6)/mcL Final    Hgb 12/11/2023 12.8 (L)  14.0 - 18.0 g/dL Final    Hct 12/11/2023 43.1  42.0 - 52.0 % Final    MCV 12/11/2023 93.5  80.0 - 94.0 fL Final    MCH 12/11/2023 27.8  27.0 - 31.0 pg Final    MCHC 12/11/2023 29.7 (L)  33.0 - 36.0 g/dL Final    RDW 12/11/2023 22.5 (H)  11.5 - 17.0 % Final    Platelet 12/11/2023 182  130 - 400 x10(3)/mcL Final    MPV 12/11/2023 10.1  7.4 - 10.4 fL Final    Neut % 12/11/2023 61.0  % Final    Lymph % 12/11/2023 23.4  % Final    Mono % 12/11/2023 6.7  % Final    Eos % 12/11/2023 7.9  % Final    Basophil % 12/11/2023 0.7  % Final    Lymph # 12/11/2023 1.60  0.6 - 4.6 x10(3)/mcL Final    Neut # 12/11/2023 4.17  2.1 - 9.2 x10(3)/mcL Final    Mono # 12/11/2023 0.46  0.1 - 1.3 x10(3)/mcL Final    Eos # 12/11/2023 0.54  0 - 0.9 x10(3)/mcL Final    Baso # 12/11/2023 0.05  <=0.2 x10(3)/mcL Final    IG# 12/11/2023 0.02  0 - 0.04 x10(3)/mcL Final    IG% 12/11/2023 0.3  % Final    NRBC% 12/11/2023 0.0  % Final    RBC Morph 12/11/2023 Abnormal (A)  Normal Final    Anisocytosis 12/11/2023 1+ (A)  (none) Final    Kareem Cells 12/11/2023 Slight (A)  (none) Final    Elliptocytosis 12/11/2023 Slight (A)  (none) Final    Microcytosis 12/11/2023 1+ (A)  (none) Final    Poikilocytosis 12/11/2023 Slight (A)  (none) Final     Platelets 12/11/2023 Adequate  Normal, Adequate Final   Office Visit on 12/11/2023   Component Date Value Ref Range Status    Hemoglobin A1C, POC 12/11/2023 4.7  % Final   Lab Visit on 11/28/2023   Component Date Value Ref Range Status    Sodium Level 11/28/2023 140  136 - 145 mmol/L Final    Potassium Level 11/28/2023 5.2 (H)  3.5 - 5.1 mmol/L Final    Chloride 11/28/2023 108 (H)  98 - 107 mmol/L Final    Carbon Dioxide 11/28/2023 23  23 - 31 mmol/L Final    Glucose Level 11/28/2023 78 (L)  82 - 115 mg/dL Final    Blood Urea Nitrogen 11/28/2023 75.1 (H)  8.4 - 25.7 mg/dL Final    Creatinine 11/28/2023 5.11 (H)  0.73 - 1.18 mg/dL Final    Calcium Level Total 11/28/2023 8.5 (L)  8.8 - 10.0 mg/dL Final    Protein Total 11/28/2023 7.4  5.8 - 7.6 gm/dL Final    Albumin Level 11/28/2023 3.8  3.4 - 4.8 g/dL Final    Globulin 11/28/2023 3.6 (H)  2.4 - 3.5 gm/dL Final    Albumin/Globulin Ratio 11/28/2023 1.1  1.1 - 2.0 ratio Final    Bilirubin Total 11/28/2023 0.4  <=1.5 mg/dL Final    Alkaline Phosphatase 11/28/2023 65  40 - 150 unit/L Final    Alanine Aminotransferase 11/28/2023 7  0 - 55 unit/L Final    Aspartate Aminotransferase 11/28/2023 7  5 - 34 unit/L Final    eGFR 11/28/2023 12  mls/min/1.73/m2 Final    Ferritin Level 11/28/2023 358.83 (H)  21.81 - 274.66 ng/mL Final    Iron Binding Capacity Unsaturated 11/28/2023 164  69 - 240 ug/dL Final    Iron Level 11/28/2023 101  65 - 175 ug/dL Final    Transferrin 11/28/2023 212  163 - 344 mg/dL Final    Iron Binding Capacity Total 11/28/2023 265  250 - 450 ug/dL Final    Iron Saturation 11/28/2023 38  20 - 50 % Final    WBC 11/28/2023 5.14  4.50 - 11.50 x10(3)/mcL Final    RBC 11/28/2023 4.34 (L)  4.70 - 6.10 x10(6)/mcL Final    Hgb 11/28/2023 11.8 (L)  14.0 - 18.0 g/dL Final    Hct 11/28/2023 38.8 (L)  42.0 - 52.0 % Final    MCV 11/28/2023 89.4  80.0 - 94.0 fL Final    MCH 11/28/2023 27.2  27.0 - 31.0 pg Final    MCHC 11/28/2023 30.4 (L)  33.0 - 36.0 g/dL Final    RDW  11/28/2023 25.7 (H)  11.5 - 17.0 % Final    Platelet 11/28/2023 176  130 - 400 x10(3)/mcL Final    MPV 11/28/2023 10.2  7.4 - 10.4 fL Final    Neut % 11/28/2023 51.7  % Final    Lymph % 11/28/2023 25.9  % Final    Mono % 11/28/2023 7.4  % Final    Eos % 11/28/2023 14.0  % Final    Basophil % 11/28/2023 0.8  % Final    Lymph # 11/28/2023 1.33  0.6 - 4.6 x10(3)/mcL Final    Neut # 11/28/2023 2.66  2.1 - 9.2 x10(3)/mcL Final    Mono # 11/28/2023 0.38  0.1 - 1.3 x10(3)/mcL Final    Eos # 11/28/2023 0.72  0 - 0.9 x10(3)/mcL Final    Baso # 11/28/2023 0.04  <=0.2 x10(3)/mcL Final    IG# 11/28/2023 0.01  0 - 0.04 x10(3)/mcL Final    IG% 11/28/2023 0.2  % Final    NRBC% 11/28/2023 0.0  % Final    Urine Protein Level 11/28/2023 32.6  mg/dL Final    Urine Creatinine 11/28/2023 65.0  63.0 - 166.0 mg/dL Final    Urine Protein/Creatinine Ratio 11/28/2023 0.5   Final    Color, UA 11/28/2023 Light-Yellow  Yellow, Light-Yellow, Dark Yellow, Luisa, Straw Final    Appearance, UA 11/28/2023 Turbid (A)  Clear Final    Specific Gravity, UA 11/28/2023 1.010  1.005 - 1.030 Final    pH, UA 11/28/2023 6.5  5.0 - 8.5 Final    Protein, UA 11/28/2023 1+ (A)  Negative Final    Glucose, UA 11/28/2023 Normal  Negative, Normal Final    Ketones, UA 11/28/2023 Negative  Negative Final    Blood, UA 11/28/2023 Trace (A)  Negative Final    Bilirubin, UA 11/28/2023 Negative  Negative Final    Urobilinogen, UA 11/28/2023 Normal  0.2, 1.0, Normal Final    Nitrites, UA 11/28/2023 1+ (A)  Negative Final    Leukocyte Esterase, UA 11/28/2023 500 (A)  Negative Final    WBC, UA 11/28/2023 >100 (A)  None Seen, 0-2, 3-5, 0-5 /HPF Final    Bacteria, UA 11/28/2023 Occ (A)  None Seen /HPF Final    Squamous Epithelial Cells, UA 11/28/2023 None Seen  None Seen /HPF Final    Hyaline Casts, UA 11/28/2023 None Seen  None Seen /lpf Final    RBC, UA 11/28/2023 6-10 (A)  None Seen, 0-2, 3-5, 0-5 /HPF Final    Urine Culture 11/28/2023 >/= 100,000 colonies/ml Escherichia coli  (A)   Final    RBC Morph 11/28/2023 Abnormal (A)  Normal Final    Anisocytosis 11/28/2023 3+ (A)  (none) Final    Poikilocytosis 11/28/2023 1+ (A)  (none) Final    Polychromasia 11/28/2023 Slight (A)  (none) Final    Schistocytes 11/28/2023 Slight (A)  (none) Final    Platelets 11/28/2023 Adequate  Normal, Adequate Final   No results displayed because visit has over 200 results.      Office Visit on 08/11/2023   Component Date Value Ref Range Status    Color, UA 08/11/2023 Dark Luisa   Final    Spec Grav UA 08/11/2023 1.020   Final    pH, UA 08/11/2023 6.5   Final    WBC, UA 08/11/2023 Large   Final    Nitrite, UA 08/11/2023 Positive   Final    Protein, POC 08/11/2023 300   Final    Glucose, UA 08/11/2023 Negative   Final    Ketones, UA 08/11/2023 Negative   Final    Urobilinogen, UA 08/11/2023 0.2   Final    Bilirubin, POC 08/11/2023 Negative   Final    Blood, UA 08/11/2023 Large   Final    Urine Culture 08/11/2023 >/= 100,000 colonies/ml Klebsiella pneumoniae ssp ozaenae (A)   Final    Urine Culture 08/11/2023 10,000 - 25,000 colonies/ml Proteus vulgaris (A)   Final    Susceptibility sent to reference lab. Results to follow on separate report.    [place] Generic Orderable 08/11/2023 SEE COMMENTS (A)   Final       Test                               Result         Flag  Unit  RefValue  ----------------------------------------------------------------------  Susceptibility, Aerobic, RICA       SEE COMMENTS    AB                   SOURCE: URINE, Source Urine, ID Proteus vulgaris  SUSCEPTIBILITY, AEROBIC, RICA                           FINAL    PROTEUS VULGARIS    Organism identified by client.   Unable to perform susceptibility testing.  Organism   did not grow on test medium.     Test Performed by:  [address] [id number]  : Bartolome Powell M.D. Ph.D.; CLIA# [id number]           Assessment:       Plan  1. Healthcare maintenance  2. Need for  vaccination  3. Encounter to establish care with new doctor  4. Chewing Tobacco User   - CBC Auto Differential; Future  - Comprehensive Metabolic Panel; Future  - HIV 1/2 Ag/Ab (4th Gen); Future  - Hepatitis C Antibody; Future  - Hepatitis B Surface Antigen; Future  - Hepatitis B Core Antibody, Total; Future  - POCT HEMOGLOBIN A1C  - Lipid Panel; Future  - Vitamin D; Future  - Ordered Influenza and Pneumovax vaccines for in clinic administration today   - Encouraged healthy lifestyle modifications including low protein, renal diet and physical activity as tolerated   - Encouraged tobacco product cessation, patient verbalized understanding   - Continue routine follow-up with [facility] Nephrology, Urology and Dr. Scott with surgical oncology at [facility]       ED precautions given.     Patient case and plan of care discussed with Dr. Moshe Wong     Disposition: RTC in 3 months or sooner if needed    Syed Denton MD   Internal Medicine - HO-1

## 2023-12-12 NOTE — PROGRESS NOTES
Subjective:       Patient ID: Bridget Cardenas Jr. is a 63 y.o. male.    GI: Dr. Angy Mcguire  Surgeon: Dr. Aris Scott      1. 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 internal hemorrhoids, diverticulosis in the sigmoid colon, on 9mm polyp in transverse colon, removed and c/w tubular adenoma, one 10mm polyp in descending colon, removed and c/w tubular adenoma, one 30-35mm polyp in sigmoid colon, removed and c/w tubulovillous adenoma with high-grade dysplasia, no malignancy; one 15mm polyp in rectum removed, and c/w tubulovillous adenoma, no malignancy; quality of bowel prep good, ileocecal valve, appendiceal orifice photographed.   Surgery/pathology:  Robotic assisted transverse colectomy done 12/14/23--adenocarcinoma, moderately differentiated, measuring 6cm, carcinoma invades into subserosal adipose tissue, LVI indeterminate, margins clear, 16 mesenteric lymph nodes uninvolved (0/16).    2. Severe Iron deficiency anemia--10/2023 (Ferritin <1.98)    3. H/o Bladder Cancer unknown stage--Diagnosed 2018    Imaging:  CT A/P w/o contrast done 10/20/23--Symmetric moderate bilateral hydronephrosis and hydroureter.  No obstructing stone.  This appears to be chronic, similar to previous exam. Neobladder collapsed about a Manzo catheter.  PET/CT done 11/8/23--focal moderate uptake at transverse colon has max SUV 12, no appreciable hypermetabolic metastatic disease, similar chronic bilateral hydroureteronephrosis, post-surgical changes of prior right clavicle ORIF.    Treatment history:  1. Neoadjuvant chemotherapy unknown drugs and dates.  2. S/p total cystectomy/prostatectomy with neobladder creating done in 2018 at  [facility].  3. IV Feraheme X 2 doses completed 11/20/23.    CEA:   10/26/23--1.98    Procedures:  EGD 10/25/23--normal esophagus, normal stomach, normal duodenum, no specimens collected.     Current treatment plan: Observation    Chief Complaint: Other Misc (Pt reports no concerns today.)    HPI  Patient presents for follow-up of colon cancer. He underwent surgery last week. Pathology showed tumor 6cm, T3, with 16 negative lymph nodes, clear margins. MSI testing done on tumor showed that tumor is MSI-high. Discussed recommendations for observation per [facility] and they are happy about this news.     Past Medical History:   Diagnosis Date    Cancer     hx of bladder cancer    Malignant neoplasm of transverse colon     Obesity     Renal disorder     CKD 5      Past Surgical History:   Procedure Laterality Date    COLONOSCOPY N/A 10/25/2023    Procedure: COLONOSCOPY, WITH HEMORRHAGE CONTROL;  Surgeon: Angy Mcguire MD;  Location: [facility] ENDOSCOPY;  Service: Gastroenterology;  Laterality: N/A;  sigmoid clips    COLONOSCOPY, WITH 1 OR MORE BIOPSIES N/A 10/25/2023    Procedure: COLONOSCOPY, WITH 1 OR MORE BIOPSIES;  Surgeon: Angy Mcguire MD;  Location: [facility] ENDOSCOPY;  Service: Gastroenterology;  Laterality: N/A;  transverse colon mass    COLONOSCOPY, WITH POLYPECTOMY USING SNARE Left 10/25/2023    Procedure: COLONOSCOPY, WITH POLYPECTOMY USING SNARE;  Surgeon: Angy Mcguire MD;  Location: [facility] ENDOSCOPY;  Service: Gastroenterology;  Laterality: Left;    CYSTECTOMY      ESOPHAGOGASTRODUODENOSCOPY Left 10/25/2023    Procedure: EGD (ESOPHAGOGASTRODUODENOSCOPY);  Surgeon: Angy Mcguire MD;  Location: [facility] ENDOSCOPY;  Service: Gastroenterology;  Laterality: Left;    FRACTURE SURGERY  7/2018    Collar none    HERNIA REPAIR  11/2010    ILEO LOOP NEOBLADDER      MEDIPORT INSERTION, SINGLE      PROSTATE SURGERY  10/2018    SMALL INTESTINE SURGERY  10/2018    XI ROBOTIC COLECTOMY, PARTIAL N/A 12/14/2023     Procedure: XI ROBOTIC COLECTOMY, PARTIAL;  Surgeon: Aris Scott MD;  Location[place] [facility] [place];  Service: Oncology;  Laterality: N/A;  TRANSVERSE COLON; POSSIBLE OPEN CASE     History reviewed. No pertinent family history.  Social History     Socioeconomic History    Marital status:    Tobacco Use    Smoking status: Former     Current packs/day: 0.00     Types: Cigarettes     Quit date:      Years since quittin.9     Passive exposure: Current    Smokeless tobacco: Current     Types: Chew   Substance and Sexual Activity    Alcohol use: Never    Drug use: Never    Sexual activity: Yes     Partners: Female     Birth control/protection: None     Social Determinants of Health     Financial Resource Strain: Medium Risk (2023)    Overall Financial Resource Strain (CARDIA)     Difficulty of Paying Living Expenses: Somewhat hard   Food Insecurity: No Food Insecurity (2023)    Hunger Vital Sign     Worried About Running Out of Food in the Last Year: Never true     Ran Out of Food in the Last Year: Never true   Transportation Needs: No Transportation Needs (2023)    PRAPARE - Transportation     Lack of Transportation (Medical): No     Lack of Transportation (Non-Medical): No   Physical Activity: Inactive (2023)    Exercise Vital Sign     Days of Exercise per Week: 5 days     Minutes of Exercise per Session: 0 min   Stress: No Stress Concern Present (2023)    Citizen of [place] of Occupational Health - Occupational Stress Questionnaire     Feeling of Stress : Not at all   Social Connections: Moderately Isolated (2023)    Social Connection and Isolation Panel [NHANES]     Frequency of Communication with Friends and Family: More than three times a week     Frequency of Social Gatherings with Friends and Family: Never     Attends Religion Services: Never     Active Member of Clubs or Organizations: No     Attends Club or Organization Meetings: Never     Marital Status:     Housing Stability: Unknown (12/14/2023)    Housing Stability Vital Sign     Unable to Pay for Housing in the Last Year: No     Unstable Housing in the Last Year: No       Review of patient's allergies indicates:   Allergen Reactions    Pcn [penicillins] Hives      Current Outpatient Medications on File Prior to Visit   Medication Sig Dispense Refill    calcitRIOL (ROCALTROL) 0.25 MCG Cap Take 1 capsule (0.25 mcg total) by mouth once daily. 90 capsule 0    patiromer calcium sorbitex (VELTASSA) 8.4 gram PwPk Take 1 packet (8.4 g total) by mouth every other day. 45 packet 1    sodium bicarbonate 650 MG tablet Take 2 tablets (1,300 mg total) by mouth 2 (two) times daily. 120 tablet 11    HYDROcodone-acetaminophen (NORCO) 5-325 mg per tablet Take 1 tablet by mouth every 6 (six) hours as needed for Pain. (Patient not taking: Reported on 12/21/2023) 20 tablet 0    metroNIDAZOLE (FLAGYL) 250 MG tablet TAKE 2 TABLETS AT 1PM, 5PM AND 9PM THE EVENING PRIOR TO SURGERY (Patient not taking: Reported on 12/21/2023) 6 tablet 0    neomycin (MYCIFRADIN) 500 mg Tab TAKE 2 TABLETS AT 1PM, 5PM AND 9PM THE EVENING PRIOR TO SURGERY (Patient not taking: Reported on 12/21/2023) 6 tablet 0     No current facility-administered medications on file prior to visit.      Review of Systems   Constitutional:  Negative for appetite change, fatigue, fever and unexpected weight change.   HENT:  Negative for mouth sores.    Eyes: Negative.  Negative for visual disturbance.   Respiratory:  Negative for cough and shortness of breath.    Cardiovascular:  Negative for chest pain and leg swelling.   Gastrointestinal:  Negative for abdominal distention, abdominal pain, constipation, diarrhea, nausea, vomiting and reflux.   Genitourinary:  Negative for difficulty urinating, dysuria, frequency and hematuria.        Neobladder, h/o bladder cancer, self-catheterizes 6X per day   Musculoskeletal:  Negative for arthralgias and back pain.   Integumentary:   "Negative for rash.   Neurological:  Negative for weakness and headaches.   Hematological:  Negative for adenopathy.   Psychiatric/Behavioral:  Negative for sleep disturbance. The patient is not nervous/anxious.             Vitals:    12/21/23 1251   BP: 135/82   Pulse: 66   Resp: 14   Temp: 98 °F (36.7 °C)   TempSrc: Oral   SpO2: 100%   Weight: 89.8 kg (198 lb)   Height: 5' 5" (1.651 m)        Physical Exam  Constitutional:       General: He is awake.      Appearance: Normal appearance.   HENT:      Head: Normocephalic and atraumatic.      Nose: Nose normal.      Mouth/Throat:      Mouth: Mucous membranes are moist.   Eyes:      General: Vision grossly intact.      Extraocular Movements: Extraocular movements intact.      Conjunctiva/sclera: Conjunctivae normal.   Cardiovascular:      Rate and Rhythm: Normal rate and regular rhythm.      Heart sounds: Normal heart sounds.   Pulmonary:      Effort: Pulmonary effort is normal.      Breath sounds: Normal breath sounds.   Chest:      Comments: Left chest wall mediport in place  Abdominal:      General: Bowel sounds are normal. There is no distension.      Palpations: Abdomen is soft.      Tenderness: There is no abdominal tenderness.      Comments: Scattered laparoscopic incisions healed   Musculoskeletal:      Cervical back: Normal range of motion and neck supple.      Right lower leg: No edema.      Left lower leg: No edema.   Lymphadenopathy:      Cervical: No cervical adenopathy.      Upper Body:      Right upper body: No supraclavicular adenopathy.      Left upper body: No supraclavicular adenopathy.   Skin:     General: Skin is warm.   Neurological:      Mental Status: He is alert and oriented to person, place, and time.      Motor: Motor function is intact.   Psychiatric:         Mood and Affect: Mood normal.         Speech: Speech normal.         Behavior: Behavior is cooperative.         Judgment: Judgment normal.            Assessment:       1. Adenocarcinoma " of transverse colon    2. Tubulovillous adenoma of rectum      Plan:       Patient with h/o bladder cancer from 2018 s/p neoadjuvant chemotherapy given by Dr. Villar in [place] and s/p surgery with cystectomy/prostatectomy with creation of neobladder done at [facility] in [place].    Now with a new transverse colon cancer found on colonoscopy on 10/25/23 after patient presented to hospital with severe iron deficiency anemia.  Patient also with Stage 5 CKD, following with nephrology at [facility].  Initial PET with no evidence of other disease.    Patient underwent surgery robotic assisted transverse colectomy on 12/14/23. Pathology with Stage IIA (T3N0M0), tumor measures 6cm, G2, with 16 negative lymph nodes.   On initial biopsy of tumor, MSI testing shows that tumor is MSI-high.    Per [facility], observation only recommended. No chemotherapy.    Will start routine surveillance visits.  RTC 3 months for follow-up with repeat labs including CBCdiff, CMP, iron, TIBC and ferritin and CEA level.  Last labs showed anemia was improved.     Plan to repeat CT C/A/P w/o contrast for surveillance in 6 months.     Patient will need colonoscopy in 12/2024.   Consider removal of mediport as well if it is not being used.   Will discuss on RTC about potential genetic testing as well for Rodriguez syndrome.      All questions answered at this time.    Anita Wayne MD      Therapy Plan Information  Flushes  heparin, porcine (PF) 100 unit/mL injection flush 500 Units  500 Units, Intravenous, On the 1st Thu of every 3 months  sodium chloride 0.9% flush 10 mL  10 mL, Intravenous, On the 1st Thu of every 3 months

## 2023-12-14 ENCOUNTER — ANESTHESIA (OUTPATIENT)
Dept: SURGERY | Facility: HOSPITAL | Age: 63
DRG: 330 | End: 2023-12-14
Payer: COMMERCIAL

## 2023-12-14 ENCOUNTER — HOSPITAL ENCOUNTER (INPATIENT)
Facility: HOSPITAL | Age: 63
LOS: 1 days | Discharge: HOME | DRG: 330 | End: 2023-12-15
Attending: SURGERY | Admitting: SURGERY
Payer: COMMERCIAL

## 2023-12-14 DIAGNOSIS — C18.4 MALIGNANT TUMOR OF TRANSVERSE COLON: ICD-10-CM

## 2023-12-14 DIAGNOSIS — C18.9 COLON CANCER: ICD-10-CM

## 2023-12-14 LAB
ANION GAP SERPL CALC-SCNC: 5 MEQ/L
ANTIBODY IDENTIFICATION: NORMAL
BUN SERPL-MCNC: 52.9 MG/DL (ref 8.4–25.7)
CALCIUM SERPL-MCNC: 8 MG/DL (ref 8.8–10)
CHLORIDE SERPL-SCNC: 111 MMOL/L (ref 98–107)
CO2 SERPL-SCNC: 21 MMOL/L (ref 23–31)
CREAT SERPL-MCNC: 4.81 MG/DL (ref 0.73–1.18)
CREAT/UREA NIT SERPL: 11
GFR SERPLBLD CREATININE-BSD FMLA CKD-EPI: 13 MLS/MIN/1.73/M2
GLUCOSE SERPL-MCNC: 98 MG/DL (ref 82–115)
GROUP & RH: ABNORMAL
INDIRECT COOMBS: ABNORMAL
POTASSIUM SERPL-SCNC: 4.8 MMOL/L (ref 3.5–5.1)
SODIUM SERPL-SCNC: 137 MMOL/L (ref 136–145)
SPECIMEN OUTDATE: ABNORMAL

## 2023-12-14 PROCEDURE — 37000009 HC ANESTHESIA EA ADD 15 MINS: Performed by: SURGERY

## 2023-12-14 PROCEDURE — 86922 COMPATIBILITY TEST ANTIGLOB: CPT | Performed by: SURGERY

## 2023-12-14 PROCEDURE — 27201423 OPTIME MED/SURG SUP & DEVICES STERILE SUPPLY: Performed by: SURGERY

## 2023-12-14 PROCEDURE — 11000001 HC ACUTE MED/SURG PRIVATE ROOM

## 2023-12-14 PROCEDURE — 44213 LAP MOBIL SPLENIC FL ADD-ON: CPT | Mod: AS,,, | Performed by: NURSE PRACTITIONER

## 2023-12-14 PROCEDURE — 63600175 PHARM REV CODE 636 W HCPCS: Performed by: NURSE ANESTHETIST, CERTIFIED REGISTERED

## 2023-12-14 PROCEDURE — D9220A PRA ANESTHESIA: Mod: CRNA,,, | Performed by: NURSE ANESTHETIST, CERTIFIED REGISTERED

## 2023-12-14 PROCEDURE — 71000033 HC RECOVERY, INTIAL HOUR: Performed by: SURGERY

## 2023-12-14 PROCEDURE — C9290 INJ, BUPIVACAINE LIPOSOME: HCPCS | Performed by: SURGERY

## 2023-12-14 PROCEDURE — 25000003 PHARM REV CODE 250: Performed by: NURSE PRACTITIONER

## 2023-12-14 PROCEDURE — 63600175 PHARM REV CODE 636 W HCPCS: Performed by: NURSE PRACTITIONER

## 2023-12-14 PROCEDURE — 36000713 HC OR TIME LEV V EA ADD 15 MIN: Performed by: SURGERY

## 2023-12-14 PROCEDURE — D9220A PRA ANESTHESIA: Mod: ANES,,, | Performed by: ANESTHESIOLOGY

## 2023-12-14 PROCEDURE — 25000003 PHARM REV CODE 250: Performed by: SURGERY

## 2023-12-14 PROCEDURE — 36000712 HC OR TIME LEV V 1ST 15 MIN: Performed by: SURGERY

## 2023-12-14 PROCEDURE — 63600175 PHARM REV CODE 636 W HCPCS: Performed by: SURGERY

## 2023-12-14 PROCEDURE — 44204 LAPARO PARTIAL COLECTOMY: CPT | Mod: AS,,, | Performed by: NURSE PRACTITIONER

## 2023-12-14 PROCEDURE — 37000008 HC ANESTHESIA 1ST 15 MINUTES: Performed by: SURGERY

## 2023-12-14 PROCEDURE — D9220A PRA ANESTHESIA: ICD-10-PCS | Mod: CRNA,,, | Performed by: NURSE ANESTHETIST, CERTIFIED REGISTERED

## 2023-12-14 PROCEDURE — 44213 LAP MOBIL SPLENIC FL ADD-ON: CPT | Mod: ,,, | Performed by: SURGERY

## 2023-12-14 PROCEDURE — P9047 ALBUMIN (HUMAN), 25%, 50ML: HCPCS | Mod: JZ,JG | Performed by: NURSE ANESTHETIST, CERTIFIED REGISTERED

## 2023-12-14 PROCEDURE — 25000003 PHARM REV CODE 250: Performed by: NURSE ANESTHETIST, CERTIFIED REGISTERED

## 2023-12-14 PROCEDURE — 86901 BLOOD TYPING SEROLOGIC RH(D): CPT | Performed by: SURGERY

## 2023-12-14 PROCEDURE — 86870 RBC ANTIBODY IDENTIFICATION: CPT | Performed by: SURGERY

## 2023-12-14 PROCEDURE — 27000221 HC OXYGEN, UP TO 24 HOURS

## 2023-12-14 PROCEDURE — D9220A PRA ANESTHESIA: ICD-10-PCS | Mod: ANES,,, | Performed by: ANESTHESIOLOGY

## 2023-12-14 PROCEDURE — 80048 BASIC METABOLIC PNL TOTAL CA: CPT | Performed by: SURGERY

## 2023-12-14 PROCEDURE — 44204 LAPARO PARTIAL COLECTOMY: CPT | Mod: ,,, | Performed by: SURGERY

## 2023-12-14 PROCEDURE — 8E0W4CZ ROBOTIC ASSISTED PROCEDURE OF TRUNK REGION, PERCUTANEOUS ENDOSCOPIC APPROACH: ICD-10-PCS | Performed by: SURGERY

## 2023-12-14 PROCEDURE — 0DTL4ZZ RESECTION OF TRANSVERSE COLON, PERCUTANEOUS ENDOSCOPIC APPROACH: ICD-10-PCS | Performed by: SURGERY

## 2023-12-14 RX ORDER — GLYCOPYRROLATE 0.2 MG/ML
INJECTION INTRAMUSCULAR; INTRAVENOUS
Status: DISCONTINUED | OUTPATIENT
Start: 2023-12-14 | End: 2023-12-14

## 2023-12-14 RX ORDER — PHENYLEPHRINE HCL IN 0.9% NACL 1 MG/10 ML
SYRINGE (ML) INTRAVENOUS
Status: DISCONTINUED | OUTPATIENT
Start: 2023-12-14 | End: 2023-12-14

## 2023-12-14 RX ORDER — ACETAMINOPHEN 10 MG/ML
INJECTION, SOLUTION INTRAVENOUS
Status: DISCONTINUED | OUTPATIENT
Start: 2023-12-14 | End: 2023-12-14

## 2023-12-14 RX ORDER — HYDROMORPHONE HYDROCHLORIDE 2 MG/ML
0.4 INJECTION, SOLUTION INTRAMUSCULAR; INTRAVENOUS; SUBCUTANEOUS EVERY 5 MIN PRN
Status: DISCONTINUED | OUTPATIENT
Start: 2023-12-14 | End: 2023-12-14 | Stop reason: HOSPADM

## 2023-12-14 RX ORDER — ROCURONIUM BROMIDE 10 MG/ML
INJECTION, SOLUTION INTRAVENOUS
Status: DISCONTINUED | OUTPATIENT
Start: 2023-12-14 | End: 2023-12-14

## 2023-12-14 RX ORDER — BUPIVACAINE HYDROCHLORIDE 5 MG/ML
INJECTION, SOLUTION EPIDURAL; INTRACAUDAL
Status: DISCONTINUED | OUTPATIENT
Start: 2023-12-14 | End: 2023-12-14 | Stop reason: HOSPADM

## 2023-12-14 RX ORDER — NALOXONE HCL 0.4 MG/ML
0.02 VIAL (ML) INJECTION
Status: DISCONTINUED | OUTPATIENT
Start: 2023-12-14 | End: 2023-12-15 | Stop reason: HOSPADM

## 2023-12-14 RX ORDER — DIPHENHYDRAMINE HYDROCHLORIDE 50 MG/ML
12.5 INJECTION, SOLUTION INTRAMUSCULAR; INTRAVENOUS EVERY 4 HOURS PRN
Status: DISCONTINUED | OUTPATIENT
Start: 2023-12-14 | End: 2023-12-15 | Stop reason: HOSPADM

## 2023-12-14 RX ORDER — CLINDAMYCIN PHOSPHATE 600 MG/50ML
600 INJECTION, SOLUTION INTRAVENOUS ONCE
Status: COMPLETED | OUTPATIENT
Start: 2023-12-14 | End: 2023-12-14

## 2023-12-14 RX ORDER — ACETAMINOPHEN 10 MG/ML
1000 INJECTION, SOLUTION INTRAVENOUS EVERY 8 HOURS
Status: DISPENSED | OUTPATIENT
Start: 2023-12-14 | End: 2023-12-15

## 2023-12-14 RX ORDER — PROMETHAZINE HYDROCHLORIDE 25 MG/1
25 TABLET ORAL EVERY 6 HOURS PRN
Status: DISCONTINUED | OUTPATIENT
Start: 2023-12-14 | End: 2023-12-15 | Stop reason: HOSPADM

## 2023-12-14 RX ORDER — ONDANSETRON 2 MG/ML
8 INJECTION INTRAMUSCULAR; INTRAVENOUS EVERY 8 HOURS PRN
Status: DISCONTINUED | OUTPATIENT
Start: 2023-12-14 | End: 2023-12-15 | Stop reason: HOSPADM

## 2023-12-14 RX ORDER — ONDANSETRON 2 MG/ML
INJECTION INTRAMUSCULAR; INTRAVENOUS
Status: DISCONTINUED | OUTPATIENT
Start: 2023-12-14 | End: 2023-12-14

## 2023-12-14 RX ORDER — MIDAZOLAM HYDROCHLORIDE 1 MG/ML
INJECTION INTRAMUSCULAR; INTRAVENOUS
Status: DISCONTINUED | OUTPATIENT
Start: 2023-12-14 | End: 2023-12-14

## 2023-12-14 RX ORDER — ALVIMOPAN 12 MG/1
12 CAPSULE ORAL ONCE
Status: COMPLETED | OUTPATIENT
Start: 2023-12-14 | End: 2023-12-14

## 2023-12-14 RX ORDER — SODIUM CHLORIDE, SODIUM LACTATE, POTASSIUM CHLORIDE, CALCIUM CHLORIDE 600; 310; 30; 20 MG/100ML; MG/100ML; MG/100ML; MG/100ML
INJECTION, SOLUTION INTRAVENOUS CONTINUOUS
Status: DISCONTINUED | OUTPATIENT
Start: 2023-12-14 | End: 2023-12-15 | Stop reason: HOSPADM

## 2023-12-14 RX ORDER — PROPOFOL 10 MG/ML
VIAL (ML) INTRAVENOUS
Status: DISCONTINUED | OUTPATIENT
Start: 2023-12-14 | End: 2023-12-14

## 2023-12-14 RX ORDER — HYDROMORPHONE HYDROCHLORIDE 2 MG/ML
1 INJECTION, SOLUTION INTRAMUSCULAR; INTRAVENOUS; SUBCUTANEOUS EVERY 4 HOURS PRN
Status: DISCONTINUED | OUTPATIENT
Start: 2023-12-14 | End: 2023-12-15 | Stop reason: HOSPADM

## 2023-12-14 RX ORDER — FENTANYL CITRATE 50 UG/ML
INJECTION, SOLUTION INTRAMUSCULAR; INTRAVENOUS
Status: DISCONTINUED | OUTPATIENT
Start: 2023-12-14 | End: 2023-12-14

## 2023-12-14 RX ORDER — HYDROMORPHONE HYDROCHLORIDE 2 MG/ML
INJECTION, SOLUTION INTRAMUSCULAR; INTRAVENOUS; SUBCUTANEOUS
Status: DISCONTINUED | OUTPATIENT
Start: 2023-12-14 | End: 2023-12-14

## 2023-12-14 RX ORDER — METOPROLOL TARTRATE 1 MG/ML
INJECTION, SOLUTION INTRAVENOUS
Status: DISCONTINUED | OUTPATIENT
Start: 2023-12-14 | End: 2023-12-14

## 2023-12-14 RX ORDER — DEXAMETHASONE SODIUM PHOSPHATE 4 MG/ML
INJECTION, SOLUTION INTRA-ARTICULAR; INTRALESIONAL; INTRAMUSCULAR; INTRAVENOUS; SOFT TISSUE
Status: DISCONTINUED | OUTPATIENT
Start: 2023-12-14 | End: 2023-12-14

## 2023-12-14 RX ORDER — HYDROMORPHONE HYDROCHLORIDE 2 MG/ML
1 INJECTION, SOLUTION INTRAMUSCULAR; INTRAVENOUS; SUBCUTANEOUS
Status: DISCONTINUED | OUTPATIENT
Start: 2023-12-14 | End: 2023-12-15 | Stop reason: HOSPADM

## 2023-12-14 RX ORDER — ENOXAPARIN SODIUM 100 MG/ML
30 INJECTION SUBCUTANEOUS EVERY 24 HOURS
Status: DISCONTINUED | OUTPATIENT
Start: 2023-12-15 | End: 2023-12-15 | Stop reason: HOSPADM

## 2023-12-14 RX ORDER — SODIUM CHLORIDE 0.9 % (FLUSH) 0.9 %
10 SYRINGE (ML) INJECTION
Status: DISCONTINUED | OUTPATIENT
Start: 2023-12-14 | End: 2023-12-14 | Stop reason: HOSPADM

## 2023-12-14 RX ORDER — DIPHENHYDRAMINE HYDROCHLORIDE 50 MG/ML
12.5 INJECTION, SOLUTION INTRAMUSCULAR; INTRAVENOUS EVERY 6 HOURS PRN
Status: DISCONTINUED | OUTPATIENT
Start: 2023-12-14 | End: 2023-12-15 | Stop reason: HOSPADM

## 2023-12-14 RX ORDER — ALBUMIN HUMAN 250 G/1000ML
SOLUTION INTRAVENOUS
Status: DISCONTINUED | OUTPATIENT
Start: 2023-12-14 | End: 2023-12-14

## 2023-12-14 RX ORDER — ENOXAPARIN SODIUM 100 MG/ML
30 INJECTION SUBCUTANEOUS
Status: COMPLETED | OUTPATIENT
Start: 2023-12-14 | End: 2023-12-14

## 2023-12-14 RX ORDER — ALVIMOPAN 12 MG/1
12 CAPSULE ORAL 2 TIMES DAILY
Status: DISCONTINUED | OUTPATIENT
Start: 2023-12-14 | End: 2023-12-15 | Stop reason: HOSPADM

## 2023-12-14 RX ORDER — LIDOCAINE HYDROCHLORIDE 20 MG/ML
INJECTION INTRAVENOUS
Status: DISCONTINUED | OUTPATIENT
Start: 2023-12-14 | End: 2023-12-14

## 2023-12-14 RX ADMIN — ROCURONIUM BROMIDE 100 MG: 10 SOLUTION INTRAVENOUS at 10:12

## 2023-12-14 RX ADMIN — SODIUM CHLORIDE, POTASSIUM CHLORIDE, SODIUM LACTATE AND CALCIUM CHLORIDE: 600; 310; 30; 20 INJECTION, SOLUTION INTRAVENOUS at 02:12

## 2023-12-14 RX ADMIN — ACETAMINOPHEN 1000 MG: 10 INJECTION, SOLUTION INTRAVENOUS at 09:12

## 2023-12-14 RX ADMIN — Medication 100 MCG: at 11:12

## 2023-12-14 RX ADMIN — DEXAMETHASONE SODIUM PHOSPHATE 4 MG: 4 INJECTION, SOLUTION INTRA-ARTICULAR; INTRALESIONAL; INTRAMUSCULAR; INTRAVENOUS; SOFT TISSUE at 11:12

## 2023-12-14 RX ADMIN — LIDOCAINE HYDROCHLORIDE 80 MG: 20 INJECTION INTRAVENOUS at 10:12

## 2023-12-14 RX ADMIN — CLINDAMYCIN PHOSPHATE 600 MG: 600 INJECTION, SOLUTION INTRAVENOUS at 11:12

## 2023-12-14 RX ADMIN — FENTANYL CITRATE 50 MCG: 50 INJECTION, SOLUTION INTRAMUSCULAR; INTRAVENOUS at 11:12

## 2023-12-14 RX ADMIN — PROPOFOL 150 MG: 10 INJECTION, EMULSION INTRAVENOUS at 10:12

## 2023-12-14 RX ADMIN — FENTANYL CITRATE 100 MCG: 50 INJECTION, SOLUTION INTRAMUSCULAR; INTRAVENOUS at 11:12

## 2023-12-14 RX ADMIN — GLYCOPYRROLATE 0.2 MG: 0.2 INJECTION INTRAMUSCULAR; INTRAVENOUS at 10:12

## 2023-12-14 RX ADMIN — FENTANYL CITRATE 100 MCG: 50 INJECTION, SOLUTION INTRAMUSCULAR; INTRAVENOUS at 10:12

## 2023-12-14 RX ADMIN — MIDAZOLAM HYDROCHLORIDE 2 MG: 1 INJECTION, SOLUTION INTRAMUSCULAR; INTRAVENOUS at 10:12

## 2023-12-14 RX ADMIN — ROCURONIUM BROMIDE 50 MG: 10 SOLUTION INTRAVENOUS at 11:12

## 2023-12-14 RX ADMIN — SUGAMMADEX 180 MG: 100 INJECTION, SOLUTION INTRAVENOUS at 01:12

## 2023-12-14 RX ADMIN — ALVIMOPAN 12 MG: 12 CAPSULE ORAL at 08:12

## 2023-12-14 RX ADMIN — PROPOFOL 50 MG: 10 INJECTION, EMULSION INTRAVENOUS at 11:12

## 2023-12-14 RX ADMIN — METOPROLOL TARTRATE 1 MG: 1 INJECTION, SOLUTION INTRAVENOUS at 12:12

## 2023-12-14 RX ADMIN — HYDROMORPHONE HYDROCHLORIDE 1 MG: 2 INJECTION, SOLUTION INTRAMUSCULAR; INTRAVENOUS; SUBCUTANEOUS at 01:12

## 2023-12-14 RX ADMIN — ALVIMOPAN 12 MG: 12 CAPSULE ORAL at 09:12

## 2023-12-14 RX ADMIN — ACETAMINOPHEN 1000 MG: 10 INJECTION, SOLUTION INTRAVENOUS at 11:12

## 2023-12-14 RX ADMIN — ALBUMIN (HUMAN) 100 ML: 12.5 SOLUTION INTRAVENOUS at 11:12

## 2023-12-14 RX ADMIN — SODIUM CHLORIDE: 0.9 INJECTION, SOLUTION INTRAVENOUS at 10:12

## 2023-12-14 RX ADMIN — ONDANSETRON 4 MG: 2 INJECTION INTRAMUSCULAR; INTRAVENOUS at 11:12

## 2023-12-14 RX ADMIN — HYDROMORPHONE HYDROCHLORIDE 1 MG: 2 INJECTION, SOLUTION INTRAMUSCULAR; INTRAVENOUS; SUBCUTANEOUS at 11:12

## 2023-12-14 RX ADMIN — HYDROMORPHONE HYDROCHLORIDE 1 MG: 2 INJECTION INTRAMUSCULAR; INTRAVENOUS; SUBCUTANEOUS at 05:12

## 2023-12-14 RX ADMIN — ENOXAPARIN SODIUM 30 MG: 30 INJECTION SUBCUTANEOUS at 08:12

## 2023-12-14 NOTE — ANESTHESIA PREPROCEDURE EVALUATION
12/14/2023  Bridget Cardenas Jr. is a 63 y.o., male.   with PMHx of prostate/bladder CA s/p resection wit neobladder construction, CKD presents for EGD/colonoscopy secondary to anemia.     NO BETA BLOCKER USE    Had Polyps biopsied, transverse colon mass.  Chews tobacco    Pre-op Assessment    I have reviewed the Patient Summary Reports.     I have reviewed the Nursing Notes. I have reviewed the NPO Status.   I have reviewed the Medications.     Review of Systems  Anesthesia Hx:  No problems with previous Anesthesia                Cardiovascular:        Shortness of Breath                          Pulmonary:      Shortness of breath                  Renal/:  Chronic Renal Disease        Kidney Function/Disease                 Physical Exam  General: Well nourished, Cooperative, Alert and Oriented    Airway:  Mallampati: II   Mouth Opening: Normal  TM Distance: Normal  Tongue: Normal  Neck ROM: Normal ROM    Dental:  Intact        Anesthesia Plan  Type of Anesthesia, risks & benefits discussed:    Anesthesia Type: Gen ETT  Intra-op Monitoring Plan: Standard ASA Monitors  Post Op Pain Control Plan: multimodal analgesia  Induction:  IV  Airway Plan: Direct, Post-Induction  Informed Consent: Informed consent signed with the Patient and all parties understand the risks and agree with anesthesia plan.  All questions answered. Patient consented to blood products? Yes  ASA Score: 2  Day of Surgery Review of History & Physical: H&P Update referred to the surgeon/provider.    Ready For Surgery From Anesthesia Perspective.     .

## 2023-12-14 NOTE — ANESTHESIA POSTPROCEDURE EVALUATION
Anesthesia Post Evaluation    Patient: Bridget Cardenas JrMaritza    Procedure(s) Performed: Procedure(s) (LRB):  XI ROBOTIC COLECTOMY, PARTIAL (N/A)    Final Anesthesia Type: general      Patient location during evaluation: PACU  Patient participation: Yes- Able to Participate  Level of consciousness: awake and alert  Post-procedure vital signs: reviewed and stable  Pain management: adequate  Airway patency: patent      Anesthetic complications: no      Cardiovascular status: hemodynamically stable  Respiratory status: unassisted  Hydration status: euvolemic  Follow-up not needed.              Vitals Value Taken Time   /76 12/14/23 1453   Temp 36.8 °C (98.2 °F) 12/14/23 1453   Pulse 60 12/14/23 1453   Resp 14 12/14/23 1453   SpO2 96 % 12/14/23 1506         Event Time   Out of Recovery 14:42:00         Pain/Lotus Score: Lotus Score: 8 (12/14/2023  2:53 PM)

## 2023-12-14 NOTE — OP NOTE
Date of Surgery:  12/14/2023    Surgeon:  Aris Scott MD    Assistant:  Agueda BERNARD                                        Pre-op Diagnosis:  Transverse colon cancer    Post-op Diagnosis:  Same    Procedure:    1. Laparoscopic/robotic transverse colectomy   2. Complete mobilization of the splenic flexure    Anesthesia:  GETA    EBL:  25 cc's    Specimens:  Transverse colon    Findings:  No evidence of metastatic disease.  The tumor was visible as an apple-core lesion in the distal transverse colon with adjacent tattoo.  Transverse colectomy was performed with high ligation of the middle colic vessels, hand-sewn end-to-end anastomosis performed through the extraction site.    Colon Resection  Operation performed with curative intent Yes   Tumor Location Transverse colon   Extent of colon and vascular resection Transverse colectomy - middle colic         Procedure in detail:  After informed consent was obtained patient brought to operating placed supine position.  General endotracheal anesthesia administered difficulty.  The patient's abdomen prepped and draped in sterile fashion.  Under ultrasound guidance I performed bilateral transversus abdominis plane nerve blocks using liposomal bupivacaine.  Supraumbilical incision was made optical trocar used into peritoneal cavity under direct vision.  Pneumoperitoneum achieved without difficulty.  Additional ports were placed under direct vision.  The abdomen was examined laparoscopically there was no overt evidence of metastatic disease.  The tumor was visible as an apple-core lesion in the distal transverse colon with adjacent tattoo.  The patient was placed in reverse Trendelenburg and the robot was docked.  The splenic flexure was taken down completely mobilized along with the proximal descending colon using the electrocautery and vessel sealer.  The omentum was detached from the transverse colon in the avascular plane entering the lesser sac.  Just to the right  of the ligament of Treitz the middle colic vessels were identified and isolated and dissection was carried out over the superior border of the pancreas isolating the middle colic pedicle.  The pedicle was then divided using a vascular load stapler at its origin.  The mesentery was divided perpendicularly proximal and distal preserving the vascular arcade.  The colon was divided proximal and distal to the area of tumor and mesenteric dissection using blue load staplers.  The 2 ends of the colon easily met in the midline without tension.  The specimen was extracted through an extraction site created at the upper midline incision through a transverse incision.  A wound protector was placed.  The 2 stapled ends of the colon were delivered and a posterior row of 2-0 silk Lembert sutures were placed.  The staple line was excised.  3-0 PDS was then used as a running stitch beginning both sutures at the midpoint of the posterior anastomosis and running these around with anterior [place] sutures completing the anastomosis.  The anastomosis was palpated it was of adequate patency there was no evidence of tension or ischemia was reduced back into the abdominal cavity.  The abdomen was re-insufflated and inspected for hemostasis which appeared to be excellent.  The anastomosis was examined and again showed no evidence of tension or ischemia.  Ports removed pneumoperitoneum was relieved port sites were closed absorbable suture sterile dressings were applied the patient tolerated the procedure well.    Significant surgical tasks conducted by the assistant:  The skilled assistance of the nurse practitioner MARCO ANTONIO Guevara was necessary for the successful completion of this case.  She was essential for the proper positioning of the patient, manipulation of instruments, proper exposure, manipulation of tissue, and wound closure        Aris Scott MD  Surgical Oncology  Complex General, Gastrointestinal and Hepatobiliary Surgery

## 2023-12-14 NOTE — TRANSFER OF CARE
"Anesthesia Transfer of Care Note    Patient: Bridget Cardenas Jr.    Procedure(s) Performed: Procedure(s) (LRB):  XI ROBOTIC COLECTOMY, PARTIAL (N/A)    Patient location: PACU    Anesthesia Type: general    Transport from OR: Transported from OR on room air with adequate spontaneous ventilation    Post pain: adequate analgesia    Post assessment: no apparent anesthetic complications and tolerated procedure well    Post vital signs: stable    Level of consciousness: awake and alert    Nausea/Vomiting: no nausea/vomiting    Complications: none    Transfer of care protocol was followed      Last vitals: Visit Vitals  /77   Pulse 65   Temp 36.6 °C (97.9 °F) (Oral)   Resp 17   Ht 5' 5" (1.651 m)   Wt 88.5 kg (195 lb 1.7 oz)   SpO2 100%   BMI 32.47 kg/m²     "

## 2023-12-14 NOTE — ANESTHESIA PROCEDURE NOTES
Intubation    Date/Time: 12/14/2023 10:56 AM    Performed by: Brian Nuñez CRNA  Authorized by: Brian Nuñez CRNA    Intubation:     Induction:  Intravenous    Intubated:  Postinduction    Mask Ventilation:  Easy mask    Attempts:  1    Attempted By:  CRNA    Method of Intubation:  Direct    Blade:  Mejias 2    Laryngeal View Grade: Grade I - full view of cords      Difficult Airway Encountered?: No      Complications:  None    Airway Device:  Oral endotracheal tube    Airway Device Size:  7.5    Style/Cuff Inflation:  Cuffed (inflated to minimal occlusive pressure)    Tube secured:  23    Secured at:  The lips    Placement Verified By:  Capnometry    Complicating Factors:  None    Findings Post-Intubation:  BS equal bilateral and atraumatic/condition of teeth unchanged

## 2023-12-15 VITALS
SYSTOLIC BLOOD PRESSURE: 151 MMHG | WEIGHT: 195.13 LBS | RESPIRATION RATE: 18 BRPM | OXYGEN SATURATION: 94 % | HEIGHT: 65 IN | DIASTOLIC BLOOD PRESSURE: 85 MMHG | TEMPERATURE: 98 F | BODY MASS INDEX: 32.51 KG/M2 | HEART RATE: 60 BPM

## 2023-12-15 LAB
ALBUMIN SERPL-MCNC: 3.3 G/DL (ref 3.4–4.8)
ALBUMIN/GLOB SERPL: 1.3 RATIO (ref 1.1–2)
ALP SERPL-CCNC: 60 UNIT/L (ref 40–150)
ALT SERPL-CCNC: 8 UNIT/L (ref 0–55)
APTT PPP: 28.7 SECONDS (ref 23.2–33.7)
AST SERPL-CCNC: 10 UNIT/L (ref 5–34)
BASOPHILS # BLD AUTO: 0.03 X10(3)/MCL
BASOPHILS NFR BLD AUTO: 0.3 %
BILIRUB SERPL-MCNC: 0.3 MG/DL
BUN SERPL-MCNC: 49.1 MG/DL (ref 8.4–25.7)
CALCIUM SERPL-MCNC: 7.6 MG/DL (ref 8.8–10)
CHLORIDE SERPL-SCNC: 111 MMOL/L (ref 98–107)
CO2 SERPL-SCNC: 17 MMOL/L (ref 23–31)
CREAT SERPL-MCNC: 4.28 MG/DL (ref 0.73–1.18)
EOSINOPHIL # BLD AUTO: 0.05 X10(3)/MCL (ref 0–0.9)
EOSINOPHIL NFR BLD AUTO: 0.6 %
ERYTHROCYTE [DISTWIDTH] IN BLOOD BY AUTOMATED COUNT: 21.2 % (ref 11.5–17)
GFR SERPLBLD CREATININE-BSD FMLA CKD-EPI: 15 MLS/MIN/1.73/M2
GLOBULIN SER-MCNC: 2.5 GM/DL (ref 2.4–3.5)
GLUCOSE SERPL-MCNC: 107 MG/DL (ref 82–115)
HCT VFR BLD AUTO: 35.6 % (ref 42–52)
HGB BLD-MCNC: 11.1 G/DL (ref 14–18)
IMM GRANULOCYTES # BLD AUTO: 0.03 X10(3)/MCL (ref 0–0.04)
IMM GRANULOCYTES NFR BLD AUTO: 0.3 %
INR PPP: 1.1
LYMPHOCYTES # BLD AUTO: 1.16 X10(3)/MCL (ref 0.6–4.6)
LYMPHOCYTES NFR BLD AUTO: 13.4 %
MAGNESIUM SERPL-MCNC: 1.5 MG/DL (ref 1.6–2.6)
MCH RBC QN AUTO: 28.7 PG (ref 27–31)
MCHC RBC AUTO-ENTMCNC: 31.2 G/DL (ref 33–36)
MCV RBC AUTO: 92 FL (ref 80–94)
MONOCYTES # BLD AUTO: 0.66 X10(3)/MCL (ref 0.1–1.3)
MONOCYTES NFR BLD AUTO: 7.6 %
NEUTROPHILS # BLD AUTO: 6.73 X10(3)/MCL (ref 2.1–9.2)
NEUTROPHILS NFR BLD AUTO: 77.8 %
NRBC BLD AUTO-RTO: 0 %
PLATELET # BLD AUTO: 155 X10(3)/MCL (ref 130–400)
PMV BLD AUTO: 10.3 FL (ref 7.4–10.4)
POTASSIUM SERPL-SCNC: 4.9 MMOL/L (ref 3.5–5.1)
PROT SERPL-MCNC: 5.8 GM/DL (ref 5.8–7.6)
PROTHROMBIN TIME: 14.3 SECONDS (ref 12.5–14.5)
RBC # BLD AUTO: 3.87 X10(6)/MCL (ref 4.7–6.1)
SODIUM SERPL-SCNC: 137 MMOL/L (ref 136–145)
WBC # SPEC AUTO: 8.66 X10(3)/MCL (ref 4.5–11.5)

## 2023-12-15 PROCEDURE — 85610 PROTHROMBIN TIME: CPT | Performed by: NURSE PRACTITIONER

## 2023-12-15 PROCEDURE — 85730 THROMBOPLASTIN TIME PARTIAL: CPT | Performed by: NURSE PRACTITIONER

## 2023-12-15 PROCEDURE — 25000003 PHARM REV CODE 250: Performed by: NURSE PRACTITIONER

## 2023-12-15 PROCEDURE — 80053 COMPREHEN METABOLIC PANEL: CPT | Performed by: NURSE PRACTITIONER

## 2023-12-15 PROCEDURE — 63600175 PHARM REV CODE 636 W HCPCS: Performed by: NURSE PRACTITIONER

## 2023-12-15 PROCEDURE — 83735 ASSAY OF MAGNESIUM: CPT | Performed by: NURSE PRACTITIONER

## 2023-12-15 PROCEDURE — 85025 COMPLETE CBC W/AUTO DIFF WBC: CPT | Performed by: NURSE PRACTITIONER

## 2023-12-15 RX ORDER — HYDROCODONE BITARTRATE AND ACETAMINOPHEN 5; 325 MG/1; MG/1
1 TABLET ORAL EVERY 6 HOURS PRN
Qty: 20 TABLET | Refills: 0 | Status: SHIPPED | OUTPATIENT
Start: 2023-12-15 | End: 2024-02-09

## 2023-12-15 RX ORDER — HYDROCODONE BITARTRATE AND ACETAMINOPHEN 5; 325 MG/1; MG/1
1 TABLET ORAL EVERY 4 HOURS PRN
Status: DISCONTINUED | OUTPATIENT
Start: 2023-12-15 | End: 2023-12-15 | Stop reason: HOSPADM

## 2023-12-15 RX ADMIN — ALVIMOPAN 12 MG: 12 CAPSULE ORAL at 08:12

## 2023-12-15 RX ADMIN — ACETAMINOPHEN 1000 MG: 10 INJECTION, SOLUTION INTRAVENOUS at 05:12

## 2023-12-15 RX ADMIN — HYDROMORPHONE HYDROCHLORIDE 1 MG: 2 INJECTION INTRAMUSCULAR; INTRAVENOUS; SUBCUTANEOUS at 09:12

## 2023-12-15 RX ADMIN — ERTAPENEM 1 G: 1 INJECTION INTRAMUSCULAR; INTRAVENOUS at 06:12

## 2023-12-15 RX ADMIN — HYDROMORPHONE HYDROCHLORIDE 1 MG: 2 INJECTION INTRAMUSCULAR; INTRAVENOUS; SUBCUTANEOUS at 12:12

## 2023-12-15 RX ADMIN — HYDROCODONE BITARTRATE AND ACETAMINOPHEN 1 TABLET: 5; 325 TABLET ORAL at 03:12

## 2023-12-15 NOTE — PROGRESS NOTES
SURG ONC POD 1    PT DOING VERY WELL  NO ISSUES REPORTED  TOLERATING DIET  WANTS MORE TO EAT  PAIN CONTROLLED  NO NAUSEA  NO FEVER OR CHILLS    ABD SOFT  INCISIONS ALL HEALING WELL    VSS; NO FEVER  WBC 8000  HH 11/35  BUN/CREAT: 49/4.28  UO MORE THAN ADEQUATE    PLAN  FULLS  HL IV  DC BELTRAN  ADV TO LOW RESIDUE IF GOOD  POSSIBLE DC THIS EVENING IF ALL OK    CALLED AND SPOKE WITH GOSIA NP WITH [facility]  THEY WILL COME SEE PT AS HE HAS A HX OF SIGNIFICANT CKD WITH PLANS FOR DIAYLSIS IN THE FUTURE  APPRECIATE THEIR ASSISTANCE

## 2023-12-15 NOTE — CONSULTS
[facility] - 8th Floor Med Surg  Nephrology  Consult Note    Patient Name: Bridget Cardenas Jr.  MRN: [id number]  Admission Date: 12/14/2023  Hospital Length of Stay: 1 days  Attending Provider: Aris Scott MD   Primary Care Physician: No, Primary Doctor  Principal Problem:<principal problem not specified>    Consults  Subjective:     HPI:  This is a 63-year-old male recently diagnosed with CKD 5 when he was established at [facility] on 11/29/2023.  At that time he had no urgent indication for initiation of dialysis of he is to see vascular surgeon in the beginning of January for creation proteinuria he has a history of bladder cancer 5 years ago, status post cystectomy and neobladder creation.  He suffers from chronic hydronephrosis and self catheterizes.  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  Workup this admission again shows CKD 5.  Nephrology was consulted for the latter.    He is now status post flatus and bowel movement.  Denies nausea, no asterixis, no dysgeusia, no confusion or lethargy noted.  He is without respiratory distress on room air.  Wife at bedside.    Past Medical History:   Diagnosis Date    Cancer     hx of bladder cancer    Malignant neoplasm of transverse colon     Obesity     Renal disorder     CKD 5       Past Surgical History:   Procedure Laterality Date    COLONOSCOPY N/A 10/25/2023    Procedure: COLONOSCOPY, WITH HEMORRHAGE CONTROL;  Surgeon: Angy Mcguire MD;  Location: [facility] ENDOSCOPY;  Service: Gastroenterology;  Laterality: N/A;  sigmoid clips    COLONOSCOPY, WITH 1 OR MORE BIOPSIES N/A 10/25/2023    Procedure: COLONOSCOPY, WITH 1 OR MORE BIOPSIES;  Surgeon: Angy Mcguire MD;  Location: [facility] ENDOSCOPY;  Service: Gastroenterology;  Laterality: N/A;  transverse colon mass    COLONOSCOPY, WITH POLYPECTOMY USING SNARE Left 10/25/2023    Procedure: COLONOSCOPY,  WITH POLYPECTOMY USING SNARE;  Surgeon: Angy Mcguire MD;  Location: [facility] ENDOSCOPY;  Service: Gastroenterology;  Laterality: Left;    CYSTECTOMY      ESOPHAGOGASTRODUODENOSCOPY Left 10/25/2023    Procedure: EGD (ESOPHAGOGASTRODUODENOSCOPY);  Surgeon: Angy Mcguire MD;  Location: [facility] ENDOSCOPY;  Service: Gastroenterology;  Laterality: Left;    FRACTURE SURGERY  2018    Collar none    HERNIA REPAIR  2010    ILEO LOOP NEOBLADDER      MEDIPORT INSERTION, SINGLE      PROSTATE SURGERY  10/2018    SMALL INTESTINE SURGERY  10/2018    XI ROBOTIC COLECTOMY, PARTIAL N/A 2023    Procedure: XI ROBOTIC COLECTOMY, PARTIAL;  Surgeon: Aris Scott MD;  Location: [facility] OR;  Service: Oncology;  Laterality: N/A;  TRANSVERSE COLON; POSSIBLE OPEN CASE       Review of patient's allergies indicates:   Allergen Reactions    Pcn [penicillins] Hives     Current Facility-Administered Medications   Medication Frequency    acetaminophen 1,000 mg/100 mL (10 mg/mL) injection 1,000 mg Q8H    alvimopan capsule 12 mg BID    diphenhydrAMINE injection 12.5 mg Q6H PRN    diphenhydrAMINE injection 12.5 mg Q4H PRN    enoxaparin injection 30 mg Daily    HYDROmorphone (PF) injection 1 mg Q3H PRN    HYDROmorphone (PF) injection 1 mg Q4H PRN    lactated ringers infusion Continuous    naloxone 0.4 mg/mL injection 0.02 mg PRN    ondansetron injection 8 mg Q8H PRN    promethazine tablet 25 mg Q6H PRN     Family History    None       Tobacco Use    Smoking status: Former     Current packs/day: 0.00     Types: Cigarettes     Quit date:      Years since quittin.9     Passive exposure: Current    Smokeless tobacco: Current     Types: Chew   Substance and Sexual Activity    Alcohol use: Never    Drug use: Never    Sexual activity: Yes     Partners: Female     Birth control/protection: None     Review of Systems   Constitutional:  Negative for fatigue and fever.   Respiratory:  Negative for shortness of breath.     Cardiovascular:  Negative for chest pain and leg swelling.   Genitourinary: Negative.      Objective:     Vital Signs (Most Recent):  Temp: 97.9 °F (36.6 °C) (12/15/23 0739)  Pulse: (!) 58 (12/15/23 0739)  Resp: 16 (12/15/23 0925)  BP: 133/78 (12/15/23 0739)  SpO2: 99 % (12/15/23 0739) Vital Signs (24h Range):  Temp:  [97.2 °F (36.2 °C)-98.2 °F (36.8 °C)] 97.9 °F (36.6 °C)  Pulse:  [58-90] 58  Resp:  [10-19] 16  SpO2:  [95 %-99 %] 99 %  BP: (124-135)/(72-95) 133/78     Weight: 88.5 kg (195 lb 1.7 oz) (12/14/23 0916)  Body mass index is 32.47 kg/m².  Body surface area is 2.01 meters squared.    I/O last 3 completed shifts:  In: 2340 [P.O.:840; IV Piggyback:1500]  Out: 3000 [Urine:3000]    Physical Exam  Constitutional:       General: He is not in acute distress.  HENT:      Head: Atraumatic.      Nose: Nose normal.      Mouth/Throat:      Mouth: Mucous membranes are moist.   Eyes:      Extraocular Movements: Extraocular movements intact.      Conjunctiva/sclera: Conjunctivae normal.   Cardiovascular:      Rate and Rhythm: Normal rate and regular rhythm.      Pulses: Normal pulses.      Heart sounds: Normal heart sounds.   Pulmonary:      Effort: Pulmonary effort is normal.      Breath sounds: Normal breath sounds.   Abdominal:      Comments: Laparoscopic incisions approximated    Musculoskeletal:         General: No swelling.   Skin:     General: Skin is warm.   Neurological:      Mental Status: He is alert and oriented to person, place, and time.         Significant Labs:  BMP:   Recent Labs   Lab 12/15/23  0559      K 4.9   CO2 17*   BUN 49.1*   CREATININE 4.28*   CALCIUM 7.6*   MG 1.50*     CBC:   Recent Labs   Lab 12/15/23  0559   WBC 8.66   RBC 3.87*   HGB 11.1*   HCT 35.6*      MCV 92.0   MCH 28.7   MCHC 31.2*     Coagulation:   Recent Labs   Lab 12/15/23  0559   INR 1.1     LFTs:   Recent Labs   Lab 12/15/23  0559   ALT 8   AST 10   ALKPHOS 60   BILITOT 0.3   ALBUMIN 3.3*     Microbiology  "Results (last 7 days)       ** No results found for the last 168 hours. **          Specimen (24h ago, onward)       Start     Ordered    12/14/23 1321  Specimen to Pathology  RELEASE UPON ORDERING        References:    Click here for ordering Quick Tip   Question:  Release to patient  Answer:  Immediate    12/14/23 1321                  No results for input(s): "COLORU", "CLARITYU", "SPECGRAV", "PHUR", "PROTEINUA", "GLUCOSEU", "BILIRUBINCON", "BLOODU", "WBCU", "RBCU", "BACTERIA", "MUCUS", "NITRITE", "LEUKOCYTESUR", "UROBILINOGEN", "HYALINECASTS" in the last 168 hours.    Significant Imaging:  No new imaging this admission     Assessment/Plan:     CKD 5-stable.  Pending AV fistula creation in January 2024.    2. 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 Metabolic acidosis   4. Anemia  5. Status post bladder cancer total cystectomy prostatectomy status post neobladder.  Self catheterization required.  Chronic hydronephrosis.        Understands that dialysis initiation in the near future.  No urgent indication for dialysis today.  From renal standpoint okay to discharge when cleared by surgery team       MARCO Sahu  Nephrology  [facility] - 8th Floor Med Surg  "

## 2023-12-15 NOTE — PLAN OF CARE
12/15/23 1103   Discharge Assessment   Assessment Type Discharge Planning Assessment   Confirmed/corrected address, phone number and insurance Yes   Confirmed Demographics Correct on Facesheet   Source of Information patient;family   When was your last doctors appointment? 12/11/23   Reason For Admission colon cancer   People in Home spouse   Do you expect to return to your current living situation? Yes   Do you have help at home or someone to help you manage your care at home? Yes   Who are your caregiver(s) and their phone number(s)? Pt lives with wife Isadora [phone]   Current cognitive status: Alert/Oriented;No Deficits   Walking or Climbing Stairs Difficulty no   Dressing/Bathing Difficulty no   Home Layout Able to live on 1st floor   Equipment Currently Used at Home urinary catheter supplies   Do you currently have service(s) that help you manage your care at home? No   Do you take prescription medications? Yes   Do you have prescription coverage? Yes   Coverage [employer]   Do you have any problems affording any of your prescribed medications? No   Is the patient taking medications as prescribed? yes   Who is going to help you get home at discharge? Isadora   How do you get to doctors appointments? car, drives self   Are you on dialysis? No   Do you take coumadin? No   Discharge Plan A Home with family   Discharge Plan B Home with family   Discharge Plan discussed with: Patient;Spouse/sig other   Name(s) and Number(s) Isadora [phone]   Transition of Care Barriers None     Pt states he lives with his wife in a single level home with 2 steps to enter with railings present. Pt states he is independent with ADL's prior to admission. He states he performs self cath 6 X per day. He receives supplies through his insurance. He is not active with at home health agency. Will follow for discharge needs.

## 2023-12-15 NOTE — PROGRESS NOTES
Pt and wife educated on discharge instructions. All questions and concerns addressed. Ivs removed. VSS. Discharged medications was received by pt from retail pharmacy.Pt being transferred in wheelchair by Unit secretary Bryley

## 2023-12-15 NOTE — PROGRESS NOTES
Inpatient Nutrition Evaluation    Admit Date: 12/14/2023   Total duration of encounter: 1 day   Patient Age: 63 y.o.    Nutrition Recommendation/Prescription     -Advance diet as tolerated per MD. Goal Diet: Low Residue Diet.   -Boost Plus TID for additional nourishment while on liquids.   -Monitor wt, labs, and intake.     Nutrition Assessment     Chart Review    Reason Seen: continuous nutrition monitoring    Malnutrition Screening Tool Results              Diagnosis:  CKD 5-stable.  Pending AV fistula creation in January 2024.    2. 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 Metabolic acidosis   4. Anemia  5. Status post bladder cancer total cystectomy prostatectomy status post neobladder.  Self catheterization required.  Chronic hydronephrosis.    Relevant Medical History:    Cancer     hx of bladder cancer    Malignant neoplasm of transverse colon    Obesity    Renal disorder     CKD 5       Scheduled Medications:  acetaminophen, 1,000 mg, Q8H  alvimopan, 12 mg, BID  enoxparin, 30 mg, Daily    Continuous Infusions:  lactated ringers, Last Rate: 75 mL/hr at 12/14/23 1450    PRN Medications: diphenhydrAMINE, diphenhydrAMINE, HYDROmorphone, HYDROmorphone, naloxone, ondansetron, promethazine    Recent Labs   Lab 12/11/23  1506 12/14/23  0855 12/15/23  0559    137 137   K 5.2* 4.8 4.9   CALCIUM 9.3 8.0* 7.6*   MG  --   --  1.50*   CHLORIDE 112* 111* 111*   CO2 20* 21* 17*   BUN 63.1* 52.9* 49.1*   CREATININE 4.69* 4.81* 4.28*   EGFRNORACEVR 13 13 15   GLUCOSE 94 98 107   BILITOT 0.3  --  0.3   ALKPHOS 70  --  60   ALT 6  --  8   AST 7  --  10   ALBUMIN 3.9  --  3.3*   TRIG 54  --   --    WBC 6.84  --  8.66   HGB 12.8*  --  11.1*   HCT 43.1  --  35.6*     Nutrition Orders:  Diet full liquid      Appetite/Oral Intake: good/% of meals  Factors Affecting Nutritional Intake: none identified  Food/Protestant/Cultural Preferences: none reported  Food  "Allergies: no known food allergies  Last Bowel Movement: 12/13/23  Wound(s):  surgical incision    Comments    12/15/23: Pt reports good appetite/intake, denies n/v; states that he is "hungry and ready for solids"; receptive to oral supplement while on liquids; reports usual wt of ~ 196 lbs.     Anthropometrics    Height: 5' 5" (165.1 cm), Height Method: Stated  Last Weight: 88.5 kg (195 lb 1.7 oz) (12/14/23 0916), Weight Method: Standard Scale  BMI (Calculated): 32.5  BMI Classification: obese grade I (BMI 30-34.9)        Ideal Body Weight (IBW), Male: 136 lb     % Ideal Body Weight, Male (lb): 145.59 %                          Usual Weight Provided By: patient    Wt Readings from Last 5 Encounters:   12/14/23 88.5 kg (195 lb 1.7 oz)   12/11/23 89.8 kg (198 lb)   12/06/23 90.2 kg (198 lb 12.8 oz)   11/29/23 90 kg (198 lb 6.4 oz)   11/22/23 88.9 kg (196 lb)     Weight Change(s) Since Admission:   Wt Readings from Last 1 Encounters:   12/14/23 0916 88.5 kg (195 lb 1.7 oz)   12/06/23 1542 89.8 kg (198 lb)   Admit Weight: 89.8 kg (198 lb) (12/06/23 1542), Weight Method: Stated    Patient Education     Not applicable.    Nutrition Goals & Monitoring     Dietitian will monitor: energy intake and weight    Nutrition Risk/Follow-Up: low (follow-up in 5-7 days)  Patients assigned 'low nutrition risk' status do not qualify for a full nutritional assessment but will be monitored and re-evaluated in a 5-7 day time period. Please consult if re-evaluation needed sooner.   "

## 2023-12-17 ENCOUNTER — PATIENT MESSAGE (OUTPATIENT)
Dept: ADMINISTRATIVE | Facility: OTHER | Age: 63
End: 2023-12-17
Payer: COMMERCIAL

## 2023-12-18 LAB
ABO + RH BLD: NORMAL
ABO + RH BLD: NORMAL
BLD PROD TYP BPU: NORMAL
BLD PROD TYP BPU: NORMAL
BLOOD UNIT EXPIRATION DATE: NORMAL
BLOOD UNIT EXPIRATION DATE: NORMAL
BLOOD UNIT TYPE CODE: 7300
BLOOD UNIT TYPE CODE: 7300
CROSSMATCH INTERPRETATION: NORMAL
CROSSMATCH INTERPRETATION: NORMAL
DISPENSE STATUS: NORMAL
DISPENSE STATUS: NORMAL
PSYCHE PATHOLOGY RESULT: NORMAL
UNIT NUMBER: NORMAL
UNIT NUMBER: NORMAL

## 2023-12-19 ENCOUNTER — TELEPHONE (OUTPATIENT)
Dept: SURGICAL ONCOLOGY | Facility: CLINIC | Age: 63
End: 2023-12-19

## 2023-12-19 ENCOUNTER — DOCUMENTATION ONLY (OUTPATIENT)
Dept: SURGICAL ONCOLOGY | Facility: CLINIC | Age: 63
End: 2023-12-19

## 2023-12-19 NOTE — TELEPHONE ENCOUNTER
Received second phone call from patient's wife stating she was wondering the date of when I faxed over the patients leave paperwork. I informed her that I faxed it over on 12/11 to the number that was on the corner of his paperwork [phone]. She stated that was a correct number but that they called her to let her know they have yet to receive the completed forms. She asked if I was able to email the forms and I informed her that I do not email forms but that if she could get me a secondary fax number to send it to that I would resend it. She contacted me back with this phone number and to put attention to Estuardo in HR.. [phone]. I refaxed the forms for the second time. cpl

## 2023-12-20 ENCOUNTER — PATIENT MESSAGE (OUTPATIENT)
Dept: ADMINISTRATIVE | Facility: OTHER | Age: 63
End: 2023-12-20
Payer: COMMERCIAL

## 2023-12-21 ENCOUNTER — PATIENT MESSAGE (OUTPATIENT)
Dept: NEPHROLOGY | Facility: CLINIC | Age: 63
End: 2023-12-21
Payer: COMMERCIAL

## 2023-12-21 ENCOUNTER — OFFICE VISIT (OUTPATIENT)
Dept: HEMATOLOGY/ONCOLOGY | Facility: CLINIC | Age: 63
End: 2023-12-21
Payer: COMMERCIAL

## 2023-12-21 VITALS
WEIGHT: 198 LBS | DIASTOLIC BLOOD PRESSURE: 82 MMHG | BODY MASS INDEX: 32.99 KG/M2 | HEART RATE: 66 BPM | OXYGEN SATURATION: 100 % | HEIGHT: 65 IN | SYSTOLIC BLOOD PRESSURE: 135 MMHG | TEMPERATURE: 98 F | RESPIRATION RATE: 14 BRPM

## 2023-12-21 DIAGNOSIS — D12.8 TUBULOVILLOUS ADENOMA OF RECTUM: ICD-10-CM

## 2023-12-21 DIAGNOSIS — C18.4 ADENOCARCINOMA OF TRANSVERSE COLON: Primary | ICD-10-CM

## 2023-12-21 PROCEDURE — 3044F PR MOST RECENT HEMOGLOBIN A1C LEVEL <7.0%: ICD-10-PCS | Mod: CPTII,S$GLB,, | Performed by: INTERNAL MEDICINE

## 2023-12-21 PROCEDURE — 99999 PR PBB SHADOW E&M-EST. PATIENT-LVL IV: ICD-10-PCS | Mod: PBBFAC,,, | Performed by: INTERNAL MEDICINE

## 2023-12-21 PROCEDURE — 3075F SYST BP GE 130 - 139MM HG: CPT | Mod: CPTII,S$GLB,, | Performed by: INTERNAL MEDICINE

## 2023-12-21 PROCEDURE — 3008F PR BODY MASS INDEX (BMI) DOCUMENTED: ICD-10-PCS | Mod: CPTII,S$GLB,, | Performed by: INTERNAL MEDICINE

## 2023-12-21 PROCEDURE — 1111F DSCHRG MED/CURRENT MED MERGE: CPT | Mod: CPTII,S$GLB,, | Performed by: INTERNAL MEDICINE

## 2023-12-21 PROCEDURE — 1160F RVW MEDS BY RX/DR IN RCRD: CPT | Mod: CPTII,S$GLB,, | Performed by: INTERNAL MEDICINE

## 2023-12-21 PROCEDURE — 1159F PR MEDICATION LIST DOCUMENTED IN MEDICAL RECORD: ICD-10-PCS | Mod: CPTII,S$GLB,, | Performed by: INTERNAL MEDICINE

## 2023-12-21 PROCEDURE — 3066F NEPHROPATHY DOC TX: CPT | Mod: CPTII,S$GLB,, | Performed by: INTERNAL MEDICINE

## 2023-12-21 PROCEDURE — 99215 PR OFFICE/OUTPT VISIT, EST, LEVL V, 40-54 MIN: ICD-10-PCS | Mod: S$GLB,,, | Performed by: INTERNAL MEDICINE

## 2023-12-21 PROCEDURE — 1160F PR REVIEW ALL MEDS BY PRESCRIBER/CLIN PHARMACIST DOCUMENTED: ICD-10-PCS | Mod: CPTII,S$GLB,, | Performed by: INTERNAL MEDICINE

## 2023-12-21 PROCEDURE — 1111F PR DISCHARGE MEDS RECONCILED W/ CURRENT OUTPATIENT MED LIST: ICD-10-PCS | Mod: CPTII,S$GLB,, | Performed by: INTERNAL MEDICINE

## 2023-12-21 PROCEDURE — 3079F DIAST BP 80-89 MM HG: CPT | Mod: CPTII,S$GLB,, | Performed by: INTERNAL MEDICINE

## 2023-12-21 PROCEDURE — 3066F PR DOCUMENTATION OF TREATMENT FOR NEPHROPATHY: ICD-10-PCS | Mod: CPTII,S$GLB,, | Performed by: INTERNAL MEDICINE

## 2023-12-21 PROCEDURE — 3044F HG A1C LEVEL LT 7.0%: CPT | Mod: CPTII,S$GLB,, | Performed by: INTERNAL MEDICINE

## 2023-12-21 PROCEDURE — 1159F MED LIST DOCD IN RCRD: CPT | Mod: CPTII,S$GLB,, | Performed by: INTERNAL MEDICINE

## 2023-12-21 PROCEDURE — 3075F PR MOST RECENT SYSTOLIC BLOOD PRESS GE 130-139MM HG: ICD-10-PCS | Mod: CPTII,S$GLB,, | Performed by: INTERNAL MEDICINE

## 2023-12-21 PROCEDURE — 3008F BODY MASS INDEX DOCD: CPT | Mod: CPTII,S$GLB,, | Performed by: INTERNAL MEDICINE

## 2023-12-21 PROCEDURE — 99999 PR PBB SHADOW E&M-EST. PATIENT-LVL IV: CPT | Mod: PBBFAC,,, | Performed by: INTERNAL MEDICINE

## 2023-12-21 PROCEDURE — 3079F PR MOST RECENT DIASTOLIC BLOOD PRESSURE 80-89 MM HG: ICD-10-PCS | Mod: CPTII,S$GLB,, | Performed by: INTERNAL MEDICINE

## 2023-12-21 PROCEDURE — 99215 OFFICE O/P EST HI 40 MIN: CPT | Mod: S$GLB,,, | Performed by: INTERNAL MEDICINE

## 2024-01-02 ENCOUNTER — OFFICE VISIT (OUTPATIENT)
Dept: SURGICAL ONCOLOGY | Facility: CLINIC | Age: 64
End: 2024-01-02
Payer: COMMERCIAL

## 2024-01-02 ENCOUNTER — LAB VISIT (OUTPATIENT)
Dept: LAB | Facility: HOSPITAL | Age: 64
End: 2024-01-02
Attending: NURSE PRACTITIONER
Payer: COMMERCIAL

## 2024-01-02 DIAGNOSIS — N25.81 SECONDARY HYPERPARATHYROIDISM OF RENAL ORIGIN: ICD-10-CM

## 2024-01-02 DIAGNOSIS — N18.5 CKD STAGE G5/A3, GFR <15 AND ALBUMIN CREATININE RATIO >300 MG/G: ICD-10-CM

## 2024-01-02 DIAGNOSIS — C18.4 MALIGNANT TUMOR OF TRANSVERSE COLON: Primary | ICD-10-CM

## 2024-01-02 LAB
ALBUMIN SERPL-MCNC: 3.7 G/DL (ref 3.4–4.8)
ALBUMIN/GLOB SERPL: 0.9 RATIO (ref 1.1–2)
ALP SERPL-CCNC: 72 UNIT/L (ref 40–150)
ALT SERPL-CCNC: 17 UNIT/L (ref 0–55)
AST SERPL-CCNC: 13 UNIT/L (ref 5–34)
BASOPHILS # BLD AUTO: 0.05 X10(3)/MCL
BASOPHILS NFR BLD AUTO: 0.9 %
BILIRUB SERPL-MCNC: 0.3 MG/DL
BUN SERPL-MCNC: 53.8 MG/DL (ref 8.4–25.7)
CALCIUM SERPL-MCNC: 8.9 MG/DL (ref 8.8–10)
CHLORIDE SERPL-SCNC: 111 MMOL/L (ref 98–107)
CO2 SERPL-SCNC: 21 MMOL/L (ref 23–31)
CREAT SERPL-MCNC: 4.78 MG/DL (ref 0.73–1.18)
EOSINOPHIL # BLD AUTO: 0.93 X10(3)/MCL (ref 0–0.9)
EOSINOPHIL NFR BLD AUTO: 16.7 %
ERYTHROCYTE [DISTWIDTH] IN BLOOD BY AUTOMATED COUNT: 17.6 % (ref 11.5–17)
FERRITIN SERPL-MCNC: 75.19 NG/ML (ref 21.81–274.66)
GFR SERPLBLD CREATININE-BSD FMLA CKD-EPI: 13 MLS/MIN/1.73/M2
GLOBULIN SER-MCNC: 4 GM/DL (ref 2.4–3.5)
GLUCOSE SERPL-MCNC: 80 MG/DL (ref 82–115)
HCT VFR BLD AUTO: 42.4 % (ref 42–52)
HGB BLD-MCNC: 13.5 G/DL (ref 14–18)
IMM GRANULOCYTES # BLD AUTO: 0.01 X10(3)/MCL (ref 0–0.04)
IMM GRANULOCYTES NFR BLD AUTO: 0.2 %
IRON SATN MFR SERPL: 34 % (ref 20–50)
IRON SERPL-MCNC: 82 UG/DL (ref 65–175)
LYMPHOCYTES # BLD AUTO: 1.74 X10(3)/MCL (ref 0.6–4.6)
LYMPHOCYTES NFR BLD AUTO: 31.3 %
MCH RBC QN AUTO: 29.2 PG (ref 27–31)
MCHC RBC AUTO-ENTMCNC: 31.8 G/DL (ref 33–36)
MCV RBC AUTO: 91.6 FL (ref 80–94)
MONOCYTES # BLD AUTO: 0.36 X10(3)/MCL (ref 0.1–1.3)
MONOCYTES NFR BLD AUTO: 6.5 %
NEUTROPHILS # BLD AUTO: 2.47 X10(3)/MCL (ref 2.1–9.2)
NEUTROPHILS NFR BLD AUTO: 44.4 %
NRBC BLD AUTO-RTO: 0 %
PHOSPHATE SERPL-MCNC: 4 MG/DL (ref 2.3–4.7)
PLATELET # BLD AUTO: 192 X10(3)/MCL (ref 130–400)
PMV BLD AUTO: 9.7 FL (ref 7.4–10.4)
POTASSIUM SERPL-SCNC: 4.5 MMOL/L (ref 3.5–5.1)
PROT SERPL-MCNC: 7.7 GM/DL (ref 5.8–7.6)
PTH-INTACT SERPL-MCNC: 152.4 PG/ML (ref 8.7–77)
RBC # BLD AUTO: 4.63 X10(6)/MCL (ref 4.7–6.1)
SODIUM SERPL-SCNC: 140 MMOL/L (ref 136–145)
TIBC SERPL-MCNC: 157 UG/DL (ref 69–240)
TIBC SERPL-MCNC: 239 UG/DL (ref 250–450)
TRANSFERRIN SERPL-MCNC: 215 MG/DL (ref 163–344)
WBC # SPEC AUTO: 5.56 X10(3)/MCL (ref 4.5–11.5)

## 2024-01-02 PROCEDURE — 80053 COMPREHEN METABOLIC PANEL: CPT

## 2024-01-02 PROCEDURE — 36415 COLL VENOUS BLD VENIPUNCTURE: CPT

## 2024-01-02 PROCEDURE — 99024 POSTOP FOLLOW-UP VISIT: CPT | Mod: S$GLB,,, | Performed by: NURSE PRACTITIONER

## 2024-01-02 PROCEDURE — 83540 ASSAY OF IRON: CPT

## 2024-01-02 PROCEDURE — 84100 ASSAY OF PHOSPHORUS: CPT

## 2024-01-02 PROCEDURE — 99999 PR PBB SHADOW E&M-EST. PATIENT-LVL I: CPT | Mod: PBBFAC,,, | Performed by: NURSE PRACTITIONER

## 2024-01-02 PROCEDURE — 83970 ASSAY OF PARATHORMONE: CPT

## 2024-01-02 PROCEDURE — 82728 ASSAY OF FERRITIN: CPT

## 2024-01-02 PROCEDURE — 85025 COMPLETE CBC W/AUTO DIFF WBC: CPT

## 2024-01-02 NOTE — PROGRESS NOTES
POST OP LAP/GIANLUCA TRANSVERSE COLON RESECTION  DX TRANSVERSE COLON CANCER    PT LOOKS GOOD  TELLS ME HE IS DOING WELL  NO ISSUES REPORTED  EATING  HAVING BMS  NO FEVER OR CHILLS  NO COMPLAINTS OF PAIN    ABD SOFT  INCISIONS HEALING WELL  NO SIGNS OF INFECTION    PATH: COLON CANCER 6CM, CLEAR MARGINS, NEG NODES  PATH DISCUSSED WITH PT AND QUESTIONS ANSWERED  DR BARAJAS REVIEWS PATH    PT HAS ALREADY SEEN MED ONC DR CASON  PT TELLS ME NO PLANS FOR CHEMOTHERAPY    PT IS ESTABLISHED WITH DR BRADLEY AND WILL HAVE TUNNELED CATH PLACED FOR UPCOMING DIALYSIS, TELLS ME RENAL PLANS TO BEGIN 2-3 MONTHS    RESTRICTIONS DISCUSSED AND QUESTIONS ANSWERED  HE WILL HAVE ONE YEAR COLONOSCOPY AT [facility], PT ARRANGED    HE KNOWS TO CALL WITH ANY PROBLEMS    RTC 6 MONTHS

## 2024-01-02 NOTE — DISCHARGE SUMMARY
[facility] - 8th Floor Med Surg  General Surgery  Discharge Summary      Patient Name: Bridget Cardenas Jr.  MRN: [id number]  Admission Date: 12/14/2023  Hospital Length of Stay: 1 days  Discharge Date and Time: 12/15/2023  5:50 PM  Attending Physician: No att. providers found   Discharging Provider: MARCO ANTONIO Baxter  Primary Care Provider: Syed Denton MD    HPI:   No notes on file    Procedure(s) (LRB):  XI ROBOTIC COLECTOMY, PARTIAL (N/A)      Indwelling Lines/Drains at time of discharge:   Lines/Drains/Airways       Peripherally Inserted Central Catheter Line  Duration             PICC Double Lumen 01/06/23 1132 left basilic 26 days                  Hospital Course: 63 YEAR OLD MALE DX TRANSVERSE COLON CANCER UNDERWENT LAP/GIANLUCA TRANSVERSE COLON RESECTION; NO POST OPERATIVE COMPLICATIONS; STABLE ON DISCHARGE    Goals of Care Treatment Preferences:  Code Status: Full Code      Consults:       Significant Diagnostic Studies: N/A    Pending Diagnostic Studies:       None          Final Active Diagnoses:    Diagnosis Date Noted POA    PRINCIPAL PROBLEM:  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    Specialty: Nurse Practitioner  Contact information:  [address]  [address]  [facility] [address] [phone]                           Patient Instructions:   No discharge procedures on file.  Medications:  Reconciled Home Medications:      Medication List        START taking these medications      HYDROcodone-acetaminophen 5-325 mg per tablet  Commonly known as: NORCO  Take 1 tablet by mouth every 6 (six) hours as needed for Pain.            CONTINUE taking these medications      calcitRIOL 0.25 MCG Cap  Commonly known as: ROCALTROL  Take 1 capsule (0.25 mcg total) by mouth once  daily.     metroNIDAZOLE 250 MG tablet  Commonly known as: FLAGYL  TAKE 2 TABLETS AT 1PM, 5PM AND 9PM THE EVENING PRIOR TO SURGERY     neomycin 500 mg Tab  Commonly known as: MYCIFRADIN  TAKE 2 TABLETS AT 1PM, 5PM AND 9PM THE EVENING PRIOR TO SURGERY     patiromer calcium sorbitex 8.4 gram Pwpk  Commonly known as: VELTASSA  Take 1 packet (8.4 g total) by mouth every other day.     sodium bicarbonate 650 MG tablet  Take 2 tablets (1,300 mg total) by mouth 2 (two) times daily.            Time spent on the discharge of patient:  minutes    MARCO ANTONIO Baxter  General Surgery  [facility] - 8th Floor Med Surg

## 2024-01-04 ENCOUNTER — OFFICE VISIT (OUTPATIENT)
Dept: NEPHROLOGY | Facility: CLINIC | Age: 64
End: 2024-01-04
Payer: COMMERCIAL

## 2024-01-04 VITALS
SYSTOLIC BLOOD PRESSURE: 114 MMHG | RESPIRATION RATE: 20 BRPM | OXYGEN SATURATION: 99 % | HEART RATE: 82 BPM | HEIGHT: 65 IN | DIASTOLIC BLOOD PRESSURE: 76 MMHG | BODY MASS INDEX: 33.66 KG/M2 | WEIGHT: 202 LBS

## 2024-01-04 DIAGNOSIS — E87.20 METABOLIC ACIDOSIS: ICD-10-CM

## 2024-01-04 DIAGNOSIS — N18.5 CKD STAGE G5/A3, GFR <15 AND ALBUMIN CREATININE RATIO >300 MG/G: Primary | ICD-10-CM

## 2024-01-04 DIAGNOSIS — N25.81 SECONDARY HYPERPARATHYROIDISM OF RENAL ORIGIN: ICD-10-CM

## 2024-01-04 DIAGNOSIS — E87.5 HYPERKALEMIA: ICD-10-CM

## 2024-01-04 PROCEDURE — 3066F NEPHROPATHY DOC TX: CPT | Mod: CPTII,,, | Performed by: NURSE PRACTITIONER

## 2024-01-04 PROCEDURE — 3078F DIAST BP <80 MM HG: CPT | Mod: CPTII,,, | Performed by: NURSE PRACTITIONER

## 2024-01-04 PROCEDURE — 3074F SYST BP LT 130 MM HG: CPT | Mod: CPTII,,, | Performed by: NURSE PRACTITIONER

## 2024-01-04 PROCEDURE — 1160F RVW MEDS BY RX/DR IN RCRD: CPT | Mod: CPTII,,, | Performed by: NURSE PRACTITIONER

## 2024-01-04 PROCEDURE — 1111F DSCHRG MED/CURRENT MED MERGE: CPT | Mod: CPTII,,, | Performed by: NURSE PRACTITIONER

## 2024-01-04 PROCEDURE — 99214 OFFICE O/P EST MOD 30 MIN: CPT | Mod: PBBFAC | Performed by: NURSE PRACTITIONER

## 2024-01-04 PROCEDURE — 99214 OFFICE O/P EST MOD 30 MIN: CPT | Mod: S$PBB,,, | Performed by: NURSE PRACTITIONER

## 2024-01-04 PROCEDURE — 3008F BODY MASS INDEX DOCD: CPT | Mod: CPTII,,, | Performed by: NURSE PRACTITIONER

## 2024-01-04 PROCEDURE — 1159F MED LIST DOCD IN RCRD: CPT | Mod: CPTII,,, | Performed by: NURSE PRACTITIONER

## 2024-01-04 NOTE — PROGRESS NOTES
[facility]  Nephrology Clinic Note    Chief complaint: Chronic Kidney Disease (Follow up)    History of present illness:   Bridget Cardenas Jr. is a 63 y.o. White male with past medical history of bladder cancer, status post total cystectomy and prostatectomy in 2018 with neobladder creation (patient self catheterizes 6 times a day); advanced chronic kidney disease, chronic hydronephrosis, metabolic acidosis. 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  Patient tolerated surgery without complications.  Presents for follow-up appointment in Nephrology Clinic, denies complaints.       Review of Systems  12 point review of systems conducted, negative except as stated in the history of present illness.    Allergies: Patient is allergic to pcn [penicillins].     Past Medical History:  has a past medical history of Cancer, Malignant neoplasm of transverse colon, Obesity, and Renal disorder.    Procedure History:  has a past surgical history that includes colonoscopy, with polypectomy using snare (Left, 10/25/2023); Esophagogastroduodenoscopy (Left, 10/25/2023); colonoscopy, with 1 or more biopsies (N/A, 10/25/2023); Colonoscopy (N/A, 10/25/2023); Prostate surgery (10/2018); Small intestine surgery (10/2018); Fracture surgery (7/2018); Hernia repair (11/2010); cystectomy; Mediport insertion, single; Ileo loop neobladder; and xi robotic colectomy, partial (N/A, 12/14/2023).    Family History: family history is not on file.    Social History:  reports that he quit smoking about 37 years ago. His smoking use included cigarettes. He has been exposed to tobacco smoke. His smokeless tobacco use includes chew. He reports that he does not drink alcohol and does not use drugs.    Physical exam  /76 (BP Location: Left arm, Patient Position: Sitting, BP Method: Large (Automatic))    "Pulse 82   Resp 20   Ht 5' 5" (1.651 m)   Wt 91.6 kg (202 lb)   SpO2 99%   BMI 33.61 kg/m²   General appearance: Patient is in no acute distress.  Skin: No rashes or wounds.  HEENT: PERRLA, EOMI, no scleral icterus, no JVD. Neck is supple.  Chest: Respirations are unlabored. Lungs sounds are clear.   Heart: S1, S2.   Abdomen: Benign.  : Deferred.  Extremities: No edema, peripheral pulses are palpable.   Neuro: No focal deficits.     Home Medications:    Current Outpatient Medications:     calcitRIOL (ROCALTROL) 0.25 MCG Cap, Take 1 capsule (0.25 mcg total) by mouth once daily., Disp: 90 capsule, Rfl: 0    patiromer calcium sorbitex (VELTASSA) 8.4 gram PwPk, Take 1 packet (8.4 g total) by mouth every other day., Disp: 45 packet, Rfl: 1    sodium bicarbonate 650 MG tablet, Take 2 tablets (1,300 mg total) by mouth 2 (two) times daily., Disp: 120 tablet, Rfl: 11    HYDROcodone-acetaminophen (NORCO) 5-325 mg per tablet, Take 1 tablet by mouth every 6 (six) hours as needed for Pain. (Patient not taking: Reported on 1/4/2024), Disp: 20 tablet, Rfl: 0    metroNIDAZOLE (FLAGYL) 250 MG tablet, TAKE 2 TABLETS AT 1PM, 5PM AND 9PM THE EVENING PRIOR TO SURGERY (Patient not taking: Reported on 12/21/2023), Disp: 6 tablet, Rfl: 0    neomycin (MYCIFRADIN) 500 mg Tab, TAKE 2 TABLETS AT 1PM, 5PM AND 9PM THE EVENING PRIOR TO SURGERY (Patient not taking: Reported on 12/21/2023), Disp: 6 tablet, Rfl: 0    Laboratory data    Lab Results   Component Value Date    WBC 5.56 01/02/2024    HGB 13.5 (L) 01/02/2024    HCT 42.4 01/02/2024     01/02/2024    IRON 82 01/02/2024    TIBC 239 (L) 01/02/2024    TRANS 215 01/02/2024    LABIRON 34 01/02/2024    FERRITIN 75.19 01/02/2024     01/02/2024    K 4.5 01/02/2024    CHLORIDE 111 (H) 01/02/2024    CO2 21 (L) 01/02/2024    BUN 53.8 (H) 01/02/2024    CREATININE 4.78 (H) 01/02/2024    EGFRNORACEVR 13 01/02/2024    GLUCOSE 80 (L) 01/02/2024    CALCIUM 8.9 01/02/2024    ALKPHOS 72 " 01/02/2024    LABPROT 7.7 (H) 01/02/2024    ALBUMIN 3.7 01/02/2024    BILIDIR 0.2 10/20/2023    IBILI 0.20 05/27/2021    AST 13 01/02/2024    ALT 17 01/02/2024    MG 1.50 (L) 12/15/2023    PHOS 4.0 01/02/2024      Lab Results   Component Value Date    .4 (H) 01/02/2024    OKFUIVOQ94VZ 23.9 (L) 12/11/2023    HIV Nonreactive 12/11/2023    HEPCAB Nonreactive 12/11/2023    HEPBSURFAG Nonreactive 12/11/2023    HEPBCAB Nonreactive 12/11/2023     Urine:  Lab Results   Component Value Date    COLORUA Light-Yellow 11/28/2023    APPEARANCEUA Turbid (A) 11/28/2023    SGUA 1.010 11/28/2023    PHUA 6.5 11/28/2023    PROTEINUA 1+ (A) 11/28/2023    GLUCOSEUA Normal 11/28/2023    KETONESUA Negative 11/28/2023    BLOODUA Trace (A) 11/28/2023    NITRITESUA 1+ (A) 11/28/2023    LEUKOCYTESUR 500 (A) 11/28/2023    RBCUA 6-10 (A) 11/28/2023    WBCUA >100 (A) 11/28/2023    BACTERIA Occ (A) 11/28/2023    SQEPUA None Seen 11/28/2023    HYALINECASTS None Seen 11/28/2023    CREATRANDUR 65.0 11/28/2023    PROTEINURINE 32.6 11/28/2023    UPROTCREA 0.5 11/28/2023         Imaging  US Retroperitoneal Complete 11/13/2023  Right Kidney:  Length: 10 x 5.5 x 4.6 cm  Appearance: Normal echogenicity.  Collecting system: Mild to moderate prominence of the collecting system and renal pelvis indicating the presence of hydronephrosis  Stones: None  Cyst/Mass: None  Left Kidney: Left kidneys poorly visualize due to bowel and poor acoustic windows  Length: 10.6 x 5.6 x 4.6 cm Appearance: Normal echogenicity.  Collecting system: No hydronephrosis  Stones: None  Cyst/Mass: None  Bladder:  Patient has a neobladder measuring 7.9 by 3.2 x 1.6 cm that appears to be unremarkable patient self catheterized 15 minutes prior to the study  Vessels:  Visualized portions of the IVC and aorta have a normal grayscale appearance.    Impression  Mild to moderate hydronephrosis of the right kidney.  Poor visualization of the left kidney likely related to overlying bowel  gas and poor acoustic windows.  Neobladder  Electronically signed by: Freeman Barnes  Date:    11/13/2023  Time:    12:27         Impression    ICD-10-CM ICD-9-CM   1. CKD stage G5/A3, GFR <15 and albumin creatinine ratio >300 mg/g  N18.5 585.5   2. Secondary hyperparathyroidism of renal origin  N25.81 588.81   3. Hyperkalemia  E87.5 276.7   4. Metabolic acidosis  E87.20 276.2        Plan     CKD stage G5/A3, GFR <15 and albumin creatinine ratio >300 mg/g  -     Comprehensive Metabolic Panel; Future; Expected date: 03/17/2024  -     CBC Auto Differential; Future; Expected date: 03/17/2024  -     Phosphorus; Future; Expected date: 03/17/2024  -     PTH, Intact; Future; Expected date: 03/17/2024  Possibly chronic tubular interstitial disease secondary to chronic obstructive uropathy.  Estimated GFR is 13 mL/min.  Patient has multiple complications advanced kidney dysfunction (metabolic acidosis, hyperkalemia, secondary hyperparathyroidism), that are controlled with medications at present.  Patient is not a candidate for kidney transplantation due recent diagnosis of malignancy, is not a candidate for peritoneal dialysis due to multiple abdominal surgeries.  There are no acute indications for initiation of hemodialysis, however, will need to continue preparation for RRT.  Patient has been referred to  [facility] and has an appointment next week.  Follow up in about 10 weeks (around 3/14/2024).    Secondary hyperparathyroidism of renal origin  Continue calcitriol, intact PTH level is downtrending.      Hyperkalemia  Hyperkalemia is well-controlled with Veltassa.  Continue as prescribed.      Metabolic acidosis  Continue sodium bicarbonate as ordered.        1/4/2024  Mayra Grimaldo NP  [facility]

## 2024-01-18 ENCOUNTER — HOSPITAL ENCOUNTER (OUTPATIENT)
Dept: CARDIOLOGY | Facility: HOSPITAL | Age: 64
Discharge: HOME | End: 2024-01-18
Attending: SURGERY
Payer: COMMERCIAL

## 2024-01-18 ENCOUNTER — HOSPITAL ENCOUNTER (OUTPATIENT)
Dept: RADIOLOGY | Facility: HOSPITAL | Age: 64
Discharge: HOME | End: 2024-01-18
Payer: COMMERCIAL

## 2024-01-18 DIAGNOSIS — Z01.810 PRE-OPERATIVE CARDIOVASCULAR EXAMINATION: ICD-10-CM

## 2024-01-18 DIAGNOSIS — Z01.810 PRE-OPERATIVE CARDIOVASCULAR EXAMINATION: Primary | ICD-10-CM

## 2024-01-18 DIAGNOSIS — Z01.811 PRE-OP CHEST EXAM: ICD-10-CM

## 2024-01-18 PROCEDURE — 93005 ELECTROCARDIOGRAM TRACING: CPT

## 2024-01-18 PROCEDURE — 71046 X-RAY EXAM CHEST 2 VIEWS: CPT | Mod: TC

## 2024-01-22 PROBLEM — N17.9 AKI (ACUTE KIDNEY INJURY): Status: RESOLVED | Noted: 2023-10-20 | Resolved: 2024-01-22

## 2024-02-09 ENCOUNTER — OFFICE VISIT (OUTPATIENT)
Dept: UROLOGY | Facility: CLINIC | Age: 64
End: 2024-02-09
Payer: COMMERCIAL

## 2024-02-09 VITALS
HEART RATE: 75 BPM | TEMPERATURE: 98 F | BODY MASS INDEX: 32.72 KG/M2 | HEIGHT: 65 IN | RESPIRATION RATE: 20 BRPM | SYSTOLIC BLOOD PRESSURE: 134 MMHG | WEIGHT: 196.38 LBS | DIASTOLIC BLOOD PRESSURE: 82 MMHG | OXYGEN SATURATION: 99 %

## 2024-02-09 DIAGNOSIS — C61 PROSTATE CA: ICD-10-CM

## 2024-02-09 DIAGNOSIS — R82.90 ABNORMAL URINALYSIS: Primary | ICD-10-CM

## 2024-02-09 LAB
BILIRUB SERPL-MCNC: NORMAL MG/DL
BLOOD URINE, POC: NORMAL
COLOR, POC UA: YELLOW
GLUCOSE UR QL STRIP: NORMAL
KETONES UR QL STRIP: NORMAL
LEUKOCYTE ESTERASE URINE, POC: NORMAL
NITRITE, POC UA: NORMAL
PH, POC UA: 7
PROTEIN, POC: 100
SPECIFIC GRAVITY, POC UA: 1.02
UROBILINOGEN, POC UA: 0.2

## 2024-02-09 PROCEDURE — 99214 OFFICE O/P EST MOD 30 MIN: CPT | Mod: PBBFAC | Performed by: NURSE PRACTITIONER

## 2024-02-09 PROCEDURE — 99213 OFFICE O/P EST LOW 20 MIN: CPT | Mod: S$PBB,,, | Performed by: NURSE PRACTITIONER

## 2024-02-09 PROCEDURE — 3066F NEPHROPATHY DOC TX: CPT | Mod: CPTII,,, | Performed by: NURSE PRACTITIONER

## 2024-02-09 PROCEDURE — 81001 URINALYSIS AUTO W/SCOPE: CPT | Mod: PBBFAC | Performed by: NURSE PRACTITIONER

## 2024-02-09 PROCEDURE — 87086 URINE CULTURE/COLONY COUNT: CPT | Performed by: NURSE PRACTITIONER

## 2024-02-09 PROCEDURE — 3008F BODY MASS INDEX DOCD: CPT | Mod: CPTII,,, | Performed by: NURSE PRACTITIONER

## 2024-02-09 PROCEDURE — 3079F DIAST BP 80-89 MM HG: CPT | Mod: CPTII,,, | Performed by: NURSE PRACTITIONER

## 2024-02-09 PROCEDURE — 3075F SYST BP GE 130 - 139MM HG: CPT | Mod: CPTII,,, | Performed by: NURSE PRACTITIONER

## 2024-02-09 RX ORDER — VALACYCLOVIR HYDROCHLORIDE 500 MG/1
500 TABLET, FILM COATED ORAL
COMMUNITY
End: 2024-02-09

## 2024-02-09 NOTE — PROGRESS NOTES
Placed in room. Seen by Max Larkin NP. Spoke with patient. Rectal exam done.  Urine for C&S. PSA before next appointment. RTC in 6 months with a renal U/S.

## 2024-02-09 NOTE — PROGRESS NOTES
Chief Complaint:   Chief Complaint   Patient presents with    abnormal urinalysis       HPI: Patient is a 62-year-old male referred to Urology for urinary retention.  Patient currently on self-intermittent catheterization due to a bladder cancer in 2018 in which he had his bladder removed and a total prostatectomy.  Patient reconstructed bladder was from his stomach tissue.  Patient admits to doing self-catheterizations 5-6 times daily.  Denies any dysuria, gross hematuria.  Patient being followed by Nephrology for worsening kidney function.  Plan is to notify patient of urine culture and sensitivity once completed and treat accordingly, follow up 6 months with renal ultrasound, instructed patient to continue current self-catheterizations.  RUBY exam as noted below.    Allergies:  Review of patient's allergies indicates:   Allergen Reactions    Pcn [penicillins] Hives       Medications:  Current Outpatient Medications   Medication Sig Dispense Refill    calcitRIOL (ROCALTROL) 0.25 MCG Cap Take 1 capsule (0.25 mcg total) by mouth once daily. 90 capsule 0    patiromer calcium sorbitex (VELTASSA) 8.4 gram PwPk Take 1 packet (8.4 g total) by mouth every other day. 45 packet 1    sodium bicarbonate 650 MG tablet Take 2 tablets (1,300 mg total) by mouth 2 (two) times daily. 120 tablet 11    valACYclovir (VALTREX) 500 MG tablet Take 500 mg by mouth.      HYDROcodone-acetaminophen (NORCO) 5-325 mg per tablet Take 1 tablet by mouth every 6 (six) hours as needed for Pain. (Patient not taking: Reported on 1/4/2024) 20 tablet 0    metroNIDAZOLE (FLAGYL) 250 MG tablet TAKE 2 TABLETS AT 1PM, 5PM AND 9PM THE EVENING PRIOR TO SURGERY (Patient not taking: Reported on 12/21/2023) 6 tablet 0    neomycin (MYCIFRADIN) 500 mg Tab TAKE 2 TABLETS AT 1PM, 5PM AND 9PM THE EVENING PRIOR TO SURGERY (Patient not taking: Reported on 12/21/2023) 6 tablet 0     No current facility-administered medications for this visit.       Review of  Systems:  General: No fever, chills, fatigability, or weight loss.  Skin: No rashes, itching, or changes in color or texture of skin.  Chest: Denies DIAZ, cyanosis, wheezing, cough, and sputum production.  Abdomen: Appetite fine. No weight loss. Denies diarrhea, abdominal pain, hematemesis, or blood in stool.  Musculoskeletal: No joint stiffness or swelling. Denies back pain.  : As above.  All other review of systems negative.    PMH:  Past Medical History:   Diagnosis Date    Cancer     hx of bladder cancer    Malignant neoplasm of transverse colon     Obesity     Renal disorder     CKD 5       PSH:  Past Surgical History:   Procedure Laterality Date    COLONOSCOPY N/A 10/25/2023    Procedure: COLONOSCOPY, WITH HEMORRHAGE CONTROL;  Surgeon: Angy Mcguire MD;  Location: [facility] ENDOSCOPY;  Service: Gastroenterology;  Laterality: N/A;  sigmoid clips    COLONOSCOPY, WITH 1 OR MORE BIOPSIES N/A 10/25/2023    Procedure: COLONOSCOPY, WITH 1 OR MORE BIOPSIES;  Surgeon: Angy Mcguire MD;  Location: [facility] ENDOSCOPY;  Service: Gastroenterology;  Laterality: N/A;  transverse colon mass    COLONOSCOPY, WITH POLYPECTOMY USING SNARE Left 10/25/2023    Procedure: COLONOSCOPY, WITH POLYPECTOMY USING SNARE;  Surgeon: Angy Mcguire MD;  Location: [facility] ENDOSCOPY;  Service: Gastroenterology;  Laterality: Left;    CYSTECTOMY      ESOPHAGOGASTRODUODENOSCOPY Left 10/25/2023    Procedure: EGD (ESOPHAGOGASTRODUODENOSCOPY);  Surgeon: Angy Mcguire MD;  Location: [facility] ENDOSCOPY;  Service: Gastroenterology;  Laterality: Left;    FRACTURE SURGERY  7/2018    Collar none    HERNIA REPAIR  11/2010    ILEO LOOP NEOBLADDER      MEDIPORT INSERTION, SINGLE      PROSTATE SURGERY  10/2018    SMALL INTESTINE SURGERY  10/2018    XI ROBOTIC COLECTOMY, PARTIAL N/A 12/14/2023    Procedure: XI ROBOTIC COLECTOMY, PARTIAL;  Surgeon: Airs Scott MD;  Location: [facility] OR;  Service: Oncology;  Laterality: N/A;  TRANSVERSE COLON;  POSSIBLE OPEN CASE       FamHx:  No family history on file.    SocHx:  Social History     Socioeconomic History    Marital status:    Tobacco Use    Smoking status: Former     Current packs/day: 0.00     Types: Cigarettes     Quit date:      Years since quittin.1     Passive exposure: Current    Smokeless tobacco: Current     Types: Chew   Substance and Sexual Activity    Alcohol use: Never    Drug use: Never    Sexual activity: Yes     Partners: Female     Birth control/protection: None     Social Determinants of Health     Financial Resource Strain: Medium Risk (2023)    Overall Financial Resource Strain (CARDIA)     Difficulty of Paying Living Expenses: Somewhat hard   Food Insecurity: No Food Insecurity (2023)    Hunger Vital Sign     Worried About Running Out of Food in the Last Year: Never true     Ran Out of Food in the Last Year: Never true   Transportation Needs: No Transportation Needs (2023)    PRAPARE - Transportation     Lack of Transportation (Medical): No     Lack of Transportation (Non-Medical): No   Physical Activity: Inactive (2023)    Exercise Vital Sign     Days of Exercise per Week: 5 days     Minutes of Exercise per Session: 0 min   Stress: No Stress Concern Present (2023)    [place] [employer] - Occupational Stress Questionnaire     Feeling of Stress : Not at all   Social Connections: Moderately Isolated (2023)    Social Connection and Isolation Panel [NHANES]     Frequency of Communication with Friends and Family: More than three times a week     Frequency of Social Gatherings with Friends and Family: Never     Attends Catholic Services: Never     Active Member of Clubs or Organizations: No     Attends Club or Organization Meetings: Never     Marital Status:    Housing Stability: Unknown (2023)    Housing Stability Vital Sign     Unable to Pay for Housing in the Last Year: No     Unstable Housing in the  Last Year: No       Physical Exam:  There were no vitals filed for this visit.  General: A&Ox3, no apparent distress, no deformities  Neck: No masses, normal thyroid  Lungs: CTA margie, no use of accessory muscles  Heart: RRR, no arrhythmias  Abdomen: Soft, NT, ND, no masses, no hernias, no hepatosplenomegaly  Lymphatic: Neck and groin nodes negative  Skin: The skin is warm and dry. No jaundice.  Ext: No c/c/e.    GUMale: Test desc margie, no abnormalities of epididymus. Penis circumcised, with normal penile and scrotal skin. Meatus normal. Normal rectal tone, no hemorrhoids. Prostate: 1 finger breadth wide, flat, smooth, soft, nontender, no nodules or masses appreciated. SV not palpable. Perineum and anus normal.    Urinalysis:  Results for orders placed or performed in visit on 02/09/24   POCT URINE DIPSTICK WITH MICROSCOPE, AUTOMATED   Result Value Ref Range    Color, UA Yellow     Spec Grav UA 1.020     pH, UA 7.0     WBC, UA large     Nitrite, UA neg     Protein,      Glucose, UA neg     Ketones, UA neg     Urobilinogen, UA 0.2     Bilirubin, POC neg     Blood, UA moderate    Microscopic urinalysis revealed moderate RBCs, large WBCs, negative nitrites, will notify patient of culture and sensitivity results when completed and treatment regimen.      Impression:  1. Abnormal urinalysis  - POCT URINE DIPSTICK WITH MICROSCOPE, AUTOMATED  2. Status post Prostate CA with prostatectomy  3. Urinary retention      Plan:  Instructed patient to continue self-catheterizations 5 6 times daily, culture and sensitivity results will be notified to patient when completed and treatment regimen, RTC 6 months with renal ultrasound and PSA.

## 2024-02-11 LAB — BACTERIA UR CULT: ABNORMAL

## 2024-02-12 ENCOUNTER — OFFICE VISIT (OUTPATIENT)
Dept: UROLOGY | Facility: CLINIC | Age: 64
End: 2024-02-12
Payer: COMMERCIAL

## 2024-02-12 DIAGNOSIS — N30.01 ACUTE CYSTITIS WITH HEMATURIA: Primary | ICD-10-CM

## 2024-02-12 PROCEDURE — 99213 OFFICE O/P EST LOW 20 MIN: CPT | Mod: 95,,, | Performed by: NURSE PRACTITIONER

## 2024-02-12 PROCEDURE — 1160F RVW MEDS BY RX/DR IN RCRD: CPT | Mod: CPTII,95,, | Performed by: NURSE PRACTITIONER

## 2024-02-12 PROCEDURE — 3066F NEPHROPATHY DOC TX: CPT | Mod: CPTII,95,, | Performed by: NURSE PRACTITIONER

## 2024-02-12 PROCEDURE — 1159F MED LIST DOCD IN RCRD: CPT | Mod: CPTII,95,, | Performed by: NURSE PRACTITIONER

## 2024-02-12 RX ORDER — CIPROFLOXACIN 500 MG/1
500 TABLET ORAL EVERY 12 HOURS
Qty: 14 TABLET | Refills: 0 | Status: SHIPPED | OUTPATIENT
Start: 2024-02-12 | End: 2024-03-26

## 2024-02-12 NOTE — PROGRESS NOTES
Established Patient - Audio Only Telehealth Visit     The patient location is:  Home  The chief complaint leading to consultation is:  Dysuria culture and sensitivity results reviewed  Visit type: Virtual visit with audio only (telephone)  Total time spent with patient:  20  minutes     The reason for the audio only service rather than synchronous audio virtual visit was related to technical difficulties or patient preference/necessity.     Each patient to whom I provide medical services by telemedicine is:  (1) informed of the relationship between the physician and patient and the respective role of any other health care provider with respect to management of the patient; and (2) notified that they may decline to receive medical services by telemedicine and may withdraw from such care at any time. Patient verbally consented to receive this service via voice-only telephone call.     This service was not originating from a related E/M service provided within the previous 7 days nor will  to an E/M service or procedure within the next 24 hours or my soonest available appointment.  Prevailing standard of care was able to be met in this audio-only visit.      Chief Complaint:  Urinary tract infection    HPI:  Patient is 63-year-old male on a audio virtual visit due to failed video visit.  Patient culture and sensitivity positive for Klebsiella bacteria sensitive to ciprofloxacin.  Due to patient's complex history will treat patient with ciprofloxacin 500 mg p.o. b.i.d. x7 days and will repeat urine culture and sensitivity in 2 weeks to determine resolution of urinary tract infection.  Patient denies any dysuria, urinary frequency, urinary urgency, urinary hesitancy, urinary incontinence, urinary retention, gross hematuria, nocturia.  Patient does have a history of reconstructed the bladder with stomach tissue.  Instructed patient to notify clinic of any abnormal urologic symptoms develop between now and  completion of antibiotics.    Allergies:  Review of patient's allergies indicates:   Allergen Reactions    Pcn [penicillins] Hives       Medications:  Current Outpatient Medications   Medication Sig Dispense Refill    calcitRIOL (ROCALTROL) 0.25 MCG Cap Take 1 capsule (0.25 mcg total) by mouth once daily. 90 capsule 0    metroNIDAZOLE (FLAGYL) 250 MG tablet TAKE 2 TABLETS AT 1PM, 5PM AND 9PM THE EVENING PRIOR TO SURGERY (Patient not taking: Reported on 12/21/2023) 6 tablet 0    neomycin (MYCIFRADIN) 500 mg Tab TAKE 2 TABLETS AT 1PM, 5PM AND 9PM THE EVENING PRIOR TO SURGERY (Patient not taking: Reported on 12/21/2023) 6 tablet 0    patiromer calcium sorbitex (VELTASSA) 8.4 gram PwPk Take 1 packet (8.4 g total) by mouth every other day. 45 packet 1    sodium bicarbonate 650 MG tablet Take 2 tablets (1,300 mg total) by mouth 2 (two) times daily. 120 tablet 11     No current facility-administered medications for this visit.       Review of Systems:  General: No fever, chills, fatigability, or weight loss.  Skin: No rashes, itching, or changes in color or texture of skin.  Chest: Denies DIAZ, cyanosis, wheezing, cough, and sputum production.  Abdomen: Appetite fine. No weight loss. Denies diarrhea, abdominal pain, hematemesis, or blood in stool.  Musculoskeletal: No joint stiffness or swelling. Denies back pain.  : As above.  All other review of systems negative.    PMH:  Past Medical History:   Diagnosis Date    Cancer     hx of bladder cancer    Malignant neoplasm of transverse colon     Obesity     Renal disorder     CKD 5       PSH:  Past Surgical History:   Procedure Laterality Date    COLONOSCOPY N/A 10/25/2023    Procedure: COLONOSCOPY, WITH HEMORRHAGE CONTROL;  Surgeon: Angy Mcguire MD;  Location: [facility] ENDOSCOPY;  Service: Gastroenterology;  Laterality: N/A;  sigmoid clips    COLONOSCOPY, WITH 1 OR MORE BIOPSIES N/A 10/25/2023    Procedure: COLONOSCOPY, WITH 1 OR MORE BIOPSIES;  Surgeon: Shayla  Angy MAYER MD;  Location: [facility] ENDOSCOPY;  Service: Gastroenterology;  Laterality: N/A;  transverse colon mass    COLONOSCOPY, WITH POLYPECTOMY USING SNARE Left 10/25/2023    Procedure: COLONOSCOPY, WITH POLYPECTOMY USING SNARE;  Surgeon: Angy Mcguire MD;  Location: [facility] ENDOSCOPY;  Service: Gastroenterology;  Laterality: Left;    CYSTECTOMY      ESOPHAGOGASTRODUODENOSCOPY Left 10/25/2023    Procedure: EGD (ESOPHAGOGASTRODUODENOSCOPY);  Surgeon: Angy Mcguire MD;  Location: [facility] ENDOSCOPY;  Service: Gastroenterology;  Laterality: Left;    FRACTURE SURGERY  2018    Collar none    HERNIA REPAIR  2010    ILEO LOOP NEOBLADDER      MEDIPORT INSERTION, SINGLE      PROSTATE SURGERY  10/2018    SMALL INTESTINE SURGERY  10/2018    XI ROBOTIC COLECTOMY, PARTIAL N/A 2023    Procedure: XI ROBOTIC COLECTOMY, PARTIAL;  Surgeon: Aris Scott MD;  Location: [facility];  Service: Oncology;  Laterality: N/A;  TRANSVERSE COLON; POSSIBLE OPEN CASE       FamHx:  No family history on file.    SocHx:  Social History     Socioeconomic History    Marital status:    Tobacco Use    Smoking status: Former     Current packs/day: 0.00     Types: Cigarettes     Quit date:      Years since quittin.1     Passive exposure: Current    Smokeless tobacco: Current     Types: Chew   Substance and Sexual Activity    Alcohol use: Never    Drug use: Never    Sexual activity: Yes     Partners: Female     Birth control/protection: None     Social Determinants of Health     Financial Resource Strain: Medium Risk (2023)    Overall Financial Resource Strain (CARDIA)     Difficulty of Paying Living Expenses: Somewhat hard   Food Insecurity: No Food Insecurity (2023)    Hunger Vital Sign     Worried About Running Out of Food in the Last Year: Never true     Ran Out of Food in the Last Year: Never true   Transportation Needs: No Transportation Needs (2023)    PRAPARE - Transportation     Lack of  Transportation (Medical): No     Lack of Transportation (Non-Medical): No   Physical Activity: Inactive (12/14/2023)    Exercise Vital Sign     Days of Exercise per Week: 5 days     Minutes of Exercise per Session: 0 min   Stress: No Stress Concern Present (12/14/2023)    [employer] [place] [employer] - Occupational Stress Questionnaire     Feeling of Stress : Not at all   Social Connections: Moderately Isolated (12/14/2023)    Social Connection and Isolation Panel [NHANES]     Frequency of Communication with Friends and Family: More than three times a week     Frequency of Social Gatherings with Friends and Family: Never     Attends Denominational Services: Never     Active Member of Clubs or Organizations: No     Attends Club or Organization Meetings: Never     Marital Status:    Housing Stability: Unknown (12/14/2023)    Housing Stability Vital Sign     Unable to Pay for Housing in the Last Year: No     Unstable Housing in the Last Year: No       Physical Exam:  There were no vitals filed for this visit.         Impression:  Urinary tract infection    Plan:  Instructed patient to start ciprofloxacin 500 mg p.o. b.i.d. x7 days and repeat urine culture and sensitivity in 2 weeks to confirm resolution of urinary tract infection.  Instructed patient to notify clinic if any abnormal urologic symptoms between 9 next visit.

## 2024-02-13 DIAGNOSIS — N25.81 SECONDARY HYPERPARATHYROIDISM OF RENAL ORIGIN: ICD-10-CM

## 2024-02-13 RX ORDER — CALCITRIOL 0.25 UG/1
CAPSULE ORAL
Qty: 90 CAPSULE | Refills: 0 | Status: SHIPPED | OUTPATIENT
Start: 2024-02-13 | End: 2024-03-27

## 2024-02-14 ENCOUNTER — INFUSION (OUTPATIENT)
Dept: INFUSION THERAPY | Facility: HOSPITAL | Age: 64
End: 2024-02-14
Attending: INTERNAL MEDICINE
Payer: COMMERCIAL

## 2024-02-14 ENCOUNTER — PATIENT MESSAGE (OUTPATIENT)
Dept: NEPHROLOGY | Facility: CLINIC | Age: 64
End: 2024-02-14
Payer: COMMERCIAL

## 2024-02-14 VITALS
DIASTOLIC BLOOD PRESSURE: 74 MMHG | OXYGEN SATURATION: 97 % | HEART RATE: 80 BPM | TEMPERATURE: 97 F | SYSTOLIC BLOOD PRESSURE: 134 MMHG

## 2024-02-14 DIAGNOSIS — C18.4 ADENOCARCINOMA OF TRANSVERSE COLON: Primary | ICD-10-CM

## 2024-02-14 PROCEDURE — 96523 IRRIG DRUG DELIVERY DEVICE: CPT

## 2024-02-14 PROCEDURE — 63600175 PHARM REV CODE 636 W HCPCS: Performed by: INTERNAL MEDICINE

## 2024-02-14 PROCEDURE — A4216 STERILE WATER/SALINE, 10 ML: HCPCS | Performed by: INTERNAL MEDICINE

## 2024-02-14 PROCEDURE — 25000003 PHARM REV CODE 250: Performed by: INTERNAL MEDICINE

## 2024-02-14 RX ORDER — SODIUM CHLORIDE 0.9 % (FLUSH) 0.9 %
10 SYRINGE (ML) INJECTION
OUTPATIENT
Start: 2024-05-02

## 2024-02-14 RX ORDER — HEPARIN 100 UNIT/ML
500 SYRINGE INTRAVENOUS
Status: DISCONTINUED | OUTPATIENT
Start: 2024-02-14 | End: 2024-02-14 | Stop reason: HOSPADM

## 2024-02-14 RX ORDER — SODIUM CHLORIDE 0.9 % (FLUSH) 0.9 %
10 SYRINGE (ML) INJECTION
Status: DISCONTINUED | OUTPATIENT
Start: 2024-02-14 | End: 2024-02-14 | Stop reason: HOSPADM

## 2024-02-14 RX ORDER — HEPARIN 100 UNIT/ML
500 SYRINGE INTRAVENOUS
OUTPATIENT
Start: 2024-05-02

## 2024-02-14 RX ADMIN — HEPARIN 500 UNITS: 100 SYRINGE at 02:02

## 2024-02-14 RX ADMIN — SODIUM CHLORIDE, PRESERVATIVE FREE 10 ML: 5 INJECTION INTRAVENOUS at 02:02

## 2024-02-15 ENCOUNTER — PATIENT MESSAGE (OUTPATIENT)
Dept: UROLOGY | Facility: CLINIC | Age: 64
End: 2024-02-15
Payer: COMMERCIAL

## 2024-02-15 ENCOUNTER — DOCUMENTATION ONLY (OUTPATIENT)
Dept: SURGICAL ONCOLOGY | Facility: CLINIC | Age: 64
End: 2024-02-15
Payer: COMMERCIAL

## 2024-02-15 ENCOUNTER — DOCUMENTATION ONLY (OUTPATIENT)
Dept: NEPHROLOGY | Facility: CLINIC | Age: 64
End: 2024-02-15
Payer: COMMERCIAL

## 2024-02-15 ENCOUNTER — PATIENT MESSAGE (OUTPATIENT)
Dept: INTERNAL MEDICINE | Facility: CLINIC | Age: 64
End: 2024-02-15
Payer: COMMERCIAL

## 2024-02-15 NOTE — PROGRESS NOTES
To Whom It May Concern,     Bridget Cardenas Jr. (date of birth 1960) is being treated at [facility] Nephrology Department for chronic kidney disease.  Mr. Cardenas can continue working from nephrology perspective.   Please do not hesitate to reach out to us should you have any questions.  Thank you very much.      2/15/2024  Mayra Grimaldo NP  [facility]

## 2024-02-15 NOTE — PROGRESS NOTES
Yesterday, 02/14/24, patient came into office confused regarding his release to go back to work. Stated his paperwork said he could potentially return on 02/08/24 but that his work was requiring a release. I informed him that they would need to fax over the paperwork again so I can remove restrictions and clear him. He then contacted Estuardo Ronald in his HR department who ended up speaking to me directly. This man stated that he only needed the patients medical record regarding this case in order for HIS doctor that they use to clear him and allow him to return. I directed him to MR's department.    Today, 02/15/24, Estuardo Cardenas contacts our office again stating he is the lead in HR and that he is beyond confused at this point and is needing more direction. He spoke with Smiley NICKERSON first and then he was transferred to me. He explained that he talked to us yesterday and received that phone number however he was under the impression that he needed records but then found out that the drMaritza Gudino use already had records on Bridget. He then stated that Bridget told him that all of his doctors on his case stated he was cleared so he was asking to see all of the releases from each doctor claiming so. I informed him again about needing the paperwork refaxed so I can remove restrictions. He then asked me if once I completed it, would this mean he was clear all across the board. I informed him that I only clear on behalf of Dr. Aris Scott and Agueda Fan NP. I do NOT clear across the board for any other physician's that Bridget may have on his case. I told him I would only be clearing him from a surgical standpoint and that the other providers would have to clear him based off of their standards and reasoning for seeing the patient. He voiced understanding and stated that he would fax this paperwork over for me to re-complete. cpl

## 2024-03-26 ENCOUNTER — LAB VISIT (OUTPATIENT)
Dept: LAB | Facility: HOSPITAL | Age: 64
End: 2024-03-26
Attending: NURSE PRACTITIONER
Payer: COMMERCIAL

## 2024-03-26 DIAGNOSIS — N18.5 CKD STAGE G5/A3, GFR <15 AND ALBUMIN CREATININE RATIO >300 MG/G: ICD-10-CM

## 2024-03-26 PROBLEM — N18.4 STAGE 4 CHRONIC KIDNEY DISEASE: Status: ACTIVE | Noted: 2023-10-24

## 2024-03-26 PROBLEM — N13.30 HYDRONEPHROSIS: Status: ACTIVE | Noted: 2024-03-26

## 2024-03-26 PROBLEM — C67.9 MALIGNANT NEOPLASM OF URINARY BLADDER: Status: ACTIVE | Noted: 2024-03-26

## 2024-03-26 LAB
ALBUMIN SERPL-MCNC: 3.7 G/DL (ref 3.4–4.8)
ALBUMIN/GLOB SERPL: 1.1 RATIO (ref 1.1–2)
ALP SERPL-CCNC: 62 UNIT/L (ref 40–150)
ALT SERPL-CCNC: 17 UNIT/L (ref 0–55)
AST SERPL-CCNC: 11 UNIT/L (ref 5–34)
BASOPHILS # BLD AUTO: 0.04 X10(3)/MCL
BASOPHILS NFR BLD AUTO: 0.8 %
BILIRUB SERPL-MCNC: 0.6 MG/DL
BUN SERPL-MCNC: 62.4 MG/DL (ref 8.4–25.7)
CALCIUM SERPL-MCNC: 8.7 MG/DL (ref 8.8–10)
CHLORIDE SERPL-SCNC: 111 MMOL/L (ref 98–107)
CO2 SERPL-SCNC: 22 MMOL/L (ref 23–31)
CREAT SERPL-MCNC: 4.55 MG/DL (ref 0.73–1.18)
EOSINOPHIL # BLD AUTO: 0.4 X10(3)/MCL (ref 0–0.9)
EOSINOPHIL NFR BLD AUTO: 7.6 %
ERYTHROCYTE [DISTWIDTH] IN BLOOD BY AUTOMATED COUNT: 14.4 % (ref 11.5–17)
GFR SERPLBLD CREATININE-BSD FMLA CKD-EPI: 14 MLS/MIN/1.73/M2
GLOBULIN SER-MCNC: 3.4 GM/DL (ref 2.4–3.5)
GLUCOSE SERPL-MCNC: 107 MG/DL (ref 82–115)
HCT VFR BLD AUTO: 43.3 % (ref 42–52)
HGB BLD-MCNC: 13.9 G/DL (ref 14–18)
HOLD SPECIMEN: NORMAL
IMM GRANULOCYTES # BLD AUTO: 0.01 X10(3)/MCL (ref 0–0.04)
IMM GRANULOCYTES NFR BLD AUTO: 0.2 %
LYMPHOCYTES # BLD AUTO: 1.68 X10(3)/MCL (ref 0.6–4.6)
LYMPHOCYTES NFR BLD AUTO: 32.1 %
MCH RBC QN AUTO: 30.2 PG (ref 27–31)
MCHC RBC AUTO-ENTMCNC: 32.1 G/DL (ref 33–36)
MCV RBC AUTO: 93.9 FL (ref 80–94)
MONOCYTES # BLD AUTO: 0.31 X10(3)/MCL (ref 0.1–1.3)
MONOCYTES NFR BLD AUTO: 5.9 %
NEUTROPHILS # BLD AUTO: 2.8 X10(3)/MCL (ref 2.1–9.2)
NEUTROPHILS NFR BLD AUTO: 53.4 %
NRBC BLD AUTO-RTO: 0 %
PHOSPHATE SERPL-MCNC: 3.3 MG/DL (ref 2.3–4.7)
PLATELET # BLD AUTO: 133 X10(3)/MCL (ref 130–400)
PLATELETS.RETICULATED NFR BLD AUTO: 3.6 % (ref 0.9–11.2)
PMV BLD AUTO: 10.5 FL (ref 7.4–10.4)
POTASSIUM SERPL-SCNC: 3.8 MMOL/L (ref 3.5–5.1)
PROT SERPL-MCNC: 7.1 GM/DL (ref 5.8–7.6)
PTH-INTACT SERPL-MCNC: 206.7 PG/ML (ref 8.7–77)
RBC # BLD AUTO: 4.61 X10(6)/MCL (ref 4.7–6.1)
SODIUM SERPL-SCNC: 141 MMOL/L (ref 136–145)
WBC # SPEC AUTO: 5.24 X10(3)/MCL (ref 4.5–11.5)

## 2024-03-26 PROCEDURE — 85025 COMPLETE CBC W/AUTO DIFF WBC: CPT

## 2024-03-26 PROCEDURE — 36415 COLL VENOUS BLD VENIPUNCTURE: CPT

## 2024-03-26 PROCEDURE — 83970 ASSAY OF PARATHORMONE: CPT

## 2024-03-26 PROCEDURE — 80053 COMPREHEN METABOLIC PANEL: CPT

## 2024-03-26 PROCEDURE — 84100 ASSAY OF PHOSPHORUS: CPT

## 2024-03-26 RX ORDER — HYDROCODONE BITARTRATE AND ACETAMINOPHEN 7.5; 325 MG/1; MG/1
1 TABLET ORAL 4 TIMES DAILY PRN
COMMUNITY
Start: 2024-01-23 | End: 2024-04-04

## 2024-03-27 ENCOUNTER — OFFICE VISIT (OUTPATIENT)
Dept: NEPHROLOGY | Facility: CLINIC | Age: 64
End: 2024-03-27
Payer: COMMERCIAL

## 2024-03-27 VITALS
HEIGHT: 65 IN | TEMPERATURE: 98 F | OXYGEN SATURATION: 96 % | WEIGHT: 207 LBS | BODY MASS INDEX: 34.49 KG/M2 | SYSTOLIC BLOOD PRESSURE: 128 MMHG | RESPIRATION RATE: 18 BRPM | DIASTOLIC BLOOD PRESSURE: 70 MMHG | HEART RATE: 59 BPM

## 2024-03-27 DIAGNOSIS — E87.20 METABOLIC ACIDOSIS: ICD-10-CM

## 2024-03-27 DIAGNOSIS — E87.5 HYPERKALEMIA: ICD-10-CM

## 2024-03-27 DIAGNOSIS — N25.81 SECONDARY HYPERPARATHYROIDISM OF RENAL ORIGIN: ICD-10-CM

## 2024-03-27 DIAGNOSIS — N18.5 CKD STAGE G5/A3, GFR <15 AND ALBUMIN CREATININE RATIO >300 MG/G: Primary | ICD-10-CM

## 2024-03-27 PROCEDURE — 1160F RVW MEDS BY RX/DR IN RCRD: CPT | Mod: CPTII,,, | Performed by: NURSE PRACTITIONER

## 2024-03-27 PROCEDURE — 3074F SYST BP LT 130 MM HG: CPT | Mod: CPTII,,, | Performed by: NURSE PRACTITIONER

## 2024-03-27 PROCEDURE — 3078F DIAST BP <80 MM HG: CPT | Mod: CPTII,,, | Performed by: NURSE PRACTITIONER

## 2024-03-27 PROCEDURE — 3008F BODY MASS INDEX DOCD: CPT | Mod: CPTII,,, | Performed by: NURSE PRACTITIONER

## 2024-03-27 PROCEDURE — 1159F MED LIST DOCD IN RCRD: CPT | Mod: CPTII,,, | Performed by: NURSE PRACTITIONER

## 2024-03-27 PROCEDURE — 99214 OFFICE O/P EST MOD 30 MIN: CPT | Mod: S$PBB,,, | Performed by: NURSE PRACTITIONER

## 2024-03-27 PROCEDURE — 3066F NEPHROPATHY DOC TX: CPT | Mod: CPTII,,, | Performed by: NURSE PRACTITIONER

## 2024-03-27 PROCEDURE — 99214 OFFICE O/P EST MOD 30 MIN: CPT | Mod: PBBFAC | Performed by: NURSE PRACTITIONER

## 2024-03-27 RX ORDER — CALCITRIOL 0.5 UG/1
0.5 CAPSULE ORAL DAILY
Qty: 90 CAPSULE | Refills: 0 | Status: SHIPPED | OUTPATIENT
Start: 2024-03-27 | End: 2024-05-13

## 2024-03-27 NOTE — PROGRESS NOTES
[facility]  Nephrology Clinic Note    Chief complaint: Follow-up (RTC, took meds, WAYNE fistula, +thrill and bruit, wife in attendance)    History of present illness:   Bridget Cardenas Jr. is a 63 y.o. White male with past medical history of  bladder cancer, status post total cystectomy and prostatectomy in 2018 with neobladder creation (patient self catheterizes 6 times a day); advanced chronic kidney disease, chronic hydronephrosis, metabolic acidosis. 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  Patient tolerated surgery without complications.  Presents for follow-up appointment in Nephrology Clinic, denies complaints.    Review of Systems  12 point review of systems conducted, negative except as stated in the history of present illness.    Allergies: Patient is allergic to pcn [penicillins].     Past Medical History:  has a past medical history of Cancer, Malignant neoplasm of transverse colon, Obesity, and Renal disorder.    Procedure History:  has a past surgical history that includes colonoscopy, with polypectomy using snare (Left, 10/25/2023); Esophagogastroduodenoscopy (Left, 10/25/2023); colonoscopy, with 1 or more biopsies (N/A, 10/25/2023); Colonoscopy (N/A, 10/25/2023); Prostate surgery (10/2018); Small intestine surgery (10/2018); Fracture surgery (7/2018); Hernia repair (11/2010); cystectomy; Mediport insertion, single; Ileo loop neobladder; xi robotic colectomy, partial (N/A, 12/14/2023); and AV fistula placement (Right, 01/15/2024).    Family History: family history includes Heart disease in his brother and father.    Social History:  reports that he quit smoking about 37 years ago. His smoking use included cigarettes. He has been exposed to tobacco smoke. His smokeless tobacco use includes chew. He reports that he does not drink alcohol and does not use  "drugs.    Physical exam  /70 (BP Location: Left arm, Patient Position: Sitting, BP Method: Large (Manual))   Pulse (!) 59 Comment: 60 apical  Temp 97.6 °F (36.4 °C) (Oral)   Resp 18   Ht 5' 5.35" (1.66 m)   Wt 93.9 kg (207 lb 0.2 oz)   SpO2 96%   BMI 34.08 kg/m²   General appearance: Patient is in no acute distress.  Skin: No rashes or wounds.  HEENT: PERRLA, EOMI, no scleral icterus, no JVD. Neck is supple.  Chest: Respirations are unlabored. Lungs sounds are clear.   Heart: S1, S2.   Abdomen: Benign.  : Deferred.  Extremities: No edema, peripheral pulses are palpable.  Right upper extremity AV fistula with audible bruit and palpable thrill.  Neuro: No focal deficits.     Home Medications:    Current Outpatient Medications:     patiromer calcium sorbitex (VELTASSA) 8.4 gram PwPk, Take 1 packet (8.4 g total) by mouth every other day., Disp: 45 packet, Rfl: 1    sodium bicarbonate 650 MG tablet, Take 2 tablets (1,300 mg total) by mouth 2 (two) times daily., Disp: 120 tablet, Rfl: 11    calcitRIOL (ROCALTROL) 0.5 MCG Cap, Take 1 capsule (0.5 mcg total) by mouth once daily., Disp: 90 capsule, Rfl: 0    HYDROcodone-acetaminophen (NORCO) 7.5-325 mg per tablet, Take 1 tablet by mouth 4 (four) times daily as needed for Pain., Disp: , Rfl:     Laboratory data    Lab Results   Component Value Date    WBC 5.24 03/26/2024    HGB 13.9 (L) 03/26/2024    HCT 43.3 03/26/2024     03/26/2024    IRON 82 01/02/2024    TIBC 239 (L) 01/02/2024    TRANS 215 01/02/2024    LABIRON 34 01/02/2024    FERRITIN 75.19 01/02/2024     03/26/2024    K 3.8 03/26/2024    CHLORIDE 111 (H) 03/26/2024    CO2 22 (L) 03/26/2024    BUN 62.4 (H) 03/26/2024    CREATININE 4.55 (H) 03/26/2024    EGFRNORACEVR 14 03/26/2024    GLUCOSE 107 03/26/2024    CALCIUM 8.7 (L) 03/26/2024    ALKPHOS 62 03/26/2024    LABPROT 7.1 03/26/2024    ALBUMIN 3.7 03/26/2024    BILIDIR 0.2 10/20/2023    IBILI 0.20 05/27/2021    AST 11 03/26/2024    ALT 17 " 03/26/2024    MG 1.50 (L) 12/15/2023    PHOS 3.3 03/26/2024      Lab Results   Component Value Date    .7 (H) 03/26/2024    UPQHQKKS99DB 23.9 (L) 12/11/2023    HIV Nonreactive 12/11/2023    HEPCAB Nonreactive 12/11/2023    HEPBSURFAG Nonreactive 12/11/2023    HEPBCAB Nonreactive 12/11/2023     Urine:  Lab Results   Component Value Date    COLORUA Light-Yellow 11/28/2023    APPEARANCEUA Turbid (A) 11/28/2023    SGUA 1.010 11/28/2023    PHUA 6.5 11/28/2023    PROTEINUA 1+ (A) 11/28/2023    GLUCOSEUA Normal 11/28/2023    KETONESUA Negative 11/28/2023    BLOODUA Trace (A) 11/28/2023    NITRITESUA 1+ (A) 11/28/2023    LEUKOCYTESUR 500 (A) 11/28/2023    RBCUA 6-10 (A) 11/28/2023    WBCUA >100 (A) 11/28/2023    BACTERIA Occ (A) 11/28/2023    SQEPUA None Seen 11/28/2023    HYALINECASTS None Seen 11/28/2023    CREATRANDUR 65.0 11/28/2023    PROTEINURINE 32.6 11/28/2023    UPROTCREA 0.5 11/28/2023         Imaging  US Retroperitoneal Complete 11/13/2023  Right Kidney:  Length: 10 x 5.5 x 4.6 cm  Appearance: Normal echogenicity.  Collecting system: Mild to moderate prominence of the collecting system and renal pelvis indicating the presence of hydronephrosis  Stones: None  Cyst/Mass: None  Left Kidney: Left kidneys poorly visualize due to bowel and poor acoustic windows  Length: 10.6 x 5.6 x 4.6 cm  Appearance: Normal echogenicity.  Collecting system: No hydronephrosis  Stones: None  Cyst/Mass: None  Bladder:  Patient has a neobladder measuring 7.9 by 3.2 x 1.6 cm that appears to be unremarkable patient self catheterized 15 minutes prior to the study  Vessels:  Visualized portions of the IVC and aorta have a normal grayscale appearance.    Impression  Mild to moderate hydronephrosis of the right kidney.  Poor visualization of the left kidney likely related to overlying bowel gas and poor acoustic windows.  Neobladder  Electronically signed by: Freeman Barnes  Date:    11/13/2023  Time:    12:27      Impression     ICD-10-CM ICD-9-CM   1. CKD stage G5/A3, GFR <15 and albumin creatinine ratio >300 mg/g  N18.5 585.5   2. Secondary hyperparathyroidism of renal origin  N25.81 588.81   3. Metabolic acidosis  E87.20 276.2   4. Hyperkalemia  E87.5 276.7        Plan     CKD stage G5/A3, GFR <15 and albumin creatinine ratio >300 mg/g  -     CBC Auto Differential; Future; Expected date: 05/25/2024  -     Comprehensive Metabolic Panel; Future; Expected date: 05/25/2024  -     PTH, Intact; Future; Expected date: 05/25/2024  -     Phosphorus; Future; Expected date: 05/25/2024  Possibly chronic tubular interstitial disease secondary to chronic obstructive uropathy.  Estimated GFR is 14 mL/min.  Patient has multiple complications advanced kidney dysfunction (metabolic acidosis, hyperkalemia, secondary hyperparathyroidism), that are controlled with medications at present.  Patient is not a candidate for kidney transplantation due recent diagnosis of malignancy, is not a candidate for peritoneal dialysis due to multiple abdominal surgeries.  There are no acute indications for initiation of hemodialysis, however, will continue preparation for RRT.  Patient is status post AV fistula creation, fistula is maturing well.    Emergency department precautions discussed.  Will continue close monitoring.    Follow up in about 10 weeks (around 6/5/2024).    Secondary hyperparathyroidism of renal origin  -     calcitRIOL (ROCALTROL) 0.5 MCG Cap; Take 1 capsule (0.5 mcg total) by mouth once daily.  Dispense: 90 capsule; Refill: 0  Intact PTH is trending up, will increase calcitriol dose to 0.5 mcg, continue to monitor intact PTH and phosphorus levels.      Metabolic acidosis  Continue sodium bicarbonate supplementation.    Hyperkalemia  Hyperkalemia is well controlled, continue potassium binder.        3/27/2024  Mayra Grimaldo NP  [facility]ph[facility]

## 2024-04-01 ENCOUNTER — LAB VISIT (OUTPATIENT)
Dept: LAB | Facility: HOSPITAL | Age: 64
End: 2024-04-01
Attending: INTERNAL MEDICINE
Payer: COMMERCIAL

## 2024-04-01 DIAGNOSIS — C18.4 ADENOCARCINOMA OF TRANSVERSE COLON: ICD-10-CM

## 2024-04-01 DIAGNOSIS — D12.8 TUBULOVILLOUS ADENOMA OF RECTUM: ICD-10-CM

## 2024-04-01 LAB
ALBUMIN SERPL-MCNC: 3.8 G/DL (ref 3.4–4.8)
ALBUMIN/GLOB SERPL: 1.3 RATIO (ref 1.1–2)
ALP SERPL-CCNC: 69 UNIT/L (ref 40–150)
ALT SERPL-CCNC: 12 UNIT/L (ref 0–55)
AST SERPL-CCNC: 8 UNIT/L (ref 5–34)
BASOPHILS # BLD AUTO: 0.04 X10(3)/MCL
BASOPHILS NFR BLD AUTO: 0.6 %
BILIRUB SERPL-MCNC: 0.6 MG/DL
BUN SERPL-MCNC: 57.6 MG/DL (ref 8.4–25.7)
CALCIUM SERPL-MCNC: 8.7 MG/DL (ref 8.8–10)
CEA SERPL-MCNC: 2.39 NG/ML (ref 0–3)
CHLORIDE SERPL-SCNC: 111 MMOL/L (ref 98–107)
CO2 SERPL-SCNC: 21 MMOL/L (ref 23–31)
CREAT SERPL-MCNC: 5.09 MG/DL (ref 0.73–1.18)
EOSINOPHIL # BLD AUTO: 0.4 X10(3)/MCL (ref 0–0.9)
EOSINOPHIL NFR BLD AUTO: 6.3 %
ERYTHROCYTE [DISTWIDTH] IN BLOOD BY AUTOMATED COUNT: 14.3 % (ref 11.5–17)
FERRITIN SERPL-MCNC: 31.91 NG/ML (ref 21.81–274.66)
GFR SERPLBLD CREATININE-BSD FMLA CKD-EPI: 12 MLS/MIN/1.73/M2
GLOBULIN SER-MCNC: 3 GM/DL (ref 2.4–3.5)
GLUCOSE SERPL-MCNC: 82 MG/DL (ref 82–115)
HCT VFR BLD AUTO: 40.2 % (ref 42–52)
HGB BLD-MCNC: 13.1 G/DL (ref 14–18)
IMM GRANULOCYTES # BLD AUTO: 0 X10(3)/MCL (ref 0–0.04)
IMM GRANULOCYTES NFR BLD AUTO: 0 %
IRON SATN MFR SERPL: 37 % (ref 20–50)
IRON SERPL-MCNC: 90 UG/DL (ref 65–175)
LYMPHOCYTES # BLD AUTO: 2.02 X10(3)/MCL (ref 0.6–4.6)
LYMPHOCYTES NFR BLD AUTO: 31.8 %
MCH RBC QN AUTO: 30.4 PG (ref 27–31)
MCHC RBC AUTO-ENTMCNC: 32.6 G/DL (ref 33–36)
MCV RBC AUTO: 93.3 FL (ref 80–94)
MONOCYTES # BLD AUTO: 0.41 X10(3)/MCL (ref 0.1–1.3)
MONOCYTES NFR BLD AUTO: 6.5 %
NEUTROPHILS # BLD AUTO: 3.48 X10(3)/MCL (ref 2.1–9.2)
NEUTROPHILS NFR BLD AUTO: 54.8 %
PLATELET # BLD AUTO: 142 X10(3)/MCL (ref 130–400)
PMV BLD AUTO: 10.5 FL (ref 7.4–10.4)
POTASSIUM SERPL-SCNC: 4 MMOL/L (ref 3.5–5.1)
PROT SERPL-MCNC: 6.8 GM/DL (ref 5.8–7.6)
RBC # BLD AUTO: 4.31 X10(6)/MCL (ref 4.7–6.1)
SODIUM SERPL-SCNC: 140 MMOL/L (ref 136–145)
TIBC SERPL-MCNC: 153 UG/DL (ref 69–240)
TIBC SERPL-MCNC: 243 UG/DL (ref 250–450)
TRANSFERRIN SERPL-MCNC: 219 MG/DL (ref 163–344)
WBC # SPEC AUTO: 6.35 X10(3)/MCL (ref 4.5–11.5)

## 2024-04-01 PROCEDURE — 85025 COMPLETE CBC W/AUTO DIFF WBC: CPT

## 2024-04-01 PROCEDURE — 80053 COMPREHEN METABOLIC PANEL: CPT

## 2024-04-01 PROCEDURE — 83540 ASSAY OF IRON: CPT

## 2024-04-01 PROCEDURE — 36415 COLL VENOUS BLD VENIPUNCTURE: CPT

## 2024-04-01 PROCEDURE — 82728 ASSAY OF FERRITIN: CPT

## 2024-04-01 PROCEDURE — 82378 CARCINOEMBRYONIC ANTIGEN: CPT

## 2024-04-01 NOTE — PROGRESS NOTES
Subjective:       Patient ID: Bridget Cardenas Jr. is a 63 y.o. male.    GI: Dr. Angy Mcguire  Surgeon: Dr. Aris Scott      1. 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 internal hemorrhoids, diverticulosis in the sigmoid colon, on 9mm polyp in transverse colon, removed and c/w tubular adenoma, one 10mm polyp in descending colon, removed and c/w tubular adenoma, one 30-35mm polyp in sigmoid colon, removed and c/w tubulovillous adenoma with high-grade dysplasia, no malignancy; one 15mm polyp in rectum removed, and c/w tubulovillous adenoma, no malignancy; quality of bowel prep good, ileocecal valve, appendiceal orifice photographed.   Surgery/pathology:  Robotic assisted transverse colectomy done 12/14/23--adenocarcinoma, moderately differentiated, measuring 6cm, carcinoma invades into subserosal adipose tissue, LVI indeterminate, margins clear, 16 mesenteric lymph nodes uninvolved (0/16).    2. Severe Iron deficiency anemia--10/2023 (Ferritin <1.98)    3. H/o Bladder Cancer unknown stage--Diagnosed 2018    Imaging:  CT A/P w/o contrast done 10/20/23--Symmetric moderate bilateral hydronephrosis and hydroureter.  No obstructing stone.  This appears to be chronic, similar to previous exam. Neobladder collapsed about a Manzo catheter.  PET/CT done 11/8/23--focal moderate uptake at transverse colon has max SUV 12, no appreciable hypermetabolic metastatic disease, similar chronic bilateral hydroureteronephrosis, post-surgical changes of prior right clavicle ORIF.    Treatment history:  1. Neoadjuvant chemotherapy unknown drugs and dates.  2. S/p total cystectomy/prostatectomy with neobladder creating done in 2018 at  [facility].  3. IV Feraheme X 2 doses completed 11/20/23.    CEA:   10/26/23--1.98    Procedures:  EGD 10/25/23--normal esophagus, normal stomach, normal duodenum, no specimens collected.     Current treatment plan: Observation    Chief Complaint: Other Misc (Pt reports no concerns today.)    HPI  Patient presents for follow-up of colon cancer. He is doing well. Denies any n/v/c/d. Appetite good and weight stable. Bowels are moving well. Denies any abdominal pain. He does now have a fistula in preparation for HD. Still closely followed by nephrology. Colonoscopy scheduled for October 2024. No other problems reported.     Past Medical History:   Diagnosis Date    Cancer     hx of bladder cancer    Malignant neoplasm of transverse colon     Obesity     Renal disorder     CKD 5      Past Surgical History:   Procedure Laterality Date    AV FISTULA PLACEMENT Right 01/15/2024    COLONOSCOPY N/A 10/25/2023    Procedure: COLONOSCOPY, WITH HEMORRHAGE CONTROL;  Surgeon: Angy Mcguire MD;  Location: [facility] ENDOSCOPY;  Service: Gastroenterology;  Laterality: N/A;  sigmoid clips    COLONOSCOPY, WITH 1 OR MORE BIOPSIES N/A 10/25/2023    Procedure: COLONOSCOPY, WITH 1 OR MORE BIOPSIES;  Surgeon: Angy Mcguire MD;  Location: [facility] ENDOSCOPY;  Service: Gastroenterology;  Laterality: N/A;  transverse colon mass    COLONOSCOPY, WITH POLYPECTOMY USING SNARE Left 10/25/2023    Procedure: COLONOSCOPY, WITH POLYPECTOMY USING SNARE;  Surgeon: Angy Mcguire MD;  Location: [facility] ENDOSCOPY;  Service: Gastroenterology;  Laterality: Left;    CYSTECTOMY      ESOPHAGOGASTRODUODENOSCOPY Left 10/25/2023    Procedure: EGD (ESOPHAGOGASTRODUODENOSCOPY);  Surgeon: Angy Mcguire MD;  Location: [facility] ENDOSCOPY;  Service: Gastroenterology;  Laterality: Left;    FRACTURE SURGERY  7/2018    Collar none    HERNIA REPAIR  11/2010    ILEO LOOP NEOBLADDER      MEDIPORT INSERTION, SINGLE      PROSTATE SURGERY  10/2018    SMALL INTESTINE  SURGERY  10/2018    XI ROBOTIC COLECTOMY, PARTIAL N/A 2023    Procedure: XI ROBOTIC COLECTOMY, PARTIAL;  Surgeon: Aris Scott MD;  Location: [facility];  Service: Oncology;  Laterality: N/A;  TRANSVERSE COLON; POSSIBLE OPEN CASE     Family History   Problem Relation Age of Onset    Heart disease Father     Heart disease Brother      Social History     Socioeconomic History    Marital status:    Tobacco Use    Smoking status: Former     Current packs/day: 0.00     Types: Cigarettes     Quit date:      Years since quittin.2     Passive exposure: Current    Smokeless tobacco: Current     Types: Chew   Substance and Sexual Activity    Alcohol use: Never    Drug use: Never    Sexual activity: Yes     Partners: Female     Birth control/protection: None     Social Determinants of Health     Financial Resource Strain: Low Risk  (2024)    Overall Financial Resource Strain (CARDIA)     Difficulty of Paying Living Expenses: Not hard at all   Food Insecurity: No Food Insecurity (2024)    Hunger Vital Sign     Worried About Running Out of Food in the Last Year: Never true     Ran Out of Food in the Last Year: Never true   Transportation Needs: No Transportation Needs (2024)    PRAPARE - Transportation     Lack of Transportation (Medical): No     Lack of Transportation (Non-Medical): No   Physical Activity: Insufficiently Active (2024)    Exercise Vital Sign     Days of Exercise per Week: 5 days     Minutes of Exercise per Session: 10 min   Stress: No Stress Concern Present (2024)    [place] [employer] - Occupational Stress Questionnaire     Feeling of Stress : Not at all   Social Connections: Moderately Isolated (2024)    Social Connection and Isolation Panel [NHANES]     Frequency of Communication with Friends and Family: Twice a week     Frequency of Social Gatherings with Friends and Family: Twice a week     Attends [employer] Services: Never     Active  Member of Clubs or Organizations: No     Attends Club or Organization Meetings: Never     Marital Status:    Housing Stability: Low Risk  (4/4/2024)    Housing Stability Vital Sign     Unable to Pay for Housing in the Last Year: No     Number of Places Lived in the Last Year: 1     Unstable Housing in the Last Year: No       Review of patient's allergies indicates:   Allergen Reactions    Pcn [penicillins] Hives      Current Outpatient Medications on File Prior to Visit   Medication Sig Dispense Refill    calcitRIOL (ROCALTROL) 0.5 MCG Cap Take 1 capsule (0.5 mcg total) by mouth once daily. 90 capsule 0    patiromer calcium sorbitex (VELTASSA) 8.4 gram PwPk Take 1 packet (8.4 g total) by mouth every other day. 45 packet 1    sodium bicarbonate 650 MG tablet Take 2 tablets (1,300 mg total) by mouth 2 (two) times daily. 120 tablet 11     No current facility-administered medications on file prior to visit.      Review of Systems   Constitutional:  Negative for appetite change, fatigue, fever and unexpected weight change.   HENT:  Negative for mouth sores.    Eyes: Negative.  Negative for visual disturbance.   Respiratory:  Negative for cough and shortness of breath.    Cardiovascular:  Negative for chest pain and leg swelling.   Gastrointestinal:  Negative for abdominal distention, abdominal pain, constipation, diarrhea, nausea, vomiting and reflux.   Genitourinary:  Negative for difficulty urinating, dysuria, frequency and hematuria.        Neobladder, h/o bladder cancer, self-catheterizes 6X per day   Musculoskeletal:  Negative for arthralgias and back pain.   Integumentary:  Negative for rash.   Neurological:  Negative for weakness and headaches.   Hematological:  Negative for adenopathy.   Psychiatric/Behavioral:  Negative for sleep disturbance. The patient is not nervous/anxious.          Vitals:    04/08/24 1309   BP: 133/79   Pulse: 70   Resp: 14   Temp: 98.3 °F (36.8 °C)   TempSrc: Oral   SpO2: 100%  "  Weight: 93.4 kg (206 lb)   Height: 5' 11" (1.803 m)     Physical Exam  Constitutional:       General: He is awake.      Appearance: Normal appearance. He is normal weight.   HENT:      Head: Normocephalic and atraumatic.      Nose: Nose normal.      Mouth/Throat:      Mouth: Mucous membranes are moist.   Eyes:      General: Vision grossly intact.      Extraocular Movements: Extraocular movements intact.      Conjunctiva/sclera: Conjunctivae normal.   Cardiovascular:      Rate and Rhythm: Normal rate and regular rhythm.      Heart sounds: Normal heart sounds.   Pulmonary:      Effort: Pulmonary effort is normal.      Breath sounds: Normal breath sounds.   Chest:      Comments: Left chest wall mediport in place  Abdominal:      General: Bowel sounds are normal. There is no distension.      Palpations: Abdomen is soft.      Tenderness: There is no abdominal tenderness.      Comments: Scattered laparoscopic incisions healed   Musculoskeletal:      Cervical back: Normal range of motion and neck supple.      Right lower leg: No edema.      Left lower leg: No edema.   Lymphadenopathy:      Cervical: No cervical adenopathy.      Upper Body:      Right upper body: No supraclavicular adenopathy.      Left upper body: No supraclavicular adenopathy.   Skin:     General: Skin is warm.   Neurological:      Mental Status: He is alert and oriented to person, place, and time.      Motor: Motor function is intact.   Psychiatric:         Mood and Affect: Mood normal.         Speech: Speech normal.         Behavior: Behavior is cooperative.         Judgment: Judgment normal.            Assessment:       1. Adenocarcinoma of transverse colon    2. Iron deficiency anemia due to chronic blood loss    3. Stage 4 chronic kidney disease    4. Symptomatic anemia    5. Tubulovillous adenoma of rectum    6. Benign hypertensive CKD, stage 5 chronic kidney disease or end stage renal disease      Plan:       Patient with h/o bladder cancer from " 2018 s/p neoadjuvant chemotherapy given by Dr. Villar in [place] and s/p surgery with cystectomy/prostatectomy with creation of neobladder done at [facility] in [place].    New transverse colon cancer found on colonoscopy on 10/25/23 after patient presented to hospital with severe iron deficiency anemia.  Patient also with Stage 5 CKD, following with nephrology at [facility].  Initial PET with no evidence of other disease.    Patient underwent surgery robotic assisted transverse colectomy on 12/14/23. Pathology with Stage IIA (T3N0M0), tumor measures 6cm, G2, with 16 negative lymph nodes.   On initial biopsy of tumor, MSI testing shows that tumor is MSI-high.  Per NCCN, observation only recommended. No chemotherapy.    Routine labs show mild anemia with Hgb of 13.1 g/dL. CMP with renal insufficiency. He is closely followed by Nephrology. AV fistula now present.   CEA normal at 2.39. Iron level normal but his ferritin has decreased to 31.91. He is no longer taking the oral iron.   He could benefit from another round of IV iron - Feraheme. Patient in agreement.   Refer patient to survivorship.   Plan to repeat CT C/A/P w/o contrast for surveillance in 3 months.   RTC 3 months for follow-up with repeat labs including CBCdiff, CMP, iron, TIBC and ferritin and CEA level after scans completed.   Scheduled for colonoscopy on 10/30/24.   Consider removal of mediport if scans are good and after colonoscopy.   Discussed genetic testing as well for Rodriguez syndrome but he still would like to think about it.   All questions answered at this time.       Juan Macedo, FNJOSEPHINE

## 2024-04-04 ENCOUNTER — OFFICE VISIT (OUTPATIENT)
Dept: INTERNAL MEDICINE | Facility: CLINIC | Age: 64
End: 2024-04-04
Payer: COMMERCIAL

## 2024-04-04 VITALS
BODY MASS INDEX: 33.39 KG/M2 | TEMPERATURE: 98 F | RESPIRATION RATE: 20 BRPM | WEIGHT: 202.81 LBS | HEART RATE: 73 BPM | SYSTOLIC BLOOD PRESSURE: 122 MMHG | OXYGEN SATURATION: 99 % | DIASTOLIC BLOOD PRESSURE: 73 MMHG

## 2024-04-04 DIAGNOSIS — Z00.00 HEALTHCARE MAINTENANCE: ICD-10-CM

## 2024-04-04 DIAGNOSIS — N13.30 HYDRONEPHROSIS, UNSPECIFIED HYDRONEPHROSIS TYPE: ICD-10-CM

## 2024-04-04 DIAGNOSIS — Z85.51 HISTORY OF BLADDER CANCER: ICD-10-CM

## 2024-04-04 DIAGNOSIS — C18.4 ADENOCARCINOMA OF TRANSVERSE COLON: ICD-10-CM

## 2024-04-04 DIAGNOSIS — F17.229 CHEWING TOBACCO NICOTINE DEPENDENCE WITH NICOTINE-INDUCED DISORDER: ICD-10-CM

## 2024-04-04 DIAGNOSIS — I12.0 BENIGN HYPERTENSIVE CKD, STAGE 5 CHRONIC KIDNEY DISEASE OR END STAGE RENAL DISEASE: ICD-10-CM

## 2024-04-04 DIAGNOSIS — Z23 NEED FOR VACCINATION: Primary | ICD-10-CM

## 2024-04-04 PROCEDURE — 90750 HZV VACC RECOMBINANT IM: CPT | Mod: PBBFAC

## 2024-04-04 PROCEDURE — 90471 IMMUNIZATION ADMIN: CPT | Mod: PBBFAC

## 2024-04-04 PROCEDURE — 99213 OFFICE O/P EST LOW 20 MIN: CPT | Mod: PBBFAC,25

## 2024-04-04 RX ADMIN — ZOSTER VACCINE RECOMBINANT, ADJUVANTED 0.5 ML: KIT at 03:04

## 2024-04-04 NOTE — PROGRESS NOTES
Clinic Note    Patient Name: Bridget Cardenas Jr.  YOB: 1960   MRN: [id number]  Date: 04/04/2024  Home Address: [place] [address]      Subjective:     Chief Complaint:   Chief Complaint   Patient presents with    Follow-up     Medication Management        HPI:  Bridget Cardenas Jr. is a 62 y.o. 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 Patient does not have any acute complaints today and is asymptomatic at this time. Medications have been refilled per [facility].     Available notes and lab results since last visit were reviewed. Last seen in clinic on 12/11/23 to establish care.     Care Team   [facility]- referred to Dr. Aris Scott for evaluation and treatment of transverse colon mass. S/p partial laparoscopic colectomy. Pet scan completed on 11/8/23 not consistent with metastasis. Next follow-up scheduled for 4/8/24.   [facility]- follows for chronic hydronephrosis. Seen on 2/12/24 and given Cipro 500mg BID x 7 days for UTI. Retroperitoneal U/S ordered. Next appointment 8/12/24.   [facility]- referred for non-anion gap metabolic acidosis and CKD. Seen on 3/27/24, not a kidney transplant candidate due to previous cancer diagnosis now in active chemotherapy. AVG placed on 1/23/24, currently maturing. Continue current medication until HD initiated. Next appointment scheduled for 6/6/24.     Past Medical History:   Diagnosis Date    Cancer     hx of bladder cancer    Malignant neoplasm of transverse colon     Obesity     Renal disorder     CKD 5        Past Surgical History:   Procedure Laterality Date    AV FISTULA PLACEMENT Right 01/15/2024    COLONOSCOPY N/A  10/25/2023    Procedure: COLONOSCOPY, WITH HEMORRHAGE CONTROL;  Surgeon: Angy Mcguire MD;  Location: [facility] ENDOSCOPY;  Service: Gastroenterology;  Laterality: N/A;  sigmoid clips    COLONOSCOPY, WITH 1 OR MORE BIOPSIES N/A 10/25/2023    Procedure: COLONOSCOPY, WITH 1 OR MORE BIOPSIES;  Surgeon: Angy Mcguire MD;  Location: [facility] ENDOSCOPY;  Service: Gastroenterology;  Laterality: N/A;  transverse colon mass    COLONOSCOPY, WITH POLYPECTOMY USING SNARE Left 10/25/2023    Procedure: COLONOSCOPY, WITH POLYPECTOMY USING SNARE;  Surgeon: Angy Mcguire MD;  Location: [facility] ENDOSCOPY;  Service: Gastroenterology;  Laterality: Left;    CYSTECTOMY      ESOPHAGOGASTRODUODENOSCOPY Left 10/25/2023    Procedure: EGD (ESOPHAGOGASTRODUODENOSCOPY);  Surgeon: Angy Mcguire MD;  Location: [facility] ENDOSCOPY;  Service: Gastroenterology;  Laterality: Left;    FRACTURE SURGERY  7/2018    Collar none    HERNIA REPAIR  11/2010    ILEO LOOP NEOBLADDER      MEDIPORT INSERTION, SINGLE      PROSTATE SURGERY  10/2018    SMALL INTESTINE SURGERY  10/2018    XI ROBOTIC COLECTOMY, PARTIAL N/A 12/14/2023    Procedure: XI ROBOTIC COLECTOMY, PARTIAL;  Surgeon: Aris Scott MD;  Location: [facility];  Service: Oncology;  Laterality: N/A;  TRANSVERSE COLON; POSSIBLE OPEN CASE       Allergy:  Review of patient's allergies indicates:   Allergen Reactions    Pcn [penicillins] Hives        Current Medications:    Current Outpatient Medications:     calcitRIOL (ROCALTROL) 0.5 MCG Cap, Take 1 capsule (0.5 mcg total) by mouth once daily., Disp: 90 capsule, Rfl: 0    patiromer calcium sorbitex (VELTASSA) 8.4 gram PwPk, Take 1 packet (8.4 g total) by mouth every other day., Disp: 45 packet, Rfl: 1    sodium bicarbonate 650 MG tablet, Take 2 tablets (1,300 mg total) by mouth 2 (two) times daily., Disp: 120 tablet, Rfl: 11    HYDROcodone-acetaminophen (NORCO) 7.5-325 mg per tablet, Take 1 tablet by mouth 4 (four) times daily as needed  for Pain., Disp: , Rfl:     Current Facility-Administered Medications:     varicella-zoster gE-AS01B (PF)(SHINGRIX) 50 mcg/0.5 mL injection, 0.5 mL, Intramuscular, 1 time in Clinic/HOD, Syed Denton MD     Social History:   reports that he quit smoking about 37 years ago. His smoking use included cigarettes. He has been exposed to tobacco smoke. His smokeless tobacco use includes chew. He reports that he does not drink alcohol and does not use drugs.   Patient is on short term disability as of now. Employed with [employer] as a .     Does not smoke tobacco currently, quit smoking in 1987, but does use chewing tobacco since 1987 and uses 4 times per day. Denies EtOH and recreational drug use.     Lives in home with wife and cat. No children.     Family History: Father, Brother - CHF     Immunization History   Administered Date(s) Administered    COVID-19, vector-nr, rS-Ad26, PF (Live) 04/07/2021    Influenza - Quadrivalent - PF *Preferred* (6 months and older) 12/11/2023    Pneumococcal Conjugate - 20 Valent 12/11/2023       Review of Systems   HENT:  Negative for hearing loss.    Eyes:  Negative for discharge.   Respiratory:  Negative for wheezing.    Cardiovascular:  Negative for chest pain and palpitations.   Gastrointestinal:  Negative for blood in stool, constipation, diarrhea and vomiting.   Genitourinary:  Negative for hematuria and urgency.   Musculoskeletal:  Negative for neck pain.   Neurological:  Negative for weakness and headaches.   Endo/Heme/Allergies:  Negative for polydipsia.       Objective:      Physical Exam  Vitals:    04/04/24 1437   BP: 122/73   BP Location: Left arm   Patient Position: Sitting   BP Method: Large (Automatic)   Pulse: 73   Resp: 20   Temp: 98.4 °F (36.9 °C)   TempSrc: Oral   SpO2: 99%   Weight: 92 kg (202 lb 13.2 oz)        Wt Readings from Last 3 Encounters:   04/04/24 92 kg (202 lb 13.2 oz)   03/27/24 93.9 kg (207 lb 0.2 oz)   02/09/24 89.1 kg (196 lb  6.4 oz)       Physical Exam  Vitals and nursing note reviewed.   Constitutional:       General: He is not in acute distress.     Appearance: He is not ill-appearing.   HENT:      Head: Normocephalic and atraumatic.      Nose: No congestion.      Mouth/Throat:      Mouth: Mucous membranes are moist.      Pharynx: Oropharynx is clear. No oropharyngeal exudate.   Eyes:      Extraocular Movements: Extraocular movements intact.      Conjunctiva/sclera: Conjunctivae normal.      Pupils: Pupils are equal, round, and reactive to light.   Cardiovascular:      Rate and Rhythm: Normal rate and regular rhythm.      Pulses: Normal pulses.      Heart sounds: No murmur heard.  Pulmonary:      Effort: Pulmonary effort is normal. No respiratory distress.      Breath sounds: No wheezing, rhonchi or rales.   Abdominal:      General: Abdomen is flat. Bowel sounds are normal. There is no distension.      Palpations: Abdomen is soft. There is no mass.      Tenderness: There is no abdominal tenderness. There is no guarding.   Musculoskeletal:      Cervical back: Normal range of motion.      Right lower leg: No edema.      Left lower leg: No edema.   Lymphadenopathy:      Cervical: No cervical adenopathy.   Skin:     General: Skin is warm and dry.      Capillary Refill: Capillary refill takes less than 2 seconds.      Findings: No rash.   Neurological:      General: No focal deficit present.      Mental Status: He is alert and oriented to person, place, and time. Mental status is at baseline.      Motor: No weakness.          Body mass index is 33.39 kg/m².      Lab Visit on 04/01/2024   Component Date Value Ref Range Status    Carcinoembryonic Antigen 04/01/2024 2.39  0.00 - 3.00 ng/mL Final    Sodium Level 04/01/2024 140  136 - 145 mmol/L Final    Potassium Level 04/01/2024 4.0  3.5 - 5.1 mmol/L Final    Chloride 04/01/2024 111 (H)  98 - 107 mmol/L Final    Carbon Dioxide 04/01/2024 21 (L)  23 - 31 mmol/L Final    Glucose Level  04/01/2024 82  82 - 115 mg/dL Final    Blood Urea Nitrogen 04/01/2024 57.6 (H)  8.4 - 25.7 mg/dL Final    Creatinine 04/01/2024 5.09 (H)  0.73 - 1.18 mg/dL Final    Calcium Level Total 04/01/2024 8.7 (L)  8.8 - 10.0 mg/dL Final    Protein Total 04/01/2024 6.8  5.8 - 7.6 gm/dL Final    Albumin Level 04/01/2024 3.8  3.4 - 4.8 g/dL Final    Globulin 04/01/2024 3.0  2.4 - 3.5 gm/dL Final    Albumin/Globulin Ratio 04/01/2024 1.3  1.1 - 2.0 ratio Final    Bilirubin Total 04/01/2024 0.6  <=1.5 mg/dL Final    Alkaline Phosphatase 04/01/2024 69  40 - 150 unit/L Final    Alanine Aminotransferase 04/01/2024 12  0 - 55 unit/L Final    Aspartate Aminotransferase 04/01/2024 8  5 - 34 unit/L Final    eGFR 04/01/2024 12  mls/min/1.73/m2 Final    Ferritin Level 04/01/2024 31.91  21.81 - 274.66 ng/mL Final    Iron Binding Capacity Unsaturated 04/01/2024 153  69 - 240 ug/dL Final    Iron Level 04/01/2024 90  65 - 175 ug/dL Final    Transferrin 04/01/2024 219  163 - 344 mg/dL Final    Iron Binding Capacity Total 04/01/2024 243 (L)  250 - 450 ug/dL Final    Iron Saturation 04/01/2024 37  20 - 50 % Final    WBC 04/01/2024 6.35  4.50 - 11.50 x10(3)/mcL Final    RBC 04/01/2024 4.31 (L)  4.70 - 6.10 x10(6)/mcL Final    Hgb 04/01/2024 13.1 (L)  14.0 - 18.0 g/dL Final    Hct 04/01/2024 40.2 (L)  42.0 - 52.0 % Final    MCV 04/01/2024 93.3  80.0 - 94.0 fL Final    MCH 04/01/2024 30.4  27.0 - 31.0 pg Final    MCHC 04/01/2024 32.6 (L)  33.0 - 36.0 g/dL Final    RDW 04/01/2024 14.3  11.5 - 17.0 % Final    Platelet 04/01/2024 142  130 - 400 x10(3)/mcL Final    MPV 04/01/2024 10.5 (H)  7.4 - 10.4 fL Final    Neut % 04/01/2024 54.8  % Final    Lymph % 04/01/2024 31.8  % Final    Mono % 04/01/2024 6.5  % Final    Eos % 04/01/2024 6.3  % Final    Basophil % 04/01/2024 0.6  % Final    Lymph # 04/01/2024 2.02  0.6 - 4.6 x10(3)/mcL Final    Neut # 04/01/2024 3.48  2.1 - 9.2 x10(3)/mcL Final    Mono # 04/01/2024 0.41  0.1 - 1.3 x10(3)/mcL Final    Eos #  04/01/2024 0.40  0 - 0.9 x10(3)/mcL Final    Baso # 04/01/2024 0.04  <=0.2 x10(3)/mcL Final    IG# 04/01/2024 0.00  0 - 0.04 x10(3)/mcL Final    IG% 04/01/2024 0.0  % Final   Lab Visit on 03/26/2024   Component Date Value Ref Range Status    Sodium Level 03/26/2024 141  136 - 145 mmol/L Final    Potassium Level 03/26/2024 3.8  3.5 - 5.1 mmol/L Final    Chloride 03/26/2024 111 (H)  98 - 107 mmol/L Final    Carbon Dioxide 03/26/2024 22 (L)  23 - 31 mmol/L Final    Glucose Level 03/26/2024 107  82 - 115 mg/dL Final    Blood Urea Nitrogen 03/26/2024 62.4 (H)  8.4 - 25.7 mg/dL Final    Creatinine 03/26/2024 4.55 (H)  0.73 - 1.18 mg/dL Final    Calcium Level Total 03/26/2024 8.7 (L)  8.8 - 10.0 mg/dL Final    Protein Total 03/26/2024 7.1  5.8 - 7.6 gm/dL Final    Albumin Level 03/26/2024 3.7  3.4 - 4.8 g/dL Final    Globulin 03/26/2024 3.4  2.4 - 3.5 gm/dL Final    Albumin/Globulin Ratio 03/26/2024 1.1  1.1 - 2.0 ratio Final    Bilirubin Total 03/26/2024 0.6  <=1.5 mg/dL Final    Alkaline Phosphatase 03/26/2024 62  40 - 150 unit/L Final    Alanine Aminotransferase 03/26/2024 17  0 - 55 unit/L Final    Aspartate Aminotransferase 03/26/2024 11  5 - 34 unit/L Final    eGFR 03/26/2024 14  mls/min/1.73/m2 Final    Phosphorus Level 03/26/2024 3.3  2.3 - 4.7 mg/dL Final    Parathyroid Hormone Intact 03/26/2024 206.7 (H)  8.7 - 77.0 pg/mL Final    WBC 03/26/2024 5.24  4.50 - 11.50 x10(3)/mcL Final    RBC 03/26/2024 4.61 (L)  4.70 - 6.10 x10(6)/mcL Final    Hgb 03/26/2024 13.9 (L)  14.0 - 18.0 g/dL Final    Hct 03/26/2024 43.3  42.0 - 52.0 % Final    MCV 03/26/2024 93.9  80.0 - 94.0 fL Final    MCH 03/26/2024 30.2  27.0 - 31.0 pg Final    MCHC 03/26/2024 32.1 (L)  33.0 - 36.0 g/dL Final    RDW 03/26/2024 14.4  11.5 - 17.0 % Final    Platelet 03/26/2024 133  130 - 400 x10(3)/mcL Final    MPV 03/26/2024 10.5 (H)  7.4 - 10.4 fL Final    IPF 03/26/2024 3.6  0.9 - 11.2 % Final    Neut % 03/26/2024 53.4  % Final    Lymph % 03/26/2024  32.1  % Final    Mono % 03/26/2024 5.9  % Final    Eos % 03/26/2024 7.6  % Final    Basophil % 03/26/2024 0.8  % Final    Lymph # 03/26/2024 1.68  0.6 - 4.6 x10(3)/mcL Final    Neut # 03/26/2024 2.80  2.1 - 9.2 x10(3)/mcL Final    Mono # 03/26/2024 0.31  0.1 - 1.3 x10(3)/mcL Final    Eos # 03/26/2024 0.40  0 - 0.9 x10(3)/mcL Final    Baso # 03/26/2024 0.04  <=0.2 x10(3)/mcL Final    IG# 03/26/2024 0.01  0 - 0.04 x10(3)/mcL Final    IG% 03/26/2024 0.2  % Final    NRBC% 03/26/2024 0.0  % Final    Extra Tube 03/26/2024 Hold for add-ons.   Final    Auto resulted.      Office Visit on 02/09/2024   Component Date Value Ref Range Status    Color, UA 02/09/2024 Yellow   Final    Spec Grav UA 02/09/2024 1.020   Final    pH, UA 02/09/2024 7.0   Final    WBC, UA 02/09/2024 large   Final    Nitrite, UA 02/09/2024 neg   Final    Protein, POC 02/09/2024 100   Final    Glucose, UA 02/09/2024 neg   Final    Ketones, UA 02/09/2024 neg   Final    Urobilinogen, UA 02/09/2024 0.2   Final    Bilirubin, POC 02/09/2024 neg   Final    Blood, UA 02/09/2024 moderate   Final    Urine Culture 02/09/2024 >/= 100,000 colonies/ml Klebsiella oxytoca (A)   Final   Lab Visit on 01/18/2024   Component Date Value Ref Range Status    Sodium Level 01/18/2024 139  136 - 145 mmol/L Final    Potassium Level 01/18/2024 3.7  3.5 - 5.1 mmol/L Final    Chloride 01/18/2024 113 (H)  98 - 107 mmol/L Final    Carbon Dioxide 01/18/2024 17 (L)  23 - 31 mmol/L Final    Glucose Level 01/18/2024 130 (H)  82 - 115 mg/dL Final    Blood Urea Nitrogen 01/18/2024 68.9 (H)  8.4 - 25.7 mg/dL Final    Creatinine 01/18/2024 5.43 (H)  0.73 - 1.18 mg/dL Final    BUN/Creatinine Ratio 01/18/2024 13   Final    Calcium Level Total 01/18/2024 8.9  8.8 - 10.0 mg/dL Final    Anion Gap 01/18/2024 9.0  mEq/L Final    eGFR 01/18/2024 11  mls/min/1.73/m2 Final    WBC 01/18/2024 5.55  4.50 - 11.50 x10(3)/mcL Final    RBC 01/18/2024 5.04  4.70 - 6.10 x10(6)/mcL Final    Hgb 01/18/2024 14.9   14.0 - 18.0 g/dL Final    Hct 01/18/2024 46.2  42.0 - 52.0 % Final    MCV 01/18/2024 91.7  80.0 - 94.0 fL Final    MCH 01/18/2024 29.6  27.0 - 31.0 pg Final    MCHC 01/18/2024 32.3 (L)  33.0 - 36.0 g/dL Final    RDW 01/18/2024 15.8  11.5 - 17.0 % Final    Platelet 01/18/2024 158  130 - 400 x10(3)/mcL Final    MPV 01/18/2024 10.1  7.4 - 10.4 fL Final    Neut % 01/18/2024 58.7  % Final    Lymph % 01/18/2024 25.0  % Final    Mono % 01/18/2024 5.6  % Final    Eos % 01/18/2024 9.4  % Final    Basophil % 01/18/2024 0.9  % Final    Lymph # 01/18/2024 1.39  0.6 - 4.6 x10(3)/mcL Final    Neut # 01/18/2024 3.26  2.1 - 9.2 x10(3)/mcL Final    Mono # 01/18/2024 0.31  0.1 - 1.3 x10(3)/mcL Final    Eos # 01/18/2024 0.52  0 - 0.9 x10(3)/mcL Final    Baso # 01/18/2024 0.05  <=0.2 x10(3)/mcL Final    IG# 01/18/2024 0.02  0 - 0.04 x10(3)/mcL Final    IG% 01/18/2024 0.4  % Final    NRBC% 01/18/2024 0.0  % Final   Lab Visit on 01/02/2024   Component Date Value Ref Range Status    Ferritin Level 01/02/2024 75.19  21.81 - 274.66 ng/mL Final    Iron Binding Capacity Unsaturated 01/02/2024 157  69 - 240 ug/dL Final    Iron Level 01/02/2024 82  65 - 175 ug/dL Final    Transferrin 01/02/2024 215  163 - 344 mg/dL Final    Iron Binding Capacity Total 01/02/2024 239 (L)  250 - 450 ug/dL Final    Iron Saturation 01/02/2024 34  20 - 50 % Final    Sodium Level 01/02/2024 140  136 - 145 mmol/L Final    Potassium Level 01/02/2024 4.5  3.5 - 5.1 mmol/L Final    Chloride 01/02/2024 111 (H)  98 - 107 mmol/L Final    Carbon Dioxide 01/02/2024 21 (L)  23 - 31 mmol/L Final    Glucose Level 01/02/2024 80 (L)  82 - 115 mg/dL Final    Blood Urea Nitrogen 01/02/2024 53.8 (H)  8.4 - 25.7 mg/dL Final    Creatinine 01/02/2024 4.78 (H)  0.73 - 1.18 mg/dL Final    Calcium Level Total 01/02/2024 8.9  8.8 - 10.0 mg/dL Final    Protein Total 01/02/2024 7.7 (H)  5.8 - 7.6 gm/dL Final    Albumin Level 01/02/2024 3.7  3.4 - 4.8 g/dL Final    Globulin 01/02/2024 4.0 (H)   2.4 - 3.5 gm/dL Final    Albumin/Globulin Ratio 01/02/2024 0.9 (L)  1.1 - 2.0 ratio Final    Bilirubin Total 01/02/2024 0.3  <=1.5 mg/dL Final    Alkaline Phosphatase 01/02/2024 72  40 - 150 unit/L Final    Alanine Aminotransferase 01/02/2024 17  0 - 55 unit/L Final    Aspartate Aminotransferase 01/02/2024 13  5 - 34 unit/L Final    eGFR 01/02/2024 13  mls/min/1.73/m2 Final    Phosphorus Level 01/02/2024 4.0  2.3 - 4.7 mg/dL Final    Parathyroid Hormone Intact 01/02/2024 152.4 (H)  8.7 - 77.0 pg/mL Final    WBC 01/02/2024 5.56  4.50 - 11.50 x10(3)/mcL Final    RBC 01/02/2024 4.63 (L)  4.70 - 6.10 x10(6)/mcL Final    Hgb 01/02/2024 13.5 (L)  14.0 - 18.0 g/dL Final    Hct 01/02/2024 42.4  42.0 - 52.0 % Final    MCV 01/02/2024 91.6  80.0 - 94.0 fL Final    MCH 01/02/2024 29.2  27.0 - 31.0 pg Final    MCHC 01/02/2024 31.8 (L)  33.0 - 36.0 g/dL Final    RDW 01/02/2024 17.6 (H)  11.5 - 17.0 % Final    Platelet 01/02/2024 192  130 - 400 x10(3)/mcL Final    MPV 01/02/2024 9.7  7.4 - 10.4 fL Final    Neut % 01/02/2024 44.4  % Final    Lymph % 01/02/2024 31.3  % Final    Mono % 01/02/2024 6.5  % Final    Eos % 01/02/2024 16.7  % Final    Basophil % 01/02/2024 0.9  % Final    Lymph # 01/02/2024 1.74  0.6 - 4.6 x10(3)/mcL Final    Neut # 01/02/2024 2.47  2.1 - 9.2 x10(3)/mcL Final    Mono # 01/02/2024 0.36  0.1 - 1.3 x10(3)/mcL Final    Eos # 01/02/2024 0.93 (H)  0 - 0.9 x10(3)/mcL Final    Baso # 01/02/2024 0.05  <=0.2 x10(3)/mcL Final    IG# 01/02/2024 0.01  0 - 0.04 x10(3)/mcL Final    IG% 01/02/2024 0.2  % Final    NRBC% 01/02/2024 0.0  % Final   Admission on 12/14/2023, Discharged on 12/15/2023   Component Date Value Ref Range Status    Group & Rh 12/14/2023 B POS   Final    Indirect Michael GEL 12/14/2023 POS (A)   Final    @12/14/23 11:37 by [email or web address]:  Notified marleni maradiaga_______ of positive antibody screens and delay of  blood products if needed.    Specimen Outdate 12/14/2023 12/17/2023 23:59   Final    Sodium Level  12/14/2023 137  136 - 145 mmol/L Final    Potassium Level 12/14/2023 4.8  3.5 - 5.1 mmol/L Final    Chloride 12/14/2023 111 (H)  98 - 107 mmol/L Final    Carbon Dioxide 12/14/2023 21 (L)  23 - 31 mmol/L Final    Glucose Level 12/14/2023 98  82 - 115 mg/dL Final    Blood Urea Nitrogen 12/14/2023 52.9 (H)  8.4 - 25.7 mg/dL Final    Creatinine 12/14/2023 4.81 (H)  0.73 - 1.18 mg/dL Final    BUN/Creatinine Ratio 12/14/2023 11   Final    Calcium Level Total 12/14/2023 8.0 (L)  8.8 - 10.0 mg/dL Final    Anion Gap 12/14/2023 5.0  mEq/L Final    eGFR 12/14/2023 13  mls/min/1.73/m2 Final    Antibody Identification 12/14/2023 POS, Anti-c (little)   Final    Pathology Result 12/14/2023    Final    Comment:          FINAL DIAGNOSIS     TRANSVERSE COLON, TRANSVERSE COLECTOMY:     ADENOCARCINOMA, MODERATELY DIFFERENTIATED.     -- GREATEST TUMOR DIMENSION, 6 CM.  -- CARCINOMA INVADES INTO SUBSEROSAL ADIPOSE TISSUE.  -- LYMPHOVASCULAR INVASION, INDETERMINATE.  -- SURGICAL MARGINS, UNINVOLVED.  -- SIXTEEN MESENTERIC LYMPH NODES, UNINVOLVED (0/16).     CODE 9     Electronically Signed by:  Marbin Campbell M.D. PMaritzaHRY , Pathologist  (Case signed 12/18/2023 at 12:55pm)     Synoptic Report     SPECIMEN  Procedure:  Transverse colectomy     TUMOR  Tumor Site:  Transverse colon  Histologic Type:  Adenocarcinoma  Histologic Grade:  G2, moderately differentiated  Tumor Size:  Greatest dimension in Centimeters (cm): 6 cm  Tumor Extent:  Invades through muscularis propria into the pericolonic  or perirectal tissue  Macroscopic Tumor Perforation:  Not identified  Lymphovascular Invasion:  Cannot be determined: indeterminate  Perineural Invasion:  Not identified  Type of Polyp in which Invasive Carcinoma                            Arose:  Tubular adenoma  Treatment Effect:  No known presurgical therapy     MARGINS  Margin Status for Invasive Carcinoma:  All margins negative for invasive  carcinoma  Closest Margin(s) to Invasive Carcinoma:   Distal  Distance from Invasive Carcinoma to Closest Margin:  Exact distance in  cm: 5.1 cm  Margin Status for Non-Invasive Tumor:  All margins negative for  high-grade dysplasia / intramucosal carcinoma and low-grade dysplasia     REGIONAL LYMPH NODES  Regional Lymph Node Status:  Regional lymph nodes present  All regional lymph nodes negative for tumor  Number of Lymph Nodes Examined:  Exact number : 16  Tumor Deposits:  Not identified     PATHOLOGIC STAGE CLASSIFICATION (pTNM, AJCC 8th Edition)  pT Category:  pT3: Tumor invades through the muscularis propria into  pericolorectal tissues  pN Category:  pN0: No regional lymph node metastasis     ADDITIONAL FINDINGS  Additional Findings:  None identified           Source  TRANSVERSE COLON     Clinical Information  MALIGNANT                            TUMOR OF TRANSVERSE COLON  COLON CANCER     Gross Description  [address]12, Bridget Cardenas. Received in formalin labeled `Bridget Cardenas Transverse  Colon` and listed on the requisition as `Large Intestine, Transverse Colon` is  an unoriented segment of large intestine. The specimen measures 14.5 cm in  length x 4.6 cm in average diameter. The serosa is tan pink, slightly shaggy. At  the approximate midpoint of the specimen, the serosa is puckered. The serosa at  the puckered focus is inked blue. Yellow red mesentery extends a maximum of 7.1  cm from the serosa. The specimen is longitudinally opened.     Opening reveals a tan pink nearly circumferential, slightly raised mass  corresponding to the puckered serosa. It measures 6.0 x 3.2 cm. It rests 5.4 cm  from the presumed proximal margin, 5.1 cm from the presumed distal margin and  6.3 cm from the nearest vascular/radial margin. Sectioning through the mass  reveals a maximum height of 1.5 cm, and gross extension through the                            muscularis  into the surrounding tissues. It comes to within <0.1 cm of the blue inked  serosa. The mucosa presumed proximal  to the mass is pink tan, glistening and  slightly dilated with very few, shallow folds. The mucosa presumed distal to the  mass is tan pink, glistening with a more normal folding pattern. No other gross  abnormalities are identified. Multiple lymph node candidates, ranging from 0.1  to 0.8 cm in greatest dimension, are identified within the attached fatty  tissues. Representative sections are submitted as follows:  1A- presumed proximal resection margin  1B- presumed distal resection margin  1C- vascular/radial margin nearest mass  1D-1H- representative mass at deepest gross extent  1I- six intact lymph node candidates  1J-1L- five intact lymph node candidates per cassette  1M-1V- one sectioned lymph node candidate per cassette  [id number]     AM/bb     Microscopic Description  1. Microscopic examination performed.  Please see diagnosis.          UNIT NUMBER 12/14/2023 [id number]   Final    UNIT ABO/RH 12/14/2023 B POS   Final    DISPENSE STATUS 12/14/2023 Released   Final    Unit Expiration 12/14/2023 [id number]   Final    Product Code 12/14/2023 [id number]   Final    Unit Blood Type Code 12/14/2023 7300   Final    CROSSMATCH INTERPRETATION 12/14/2023 Compatible   Final    UNIT NUMBER 12/14/2023 [id number]   Final    UNIT ABO/RH 12/14/2023 B POS   Final    DISPENSE STATUS 12/14/2023 Released   Final    Unit Expiration 12/14/2023 202401202359   Final    Product Code 12/14/2023 [id number]   Final    Unit Blood Type Code 12/14/2023 7300   Final    CROSSMATCH INTERPRETATION 12/14/2023 Compatible   Final    Sodium Level 12/15/2023 137  136 - 145 mmol/L Final    Potassium Level 12/15/2023 4.9  3.5 - 5.1 mmol/L Final    Chloride 12/15/2023 111 (H)  98 - 107 mmol/L Final    Carbon Dioxide 12/15/2023 17 (L)  23 - 31 mmol/L Final    Glucose Level 12/15/2023 107  82 - 115 mg/dL Final    Blood Urea Nitrogen 12/15/2023 49.1 (H)  8.4 - 25.7 mg/dL Final    Creatinine 12/15/2023 4.28 (H)  0.73 - 1.18 mg/dL Final    Calcium Level  Total 12/15/2023 7.6 (L)  8.8 - 10.0 mg/dL Final    Protein Total 12/15/2023 5.8  5.8 - 7.6 gm/dL Final    Albumin Level 12/15/2023 3.3 (L)  3.4 - 4.8 g/dL Final    Globulin 12/15/2023 2.5  2.4 - 3.5 gm/dL Final    Albumin/Globulin Ratio 12/15/2023 1.3  1.1 - 2.0 ratio Final    Bilirubin Total 12/15/2023 0.3  <=1.5 mg/dL Final    Alkaline Phosphatase 12/15/2023 60  40 - 150 unit/L Final    Alanine Aminotransferase 12/15/2023 8  0 - 55 unit/L Final    Aspartate Aminotransferase 12/15/2023 10  5 - 34 unit/L Final    eGFR 12/15/2023 15  mls/min/1.73/m2 Final    Magnesium Level 12/15/2023 1.50 (L)  1.60 - 2.60 mg/dL Final    PT 12/15/2023 14.3  12.5 - 14.5 seconds Final    INR 12/15/2023 1.1  <=1.3 Final    PTT 12/15/2023 28.7  23.2 - 33.7 seconds Final    For Minimal Heparin Infusion, the goal aPTT 64-85 seconds corresponds to an anti-Xa of 0.3-0.5.    For Low Intensity and High Intensity Heparin, the goal aPTT  seconds corresponds to an anti-Xa of 0.3-0.7    WBC 12/15/2023 8.66  4.50 - 11.50 x10(3)/mcL Final    RBC 12/15/2023 3.87 (L)  4.70 - 6.10 x10(6)/mcL Final    Hgb 12/15/2023 11.1 (L)  14.0 - 18.0 g/dL Final    Hct 12/15/2023 35.6 (L)  42.0 - 52.0 % Final    MCV 12/15/2023 92.0  80.0 - 94.0 fL Final    MCH 12/15/2023 28.7  27.0 - 31.0 pg Final    MCHC 12/15/2023 31.2 (L)  33.0 - 36.0 g/dL Final    RDW 12/15/2023 21.2 (H)  11.5 - 17.0 % Final    Platelet 12/15/2023 155  130 - 400 x10(3)/mcL Final    MPV 12/15/2023 10.3  7.4 - 10.4 fL Final    Neut % 12/15/2023 77.8  % Final    Lymph % 12/15/2023 13.4  % Final    Mono % 12/15/2023 7.6  % Final    Eos % 12/15/2023 0.6  % Final    Basophil % 12/15/2023 0.3  % Final    Lymph # 12/15/2023 1.16  0.6 - 4.6 x10(3)/mcL Final    Neut # 12/15/2023 6.73  2.1 - 9.2 x10(3)/mcL Final    Mono # 12/15/2023 0.66  0.1 - 1.3 x10(3)/mcL Final    Eos # 12/15/2023 0.05  0 - 0.9 x10(3)/mcL Final    Baso # 12/15/2023 0.03  <=0.2 x10(3)/mcL Final    IG# 12/15/2023 0.03  0 - 0.04  x10(3)/mcL Final    IG% 12/15/2023 0.3  % Final    NRBC% 12/15/2023 0.0  % Final   Lab Visit on 12/11/2023   Component Date Value Ref Range Status    Sodium Level 12/11/2023 141  136 - 145 mmol/L Final    Potassium Level 12/11/2023 5.2 (H)  3.5 - 5.1 mmol/L Final    Chloride 12/11/2023 112 (H)  98 - 107 mmol/L Final    Carbon Dioxide 12/11/2023 20 (L)  23 - 31 mmol/L Final    Glucose Level 12/11/2023 94  82 - 115 mg/dL Final    Blood Urea Nitrogen 12/11/2023 63.1 (H)  8.4 - 25.7 mg/dL Final    Creatinine 12/11/2023 4.69 (H)  0.73 - 1.18 mg/dL Final    Calcium Level Total 12/11/2023 9.3  8.8 - 10.0 mg/dL Final    Protein Total 12/11/2023 7.9 (H)  5.8 - 7.6 gm/dL Final    Albumin Level 12/11/2023 3.9  3.4 - 4.8 g/dL Final    Globulin 12/11/2023 4.0 (H)  2.4 - 3.5 gm/dL Final    Albumin/Globulin Ratio 12/11/2023 1.0 (L)  1.1 - 2.0 ratio Final    Bilirubin Total 12/11/2023 0.3  <=1.5 mg/dL Final    Alkaline Phosphatase 12/11/2023 70  40 - 150 unit/L Final    Alanine Aminotransferase 12/11/2023 6  0 - 55 unit/L Final    Aspartate Aminotransferase 12/11/2023 7  5 - 34 unit/L Final    eGFR 12/11/2023 13  mls/min/1.73/m2 Final    HIV 12/11/2023 Nonreactive  Nonreactive Final    Hep C Ab Interp 12/11/2023 Nonreactive  Nonreactive Final    Hepatitis B Surface Antigen 12/11/2023 Nonreactive  Nonreactive Final    Hepatitis B Core Antibody 12/11/2023 Nonreactive  Nonreactive Final    Cholesterol Total 12/11/2023 140  <=200 mg/dL Final    HDL Cholesterol 12/11/2023 48  35 - 60 mg/dL Final    Triglyceride 12/11/2023 54  34 - 140 mg/dL Final    Cholesterol/HDL Ratio 12/11/2023 3  0 - 5 Final    Very Low Density Lipoprotein 12/11/2023 11   Final    LDL Cholesterol 12/11/2023 81.00  50.00 - 140.00 mg/dL Final    Vit D 25 OH 12/11/2023 23.9 (L)  30.0 - 80.0 ng/mL Final    WBC 12/11/2023 6.84  4.50 - 11.50 x10(3)/mcL Final    RBC 12/11/2023 4.61 (L)  4.70 - 6.10 x10(6)/mcL Final    Hgb 12/11/2023 12.8 (L)  14.0 - 18.0 g/dL Final    Hct  12/11/2023 43.1  42.0 - 52.0 % Final    MCV 12/11/2023 93.5  80.0 - 94.0 fL Final    MCH 12/11/2023 27.8  27.0 - 31.0 pg Final    MCHC 12/11/2023 29.7 (L)  33.0 - 36.0 g/dL Final    RDW 12/11/2023 22.5 (H)  11.5 - 17.0 % Final    Platelet 12/11/2023 182  130 - 400 x10(3)/mcL Final    MPV 12/11/2023 10.1  7.4 - 10.4 fL Final    Neut % 12/11/2023 61.0  % Final    Lymph % 12/11/2023 23.4  % Final    Mono % 12/11/2023 6.7  % Final    Eos % 12/11/2023 7.9  % Final    Basophil % 12/11/2023 0.7  % Final    Lymph # 12/11/2023 1.60  0.6 - 4.6 x10(3)/mcL Final    Neut # 12/11/2023 4.17  2.1 - 9.2 x10(3)/mcL Final    Mono # 12/11/2023 0.46  0.1 - 1.3 x10(3)/mcL Final    Eos # 12/11/2023 0.54  0 - 0.9 x10(3)/mcL Final    Baso # 12/11/2023 0.05  <=0.2 x10(3)/mcL Final    IG# 12/11/2023 0.02  0 - 0.04 x10(3)/mcL Final    IG% 12/11/2023 0.3  % Final    NRBC% 12/11/2023 0.0  % Final    RBC Morph 12/11/2023 Abnormal (A)  Normal Final    Anisocytosis 12/11/2023 1+ (A)  (none) Final    Kareem Cells 12/11/2023 Slight (A)  (none) Final    Elliptocytosis 12/11/2023 Slight (A)  (none) Final    Microcytosis 12/11/2023 1+ (A)  (none) Final    Poikilocytosis 12/11/2023 Slight (A)  (none) Final    Platelets 12/11/2023 Adequate  Normal, Adequate Final   Office Visit on 12/11/2023   Component Date Value Ref Range Status    Hemoglobin A1C, POC 12/11/2023 4.7  % Final   Lab Visit on 11/28/2023   Component Date Value Ref Range Status    Sodium Level 11/28/2023 140  136 - 145 mmol/L Final    Potassium Level 11/28/2023 5.2 (H)  3.5 - 5.1 mmol/L Final    Chloride 11/28/2023 108 (H)  98 - 107 mmol/L Final    Carbon Dioxide 11/28/2023 23  23 - 31 mmol/L Final    Glucose Level 11/28/2023 78 (L)  82 - 115 mg/dL Final    Blood Urea Nitrogen 11/28/2023 75.1 (H)  8.4 - 25.7 mg/dL Final    Creatinine 11/28/2023 5.11 (H)  0.73 - 1.18 mg/dL Final    Calcium Level Total 11/28/2023 8.5 (L)  8.8 - 10.0 mg/dL Final    Protein Total 11/28/2023 7.4  5.8 - 7.6 gm/dL  Final    Albumin Level 11/28/2023 3.8  3.4 - 4.8 g/dL Final    Globulin 11/28/2023 3.6 (H)  2.4 - 3.5 gm/dL Final    Albumin/Globulin Ratio 11/28/2023 1.1  1.1 - 2.0 ratio Final    Bilirubin Total 11/28/2023 0.4  <=1.5 mg/dL Final    Alkaline Phosphatase 11/28/2023 65  40 - 150 unit/L Final    Alanine Aminotransferase 11/28/2023 7  0 - 55 unit/L Final    Aspartate Aminotransferase 11/28/2023 7  5 - 34 unit/L Final    eGFR 11/28/2023 12  mls/min/1.73/m2 Final    Ferritin Level 11/28/2023 358.83 (H)  21.81 - 274.66 ng/mL Final    Iron Binding Capacity Unsaturated 11/28/2023 164  69 - 240 ug/dL Final    Iron Level 11/28/2023 101  65 - 175 ug/dL Final    Transferrin 11/28/2023 212  163 - 344 mg/dL Final    Iron Binding Capacity Total 11/28/2023 265  250 - 450 ug/dL Final    Iron Saturation 11/28/2023 38  20 - 50 % Final    WBC 11/28/2023 5.14  4.50 - 11.50 x10(3)/mcL Final    RBC 11/28/2023 4.34 (L)  4.70 - 6.10 x10(6)/mcL Final    Hgb 11/28/2023 11.8 (L)  14.0 - 18.0 g/dL Final    Hct 11/28/2023 38.8 (L)  42.0 - 52.0 % Final    MCV 11/28/2023 89.4  80.0 - 94.0 fL Final    MCH 11/28/2023 27.2  27.0 - 31.0 pg Final    MCHC 11/28/2023 30.4 (L)  33.0 - 36.0 g/dL Final    RDW 11/28/2023 25.7 (H)  11.5 - 17.0 % Final    Platelet 11/28/2023 176  130 - 400 x10(3)/mcL Final    MPV 11/28/2023 10.2  7.4 - 10.4 fL Final    Neut % 11/28/2023 51.7  % Final    Lymph % 11/28/2023 25.9  % Final    Mono % 11/28/2023 7.4  % Final    Eos % 11/28/2023 14.0  % Final    Basophil % 11/28/2023 0.8  % Final    Lymph # 11/28/2023 1.33  0.6 - 4.6 x10(3)/mcL Final    Neut # 11/28/2023 2.66  2.1 - 9.2 x10(3)/mcL Final    Mono # 11/28/2023 0.38  0.1 - 1.3 x10(3)/mcL Final    Eos # 11/28/2023 0.72  0 - 0.9 x10(3)/mcL Final    Baso # 11/28/2023 0.04  <=0.2 x10(3)/mcL Final    IG# 11/28/2023 0.01  0 - 0.04 x10(3)/mcL Final    IG% 11/28/2023 0.2  % Final    NRBC% 11/28/2023 0.0  % Final    Urine Protein Level 11/28/2023 32.6  mg/dL Final    Urine  Creatinine 11/28/2023 65.0  63.0 - 166.0 mg/dL Final    Urine Protein/Creatinine Ratio 11/28/2023 0.5   Final    Color, UA 11/28/2023 Light-Yellow  Yellow, Light-Yellow, Dark Yellow, Luisa, Straw Final    Appearance, UA 11/28/2023 Turbid (A)  Clear Final    Specific Gravity, UA 11/28/2023 1.010  1.005 - 1.030 Final    pH, UA 11/28/2023 6.5  5.0 - 8.5 Final    Protein, UA 11/28/2023 1+ (A)  Negative Final    Glucose, UA 11/28/2023 Normal  Negative, Normal Final    Ketones, UA 11/28/2023 Negative  Negative Final    Blood, UA 11/28/2023 Trace (A)  Negative Final    Bilirubin, UA 11/28/2023 Negative  Negative Final    Urobilinogen, UA 11/28/2023 Normal  0.2, 1.0, Normal Final    Nitrites, UA 11/28/2023 1+ (A)  Negative Final    Leukocyte Esterase, UA 11/28/2023 500 (A)  Negative Final    WBC, UA 11/28/2023 >100 (A)  None Seen, 0-2, 3-5, 0-5 /HPF Final    Bacteria, UA 11/28/2023 Occ (A)  None Seen /HPF Final    Squamous Epithelial Cells, UA 11/28/2023 None Seen  None Seen /HPF Final    Hyaline Casts, UA 11/28/2023 None Seen  None Seen /lpf Final    RBC, UA 11/28/2023 6-10 (A)  None Seen, 0-2, 3-5, 0-5 /HPF Final    Urine Culture 11/28/2023 >/= 100,000 colonies/ml Escherichia coli (A)   Final    RBC Morph 11/28/2023 Abnormal (A)  Normal Final    Anisocytosis 11/28/2023 3+ (A)  (none) Final    Poikilocytosis 11/28/2023 1+ (A)  (none) Final    Polychromasia 11/28/2023 Slight (A)  (none) Final    Schistocytes 11/28/2023 Slight (A)  (none) Final    Platelets 11/28/2023 Adequate  Normal, Adequate Final   No results displayed because visit has over 200 results.      There may be more visits with results that are not included.           Assessment:       Plan  1. Healthcare maintenance  2. Need for vaccination  -Previously ordered lab work wnl on 12/11/23   - Ordered Zoster vaccine 2/2 for in clinic administration today   - Only needs Tetanus vaccination, otherwise all healthcare maintenance UTD   - Encouraged healthy lifestyle  modifications including low protein, renal diet and physical activity as tolerated     3. CKD G5/A3 s/p AVG placement on   - Continue routine follow-up with [facility]    4. 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 Scott with surgical oncology at [facility]     5. Hx of bladder cancer s/p total cystectomy and prostatectomy in 2018 with neobladder creation  6. Chronic hydronephrosis   - Continue routine follow-up with [facility]     7. Chewing Tobacco User   - Cessation advised, patient verbalized understanding  - Declined Nicotine lozenges and gum   - Declined Chantix due to potential side effects  - Nicotine patch at high dose did not curb patient cravings while hopitalized   - Will revisit at next clinic appointment     ED precautions given.     Patient case and plan of care discussed with Dr. Anam Cardenas     Disposition: RTC in 6 months or sooner if needed    Syed Denton MD   Internal Medicine - -1

## 2024-04-08 ENCOUNTER — PATIENT MESSAGE (OUTPATIENT)
Dept: UROLOGY | Facility: CLINIC | Age: 64
End: 2024-04-08
Payer: COMMERCIAL

## 2024-04-08 ENCOUNTER — OFFICE VISIT (OUTPATIENT)
Dept: HEMATOLOGY/ONCOLOGY | Facility: CLINIC | Age: 64
End: 2024-04-08
Payer: COMMERCIAL

## 2024-04-08 VITALS
DIASTOLIC BLOOD PRESSURE: 79 MMHG | HEART RATE: 70 BPM | WEIGHT: 206 LBS | BODY MASS INDEX: 28.84 KG/M2 | RESPIRATION RATE: 14 BRPM | HEIGHT: 71 IN | OXYGEN SATURATION: 100 % | TEMPERATURE: 98 F | SYSTOLIC BLOOD PRESSURE: 133 MMHG

## 2024-04-08 DIAGNOSIS — C18.4 ADENOCARCINOMA OF TRANSVERSE COLON: Primary | ICD-10-CM

## 2024-04-08 DIAGNOSIS — D64.9 SYMPTOMATIC ANEMIA: ICD-10-CM

## 2024-04-08 DIAGNOSIS — D50.0 IRON DEFICIENCY ANEMIA DUE TO CHRONIC BLOOD LOSS: ICD-10-CM

## 2024-04-08 DIAGNOSIS — I12.0 BENIGN HYPERTENSIVE CKD, STAGE 5 CHRONIC KIDNEY DISEASE OR END STAGE RENAL DISEASE: ICD-10-CM

## 2024-04-08 DIAGNOSIS — N18.4 STAGE 4 CHRONIC KIDNEY DISEASE: ICD-10-CM

## 2024-04-08 DIAGNOSIS — D12.8 TUBULOVILLOUS ADENOMA OF RECTUM: ICD-10-CM

## 2024-04-08 PROCEDURE — 3066F NEPHROPATHY DOC TX: CPT | Mod: CPTII,S$GLB,, | Performed by: NURSE PRACTITIONER

## 2024-04-08 PROCEDURE — 99214 OFFICE O/P EST MOD 30 MIN: CPT | Mod: S$GLB,,, | Performed by: NURSE PRACTITIONER

## 2024-04-08 PROCEDURE — 99999 PR PBB SHADOW E&M-EST. PATIENT-LVL IV: CPT | Mod: PBBFAC,,, | Performed by: NURSE PRACTITIONER

## 2024-04-08 PROCEDURE — 3078F DIAST BP <80 MM HG: CPT | Mod: CPTII,S$GLB,, | Performed by: NURSE PRACTITIONER

## 2024-04-08 PROCEDURE — 1159F MED LIST DOCD IN RCRD: CPT | Mod: CPTII,S$GLB,, | Performed by: NURSE PRACTITIONER

## 2024-04-08 PROCEDURE — 1160F RVW MEDS BY RX/DR IN RCRD: CPT | Mod: CPTII,S$GLB,, | Performed by: NURSE PRACTITIONER

## 2024-04-08 PROCEDURE — 3008F BODY MASS INDEX DOCD: CPT | Mod: CPTII,S$GLB,, | Performed by: NURSE PRACTITIONER

## 2024-04-08 PROCEDURE — 3075F SYST BP GE 130 - 139MM HG: CPT | Mod: CPTII,S$GLB,, | Performed by: NURSE PRACTITIONER

## 2024-04-08 RX ORDER — SODIUM CHLORIDE 0.9 % (FLUSH) 0.9 %
10 SYRINGE (ML) INJECTION
Status: CANCELLED | OUTPATIENT
Start: 2024-04-11

## 2024-04-08 RX ORDER — HEPARIN 100 UNIT/ML
500 SYRINGE INTRAVENOUS
Status: CANCELLED | OUTPATIENT
Start: 2024-04-11

## 2024-04-08 RX ORDER — EPINEPHRINE 0.3 MG/.3ML
0.3 INJECTION SUBCUTANEOUS ONCE AS NEEDED
Status: CANCELLED | OUTPATIENT
Start: 2024-04-11

## 2024-04-08 RX ORDER — DIPHENHYDRAMINE HYDROCHLORIDE 50 MG/ML
50 INJECTION INTRAMUSCULAR; INTRAVENOUS ONCE AS NEEDED
Status: CANCELLED | OUTPATIENT
Start: 2024-04-11

## 2024-04-15 ENCOUNTER — INFUSION (OUTPATIENT)
Dept: INFUSION THERAPY | Facility: HOSPITAL | Age: 64
End: 2024-04-15
Attending: INTERNAL MEDICINE
Payer: COMMERCIAL

## 2024-04-15 VITALS
DIASTOLIC BLOOD PRESSURE: 73 MMHG | TEMPERATURE: 98 F | HEART RATE: 71 BPM | SYSTOLIC BLOOD PRESSURE: 131 MMHG | OXYGEN SATURATION: 96 % | RESPIRATION RATE: 16 BRPM

## 2024-04-15 DIAGNOSIS — D50.0 IRON DEFICIENCY ANEMIA DUE TO CHRONIC BLOOD LOSS: Primary | ICD-10-CM

## 2024-04-15 PROCEDURE — 25000003 PHARM REV CODE 250: Performed by: NURSE PRACTITIONER

## 2024-04-15 PROCEDURE — 96365 THER/PROPH/DIAG IV INF INIT: CPT

## 2024-04-15 PROCEDURE — 63600175 PHARM REV CODE 636 W HCPCS: Mod: JZ,JG | Performed by: NURSE PRACTITIONER

## 2024-04-15 RX ORDER — SODIUM CHLORIDE 0.9 % (FLUSH) 0.9 %
10 SYRINGE (ML) INJECTION
Status: CANCELLED | OUTPATIENT
Start: 2024-04-15

## 2024-04-15 RX ORDER — HEPARIN 100 UNIT/ML
500 SYRINGE INTRAVENOUS
Status: CANCELLED | OUTPATIENT
Start: 2024-04-15

## 2024-04-15 RX ORDER — SODIUM CHLORIDE 0.9 % (FLUSH) 0.9 %
10 SYRINGE (ML) INJECTION
Status: DISCONTINUED | OUTPATIENT
Start: 2024-04-15 | End: 2024-04-15 | Stop reason: HOSPADM

## 2024-04-15 RX ORDER — EPINEPHRINE 0.3 MG/.3ML
0.3 INJECTION SUBCUTANEOUS ONCE AS NEEDED
Status: CANCELLED | OUTPATIENT
Start: 2024-04-15

## 2024-04-15 RX ORDER — DIPHENHYDRAMINE HYDROCHLORIDE 50 MG/ML
50 INJECTION INTRAMUSCULAR; INTRAVENOUS ONCE AS NEEDED
Status: CANCELLED | OUTPATIENT
Start: 2024-04-15

## 2024-04-15 RX ADMIN — DEXTROSE MONOHYDRATE 510 MG: 5 INJECTION, SOLUTION INTRAVENOUS at 03:04

## 2024-04-15 NOTE — PLAN OF CARE
Problem: Adult Inpatient Plan of Care  Goal: Plan of Care Review  Outcome: Met  Flowsheets (Taken 4/15/2024 1540)  Plan of Care Reviewed With: patient  Goal: Absence of Hospital-Acquired Illness or Injury  Outcome: Met  Intervention: Identify and Manage Fall Risk  Flowsheets (Taken 4/15/2024 1540)  Safety Promotion/Fall Prevention:   assistive device/personal item within reach   Fall Risk reviewed with patient/family   in recliner, wheels locked     Pt tolerated infusion well. Next appt reviewed; pt denied questions or further needs at the time of discharge.

## 2024-04-15 NOTE — PROGRESS NOTES
I have reviewed and concur with the resident's history, physical, assessment, and plan.  I have discussed with him all issues related to the diagnosis, workup and treatment plan. Care provided as reasonable and necessary.S/P surgery for bladder creation.F?u urology    Anam Cardenas MD  [facility]

## 2024-04-22 ENCOUNTER — INFUSION (OUTPATIENT)
Dept: INFUSION THERAPY | Facility: HOSPITAL | Age: 64
End: 2024-04-22
Attending: INTERNAL MEDICINE
Payer: COMMERCIAL

## 2024-04-22 VITALS — TEMPERATURE: 98 F | HEART RATE: 67 BPM | SYSTOLIC BLOOD PRESSURE: 152 MMHG | DIASTOLIC BLOOD PRESSURE: 73 MMHG

## 2024-04-22 DIAGNOSIS — D50.0 IRON DEFICIENCY ANEMIA DUE TO CHRONIC BLOOD LOSS: Primary | ICD-10-CM

## 2024-04-22 PROCEDURE — 96365 THER/PROPH/DIAG IV INF INIT: CPT

## 2024-04-22 PROCEDURE — 25000003 PHARM REV CODE 250: Performed by: NURSE PRACTITIONER

## 2024-04-22 PROCEDURE — 63600175 PHARM REV CODE 636 W HCPCS: Mod: JZ,JG | Performed by: NURSE PRACTITIONER

## 2024-04-22 RX ORDER — HEPARIN 100 UNIT/ML
500 SYRINGE INTRAVENOUS
OUTPATIENT
Start: 2024-04-22

## 2024-04-22 RX ORDER — SODIUM CHLORIDE 0.9 % (FLUSH) 0.9 %
10 SYRINGE (ML) INJECTION
Status: CANCELLED | OUTPATIENT
Start: 2024-04-22

## 2024-04-22 RX ORDER — DIPHENHYDRAMINE HYDROCHLORIDE 50 MG/ML
50 INJECTION INTRAMUSCULAR; INTRAVENOUS ONCE AS NEEDED
OUTPATIENT
Start: 2024-04-22

## 2024-04-22 RX ORDER — EPINEPHRINE 0.3 MG/.3ML
0.3 INJECTION SUBCUTANEOUS ONCE AS NEEDED
OUTPATIENT
Start: 2024-04-22

## 2024-04-22 RX ORDER — SODIUM CHLORIDE 0.9 % (FLUSH) 0.9 %
10 SYRINGE (ML) INJECTION
Status: DISCONTINUED | OUTPATIENT
Start: 2024-04-22 | End: 2024-04-22 | Stop reason: HOSPADM

## 2024-04-22 RX ADMIN — DEXTROSE MONOHYDRATE 510 MG: 5 INJECTION, SOLUTION INTRAVENOUS at 03:04

## 2024-05-06 ENCOUNTER — PATIENT MESSAGE (OUTPATIENT)
Dept: HEMATOLOGY/ONCOLOGY | Facility: CLINIC | Age: 64
End: 2024-05-06

## 2024-05-06 ENCOUNTER — TELEPHONE (OUTPATIENT)
Dept: HEMATOLOGY/ONCOLOGY | Facility: CLINIC | Age: 64
End: 2024-05-06
Payer: COMMERCIAL

## 2024-05-13 DIAGNOSIS — N25.81 SECONDARY HYPERPARATHYROIDISM OF RENAL ORIGIN: ICD-10-CM

## 2024-05-13 DIAGNOSIS — E87.5 HYPERKALEMIA: ICD-10-CM

## 2024-05-13 RX ORDER — CALCITRIOL 0.5 UG/1
0.5 CAPSULE ORAL DAILY
Qty: 90 CAPSULE | Refills: 0 | Status: SHIPPED | OUTPATIENT
Start: 2024-05-13 | End: 2024-08-11

## 2024-05-28 NOTE — PLAN OF CARE
Problem: Infection  Goal: Absence of Infection Signs and Symptoms  Outcome: Ongoing, Progressing     Problem: Adult Inpatient Plan of Care  Goal: Plan of Care Review  Outcome: Ongoing, Progressing  Goal: Patient-Specific Goal (Individualized)  Outcome: Ongoing, Progressing  Goal: Absence of Hospital-Acquired Illness or Injury  Outcome: Ongoing, Progressing  Goal: Optimal Comfort and Wellbeing  Outcome: Ongoing, Progressing  Goal: Readiness for Transition of Care  Outcome: Ongoing, Progressing     Problem: Fluid and Electrolyte Imbalance (Acute Kidney Injury/Impairment)  Goal: Fluid and Electrolyte Balance  Outcome: Ongoing, Progressing     Problem: Oral Intake Inadequate (Acute Kidney Injury/Impairment)  Goal: Optimal Nutrition Intake  Outcome: Ongoing, Progressing     Problem: Renal Function Impairment (Acute Kidney Injury/Impairment)  Goal: Effective Renal Function  Outcome: Ongoing, Progressing      [de-identified] : Today I had the pleasure of seeing PRESTON RIDDLE for hospital follow up. PRESTON is a 8 year old boy who presents for: s/p ER visit 05/10/24  9y/o M with ear trauma 05/07 (ran into a tree) resulting in auricular hematoma s/p I&D with bolster placement 05/08/24 5/10/24 increased swelling went to ED for repeat drainage and bolster placement  Completed cipro no issues

## 2024-06-05 ENCOUNTER — LAB VISIT (OUTPATIENT)
Dept: LAB | Facility: HOSPITAL | Age: 64
End: 2024-06-05
Attending: NURSE PRACTITIONER
Payer: COMMERCIAL

## 2024-06-05 DIAGNOSIS — N18.5 CKD STAGE G5/A3, GFR <15 AND ALBUMIN CREATININE RATIO >300 MG/G: ICD-10-CM

## 2024-06-05 DIAGNOSIS — C61 PROSTATE CA: ICD-10-CM

## 2024-06-05 LAB
ALBUMIN SERPL-MCNC: 3.9 G/DL (ref 3.4–4.8)
ALBUMIN/GLOB SERPL: 1.1 RATIO (ref 1.1–2)
ALP SERPL-CCNC: 56 UNIT/L (ref 40–150)
ALT SERPL-CCNC: 13 UNIT/L (ref 0–55)
ANION GAP SERPL CALC-SCNC: 10 MEQ/L
AST SERPL-CCNC: 11 UNIT/L (ref 5–34)
BASOPHILS # BLD AUTO: 0.07 X10(3)/MCL
BASOPHILS NFR BLD AUTO: 0.9 %
BILIRUB SERPL-MCNC: 0.6 MG/DL
BUN SERPL-MCNC: 70.3 MG/DL (ref 8.4–25.7)
CALCIUM SERPL-MCNC: 9.5 MG/DL (ref 8.8–10)
CHLORIDE SERPL-SCNC: 107 MMOL/L (ref 98–107)
CO2 SERPL-SCNC: 24 MMOL/L (ref 23–31)
CREAT SERPL-MCNC: 5.61 MG/DL (ref 0.73–1.18)
CREAT/UREA NIT SERPL: 13
EOSINOPHIL # BLD AUTO: 0.3 X10(3)/MCL (ref 0–0.9)
EOSINOPHIL NFR BLD AUTO: 4 %
ERYTHROCYTE [DISTWIDTH] IN BLOOD BY AUTOMATED COUNT: 13.4 % (ref 11.5–17)
GFR SERPLBLD CREATININE-BSD FMLA CKD-EPI: 11 ML/MIN/1.73/M2
GLOBULIN SER-MCNC: 3.4 GM/DL (ref 2.4–3.5)
GLUCOSE SERPL-MCNC: 72 MG/DL (ref 82–115)
HCT VFR BLD AUTO: 43.6 % (ref 42–52)
HGB BLD-MCNC: 14.4 G/DL (ref 14–18)
IMM GRANULOCYTES # BLD AUTO: 0.01 X10(3)/MCL (ref 0–0.04)
IMM GRANULOCYTES NFR BLD AUTO: 0.1 %
LYMPHOCYTES # BLD AUTO: 1.8 X10(3)/MCL (ref 0.6–4.6)
LYMPHOCYTES NFR BLD AUTO: 24.2 %
MCH RBC QN AUTO: 31.2 PG (ref 27–31)
MCHC RBC AUTO-ENTMCNC: 33 G/DL (ref 33–36)
MCV RBC AUTO: 94.4 FL (ref 80–94)
MONOCYTES # BLD AUTO: 0.63 X10(3)/MCL (ref 0.1–1.3)
MONOCYTES NFR BLD AUTO: 8.5 %
NEUTROPHILS # BLD AUTO: 4.62 X10(3)/MCL (ref 2.1–9.2)
NEUTROPHILS NFR BLD AUTO: 62.3 %
NRBC BLD AUTO-RTO: 0 %
PHOSPHATE SERPL-MCNC: 3.9 MG/DL (ref 2.3–4.7)
PLATELET # BLD AUTO: 129 X10(3)/MCL (ref 130–400)
PMV BLD AUTO: 10.3 FL (ref 7.4–10.4)
POTASSIUM SERPL-SCNC: 4.1 MMOL/L (ref 3.5–5.1)
PROT SERPL-MCNC: 7.3 GM/DL (ref 5.8–7.6)
PSA SERPL-MCNC: <0.1 NG/ML
PTH-INTACT SERPL-MCNC: 118.7 PG/ML (ref 8.7–77)
RBC # BLD AUTO: 4.62 X10(6)/MCL (ref 4.7–6.1)
SODIUM SERPL-SCNC: 141 MMOL/L (ref 136–145)
WBC # SPEC AUTO: 7.43 X10(3)/MCL (ref 4.5–11.5)

## 2024-06-05 PROCEDURE — 36415 COLL VENOUS BLD VENIPUNCTURE: CPT

## 2024-06-05 PROCEDURE — 83970 ASSAY OF PARATHORMONE: CPT

## 2024-06-05 PROCEDURE — 85025 COMPLETE CBC W/AUTO DIFF WBC: CPT

## 2024-06-05 PROCEDURE — 84100 ASSAY OF PHOSPHORUS: CPT

## 2024-06-05 PROCEDURE — 84153 ASSAY OF PSA TOTAL: CPT

## 2024-06-05 PROCEDURE — 80053 COMPREHEN METABOLIC PANEL: CPT

## 2024-06-06 ENCOUNTER — OFFICE VISIT (OUTPATIENT)
Dept: NEPHROLOGY | Facility: CLINIC | Age: 64
End: 2024-06-06
Payer: COMMERCIAL

## 2024-06-06 VITALS
SYSTOLIC BLOOD PRESSURE: 138 MMHG | HEART RATE: 61 BPM | HEIGHT: 71 IN | WEIGHT: 202.19 LBS | BODY MASS INDEX: 28.31 KG/M2 | TEMPERATURE: 98 F | DIASTOLIC BLOOD PRESSURE: 80 MMHG | OXYGEN SATURATION: 95 % | RESPIRATION RATE: 20 BRPM

## 2024-06-06 DIAGNOSIS — N18.5 CKD STAGE G5/A3, GFR <15 AND ALBUMIN CREATININE RATIO >300 MG/G: Primary | ICD-10-CM

## 2024-06-06 DIAGNOSIS — E87.20 METABOLIC ACIDOSIS: ICD-10-CM

## 2024-06-06 DIAGNOSIS — N25.81 SECONDARY HYPERPARATHYROIDISM OF RENAL ORIGIN: ICD-10-CM

## 2024-06-06 DIAGNOSIS — E87.5 HYPERKALEMIA: ICD-10-CM

## 2024-06-06 PROCEDURE — 3066F NEPHROPATHY DOC TX: CPT | Mod: CPTII,,, | Performed by: NURSE PRACTITIONER

## 2024-06-06 PROCEDURE — 99214 OFFICE O/P EST MOD 30 MIN: CPT | Mod: PBBFAC | Performed by: NURSE PRACTITIONER

## 2024-06-06 PROCEDURE — 3008F BODY MASS INDEX DOCD: CPT | Mod: CPTII,,, | Performed by: NURSE PRACTITIONER

## 2024-06-06 PROCEDURE — 1160F RVW MEDS BY RX/DR IN RCRD: CPT | Mod: CPTII,,, | Performed by: NURSE PRACTITIONER

## 2024-06-06 PROCEDURE — 3079F DIAST BP 80-89 MM HG: CPT | Mod: CPTII,,, | Performed by: NURSE PRACTITIONER

## 2024-06-06 PROCEDURE — 3075F SYST BP GE 130 - 139MM HG: CPT | Mod: CPTII,,, | Performed by: NURSE PRACTITIONER

## 2024-06-06 PROCEDURE — 1159F MED LIST DOCD IN RCRD: CPT | Mod: CPTII,,, | Performed by: NURSE PRACTITIONER

## 2024-06-06 PROCEDURE — 99214 OFFICE O/P EST MOD 30 MIN: CPT | Mod: S$PBB,,, | Performed by: NURSE PRACTITIONER

## 2024-06-06 NOTE — PROGRESS NOTES
[facility]  Nephrology Clinic Note    Chief complaint: Chronic Kidney Disease (Follow up)    History of present illness:   Bridget Cardenas Jr. is a 63 y.o. White male with past medical history of  bladder cancer, status post total cystectomy and prostatectomy in 2018 with neobladder creation (patient self catheterizes 6 times a day); advanced chronic kidney disease, chronic hydronephrosis, metabolic acidosis. 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  Patient tolerated surgery without complications.  Presents for follow-up appointment in Nephrology Clinic, denies complaints.    Review of Systems  12 point review of systems conducted, negative except as stated in the history of present illness.    Allergies: Patient is allergic to pcn [penicillins].     Past Medical History:  has a past medical history of Cancer, Malignant neoplasm of transverse colon, Obesity, and Renal disorder.    Procedure History:  has a past surgical history that includes colonoscopy, with polypectomy using snare (Left, 10/25/2023); Esophagogastroduodenoscopy (Left, 10/25/2023); colonoscopy, with 1 or more biopsies (N/A, 10/25/2023); Colonoscopy (N/A, 10/25/2023); Prostate surgery (10/2018); Small intestine surgery (10/2018); Fracture surgery (7/2018); Hernia repair (11/2010); cystectomy; Mediport insertion, single; Ileo loop neobladder; xi robotic colectomy, partial (N/A, 12/14/2023); and AV fistula placement (Right, 01/15/2024).    Family History: family history includes Heart disease in his brother and father.    Social History:  reports that he quit smoking about 37 years ago. His smoking use included cigarettes. He has been exposed to tobacco smoke. His smokeless tobacco use includes chew. He reports that he does not drink alcohol and does not use drugs.    Physical exam  /80 (BP Location:  "Left arm, Patient Position: Sitting, BP Method: Medium (Automatic))   Pulse 61   Temp 97.9 °F (36.6 °C) (Oral)   Resp 20   Ht 5' 11" (1.803 m)   Wt 91.7 kg (202 lb 3.2 oz)   SpO2 95%   BMI 28.20 kg/m²   General appearance: Patient is in no acute distress.  Skin: No rashes or wounds.  HEENT: PERRLA, EOMI, no scleral icterus, no JVD. Neck is supple.  Chest: Respirations are unlabored. Lungs sounds are clear.   Heart: S1, S2.   Abdomen: Benign. + small incisional non incarcerated hernia to mid abdomen noted  : Deferred.  Extremities: No edema, peripheral pulses are palpable.  Right upper extremity AV fistula with audible bruit and palpable thrill.  Neuro: No focal deficits.     Home Medications:    Current Outpatient Medications:     calcitRIOL (ROCALTROL) 0.5 MCG Cap, Take 1 capsule (0.5 mcg total) by mouth once daily., Disp: 90 capsule, Rfl: 0    patiromer calcium sorbitex (VELTASSA) 8.4 gram PwPk, Take 1 packet (8.4 g total) by mouth every other day., Disp: 45 packet, Rfl: 3    sodium bicarbonate 650 MG tablet, Take 2 tablets (1,300 mg total) by mouth 2 (two) times daily., Disp: 120 tablet, Rfl: 11    Laboratory data    Lab Results   Component Value Date    WBC 7.43 06/05/2024    HGB 14.4 06/05/2024    HCT 43.6 06/05/2024     (L) 06/05/2024    IRON 90 04/01/2024    TIBC 243 (L) 04/01/2024    LABIRON 37 04/01/2024    FERRITIN 31.91 04/01/2024     06/05/2024    K 4.1 06/05/2024    CO2 24 06/05/2024    BUN 70.3 (H) 06/05/2024    CREATININE 5.61 (H) 06/05/2024    EGFRNORACEVR 11 06/05/2024    GLUCOSE 72 (L) 06/05/2024    CALCIUM 9.5 06/05/2024    ALKPHOS 56 06/05/2024    LABPROT 7.3 06/05/2024    ALBUMIN 3.9 06/05/2024    BILIDIR 0.2 10/20/2023    IBILI 0.20 05/27/2021    AST 11 06/05/2024    ALT 13 06/05/2024    MG 1.50 (L) 12/15/2023    PHOS 3.9 06/05/2024      Lab Results   Component Value Date    .7 (H) 06/05/2024    YZINMIOC73WV 23.9 (L) 12/11/2023    HIV Nonreactive 12/11/2023    " HEPCAB Nonreactive 12/11/2023    HEPBCAB Nonreactive 12/11/2023     Urine:  Lab Results   Component Value Date    APPEARANCEUA Turbid (A) 11/28/2023    SGUA 1.010 11/28/2023    PROTEINUA 1+ (A) 11/28/2023    KETONESUA Negative 11/28/2023    LEUKOCYTESUR 500 (A) 11/28/2023    RBCUA 6-10 (A) 11/28/2023    WBCUA >100 (A) 11/28/2023    BACTERIA Occ (A) 11/28/2023    SQEPUA None Seen 11/28/2023    HYALINECASTS None Seen 11/28/2023    CREATRANDUR 65.0 11/28/2023    PROTEINURINE 32.6 11/28/2023    UPROTCREA 0.5 11/28/2023         Imaging  US Retroperitoneal Complete 11/13/2023  Right Kidney:  Length: 10 x 5.5 x 4.6 cm  Appearance: Normal echogenicity.  Collecting system: Mild to moderate prominence of the collecting system and renal pelvis indicating the presence of hydronephrosis  Stones: None  Cyst/Mass: None  Left Kidney: Left kidneys poorly visualize due to bowel and poor acoustic windows  Length: 10.6 x 5.6 x 4.6 cm  Appearance: Normal echogenicity.  Collecting system: No hydronephrosis  Stones: None  Cyst/Mass: None  Bladder:  Patient has a neobladder measuring 7.9 by 3.2 x 1.6 cm that appears to be unremarkable patient self catheterized 15 minutes prior to the study  Vessels:  Visualized portions of the IVC and aorta have a normal grayscale appearance.  Impression: Mild to moderate hydronephrosis of the right kidney.  Poor visualization of the left kidney likely related to overlying bowel gas and poor acoustic windows.  Neobladder  Electronically signed by: Freeman Barnes  Date:    11/13/2023  Time:    12:27    Impression    ICD-10-CM ICD-9-CM   1. CKD stage G5/A3, GFR <15 and albumin creatinine ratio >300 mg/g  N18.5 585.5   2. Secondary hyperparathyroidism of renal origin  N25.81 588.81   3. Metabolic acidosis  E87.20 276.2   4. Hyperkalemia  E87.5 276.7        Plan  CKD stage G5/A3, GFR <15 and albumin creatinine ratio >300 mg/g  -     CBC Auto Differential; Future; Expected date: 05/25/2024  -      Comprehensive Metabolic Panel; Future; Expected date: 05/25/2024  -     PTH, Intact; Future; Expected date: 05/25/2024  -     Phosphorus; Future; Expected date: 05/25/2024  Possibly chronic tubular interstitial disease secondary to chronic obstructive uropathy.  Estimated GFR is 11 mL/min.  Patient has multiple complications advanced kidney dysfunction (metabolic acidosis, hyperkalemia, secondary hyperparathyroidism), that are controlled with medications at present.  Patient is not a candidate for kidney transplantation due recent diagnosis of malignancy, is not a candidate for peritoneal dialysis due to multiple abdominal surgeries.  There are no acute indications for initiation of hemodialysis, however, will continue preparation for RRT.  Patient is status post AV fistula creation, fistula is maturing well.    Emergency department precautions discussed.  Will continue close monitoring.       Follow up in about 8 weeks (around 8/1/2024).    Secondary hyperparathyroidism of renal origin  -     calcitRIOL (ROCALTROL) 0.5 MCG Cap; Take 1 capsule (0.5 mcg total) by mouth once daily.  Dispense: 90 capsule; Refill: 0  Intact PTH is trending up, will increase calcitriol dose to 0.5 mcg, continue to monitor intact PTH and phosphorus levels.      Metabolic acidosis  Continue sodium bicarbonate supplementation.    Hyperkalemia  Hyperkalemia is well controlled, continue potassium binder.     Orders Placed This Encounter   Procedures    Comprehensive Metabolic Panel     Standing Status:   Future     Standing Expiration Date:   8/15/2024    Phosphorus     Standing Status:   Future     Standing Expiration Date:   8/15/2024    PTH, Intact     Standing Status:   Future     Standing Expiration Date:   8/15/2024       6/6/2024  Mayra Grimaldo NP  [facility]

## 2024-07-02 ENCOUNTER — HOSPITAL ENCOUNTER (OUTPATIENT)
Dept: RADIOLOGY | Facility: HOSPITAL | Age: 64
Discharge: HOME | End: 2024-07-02
Attending: NURSE PRACTITIONER
Payer: COMMERCIAL

## 2024-07-02 ENCOUNTER — PATIENT MESSAGE (OUTPATIENT)
Dept: NEPHROLOGY | Facility: CLINIC | Age: 64
End: 2024-07-02
Payer: COMMERCIAL

## 2024-07-02 DIAGNOSIS — C18.4 ADENOCARCINOMA OF TRANSVERSE COLON: ICD-10-CM

## 2024-07-02 DIAGNOSIS — D12.8 TUBULOVILLOUS ADENOMA OF RECTUM: ICD-10-CM

## 2024-07-02 DIAGNOSIS — N18.4 STAGE 4 CHRONIC KIDNEY DISEASE: ICD-10-CM

## 2024-07-02 DIAGNOSIS — D64.9 SYMPTOMATIC ANEMIA: ICD-10-CM

## 2024-07-02 DIAGNOSIS — D50.0 IRON DEFICIENCY ANEMIA DUE TO CHRONIC BLOOD LOSS: ICD-10-CM

## 2024-07-02 PROCEDURE — 74176 CT ABD & PELVIS W/O CONTRAST: CPT | Mod: TC

## 2024-07-02 PROCEDURE — 71250 CT THORAX DX C-: CPT | Mod: TC

## 2024-07-05 NOTE — PROGRESS NOTES
Subjective:       Patient ID: Bridget Cardenas Jr. is a 63 y.o. male.    GI: Dr. Angy Mcguire  Surgeon: Dr. Aris Scott    1. 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 internal hemorrhoids, diverticulosis in the sigmoid colon, on 9mm polyp in transverse colon, removed and c/w tubular adenoma, one 10mm polyp in descending colon, removed and c/w tubular adenoma, one 30-35mm polyp in sigmoid colon, removed and c/w tubulovillous adenoma with high-grade dysplasia, no malignancy; one 15mm polyp in rectum removed, and c/w tubulovillous adenoma, no malignancy; quality of bowel prep good, ileocecal valve, appendiceal orifice photographed.   Surgery/pathology:  Robotic assisted transverse colectomy done 12/14/23--adenocarcinoma, moderately differentiated, measuring 6cm, carcinoma invades into subserosal adipose tissue, LVI indeterminate, margins clear, 16 mesenteric lymph nodes uninvolved (0/16).    2. Severe Iron deficiency anemia--10/2023 (Ferritin <1.98)    3. H/o Bladder Cancer unknown stage--Diagnosed 2018    Imaging:  CT A/P w/o contrast done 10/20/23--Symmetric moderate bilateral hydronephrosis and hydroureter.  No obstructing stone.  This appears to be chronic, similar to previous exam. Neobladder collapsed about a Manzo catheter.  PET/CT done 11/8/23--focal moderate uptake at transverse colon has max SUV 12, no appreciable hypermetabolic metastatic disease, similar chronic bilateral hydroureteronephrosis, post-surgical changes of prior right clavicle ORIF.  CT C/A/P w/o contrast done 7/2/24-- Interval changes suggestive of partial transverse colectomy, with no definite residual mass-like lesion identified, no changes  appreciated to suggest new/worsened metastatic disease through the chest, abdomen, or pelvis, chronic secondary details grossly unchanged from the comparison PET-CT appearance.    Treatment history:  1. Neoadjuvant chemotherapy unknown drugs and dates.  2. S/p total cystectomy/prostatectomy with neobladder creating done in 2018 at [facility].  3. IV Feraheme X 2 doses completed 11/20/23.  4. IV Feraheme X 2 doses completed 4/22/2024.     CEA:   10/26/23--1.98  04/01/24--2.39  07/08/24--2.48    Procedures:  EGD 10/25/23--normal esophagus, normal stomach, normal duodenum, no specimens collected.     Current treatment plan: Observation    Chief Complaint: Other Misc (Pt reports a large bump on his abdomen and thinks it a hernia. )    HPI  Patient presents for follow-up of colon cancer. He is doing well. Reports having a new hernia. It is not painful and does not bother him at all other than he does not like the way it looks. Recent CT with [facility]. He reports that it was recommended for him to start dialysis but he is holding off for now because he needs to keep working to keep his insurance.     Past Medical History:   Diagnosis Date    Cancer     hx of bladder cancer    Malignant neoplasm of transverse colon     Obesity     Renal disorder     CKD 5      Past Surgical History:   Procedure Laterality Date    AV FISTULA PLACEMENT Right 01/15/2024    COLONOSCOPY N/A 10/25/2023    Procedure: COLONOSCOPY, WITH HEMORRHAGE CONTROL;  Surgeon: Angy Mcguire MD;  Location: [facility] ENDOSCOPY;  Service: Gastroenterology;  Laterality: N/A;  sigmoid clips    COLONOSCOPY, WITH 1 OR MORE BIOPSIES N/A 10/25/2023    Procedure: COLONOSCOPY, WITH 1 OR MORE BIOPSIES;  Surgeon: Angy Mcguire MD;  Location: [facility] ENDOSCOPY;  Service: Gastroenterology;  Laterality: N/A;  transverse colon mass    COLONOSCOPY, WITH POLYPECTOMY USING SNARE Left 10/25/2023    Procedure: COLONOSCOPY, WITH POLYPECTOMY USING SNARE;  Surgeon: Angy Mcguire  MD MORENO;  Location: [facility] ENDOSCOPY;  Service: Gastroenterology;  Laterality: Left;    CYSTECTOMY      ESOPHAGOGASTRODUODENOSCOPY Left 10/25/2023    Procedure: EGD (ESOPHAGOGASTRODUODENOSCOPY);  Surgeon: Angy Mcguire MD;  Location: [facility] ENDOSCOPY;  Service: Gastroenterology;  Laterality: Left;    FRACTURE SURGERY  2018    Collar none    HERNIA REPAIR  2010    ILEO LOOP NEOBLADDER      MEDIPORT INSERTION, SINGLE      PROSTATE SURGERY  10/2018    SMALL INTESTINE SURGERY  10/2018    XI ROBOTIC COLECTOMY, PARTIAL N/A 2023    Procedure: XI ROBOTIC COLECTOMY, PARTIAL;  Surgeon: Aris Scott MD;  Location: [facility] OR;  Service: Oncology;  Laterality: N/A;  TRANSVERSE COLON; POSSIBLE OPEN CASE     Family History   Problem Relation Name Age of Onset    Heart disease Father      Heart disease Brother       Social History     Socioeconomic History    Marital status:    Tobacco Use    Smoking status: Former     Current packs/day: 0.00     Types: Cigarettes     Quit date:      Years since quittin.5     Passive exposure: Current    Smokeless tobacco: Current     Types: Chew   Substance and Sexual Activity    Alcohol use: Never    Drug use: Never    Sexual activity: Yes     Partners: Female     Birth control/protection: None     Social Determinants of Health     Financial Resource Strain: Low Risk  (2024)    Overall Financial Resource Strain (CARDIA)     Difficulty of Paying Living Expenses: Not hard at all   Food Insecurity: No Food Insecurity (2024)    Hunger Vital Sign     Worried About Running Out of Food in the Last Year: Never true     Ran Out of Food in the Last Year: Never true   Transportation Needs: No Transportation Needs (2024)    PRAPARE - Transportation     Lack of Transportation (Medical): No     Lack of Transportation (Non-Medical): No   Physical Activity: Insufficiently Active (2024)    Exercise Vital Sign     Days of Exercise per Week: 5 days     Minutes of  Exercise per Session: 10 min   Stress: No Stress Concern Present (4/4/2024)    [employer] - Occupational Stress Questionnaire     Feeling of Stress : Not at all   Housing Stability: Low Risk  (4/4/2024)    Housing Stability Vital Sign     Unable to Pay for Housing in the Last Year: No     Number of Places Lived in the Last Year: 1     Unstable Housing in the Last Year: No       Review of patient's allergies indicates:   Allergen Reactions    Pcn [penicillins] Hives      Current Outpatient Medications on File Prior to Visit   Medication Sig Dispense Refill    calcitRIOL (ROCALTROL) 0.5 MCG Cap Take 1 capsule (0.5 mcg total) by mouth once daily. 90 capsule 0    patiromer calcium sorbitex (VELTASSA) 8.4 gram PwPk Take 1 packet (8.4 g total) by mouth every other day. 45 packet 3    sodium bicarbonate 650 MG tablet Take 2 tablets (1,300 mg total) by mouth 2 (two) times daily. 120 tablet 11     No current facility-administered medications on file prior to visit.      Review of Systems   Constitutional:  Negative for appetite change, fatigue, fever and unexpected weight change.   HENT:  Negative for mouth sores.    Eyes: Negative.  Negative for visual disturbance.   Respiratory:  Negative for cough and shortness of breath.    Cardiovascular:  Negative for chest pain and leg swelling.   Gastrointestinal:  Negative for abdominal distention, abdominal pain, constipation, diarrhea, nausea, vomiting and reflux.        +new midline hernia   Genitourinary:  Negative for difficulty urinating, dysuria, frequency and hematuria.        Neobladder, h/o bladder cancer, self-catheterizes 6X per day   Musculoskeletal:  Negative for arthralgias and back pain.   Integumentary:  Negative for rash.   Neurological:  Negative for weakness and headaches.   Hematological:  Negative for adenopathy.   Psychiatric/Behavioral:  Negative for sleep disturbance. The patient is not nervous/anxious.          Vitals:     "07/09/24 1303   BP: (!) 147/82   Pulse: 90   Resp: 14   Temp: 97.7 °F (36.5 °C)   TempSrc: Oral   SpO2: 97%   Weight: 95.2 kg (209 lb 12.8 oz)   Height: 5' 11" (1.803 m)       Physical Exam  Constitutional:       General: He is awake.      Appearance: Normal appearance. He is normal weight.   HENT:      Head: Normocephalic and atraumatic.      Nose: Nose normal.      Mouth/Throat:      Mouth: Mucous membranes are moist.   Eyes:      General: Vision grossly intact.      Extraocular Movements: Extraocular movements intact.      Conjunctiva/sclera: Conjunctivae normal.   Cardiovascular:      Rate and Rhythm: Normal rate and regular rhythm.      Heart sounds: Normal heart sounds.   Pulmonary:      Effort: Pulmonary effort is normal.      Breath sounds: Normal breath sounds.   Chest:      Comments: Left chest wall mediport in place  Abdominal:      General: Bowel sounds are normal. There is no distension.      Palpations: Abdomen is soft.      Tenderness: There is no abdominal tenderness.      Comments: Incisions all healed, there is a medium-sized hernia at umbilical incision site, easily reducible, not tender at all   Musculoskeletal:      Cervical back: Normal range of motion and neck supple.      Right lower leg: No edema.      Left lower leg: No edema.   Lymphadenopathy:      Cervical: No cervical adenopathy.      Upper Body:      Right upper body: No supraclavicular adenopathy.      Left upper body: No supraclavicular adenopathy.   Skin:     General: Skin is warm.   Neurological:      Mental Status: He is alert and oriented to person, place, and time.      Motor: Motor function is intact.   Psychiatric:         Mood and Affect: Mood normal.         Speech: Speech normal.         Behavior: Behavior is cooperative.         Judgment: Judgment normal.            Assessment:       1. Adenocarcinoma of transverse colon    2. Malignant neoplasm of overlapping sites of bladder      Plan:       Patient with h/o bladder " cancer from 2018 s/p neoadjuvant chemotherapy given by Dr. Villar in [place] and s/p surgery with cystectomy/prostatectomy with creation of neobladder done at [facility] in [place].    New transverse colon cancer found on colonoscopy on 10/25/23 after patient presented to hospital with severe iron deficiency anemia.  Patient also with Stage 5 CKD, following with nephrology at [facility].  Initial PET with no evidence of other disease.    Patient underwent surgery robotic assisted transverse colectomy on 12/14/23. Pathology with Stage IIA (T3N0M0), tumor measures 6cm, G2, with 16 negative lymph nodes.   On initial biopsy of tumor, MSI testing shows that tumor is MSI-high.  Per NCCN, observation only recommended. No chemotherapy.  Patient did receive IV Feraheme in 11/2024 and 4/2024.    Currently patient is doing well without any signs or symptoms to suggest disease recurrence.     Recent labs with improved anemia and ferritin normal. CEA remains WNL.  CT C/A/P w/o contrast done 7/2/24 with [facility].   Scheduled for colonoscopy on 10/30/24.     Continue routine surveillance visits.  F/u in 3 months with repeat labs.  Plan to repeat CT C/A/P w/o contrast in 6 months--1/2025.     Consider removal of mediport if scans are good and after colonoscopy.   Continue mediport flushes for now.     Discussed genetic testing as well for Rodriguez syndrome but he still would like to think about it.   All questions answered at this time.       Anita Wayne MD

## 2024-07-08 ENCOUNTER — LAB VISIT (OUTPATIENT)
Dept: LAB | Facility: HOSPITAL | Age: 64
End: 2024-07-08
Attending: INTERNAL MEDICINE
Payer: COMMERCIAL

## 2024-07-08 DIAGNOSIS — D12.8 TUBULOVILLOUS ADENOMA OF RECTUM: ICD-10-CM

## 2024-07-08 DIAGNOSIS — C18.4 ADENOCARCINOMA OF TRANSVERSE COLON: ICD-10-CM

## 2024-07-08 DIAGNOSIS — N18.4 STAGE 4 CHRONIC KIDNEY DISEASE: ICD-10-CM

## 2024-07-08 DIAGNOSIS — D50.0 IRON DEFICIENCY ANEMIA DUE TO CHRONIC BLOOD LOSS: ICD-10-CM

## 2024-07-08 DIAGNOSIS — D64.9 SYMPTOMATIC ANEMIA: ICD-10-CM

## 2024-07-08 LAB
ALBUMIN SERPL-MCNC: 4.1 G/DL (ref 3.4–4.8)
ALBUMIN/GLOB SERPL: 1.4 RATIO (ref 1.1–2)
ALP SERPL-CCNC: 55 UNIT/L (ref 40–150)
ALT SERPL-CCNC: 14 UNIT/L (ref 0–55)
ANION GAP SERPL CALC-SCNC: 11 MEQ/L
AST SERPL-CCNC: 9 UNIT/L (ref 5–34)
BASOPHILS # BLD AUTO: 0.04 X10(3)/MCL
BASOPHILS NFR BLD AUTO: 0.6 %
BILIRUB SERPL-MCNC: 0.5 MG/DL
BUN SERPL-MCNC: 75.7 MG/DL (ref 8.4–25.7)
CALCIUM SERPL-MCNC: 8.7 MG/DL (ref 8.8–10)
CEA SERPL-MCNC: 2.48 NG/ML (ref 0–3)
CHLORIDE SERPL-SCNC: 109 MMOL/L (ref 98–107)
CO2 SERPL-SCNC: 19 MMOL/L (ref 23–31)
CREAT SERPL-MCNC: 4.96 MG/DL (ref 0.73–1.18)
CREAT/UREA NIT SERPL: 15
EOSINOPHIL # BLD AUTO: 0.34 X10(3)/MCL (ref 0–0.9)
EOSINOPHIL NFR BLD AUTO: 4.7 %
ERYTHROCYTE [DISTWIDTH] IN BLOOD BY AUTOMATED COUNT: 13.1 % (ref 11.5–17)
FERRITIN SERPL-MCNC: 169.38 NG/ML (ref 21.81–274.66)
GFR SERPLBLD CREATININE-BSD FMLA CKD-EPI: 12 ML/MIN/1.73/M2
GLOBULIN SER-MCNC: 3 GM/DL (ref 2.4–3.5)
GLUCOSE SERPL-MCNC: 88 MG/DL (ref 82–115)
HCT VFR BLD AUTO: 46.6 % (ref 42–52)
HGB BLD-MCNC: 15.4 G/DL (ref 14–18)
IMM GRANULOCYTES # BLD AUTO: 0.01 X10(3)/MCL (ref 0–0.04)
IMM GRANULOCYTES NFR BLD AUTO: 0.1 %
IRON SATN MFR SERPL: 35 % (ref 20–50)
IRON SERPL-MCNC: 78 UG/DL (ref 65–175)
LYMPHOCYTES # BLD AUTO: 2.04 X10(3)/MCL (ref 0.6–4.6)
LYMPHOCYTES NFR BLD AUTO: 28.4 %
MCH RBC QN AUTO: 31.2 PG (ref 27–31)
MCHC RBC AUTO-ENTMCNC: 33 G/DL (ref 33–36)
MCV RBC AUTO: 94.5 FL (ref 80–94)
MONOCYTES # BLD AUTO: 0.61 X10(3)/MCL (ref 0.1–1.3)
MONOCYTES NFR BLD AUTO: 8.5 %
NEUTROPHILS # BLD AUTO: 4.15 X10(3)/MCL (ref 2.1–9.2)
NEUTROPHILS NFR BLD AUTO: 57.7 %
PLATELET # BLD AUTO: 136 X10(3)/MCL (ref 130–400)
PMV BLD AUTO: 10.5 FL (ref 7.4–10.4)
POTASSIUM SERPL-SCNC: 4.7 MMOL/L (ref 3.5–5.1)
PROT SERPL-MCNC: 7.1 GM/DL (ref 5.8–7.6)
RBC # BLD AUTO: 4.93 X10(6)/MCL (ref 4.7–6.1)
SODIUM SERPL-SCNC: 139 MMOL/L (ref 136–145)
TIBC SERPL-MCNC: 146 UG/DL (ref 69–240)
TIBC SERPL-MCNC: 224 UG/DL (ref 250–450)
TRANSFERRIN SERPL-MCNC: 196 MG/DL (ref 163–344)
WBC # BLD AUTO: 7.19 X10(3)/MCL (ref 4.5–11.5)

## 2024-07-08 PROCEDURE — 85025 COMPLETE CBC W/AUTO DIFF WBC: CPT

## 2024-07-08 PROCEDURE — 83550 IRON BINDING TEST: CPT

## 2024-07-08 PROCEDURE — 80053 COMPREHEN METABOLIC PANEL: CPT

## 2024-07-08 PROCEDURE — 82728 ASSAY OF FERRITIN: CPT

## 2024-07-08 PROCEDURE — 36415 COLL VENOUS BLD VENIPUNCTURE: CPT

## 2024-07-08 PROCEDURE — 82378 CARCINOEMBRYONIC ANTIGEN: CPT

## 2024-07-09 ENCOUNTER — OFFICE VISIT (OUTPATIENT)
Dept: HEMATOLOGY/ONCOLOGY | Facility: CLINIC | Age: 64
End: 2024-07-09
Payer: COMMERCIAL

## 2024-07-09 VITALS
SYSTOLIC BLOOD PRESSURE: 147 MMHG | BODY MASS INDEX: 29.37 KG/M2 | HEIGHT: 71 IN | TEMPERATURE: 98 F | OXYGEN SATURATION: 97 % | RESPIRATION RATE: 14 BRPM | HEART RATE: 90 BPM | WEIGHT: 209.81 LBS | DIASTOLIC BLOOD PRESSURE: 82 MMHG

## 2024-07-09 DIAGNOSIS — C18.4 ADENOCARCINOMA OF TRANSVERSE COLON: Primary | ICD-10-CM

## 2024-07-09 DIAGNOSIS — C67.8 MALIGNANT NEOPLASM OF OVERLAPPING SITES OF BLADDER: ICD-10-CM

## 2024-07-09 PROCEDURE — 3077F SYST BP >= 140 MM HG: CPT | Mod: CPTII,S$GLB,, | Performed by: INTERNAL MEDICINE

## 2024-07-09 PROCEDURE — 1160F RVW MEDS BY RX/DR IN RCRD: CPT | Mod: CPTII,S$GLB,, | Performed by: INTERNAL MEDICINE

## 2024-07-09 PROCEDURE — 3079F DIAST BP 80-89 MM HG: CPT | Mod: CPTII,S$GLB,, | Performed by: INTERNAL MEDICINE

## 2024-07-09 PROCEDURE — 3008F BODY MASS INDEX DOCD: CPT | Mod: CPTII,S$GLB,, | Performed by: INTERNAL MEDICINE

## 2024-07-09 PROCEDURE — 3066F NEPHROPATHY DOC TX: CPT | Mod: CPTII,S$GLB,, | Performed by: INTERNAL MEDICINE

## 2024-07-09 PROCEDURE — 99999 PR PBB SHADOW E&M-EST. PATIENT-LVL IV: CPT | Mod: PBBFAC,,, | Performed by: INTERNAL MEDICINE

## 2024-07-09 PROCEDURE — 1159F MED LIST DOCD IN RCRD: CPT | Mod: CPTII,S$GLB,, | Performed by: INTERNAL MEDICINE

## 2024-07-09 PROCEDURE — 99214 OFFICE O/P EST MOD 30 MIN: CPT | Mod: S$GLB,,, | Performed by: INTERNAL MEDICINE

## 2024-07-31 ENCOUNTER — LAB VISIT (OUTPATIENT)
Dept: LAB | Facility: HOSPITAL | Age: 64
End: 2024-07-31
Attending: NURSE PRACTITIONER
Payer: COMMERCIAL

## 2024-07-31 DIAGNOSIS — N18.5 CKD STAGE G5/A3, GFR <15 AND ALBUMIN CREATININE RATIO >300 MG/G: ICD-10-CM

## 2024-07-31 LAB
ALBUMIN SERPL-MCNC: 4 G/DL (ref 3.4–4.8)
ALBUMIN/GLOB SERPL: 1.2 RATIO (ref 1.1–2)
ALP SERPL-CCNC: 53 UNIT/L (ref 40–150)
ALT SERPL-CCNC: 11 UNIT/L (ref 0–55)
ANION GAP SERPL CALC-SCNC: 8 MEQ/L
AST SERPL-CCNC: 9 UNIT/L (ref 5–34)
BILIRUB SERPL-MCNC: 0.7 MG/DL
BUN SERPL-MCNC: 61.2 MG/DL (ref 8.4–25.7)
CALCIUM SERPL-MCNC: 9.1 MG/DL (ref 8.8–10)
CHLORIDE SERPL-SCNC: 108 MMOL/L (ref 98–107)
CO2 SERPL-SCNC: 24 MMOL/L (ref 23–31)
CREAT SERPL-MCNC: 5.32 MG/DL (ref 0.73–1.18)
CREAT/UREA NIT SERPL: 12
GFR SERPLBLD CREATININE-BSD FMLA CKD-EPI: 11 ML/MIN/1.73/M2
GLOBULIN SER-MCNC: 3.4 GM/DL (ref 2.4–3.5)
GLUCOSE SERPL-MCNC: 119 MG/DL (ref 82–115)
PHOSPHATE SERPL-MCNC: 3.8 MG/DL (ref 2.3–4.7)
POTASSIUM SERPL-SCNC: 4.1 MMOL/L (ref 3.5–5.1)
PROT SERPL-MCNC: 7.4 GM/DL (ref 5.8–7.6)
PTH-INTACT SERPL-MCNC: 106 PG/ML (ref 8.7–77)
SODIUM SERPL-SCNC: 140 MMOL/L (ref 136–145)

## 2024-07-31 PROCEDURE — 84100 ASSAY OF PHOSPHORUS: CPT

## 2024-07-31 PROCEDURE — 80053 COMPREHEN METABOLIC PANEL: CPT

## 2024-07-31 PROCEDURE — 83970 ASSAY OF PARATHORMONE: CPT

## 2024-07-31 PROCEDURE — 36415 COLL VENOUS BLD VENIPUNCTURE: CPT

## 2024-08-01 ENCOUNTER — INFUSION (OUTPATIENT)
Dept: INFUSION THERAPY | Facility: HOSPITAL | Age: 64
End: 2024-08-01
Attending: INTERNAL MEDICINE
Payer: COMMERCIAL

## 2024-08-01 ENCOUNTER — OFFICE VISIT (OUTPATIENT)
Dept: NEPHROLOGY | Facility: CLINIC | Age: 64
End: 2024-08-01
Payer: COMMERCIAL

## 2024-08-01 VITALS
RESPIRATION RATE: 16 BRPM | TEMPERATURE: 98 F | SYSTOLIC BLOOD PRESSURE: 142 MMHG | DIASTOLIC BLOOD PRESSURE: 82 MMHG | BODY MASS INDEX: 29.79 KG/M2 | WEIGHT: 212.81 LBS | HEART RATE: 69 BPM | HEIGHT: 71 IN | OXYGEN SATURATION: 96 %

## 2024-08-01 VITALS
BODY MASS INDEX: 29.69 KG/M2 | SYSTOLIC BLOOD PRESSURE: 122 MMHG | OXYGEN SATURATION: 97 % | HEART RATE: 79 BPM | TEMPERATURE: 98 F | DIASTOLIC BLOOD PRESSURE: 77 MMHG | WEIGHT: 212.06 LBS | HEIGHT: 71 IN | RESPIRATION RATE: 18 BRPM

## 2024-08-01 DIAGNOSIS — N18.5 CKD STAGE G5/A3, GFR <15 AND ALBUMIN CREATININE RATIO >300 MG/G: Primary | ICD-10-CM

## 2024-08-01 DIAGNOSIS — E87.5 HYPERKALEMIA: ICD-10-CM

## 2024-08-01 DIAGNOSIS — C18.4 ADENOCARCINOMA OF TRANSVERSE COLON: Primary | ICD-10-CM

## 2024-08-01 DIAGNOSIS — E87.20 METABOLIC ACIDOSIS: ICD-10-CM

## 2024-08-01 DIAGNOSIS — N25.81 SECONDARY HYPERPARATHYROIDISM OF RENAL ORIGIN: ICD-10-CM

## 2024-08-01 PROCEDURE — 96523 IRRIG DRUG DELIVERY DEVICE: CPT

## 2024-08-01 PROCEDURE — A4216 STERILE WATER/SALINE, 10 ML: HCPCS | Performed by: INTERNAL MEDICINE

## 2024-08-01 PROCEDURE — 25000003 PHARM REV CODE 250: Performed by: INTERNAL MEDICINE

## 2024-08-01 PROCEDURE — 63600175 PHARM REV CODE 636 W HCPCS: Performed by: INTERNAL MEDICINE

## 2024-08-01 PROCEDURE — 99214 OFFICE O/P EST MOD 30 MIN: CPT | Mod: PBBFAC,25 | Performed by: NURSE PRACTITIONER

## 2024-08-01 RX ORDER — HEPARIN 100 UNIT/ML
500 SYRINGE INTRAVENOUS
Status: DISCONTINUED | OUTPATIENT
Start: 2024-08-01 | End: 2024-08-01 | Stop reason: HOSPADM

## 2024-08-01 RX ORDER — SODIUM CHLORIDE 0.9 % (FLUSH) 0.9 %
10 SYRINGE (ML) INJECTION
OUTPATIENT
Start: 2024-11-07

## 2024-08-01 RX ORDER — SODIUM CHLORIDE 0.9 % (FLUSH) 0.9 %
10 SYRINGE (ML) INJECTION
Status: DISCONTINUED | OUTPATIENT
Start: 2024-08-01 | End: 2024-08-01 | Stop reason: HOSPADM

## 2024-08-01 RX ORDER — HEPARIN 100 UNIT/ML
500 SYRINGE INTRAVENOUS
OUTPATIENT
Start: 2024-11-07

## 2024-08-01 RX ADMIN — SODIUM CHLORIDE, PRESERVATIVE FREE 10 ML: 5 INJECTION INTRAVENOUS at 02:08

## 2024-08-01 RX ADMIN — HEPARIN 500 UNITS: 100 SYRINGE at 02:08

## 2024-08-01 NOTE — PROGRESS NOTES
[facility]  Nephrology Clinic Note    Chief complaint: Follow-up (RTC, took meds, w/o complaints, +thrill, bruit WAYNE fistula)    History of present illness:   Bridget Cardenas Jr. is a 63 y.o. White male with past medical history of bladder cancer, status post total cystectomy and prostatectomy in 2018 with neobladder creation (patient self catheterizes 6 times a day); advanced chronic kidney disease, chronic hydronephrosis, metabolic acidosis. 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  Patient tolerated surgery without complications.  Presents for follow-up appointment in Nephrology Clinic, denies complaints.    Review of Systems  12 point review of systems conducted, negative except as stated in the history of present illness.    Allergies: Patient is allergic to pcn [penicillins].     Past Medical History:  has a past medical history of Cancer, Malignant neoplasm of transverse colon, Obesity, and Renal disorder.    Procedure History:  has a past surgical history that includes colonoscopy, with polypectomy using snare (Left, 10/25/2023); Esophagogastroduodenoscopy (Left, 10/25/2023); colonoscopy, with 1 or more biopsies (N/A, 10/25/2023); Colonoscopy (N/A, 10/25/2023); Prostate surgery (10/2018); Small intestine surgery (10/2018); Fracture surgery (7/2018); Hernia repair (11/2010); cystectomy; Mediport insertion, single; Ileo loop neobladder; xi robotic colectomy, partial (N/A, 12/14/2023); and AV fistula placement (Right, 01/15/2024).    Family History: family history includes Heart disease in his brother and father.    Social History:  reports that he quit smoking about 37 years ago. His smoking use included cigarettes. He has been exposed to tobacco smoke. His smokeless tobacco use includes chew. He reports that he does not drink alcohol and does not use  "drugs.    Physical exam  /77 (BP Location: Left arm, Patient Position: Sitting, BP Method: Medium (Automatic))   Pulse 79   Temp 97.6 °F (36.4 °C) (Oral)   Resp 18   Ht 5' 10.87" (1.8 m)   Wt 96.2 kg (212 lb 1.3 oz)   SpO2 97%   BMI 29.69 kg/m²   General appearance: Patient is in no acute distress.  Skin: No rashes or wounds.  HEENT: PERRLA, EOMI, no scleral icterus, no JVD. Neck is supple.  Chest: Respirations are unlabored. Lungs sounds are clear.   Heart: S1, S2.   Abdomen: Benign. + small incisional non incarcerated hernia to mid abdomen noted  : Deferred.  Extremities: No edema, peripheral pulses are palpable.  Right upper extremity AV fistula with audible bruit and palpable thrill.  Neuro: No focal deficits.     Home Medications:    Current Outpatient Medications:     calcitRIOL (ROCALTROL) 0.5 MCG Cap, Take 1 capsule (0.5 mcg total) by mouth once daily., Disp: 90 capsule, Rfl: 0    patiromer calcium sorbitex (VELTASSA) 8.4 gram PwPk, Take 1 packet (8.4 g total) by mouth every other day., Disp: 45 packet, Rfl: 3    sodium bicarbonate 650 MG tablet, Take 2 tablets (1,300 mg total) by mouth 2 (two) times daily., Disp: 120 tablet, Rfl: 11    Laboratory data    Lab Results   Component Value Date    WBC 7.19 07/08/2024    HGB 15.4 07/08/2024    HCT 46.6 07/08/2024     07/08/2024    IRON 78 07/08/2024    TIBC 224 (L) 07/08/2024    LABIRON 35 07/08/2024    FERRITIN 169.38 07/08/2024     07/31/2024    K 4.1 07/31/2024    CO2 24 07/31/2024    BUN 61.2 (H) 07/31/2024    CREATININE 5.32 (H) 07/31/2024    EGFRNORACEVR 11 07/31/2024    GLUCOSE 119 (H) 07/31/2024    CALCIUM 9.1 07/31/2024    ALKPHOS 53 07/31/2024    LABPROT 7.4 07/31/2024    ALBUMIN 4.0 07/31/2024    BILIDIR 0.2 10/20/2023    IBILI 0.20 05/27/2021    AST 9 07/31/2024    ALT 11 07/31/2024    MG 1.50 (L) 12/15/2023    PHOS 3.8 07/31/2024      Lab Results   Component Value Date    .0 (H) 07/31/2024    CGANWHDE61XB 23.9 (L) " 12/11/2023    HIV Nonreactive 12/11/2023    HEPCAB Nonreactive 12/11/2023    HEPBCAB Nonreactive 12/11/2023     Urine:  Lab Results   Component Value Date    APPEARANCEUA Turbid (A) 11/28/2023    SGUA 1.010 11/28/2023    PROTEINUA 1+ (A) 11/28/2023    KETONESUA Negative 11/28/2023    LEUKOCYTESUR 500 (A) 11/28/2023    RBCUA 6-10 (A) 11/28/2023    WBCUA >100 (A) 11/28/2023    BACTERIA Occ (A) 11/28/2023    SQEPUA None Seen 11/28/2023    HYALINECASTS None Seen 11/28/2023    CREATRANDUR 65.0 11/28/2023    PROTEINURINE 32.6 11/28/2023    UPROTCREA 0.5 11/28/2023         Imaging  Reviewed    Impression    ICD-10-CM ICD-9-CM   1. CKD stage G5/A3, GFR <15 and albumin creatinine ratio >300 mg/g  N18.5 585.5   2. Secondary hyperparathyroidism of renal origin  N25.81 588.81   3. Metabolic acidosis  E87.20 276.2   4. Hyperkalemia  E87.5 276.7        Plan  CKD stage G5/A3, GFR <15 and albumin creatinine ratio >300 mg/g  -     Comprehensive Metabolic Panel; Future; Expected date: 10/10/2024  -     CBC Auto Differential; Future; Expected date: 10/10/2024  -     PTH, Intact; Future; Expected date: 10/10/2024  Possibly chronic tubular interstitial disease secondary to chronic obstructive uropathy.  Estimated GFR is 11 mL/min.  Patient has multiple complications advanced kidney dysfunction (metabolic acidosis, hyperkalemia, secondary hyperparathyroidism), that are controlled with medications at present.  Patient is not a candidate for kidney transplantation due recent diagnosis of malignancy, is not a candidate for peritoneal dialysis due to multiple abdominal surgeries.  There are no acute indications for initiation of hemodialysis.  Patient is status post AV fistula creation, fistula is mature.  Emergency department precautions discussed.  Will continue close monitoring.      Secondary hyperparathyroidism of renal origin  Continue calcitriol as ordered.     Metabolic acidosis  Continue sodium bicarbonate supplementation.      Hyperkalemia  Hyperkalemia is controlled, continue potassium binder.     Follow up in about 10 weeks (around 10/10/2024).     8/1/2024  Mayra Grimaldo NP  [facility]ph[facility]

## 2024-08-12 ENCOUNTER — OFFICE VISIT (OUTPATIENT)
Dept: UROLOGY | Facility: CLINIC | Age: 64
End: 2024-08-12
Payer: COMMERCIAL

## 2024-08-12 VITALS
RESPIRATION RATE: 18 BRPM | DIASTOLIC BLOOD PRESSURE: 77 MMHG | HEART RATE: 79 BPM | SYSTOLIC BLOOD PRESSURE: 148 MMHG | BODY MASS INDEX: 29.82 KG/M2 | WEIGHT: 213 LBS | OXYGEN SATURATION: 98 % | HEIGHT: 71 IN

## 2024-08-12 DIAGNOSIS — R82.90 ABNORMAL URINALYSIS: ICD-10-CM

## 2024-08-12 DIAGNOSIS — N52.9 ERECTILE DYSFUNCTION, UNSPECIFIED ERECTILE DYSFUNCTION TYPE: ICD-10-CM

## 2024-08-12 DIAGNOSIS — R33.9 URINARY RETENTION: Primary | ICD-10-CM

## 2024-08-12 DIAGNOSIS — Z20.2 STD EXPOSURE: ICD-10-CM

## 2024-08-12 LAB
BILIRUB SERPL-MCNC: NEGATIVE MG/DL
BLOOD URINE, POC: NORMAL
C TRACH DNA SPEC QL NAA+PROBE: NOT DETECTED
COLOR, POC UA: NORMAL
GLUCOSE UR QL STRIP: NEGATIVE
KETONES UR QL STRIP: NEGATIVE
LEUKOCYTE ESTERASE URINE, POC: NORMAL
N GONORRHOEA DNA SPEC QL NAA+PROBE: NOT DETECTED
NITRITE, POC UA: POSITIVE
PH, POC UA: 7
PROTEIN, POC: 30
SOURCE (OHS): NORMAL
SPECIFIC GRAVITY, POC UA: 1.01
UROBILINOGEN, POC UA: 0.2

## 2024-08-12 PROCEDURE — 99214 OFFICE O/P EST MOD 30 MIN: CPT | Mod: PBBFAC | Performed by: NURSE PRACTITIONER

## 2024-08-12 PROCEDURE — 81001 URINALYSIS AUTO W/SCOPE: CPT | Mod: PBBFAC | Performed by: NURSE PRACTITIONER

## 2024-08-12 PROCEDURE — 87661 TRICHOMONAS VAGINALIS AMPLIF: CPT | Performed by: NURSE PRACTITIONER

## 2024-08-12 PROCEDURE — 87077 CULTURE AEROBIC IDENTIFY: CPT | Performed by: NURSE PRACTITIONER

## 2024-08-12 PROCEDURE — 87086 URINE CULTURE/COLONY COUNT: CPT | Performed by: NURSE PRACTITIONER

## 2024-08-12 PROCEDURE — 87184 SC STD DISK METHOD PER PLATE: CPT | Performed by: NURSE PRACTITIONER

## 2024-08-12 PROCEDURE — 87591 N.GONORRHOEAE DNA AMP PROB: CPT | Performed by: NURSE PRACTITIONER

## 2024-08-12 PROCEDURE — 87491 CHLMYD TRACH DNA AMP PROBE: CPT | Performed by: NURSE PRACTITIONER

## 2024-08-12 RX ORDER — TADALAFIL 10 MG/1
10 TABLET ORAL DAILY PRN
Qty: 10 TABLET | Refills: 11 | Status: SHIPPED | OUTPATIENT
Start: 2024-08-12 | End: 2025-08-12

## 2024-08-12 NOTE — PROGRESS NOTES
Chief Complaint:   Chief Complaint   Patient presents with    Follow-up     6 month follow up      HPI:   Patient is a 62-year-old male referred to Urology for urinary retention, recurrent UTIs.    Patient currently on self-intermittent catheterization due to a bladder cancer in 2018 in which he had his bladder removed and a total prostatectomy.  Patient reconstructed bladder was from his stomach tissue.    Patient admits to doing self-catheterizations 5-6 times daily.   Patient presents with spouse having dysuria episodes and tested negative for STD due to patient's condition with a neobladder and having to do self caths he does not have any feeling therefore she would like to be tested.  Patient has a persistent erectile dysfunction unable to attain and maintain any erections.    Allergies:  Review of patient's allergies indicates:   Allergen Reactions    Pcn [penicillins] Hives       Medications:  Current Outpatient Medications   Medication Sig Dispense Refill    patiromer calcium sorbitex (VELTASSA) 8.4 gram PwPk Take 1 packet (8.4 g total) by mouth every other day. 45 packet 3    sodium bicarbonate 650 MG tablet Take 2 tablets (1,300 mg total) by mouth 2 (two) times daily. 120 tablet 11     No current facility-administered medications for this visit.       Review of Systems:  General: No fever, chills, fatigability, or weight loss.  Skin: No rashes, itching, or changes in color or texture of skin.  Chest: Denies DIAZ, cyanosis, wheezing, cough, and sputum production.  Abdomen: Appetite fine. No weight loss. Denies diarrhea, abdominal pain, hematemesis, or blood in stool.  Musculoskeletal: No joint stiffness or swelling. Denies back pain.  : As above.  All other review of systems negative.    PMH:  Past Medical History:   Diagnosis Date    Cancer     hx of bladder cancer    Malignant neoplasm of transverse colon     Obesity     Renal disorder     CKD 5       PSH:  Past Surgical History:   Procedure Laterality  Date    AV FISTULA PLACEMENT Right 01/15/2024    COLONOSCOPY N/A 10/25/2023    Procedure: COLONOSCOPY, WITH HEMORRHAGE CONTROL;  Surgeon: Angy Mcguire MD;  Location: [facility] ENDOSCOPY;  Service: Gastroenterology;  Laterality: N/A;  sigmoid clips    COLONOSCOPY, WITH 1 OR MORE BIOPSIES N/A 10/25/2023    Procedure: COLONOSCOPY, WITH 1 OR MORE BIOPSIES;  Surgeon: Angy Mcguire MD;  Location: [facility] ENDOSCOPY;  Service: Gastroenterology;  Laterality: N/A;  transverse colon mass    COLONOSCOPY, WITH POLYPECTOMY USING SNARE Left 10/25/2023    Procedure: COLONOSCOPY, WITH POLYPECTOMY USING SNARE;  Surgeon: Angy Mcguire MD;  Location: [facility] ENDOSCOPY;  Service: Gastroenterology;  Laterality: Left;    CYSTECTOMY      ESOPHAGOGASTRODUODENOSCOPY Left 10/25/2023    Procedure: EGD (ESOPHAGOGASTRODUODENOSCOPY);  Surgeon: Angy Mcguire MD;  Location: [facility] ENDOSCOPY;  Service: Gastroenterology;  Laterality: Left;    FRACTURE SURGERY  2018    Collar none    HERNIA REPAIR  2010    ILEO LOOP NEOBLADDER      MEDIPORT INSERTION, SINGLE      PROSTATE SURGERY  10/2018    SMALL INTESTINE SURGERY  10/2018    XI ROBOTIC COLECTOMY, PARTIAL N/A 2023    Procedure: XI ROBOTIC COLECTOMY, PARTIAL;  Surgeon: Aris Scott MD;  Location: [facility];  Service: Oncology;  Laterality: N/A;  TRANSVERSE COLON; POSSIBLE OPEN CASE       FamHx:  Family History   Problem Relation Name Age of Onset    Heart disease Father      Heart disease Brother         SocHx:  Social History     Socioeconomic History    Marital status:    Tobacco Use    Smoking status: Former     Current packs/day: 0.00     Types: Cigarettes     Quit date:      Years since quittin.6     Passive exposure: Current    Smokeless tobacco: Current     Types: Chew   Substance and Sexual Activity    Alcohol use: Never    Drug use: Never    Sexual activity: Yes     Partners: Female     Birth control/protection: None     Social Determinants of  Health     Financial Resource Strain: Low Risk  (4/4/2024)    Overall Financial Resource Strain (CARDIA)     Difficulty of Paying Living Expenses: Not hard at all   Food Insecurity: No Food Insecurity (4/4/2024)    Hunger Vital Sign     Worried About Running Out of Food in the Last Year: Never true     Ran Out of Food in the Last Year: Never true   Transportation Needs: No Transportation Needs (4/4/2024)    PRAPARE - Transportation     Lack of Transportation (Medical): No     Lack of Transportation (Non-Medical): No   Physical Activity: Insufficiently Active (4/4/2024)    Exercise Vital Sign     Days of Exercise per Week: 5 days     Minutes of Exercise per Session: 10 min   Stress: No Stress Concern Present (4/4/2024)    [employer] - Occupational Stress Questionnaire     Feeling of Stress : Not at all   Housing Stability: Low Risk  (4/4/2024)    Housing Stability Vital Sign     Unable to Pay for Housing in the Last Year: No     Number of Places Lived in the Last Year: 1     Unstable Housing in the Last Year: No       Physical Exam:  Vitals:    08/12/24 0958   BP: (!) 148/77   Pulse: 79   Resp: 18     General: A&Ox3, no apparent distress, no deformities  Neck: No masses, normal thyroid  Lungs: CTA margie, no use of accessory muscles  Heart: RRR, no arrhythmias  Abdomen: Soft, NT, ND, no masses, no hernias, no hepatosplenomegaly  Lymphatic: Neck and groin nodes negative  Skin: The skin is warm and dry. No jaundice.  Ext: No c/c/e.    GUMale: Test desc margie, no abnormalities of epididymus. Penis, with normal penile and scrotal skin. Meatus normal. Normal rectal tone, no hemorrhoids. Prostate: finger breadth wide, smooth, soft, nontender, no nodules or masses appreciated. SV not palpable. Perineum and anus normal.    GUFemale: External genitalia normal. No lesions. Meatus normal size and location. Urethra normal. No masses. Bladder normal. No fullness or masses. Vagina normal with discharge or  lesions. Anus/perineum normal. Uterus and adnexa no palpable abnormalities.    Labs:    Latest Reference Range & Units 06/05/24 15:09   Prostate Specific Antigen <=4.00 ng/mL <0.10     Urinalysis:  Results for orders placed or performed in visit on 08/12/24   POCT URINE DIPSTICK WITH MICROSCOPE, AUTOMATED   Result Value Ref Range    Color, UA Light Yellow     Spec Grav UA 1.015     pH, UA 7.0     WBC, UA Large     Nitrite, UA Positive     Protein, POC 30     Glucose, UA Negative     Ketones, UA Negative     Urobilinogen, UA 0.2     Bilirubin, POC Negative     Blood, UA Moderate    Microscopic urinalysis moderate for RBCs 3-5 per HPF, large WBCs TNTC per HPF, positive nitrites, bacteria moderate per HPF.  Instructed patient's sending urine for culture and sensitivity be evaluated and treated will notify him results when completed.  I will also send STD testing for gonorrhea, chlamydia, Trichomonas      Impression:  1. Urinary retention  - POCT URINE DIPSTICK WITH MICROSCOPE, AUTOMATED      Plan:  Instructed patient to continue self-intermittent catheterization 5-6 times during the day.  Instructed patient will notify results of urine culture and sensitivity along with STD testing.  Instructed patient on timed voiding every 2-3 hrs, double voiding, avoidance of bladder irritants such as alcohol, citrus foods, chocolate, caffeinated drinks, energy drinks, spicy foods, sugar, caffeine products, sodas. Instructed patient to avoid drinking fluids 1-2 hours prior to bedtime & void immediately before bedtime.   RTC 6 months with PSA.  Instructed patient if develops any abnormal urologic symptoms notify clinic to be re-evaluate treated or during after hours go to emergency room versus urgent here.                           [facility]

## 2024-08-13 LAB
SPECIMEN SOURCE: NORMAL
T VAGINALIS RRNA SPEC QL NAA+PROBE: NEGATIVE

## 2024-08-14 ENCOUNTER — TELEPHONE (OUTPATIENT)
Dept: UROLOGY | Facility: CLINIC | Age: 64
End: 2024-08-14
Payer: COMMERCIAL

## 2024-08-14 DIAGNOSIS — N30.01 ACUTE CYSTITIS WITH HEMATURIA: Primary | ICD-10-CM

## 2024-08-14 LAB — BACTERIA UR CULT: ABNORMAL

## 2024-08-14 RX ORDER — LEVOFLOXACIN 750 MG/1
750 TABLET ORAL DAILY
Qty: 7 TABLET | Refills: 0 | Status: SHIPPED | OUTPATIENT
Start: 2024-08-14

## 2024-08-14 NOTE — TELEPHONE ENCOUNTER
Spoke to patient via telephone regarding STD testing all negative.  Pending culture and sensitivity report to be completed he is positive for alpha Streptococcus bacteria 100,000 colonies however I do not have the culture and sensitivity.  I notify patient that when I do have the culture sensitivity I will call in antibiotics to resolve this UTI therefore keep an eye on your pharmacy on any antibiotics called in within the next 2 days.

## 2024-09-03 ENCOUNTER — OFFICE VISIT (OUTPATIENT)
Dept: SURGICAL ONCOLOGY | Facility: CLINIC | Age: 64
End: 2024-09-03
Payer: COMMERCIAL

## 2024-09-03 ENCOUNTER — LAB VISIT (OUTPATIENT)
Dept: LAB | Facility: HOSPITAL | Age: 64
End: 2024-09-03
Attending: NURSE PRACTITIONER
Payer: COMMERCIAL

## 2024-09-03 VITALS
WEIGHT: 206.19 LBS | SYSTOLIC BLOOD PRESSURE: 131 MMHG | DIASTOLIC BLOOD PRESSURE: 66 MMHG | HEART RATE: 63 BPM | HEIGHT: 71 IN | BODY MASS INDEX: 28.87 KG/M2

## 2024-09-03 DIAGNOSIS — K43.2 INCISIONAL HERNIA OF ANTERIOR ABDOMINAL WALL WITHOUT OBSTRUCTION OR GANGRENE: ICD-10-CM

## 2024-09-03 DIAGNOSIS — C18.4 MALIGNANT TUMOR OF TRANSVERSE COLON: Primary | ICD-10-CM

## 2024-09-03 DIAGNOSIS — N30.01 ACUTE CYSTITIS WITH HEMATURIA: ICD-10-CM

## 2024-09-03 PROCEDURE — 3066F NEPHROPATHY DOC TX: CPT | Mod: CPTII,S$GLB,, | Performed by: SURGERY

## 2024-09-03 PROCEDURE — 87086 URINE CULTURE/COLONY COUNT: CPT

## 2024-09-03 PROCEDURE — 99214 OFFICE O/P EST MOD 30 MIN: CPT | Mod: S$GLB,,, | Performed by: SURGERY

## 2024-09-03 PROCEDURE — 3078F DIAST BP <80 MM HG: CPT | Mod: CPTII,S$GLB,, | Performed by: SURGERY

## 2024-09-03 PROCEDURE — 99999 PR PBB SHADOW E&M-EST. PATIENT-LVL III: CPT | Mod: PBBFAC,,, | Performed by: SURGERY

## 2024-09-03 PROCEDURE — 3008F BODY MASS INDEX DOCD: CPT | Mod: CPTII,S$GLB,, | Performed by: SURGERY

## 2024-09-03 PROCEDURE — 3075F SYST BP GE 130 - 139MM HG: CPT | Mod: CPTII,S$GLB,, | Performed by: SURGERY

## 2024-09-03 NOTE — PROGRESS NOTES
Chief complaint:  Incisional hernia     HPI:  63-year-old male known to us for previous transverse colectomy for colon cancer now complains of a bulging beneath his extraction incision in the epigastrium.  No nausea vomiting constipation or obstructive symptoms.  He reports the bulging has increased in size over time.        Past Medical and Surgical History  Allergies :   Pcn [penicillins]    [facility]  Medical :   He has a past medical history of Cancer, Malignant neoplasm of transverse colon, Obesity, and Renal disorder.    Surgical :   He has a past surgical history that includes colonoscopy, with polypectomy using snare (Left, 10/25/2023); Esophagogastroduodenoscopy (Left, 10/25/2023); colonoscopy, with 1 or more biopsies (N/A, 10/25/2023); Colonoscopy (N/A, 10/25/2023); Prostate surgery (10/2018); Small intestine surgery (10/2018); Fracture surgery (7/2018); Hernia repair (11/2010); cystectomy; Mediport insertion, single; Ileo loop neobladder; xi robotic colectomy, partial (N/A, 12/14/2023); and AV fistula placement (Right, 01/15/2024).     Family History  His family history includes Heart disease in his brother and father.    Social History  He reports that he quit smoking about 37 years ago. His smoking use included cigarettes. He has been exposed to tobacco smoke. His smokeless tobacco use includes chew. He reports that he does not drink alcohol and does not use drugs.     Review of Systems   Constitutional:  Negative for activity change, appetite change, chills, diaphoresis, fatigue and fever.   HENT:  Negative for congestion, ear pain, tinnitus and trouble swallowing.    Eyes:  Negative for photophobia and pain.   Respiratory:  Negative for apnea, cough, choking, chest tightness, shortness of breath and stridor.    Cardiovascular:  Negative for chest pain, palpitations and leg swelling.   Endocrine: Negative for cold intolerance and heat intolerance.   Genitourinary:  Negative for difficulty urinating,  dysuria, enuresis, flank pain, frequency and hematuria.   Musculoskeletal:  Negative for arthralgias, back pain and gait problem.   Neurological:  Negative for dizziness, seizures, syncope, facial asymmetry, speech difficulty, weakness, light-headedness, numbness and headaches.   Psychiatric/Behavioral:  Negative for agitation, behavioral problems, confusion and decreased concentration.    All other systems reviewed and are negative.       Objective   Physical Exam  Constitutional:       General: He is not in acute distress.     Appearance: Normal appearance. He is not ill-appearing, toxic-appearing or diaphoretic.   HENT:      Head: Normocephalic and atraumatic.      Nose: No congestion or rhinorrhea.      Mouth/Throat:      Mouth: Mucous membranes are moist.      Pharynx: Oropharynx is clear.   Eyes:      General: No scleral icterus.     Extraocular Movements: Extraocular movements intact.      Pupils: Pupils are equal, round, and reactive to light.   Cardiovascular:      Rate and Rhythm: Normal rate and regular rhythm.      Pulses: Normal pulses.      Heart sounds: Normal heart sounds. No murmur heard.     No friction rub. No gallop.   Pulmonary:      Effort: Pulmonary effort is normal. No respiratory distress.      Breath sounds: Normal breath sounds. No stridor. No wheezing or rhonchi.   Chest:      Chest wall: No tenderness.   Abdominal:      General: Abdomen is flat. There is no distension.      Palpations: Abdomen is soft. There is no mass.      Tenderness: There is no abdominal tenderness. There is no guarding or rebound.      Hernia: A hernia is present.   Musculoskeletal:         General: No swelling, tenderness, deformity or signs of injury.      Cervical back: Normal range of motion and neck supple. No rigidity or tenderness.      Right lower leg: No edema.      Left lower leg: No edema.   Skin:     General: Skin is warm.      Capillary Refill: Capillary refill takes less than 2 seconds.       "Coloration: Skin is not jaundiced or pale.      Findings: No bruising, erythema, lesion or rash.   Neurological:      General: No focal deficit present.      Mental Status: He is alert and oriented to person, place, and time.   Psychiatric:         Mood and Affect: Mood normal.         Behavior: Behavior normal.         Thought Content: Thought content normal.         Judgment: Judgment normal.       VITAL SIGNS: 24 HR MIN & MAX LAST    @FLOWSTAT(6:24::1)@           @FLOWSTAT(5:24::1)@  131/66     @FLOWSTAT(8:24::1)@  63     @FLOWSTAT(9:24::1)@       @FLOWSTAT(10:24::1)@         HT: 5' 11" (180.3 cm)  WT: 93.5 kg (206 lb 3.2 oz)  BMI: 28.8       Assessment & Plan     4 cm reducible incisional hernia in the epigastrium, enlarging    Schedule laparoscopic/robotic incisional hernia repair with mesh    We discussed the importance of activity restrictions postoperatively    The risks and benefits of the procedure were explained in detail using layman terms, including the pros and cons of any implant that may be used, all questions were addressed, the patient gives consent to proceed    "

## 2024-09-05 LAB — BACTERIA UR CULT: NO GROWTH

## 2024-09-18 ENCOUNTER — PATIENT MESSAGE (OUTPATIENT)
Dept: UROLOGY | Facility: CLINIC | Age: 64
End: 2024-09-18
Payer: COMMERCIAL

## 2024-09-19 DIAGNOSIS — N52.9 ERECTILE DYSFUNCTION, UNSPECIFIED ERECTILE DYSFUNCTION TYPE: ICD-10-CM

## 2024-09-19 DIAGNOSIS — K43.2 INCISIONAL HERNIA: ICD-10-CM

## 2024-09-19 DIAGNOSIS — Z01.818 PREOP TESTING: ICD-10-CM

## 2024-09-19 DIAGNOSIS — K43.2 INCISIONAL HERNIA OF ANTERIOR ABDOMINAL WALL WITHOUT OBSTRUCTION OR GANGRENE: Primary | ICD-10-CM

## 2024-09-19 RX ORDER — ENOXAPARIN SODIUM 100 MG/ML
30 INJECTION SUBCUTANEOUS EVERY 24 HOURS
OUTPATIENT
Start: 2024-09-19

## 2024-09-19 RX ORDER — TADALAFIL 10 MG/1
10 TABLET ORAL DAILY PRN
Qty: 10 TABLET | Refills: 11 | Status: SHIPPED | OUTPATIENT
Start: 2024-09-19 | End: 2024-09-19 | Stop reason: SDUPTHER

## 2024-09-19 RX ORDER — CLINDAMYCIN PHOSPHATE 150 MG/ML
600 INJECTION, SOLUTION INTRAVENOUS ONCE
OUTPATIENT
Start: 2024-10-25

## 2024-09-19 RX ORDER — TADALAFIL 10 MG/1
10 TABLET ORAL DAILY PRN
Qty: 10 TABLET | Refills: 11 | Status: SHIPPED | OUTPATIENT
Start: 2024-09-19 | End: 2025-09-19

## 2024-09-23 DIAGNOSIS — N25.81 SECONDARY HYPERPARATHYROIDISM OF RENAL ORIGIN: ICD-10-CM

## 2024-09-23 RX ORDER — CALCITRIOL 0.5 UG/1
CAPSULE ORAL
Qty: 90 CAPSULE | Refills: 0 | Status: SHIPPED | OUTPATIENT
Start: 2024-09-23

## 2024-09-26 ENCOUNTER — PATIENT MESSAGE (OUTPATIENT)
Dept: GASTROENTEROLOGY | Facility: CLINIC | Age: 64
End: 2024-09-26
Payer: COMMERCIAL

## 2024-09-30 NOTE — PROGRESS NOTES
Subjective:       Patient ID: Bridget Cardenas Jr. is a 63 y.o. male.    GI: Dr. Angy Mcguire  Surgeon: Dr. Aris Scott    1. 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 internal hemorrhoids, diverticulosis in the sigmoid colon, on 9mm polyp in transverse colon, removed and c/w tubular adenoma, one 10mm polyp in descending colon, removed and c/w tubular adenoma, one 30-35mm polyp in sigmoid colon, removed and c/w tubulovillous adenoma with high-grade dysplasia, no malignancy; one 15mm polyp in rectum removed, and c/w tubulovillous adenoma, no malignancy; quality of bowel prep good, ileocecal valve, appendiceal orifice photographed.   Surgery/pathology:  Robotic assisted transverse colectomy done 12/14/23--adenocarcinoma, moderately differentiated, measuring 6cm, carcinoma invades into subserosal adipose tissue, LVI indeterminate, margins clear, 16 mesenteric lymph nodes uninvolved (0/16).    2. Severe Iron deficiency anemia--10/2023 (Ferritin <1.98)    3. H/o Bladder Cancer unknown stage--Diagnosed 2018    Imaging:  CT A/P w/o contrast done 10/20/23--Symmetric moderate bilateral hydronephrosis and hydroureter.  No obstructing stone.  This appears to be chronic, similar to previous exam. Neobladder collapsed about a Manzo catheter.  PET/CT done 11/8/23--focal moderate uptake at transverse colon has max SUV 12, no appreciable hypermetabolic metastatic disease, similar chronic bilateral hydroureteronephrosis, post-surgical changes of prior right clavicle ORIF.  CT C/A/P w/o contrast done 7/2/24-- Interval changes suggestive of partial transverse colectomy, with no definite residual mass-like lesion identified, no changes  appreciated to suggest new/worsened metastatic disease through the chest, abdomen, or pelvis, chronic secondary details grossly unchanged from the comparison PET-CT appearance.    Treatment history:  1. Neoadjuvant chemotherapy unknown drugs and dates.  2. S/p total cystectomy/prostatectomy with neobladder creating done in 2018 at [facility].  3. IV Feraheme X 2 doses completed 11/20/23.  4. IV Feraheme X 2 doses completed 4/22/2024.     CEA:   10/26/23--1.98  04/01/24--2.39  07/08/24--2.48  10/01/24--3.30    Procedures:  EGD 10/25/23--normal esophagus, normal stomach, normal duodenum, no specimens collected.     Current treatment plan: Observation    Chief Complaint: Other Misc (Pt reports no concerns today.)    HPI  Patient presents for follow-up of colon cancer. He is doing well. Appetite good and weight stable. Denies any n/v/c/d. Bowels are moving well. Mentions he is scheduled for hernia repair with Dr. Scott in 2 weeks. It is not painful and does not bother him at all other than he does not like the way it looks. He reports that it was recommended for him to start dialysis but he is holding off for now because he needs to keep working to keep his insurance. His renal function has been stable. No other problems reported.     Past Medical History:   Diagnosis Date    Cancer     hx of bladder cancer    Malignant neoplasm of transverse colon     Obesity     Renal disorder     CKD 5      Past Surgical History:   Procedure Laterality Date    AV FISTULA PLACEMENT Right 01/15/2024    COLONOSCOPY N/A 10/25/2023    Procedure: COLONOSCOPY, WITH HEMORRHAGE CONTROL;  Surgeon: Angy Mcguire MD;  Location: [facility] ENDOSCOPY;  Service: Gastroenterology;  Laterality: N/A;  sigmoid clips    COLONOSCOPY, WITH 1 OR MORE BIOPSIES N/A 10/25/2023    Procedure: COLONOSCOPY, WITH 1 OR MORE BIOPSIES;  Surgeon: Angy Mcguire MD;  Location: [facility] ENDOSCOPY;  Service: Gastroenterology;  Laterality: N/A;  transverse colon mass     COLONOSCOPY, WITH POLYPECTOMY USING SNARE Left 10/25/2023    Procedure: COLONOSCOPY, WITH POLYPECTOMY USING SNARE;  Surgeon: Angy Mcguire MD;  Location: [facility] ENDOSCOPY;  Service: Gastroenterology;  Laterality: Left;    CYSTECTOMY      ESOPHAGOGASTRODUODENOSCOPY Left 10/25/2023    Procedure: EGD (ESOPHAGOGASTRODUODENOSCOPY);  Surgeon: Angy Mcguire MD;  Location: [facility] ENDOSCOPY;  Service: Gastroenterology;  Laterality: Left;    FRACTURE SURGERY  2018    Collar none    HERNIA REPAIR  2010    ILEO LOOP NEOBLADDER      MEDIPORT INSERTION, SINGLE      PROSTATE SURGERY  10/2018    SMALL INTESTINE SURGERY  10/2018    XI ROBOTIC COLECTOMY, PARTIAL N/A 2023    Procedure: XI ROBOTIC COLECTOMY, PARTIAL;  Surgeon: Aris Scott MD;  Location: [facility] OR;  Service: Oncology;  Laterality: N/A;  TRANSVERSE COLON; POSSIBLE OPEN CASE     Family History   Problem Relation Name Age of Onset    Heart disease Father      Heart disease Brother       Social History     Socioeconomic History    Marital status:    Tobacco Use    Smoking status: Former     Current packs/day: 0.00     Types: Cigarettes     Quit date:      Years since quittin.7     Passive exposure: Current    Smokeless tobacco: Current     Types: Chew   Substance and Sexual Activity    Alcohol use: Never    Drug use: Never    Sexual activity: Yes     Partners: Female     Birth control/protection: None     Social Drivers of Health     Financial Resource Strain: Low Risk  (2024)    Overall Financial Resource Strain (CARDIA)     Difficulty of Paying Living Expenses: Not hard at all   Food Insecurity: No Food Insecurity (2024)    Hunger Vital Sign     Worried About Running Out of Food in the Last Year: Never true     Ran Out of Food in the Last Year: Never true   Transportation Needs: No Transportation Needs (2024)    PRAPARE - Transportation     Lack of Transportation (Medical): No     Lack of Transportation (Non-Medical):  No   Physical Activity: Insufficiently Active (4/4/2024)    Exercise Vital Sign     Days of Exercise per Week: 5 days     Minutes of Exercise per Session: 10 min   Stress: No Stress Concern Present (4/4/2024)    [employer] - Occupational Stress Questionnaire     Feeling of Stress : Not at all   Housing Stability: Low Risk  (4/4/2024)    Housing Stability Vital Sign     Unable to Pay for Housing in the Last Year: No     Number of Places Lived in the Last Year: 1     Unstable Housing in the Last Year: No       Review of patient's allergies indicates:   Allergen Reactions    Pcn [penicillins] Hives      Current Outpatient Medications on File Prior to Visit   Medication Sig Dispense Refill    calcitRIOL (ROCALTROL) 0.5 MCG Cap TAKE 1 CAPSULE(0.5 MCG) BY MOUTH DAILY 90 capsule 0    patiromer calcium sorbitex (VELTASSA) 8.4 gram PwPk Take 1 packet (8.4 g total) by mouth every other day. 45 packet 3    sodium bicarbonate 650 MG tablet Take 2 tablets (1,300 mg total) by mouth 2 (two) times daily. 120 tablet 11    tadalafiL (CIALIS) 10 MG tablet Take 1 tablet (10 mg total) by mouth daily as needed for Erectile Dysfunction. 10 tablet 11     No current facility-administered medications on file prior to visit.      Review of Systems   Constitutional:  Negative for appetite change, fatigue, fever and unexpected weight change.   HENT:  Negative for mouth sores.    Eyes: Negative.  Negative for visual disturbance.   Respiratory:  Negative for cough and shortness of breath.    Cardiovascular:  Negative for chest pain and leg swelling.   Gastrointestinal:  Negative for abdominal distention, abdominal pain, constipation, diarrhea, nausea, vomiting and reflux.        +new midline hernia   Genitourinary:  Negative for difficulty urinating, dysuria, frequency and hematuria.        Neobladder, h/o bladder cancer, self-catheterizes 6X per day   Musculoskeletal:  Negative for arthralgias and back pain.  "  Integumentary:  Negative for rash.   Neurological:  Negative for weakness and headaches.   Hematological:  Negative for adenopathy.   Psychiatric/Behavioral:  Negative for sleep disturbance. The patient is not nervous/anxious.          Vitals:    10/08/24 1501   BP: 137/73   Pulse: 67   Resp: 14   Temp: 97.6 °F (36.4 °C)   TempSrc: Oral   SpO2: 97%   Weight: 96.7 kg (213 lb 3.2 oz)   Height: 5' 11" (1.803 m)     Physical Exam  Constitutional:       General: He is awake.      Appearance: Normal appearance. He is normal weight.   HENT:      Head: Normocephalic and atraumatic.      Nose: Nose normal.      Mouth/Throat:      Mouth: Mucous membranes are moist.   Eyes:      General: Vision grossly intact.      Extraocular Movements: Extraocular movements intact.      Conjunctiva/sclera: Conjunctivae normal.   Cardiovascular:      Rate and Rhythm: Normal rate and regular rhythm.      Heart sounds: Normal heart sounds.   Pulmonary:      Effort: Pulmonary effort is normal.      Breath sounds: Normal breath sounds.   Chest:      Comments: Left chest wall mediport in place  Abdominal:      General: Bowel sounds are normal. There is no distension.      Palpations: Abdomen is soft.      Tenderness: There is no abdominal tenderness.      Comments: Incisions all healed, there is a medium-sized hernia at umbilical incision site, not tender   Musculoskeletal:      Cervical back: Normal range of motion and neck supple.      Right lower leg: No edema.      Left lower leg: No edema.   Lymphadenopathy:      Cervical: No cervical adenopathy.      Upper Body:      Right upper body: No supraclavicular adenopathy.      Left upper body: No supraclavicular adenopathy.   Skin:     General: Skin is warm.   Neurological:      Mental Status: He is alert and oriented to person, place, and time.      Motor: Motor function is intact.   Psychiatric:         Mood and Affect: Mood normal.         Speech: Speech normal.         Behavior: Behavior is " cooperative.         Judgment: Judgment normal.       No visits with results within 1 Week(s) from this visit.   Latest known visit with results is:   Lab Visit on 10/01/2024   Component Date Value    Sodium 10/01/2024 139     Potassium 10/01/2024 4.3     Chloride 10/01/2024 112 (H)     CO2 10/01/2024 18 (L)     Glucose 10/01/2024 84     Blood Urea Nitrogen 10/01/2024 74.8 (H)     Creatinine 10/01/2024 5.49 (H)     Calcium 10/01/2024 9.5     Protein Total 10/01/2024 7.5     Albumin 10/01/2024 4.4     Globulin 10/01/2024 3.1     Albumin/Globulin Ratio 10/01/2024 1.4     Bilirubin Total 10/01/2024 0.7     ALP 10/01/2024 61     ALT 10/01/2024 11     AST 10/01/2024 11     eGFR 10/01/2024 11     Anion Gap 10/01/2024 9.0     BUN/Creatinine Ratio 10/01/2024 14     Carcinoembryonic Antigen 10/01/2024 3.30 (H)     WBC 10/01/2024 7.77     RBC 10/01/2024 4.81     Hgb 10/01/2024 14.9     Hct 10/01/2024 45.7     MCV 10/01/2024 95.0 (H)     MCH 10/01/2024 31.0     MCHC 10/01/2024 32.6 (L)     RDW 10/01/2024 12.8     Platelet 10/01/2024 154     MPV 10/01/2024 10.1     Neut % 10/01/2024 59.4     Lymph % 10/01/2024 26.9     Mono % 10/01/2024 7.7     Eos % 10/01/2024 5.1     Basophil % 10/01/2024 0.8     Lymph # 10/01/2024 2.09     Neut # 10/01/2024 4.61     Mono # 10/01/2024 0.60     Eos # 10/01/2024 0.40     Baso # 10/01/2024 0.06     IG# 10/01/2024 0.01     IG% 10/01/2024 0.1           Assessment:       1. Iron deficiency anemia due to chronic blood loss    2. Benign hypertensive CKD, stage 5 chronic kidney disease or end stage renal disease    3. History of bladder cancer    4. Adenocarcinoma of transverse colon      Plan:       Patient with h/o bladder cancer from 2018 s/p neoadjuvant chemotherapy given by Dr. Villar in [place] and s/p surgery with cystectomy/prostatectomy with creation of neobladder done at [facility] in [place].    New transverse colon cancer found on colonoscopy on 10/25/23 after patient presented to  hospital with severe iron deficiency anemia.  Patient also with Stage 5 CKD, following with nephrology at [facility].  Initial PET with no evidence of other disease.    Patient underwent surgery robotic assisted transverse colectomy on 12/14/23. Pathology with Stage IIA (T3N0M0), tumor measures 6cm, G2, with 16 negative lymph nodes.   On initial biopsy of tumor, MSI testing shows that tumor is MSI-high.  Per [facility], observation only recommended. No chemotherapy.  Patient did receive IV Feraheme in 11/2024 and 4/2024.    Currently patient is doing well without any signs or symptoms to suggest disease recurrence.   Recent labs good with exception of CEA slightly elevated at 3.30.   Most recent CT C/A/P w/o contrast done 7/2/24 with [facility].   Scheduled for hernia repair with Dr. Aris Scott on 10/25/24.   Scheduled for colonoscopy on 10/30/24.   Since his CEA has increased, Dr. Wayne would like for him to have a PET/CT. However, patient does not want to do prior to his surgery.   Patient did agree to repeat his labs 3 weeks after surgery and if his CEA has decreased back down to normal range, then will plan for routine imaging in January 2025. If his CEA remains elevated or increases then will obtain PET/CT.   Follow-up in 6 weeks after labs. Appointment can be virtual.     Consider removal of mediport if scans are good and after colonoscopy.   Continue mediport flushes for now.   Discussed genetic testing as well for Rodriguez syndrome but he still would like to think about it.   All questions answered at this time.       Juan Macedo, FNJOSEPHINE

## 2024-10-01 ENCOUNTER — LAB VISIT (OUTPATIENT)
Dept: LAB | Facility: HOSPITAL | Age: 64
End: 2024-10-01
Attending: INTERNAL MEDICINE
Payer: COMMERCIAL

## 2024-10-01 DIAGNOSIS — C18.4 ADENOCARCINOMA OF TRANSVERSE COLON: ICD-10-CM

## 2024-10-01 DIAGNOSIS — C67.8 MALIGNANT NEOPLASM OF OVERLAPPING SITES OF BLADDER: ICD-10-CM

## 2024-10-01 LAB
ALBUMIN SERPL-MCNC: 4.4 G/DL (ref 3.4–4.8)
ALBUMIN/GLOB SERPL: 1.4 RATIO (ref 1.1–2)
ALP SERPL-CCNC: 61 UNIT/L (ref 40–150)
ALT SERPL-CCNC: 11 UNIT/L (ref 0–55)
ANION GAP SERPL CALC-SCNC: 9 MEQ/L
AST SERPL-CCNC: 11 UNIT/L (ref 5–34)
BASOPHILS # BLD AUTO: 0.06 X10(3)/MCL
BASOPHILS NFR BLD AUTO: 0.8 %
BILIRUB SERPL-MCNC: 0.7 MG/DL
BUN SERPL-MCNC: 74.8 MG/DL (ref 8.4–25.7)
CALCIUM SERPL-MCNC: 9.5 MG/DL (ref 8.8–10)
CEA SERPL-MCNC: 3.3 NG/ML (ref 0–3)
CHLORIDE SERPL-SCNC: 112 MMOL/L (ref 98–107)
CO2 SERPL-SCNC: 18 MMOL/L (ref 23–31)
CREAT SERPL-MCNC: 5.49 MG/DL (ref 0.73–1.18)
CREAT/UREA NIT SERPL: 14
EOSINOPHIL # BLD AUTO: 0.4 X10(3)/MCL (ref 0–0.9)
EOSINOPHIL NFR BLD AUTO: 5.1 %
ERYTHROCYTE [DISTWIDTH] IN BLOOD BY AUTOMATED COUNT: 12.8 % (ref 11.5–17)
GFR SERPLBLD CREATININE-BSD FMLA CKD-EPI: 11 ML/MIN/1.73/M2
GLOBULIN SER-MCNC: 3.1 GM/DL (ref 2.4–3.5)
GLUCOSE SERPL-MCNC: 84 MG/DL (ref 82–115)
HCT VFR BLD AUTO: 45.7 % (ref 42–52)
HGB BLD-MCNC: 14.9 G/DL (ref 14–18)
IMM GRANULOCYTES # BLD AUTO: 0.01 X10(3)/MCL (ref 0–0.04)
IMM GRANULOCYTES NFR BLD AUTO: 0.1 %
LYMPHOCYTES # BLD AUTO: 2.09 X10(3)/MCL (ref 0.6–4.6)
LYMPHOCYTES NFR BLD AUTO: 26.9 %
MCH RBC QN AUTO: 31 PG (ref 27–31)
MCHC RBC AUTO-ENTMCNC: 32.6 G/DL (ref 33–36)
MCV RBC AUTO: 95 FL (ref 80–94)
MONOCYTES # BLD AUTO: 0.6 X10(3)/MCL (ref 0.1–1.3)
MONOCYTES NFR BLD AUTO: 7.7 %
NEUTROPHILS # BLD AUTO: 4.61 X10(3)/MCL (ref 2.1–9.2)
NEUTROPHILS NFR BLD AUTO: 59.4 %
PLATELET # BLD AUTO: 154 X10(3)/MCL (ref 130–400)
PMV BLD AUTO: 10.1 FL (ref 7.4–10.4)
POTASSIUM SERPL-SCNC: 4.3 MMOL/L (ref 3.5–5.1)
PROT SERPL-MCNC: 7.5 GM/DL (ref 5.8–7.6)
RBC # BLD AUTO: 4.81 X10(6)/MCL (ref 4.7–6.1)
SODIUM SERPL-SCNC: 139 MMOL/L (ref 136–145)
WBC # BLD AUTO: 7.77 X10(3)/MCL (ref 4.5–11.5)

## 2024-10-01 PROCEDURE — 82378 CARCINOEMBRYONIC ANTIGEN: CPT

## 2024-10-01 PROCEDURE — 85025 COMPLETE CBC W/AUTO DIFF WBC: CPT

## 2024-10-01 PROCEDURE — 36415 COLL VENOUS BLD VENIPUNCTURE: CPT

## 2024-10-01 PROCEDURE — 80053 COMPREHEN METABOLIC PANEL: CPT

## 2024-10-08 ENCOUNTER — OFFICE VISIT (OUTPATIENT)
Dept: HEMATOLOGY/ONCOLOGY | Facility: CLINIC | Age: 64
End: 2024-10-08
Payer: COMMERCIAL

## 2024-10-08 VITALS
TEMPERATURE: 98 F | DIASTOLIC BLOOD PRESSURE: 73 MMHG | OXYGEN SATURATION: 97 % | HEIGHT: 71 IN | BODY MASS INDEX: 29.85 KG/M2 | WEIGHT: 213.19 LBS | SYSTOLIC BLOOD PRESSURE: 137 MMHG | HEART RATE: 67 BPM | RESPIRATION RATE: 14 BRPM

## 2024-10-08 DIAGNOSIS — C18.4 ADENOCARCINOMA OF TRANSVERSE COLON: ICD-10-CM

## 2024-10-08 DIAGNOSIS — Z85.51 HISTORY OF BLADDER CANCER: ICD-10-CM

## 2024-10-08 DIAGNOSIS — D50.0 IRON DEFICIENCY ANEMIA DUE TO CHRONIC BLOOD LOSS: Primary | ICD-10-CM

## 2024-10-08 DIAGNOSIS — I12.0 BENIGN HYPERTENSIVE CKD, STAGE 5 CHRONIC KIDNEY DISEASE OR END STAGE RENAL DISEASE: ICD-10-CM

## 2024-10-08 PROCEDURE — 1159F MED LIST DOCD IN RCRD: CPT | Mod: CPTII,S$GLB,, | Performed by: NURSE PRACTITIONER

## 2024-10-08 PROCEDURE — 3008F BODY MASS INDEX DOCD: CPT | Mod: CPTII,S$GLB,, | Performed by: NURSE PRACTITIONER

## 2024-10-08 PROCEDURE — 99214 OFFICE O/P EST MOD 30 MIN: CPT | Mod: S$GLB,,, | Performed by: NURSE PRACTITIONER

## 2024-10-08 PROCEDURE — 99999 PR PBB SHADOW E&M-EST. PATIENT-LVL IV: CPT | Mod: PBBFAC,,, | Performed by: NURSE PRACTITIONER

## 2024-10-08 PROCEDURE — 3066F NEPHROPATHY DOC TX: CPT | Mod: CPTII,S$GLB,, | Performed by: NURSE PRACTITIONER

## 2024-10-08 PROCEDURE — 3078F DIAST BP <80 MM HG: CPT | Mod: CPTII,S$GLB,, | Performed by: NURSE PRACTITIONER

## 2024-10-08 PROCEDURE — 1160F RVW MEDS BY RX/DR IN RCRD: CPT | Mod: CPTII,S$GLB,, | Performed by: NURSE PRACTITIONER

## 2024-10-08 PROCEDURE — 3075F SYST BP GE 130 - 139MM HG: CPT | Mod: CPTII,S$GLB,, | Performed by: NURSE PRACTITIONER

## 2024-10-09 ENCOUNTER — HOSPITAL ENCOUNTER (OUTPATIENT)
Dept: RADIOLOGY | Facility: HOSPITAL | Age: 64
Discharge: HOME | End: 2024-10-09
Attending: SURGERY
Payer: COMMERCIAL

## 2024-10-09 ENCOUNTER — ANESTHESIA EVENT (OUTPATIENT)
Dept: SURGERY | Facility: HOSPITAL | Age: 64
End: 2024-10-09
Payer: COMMERCIAL

## 2024-10-09 DIAGNOSIS — K43.2 INCISIONAL HERNIA OF ANTERIOR ABDOMINAL WALL WITHOUT OBSTRUCTION OR GANGRENE: ICD-10-CM

## 2024-10-09 DIAGNOSIS — Z01.818 PREOP TESTING: ICD-10-CM

## 2024-10-09 PROCEDURE — 71045 X-RAY EXAM CHEST 1 VIEW: CPT | Mod: TC

## 2024-10-15 ENCOUNTER — OFFICE VISIT (OUTPATIENT)
Dept: INTERNAL MEDICINE | Facility: CLINIC | Age: 64
End: 2024-10-15
Payer: COMMERCIAL

## 2024-10-15 ENCOUNTER — LAB VISIT (OUTPATIENT)
Dept: LAB | Facility: HOSPITAL | Age: 64
End: 2024-10-15
Attending: NURSE PRACTITIONER
Payer: COMMERCIAL

## 2024-10-15 VITALS
TEMPERATURE: 98 F | DIASTOLIC BLOOD PRESSURE: 74 MMHG | OXYGEN SATURATION: 98 % | WEIGHT: 210.38 LBS | HEART RATE: 80 BPM | SYSTOLIC BLOOD PRESSURE: 130 MMHG | BODY MASS INDEX: 29.34 KG/M2 | RESPIRATION RATE: 20 BRPM

## 2024-10-15 DIAGNOSIS — I12.0 BENIGN HYPERTENSIVE CKD, STAGE 5 CHRONIC KIDNEY DISEASE OR END STAGE RENAL DISEASE: ICD-10-CM

## 2024-10-15 DIAGNOSIS — Z76.0 MEDICATION REFILL: ICD-10-CM

## 2024-10-15 DIAGNOSIS — C18.4 ADENOCARCINOMA OF TRANSVERSE COLON: ICD-10-CM

## 2024-10-15 DIAGNOSIS — I12.0 BENIGN HYPERTENSIVE CKD, STAGE 5 CHRONIC KIDNEY DISEASE OR END STAGE RENAL DISEASE: Primary | ICD-10-CM

## 2024-10-15 DIAGNOSIS — Z85.51 HISTORY OF BLADDER CANCER: ICD-10-CM

## 2024-10-15 DIAGNOSIS — D50.0 IRON DEFICIENCY ANEMIA DUE TO CHRONIC BLOOD LOSS: ICD-10-CM

## 2024-10-15 DIAGNOSIS — N18.5 CKD STAGE G5/A3, GFR <15 AND ALBUMIN CREATININE RATIO >300 MG/G: ICD-10-CM

## 2024-10-15 LAB
ALBUMIN SERPL-MCNC: 3.8 G/DL (ref 3.4–4.8)
ALBUMIN/GLOB SERPL: 1.1 RATIO (ref 1.1–2)
ALP SERPL-CCNC: 56 UNIT/L (ref 40–150)
ALT SERPL-CCNC: 10 UNIT/L (ref 0–55)
ANION GAP SERPL CALC-SCNC: 8 MEQ/L
AST SERPL-CCNC: 12 UNIT/L (ref 5–34)
BASOPHILS # BLD AUTO: 0.05 X10(3)/MCL
BASOPHILS NFR BLD AUTO: 0.8 %
BILIRUB SERPL-MCNC: 0.5 MG/DL
BUN SERPL-MCNC: 66.4 MG/DL (ref 8.4–25.7)
CALCIUM SERPL-MCNC: 9.5 MG/DL (ref 8.8–10)
CEA SERPL-MCNC: 2.97 NG/ML (ref 0–3)
CHLORIDE SERPL-SCNC: 110 MMOL/L (ref 98–107)
CO2 SERPL-SCNC: 21 MMOL/L (ref 23–31)
CREAT SERPL-MCNC: 4.88 MG/DL (ref 0.73–1.18)
CREAT/UREA NIT SERPL: 14
EOSINOPHIL # BLD AUTO: 0.43 X10(3)/MCL (ref 0–0.9)
EOSINOPHIL NFR BLD AUTO: 6.7 %
ERYTHROCYTE [DISTWIDTH] IN BLOOD BY AUTOMATED COUNT: 12.7 % (ref 11.5–17)
GFR SERPLBLD CREATININE-BSD FMLA CKD-EPI: 13 ML/MIN/1.73/M2
GLOBULIN SER-MCNC: 3.5 GM/DL (ref 2.4–3.5)
GLUCOSE SERPL-MCNC: 104 MG/DL (ref 82–115)
HCT VFR BLD AUTO: 43.9 % (ref 42–52)
HGB BLD-MCNC: 14.4 G/DL (ref 14–18)
IMM GRANULOCYTES # BLD AUTO: 0.01 X10(3)/MCL (ref 0–0.04)
IMM GRANULOCYTES NFR BLD AUTO: 0.2 %
LYMPHOCYTES # BLD AUTO: 1.74 X10(3)/MCL (ref 0.6–4.6)
LYMPHOCYTES NFR BLD AUTO: 27.1 %
MCH RBC QN AUTO: 31.4 PG (ref 27–31)
MCHC RBC AUTO-ENTMCNC: 32.8 G/DL (ref 33–36)
MCV RBC AUTO: 95.9 FL (ref 80–94)
MONOCYTES # BLD AUTO: 0.39 X10(3)/MCL (ref 0.1–1.3)
MONOCYTES NFR BLD AUTO: 6.1 %
NEUTROPHILS # BLD AUTO: 3.79 X10(3)/MCL (ref 2.1–9.2)
NEUTROPHILS NFR BLD AUTO: 59.1 %
NRBC BLD AUTO-RTO: 0 %
PLATELET # BLD AUTO: 142 X10(3)/MCL (ref 130–400)
PMV BLD AUTO: 10.6 FL (ref 7.4–10.4)
POTASSIUM SERPL-SCNC: 3.8 MMOL/L (ref 3.5–5.1)
PROT SERPL-MCNC: 7.3 GM/DL (ref 5.8–7.6)
PTH-INTACT SERPL-MCNC: 58.4 PG/ML (ref 8.7–77)
RBC # BLD AUTO: 4.58 X10(6)/MCL (ref 4.7–6.1)
SODIUM SERPL-SCNC: 139 MMOL/L (ref 136–145)
WBC # BLD AUTO: 6.41 X10(3)/MCL (ref 4.5–11.5)

## 2024-10-15 PROCEDURE — 83970 ASSAY OF PARATHORMONE: CPT

## 2024-10-15 PROCEDURE — 82378 CARCINOEMBRYONIC ANTIGEN: CPT

## 2024-10-15 PROCEDURE — 85025 COMPLETE CBC W/AUTO DIFF WBC: CPT

## 2024-10-15 PROCEDURE — 80053 COMPREHEN METABOLIC PANEL: CPT

## 2024-10-15 PROCEDURE — 36415 COLL VENOUS BLD VENIPUNCTURE: CPT

## 2024-10-15 PROCEDURE — 99214 OFFICE O/P EST MOD 30 MIN: CPT | Mod: PBBFAC

## 2024-10-15 RX ORDER — SODIUM BICARBONATE 650 MG/1
1300 TABLET ORAL 2 TIMES DAILY
Qty: 120 TABLET | Refills: 11 | Status: SHIPPED | OUTPATIENT
Start: 2024-10-15 | End: 2025-10-15

## 2024-10-15 NOTE — PROGRESS NOTES
IM Clinic Note    Patient Name: Bridget Cardenas Jr.  YOB: 1960   MRN: [id number]  Date: 10/15/2024  Home Address: [place] [address]      Subjective:     Chief Complaint:   Chief Complaint   Patient presents with    Follow-up     Medication Management        HPI:  Bridget Cardenas Jr. is a 62 y.o. 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 Patient does not have any acute complaints today and is asymptomatic at this time. Does require refills of Sodium Bicarb today. Has follow-up with Nephrology this week. Scheduled for repeat colonoscopy at end of this month and CEA levels continue to rise despite colectomy, most recent at 3.3. Patient will complete follow-up PET scan following his hernia repair next week. Continues to use chewing dip, has no interest in cessation at this time.     Available notes and lab results since last visit were reviewed. Last seen in clinic on 4/4/24.     Care Team   External Surgical Oncology- referred to Dr. Aris Scott for evaluation and treatment of transverse colon mass. S/p partial laparoscopic colectomy now with elevated CEA, repeat PET later this month. Pet scan completed on 11/8/23 not consistent with metastasis. Last seen 10/9/24, Next follow-up scheduled for 4/8/24. Hernia repair scheduled for 10/24/24. Repeat colonoscopy scheduled for 10/30/24.   External Heme/Oncology- follows for hx of cancers and iron deficiency. Last seen 10/8/24. Next appointment 11/21/24.   [facility]- follows for chronic hydronephrosis. Seen on 8/12/24. Retroperitoneal U/S consistent with bilateral hydronephrosis and neobladder distension. Next appointment 2/12/25.   [facility]-  referred for non-anion gap metabolic acidosis and CKD. Seen on 8/1/24, not a kidney transplant candidate due to previous cancer diagnosis now in active chemotherapy. AVG placed on 1/23/24, currently maturing. Continue current medication until HD initiated. Next appointment scheduled for 10/17/24.     Past Medical History:   Diagnosis Date    Cancer     hx of bladder cancer    Malignant neoplasm of transverse colon     Obesity     Renal disorder     CKD 5        Past Surgical History:   Procedure Laterality Date    AV FISTULA PLACEMENT Right 01/15/2024    COLONOSCOPY N/A 10/25/2023    Procedure: COLONOSCOPY, WITH HEMORRHAGE CONTROL;  Surgeon: Angy Mcguire MD;  Location: [facility] ENDOSCOPY;  Service: Gastroenterology;  Laterality: N/A;  sigmoid clips    COLONOSCOPY, WITH 1 OR MORE BIOPSIES N/A 10/25/2023    Procedure: COLONOSCOPY, WITH 1 OR MORE BIOPSIES;  Surgeon: Angy Mcguire MD;  Location: [facility] ENDOSCOPY;  Service: Gastroenterology;  Laterality: N/A;  transverse colon mass    COLONOSCOPY, WITH POLYPECTOMY USING SNARE Left 10/25/2023    Procedure: COLONOSCOPY, WITH POLYPECTOMY USING SNARE;  Surgeon: Angy Mcguire MD;  Location: [facility] ENDOSCOPY;  Service: Gastroenterology;  Laterality: Left;    CYSTECTOMY      ESOPHAGOGASTRODUODENOSCOPY Left 10/25/2023    Procedure: EGD (ESOPHAGOGASTRODUODENOSCOPY);  Surgeon: Angy Mcguire MD;  Location: [facility] ENDOSCOPY;  Service: Gastroenterology;  Laterality: Left;    FRACTURE SURGERY  7/2018    Collar none    HERNIA REPAIR  11/2010    ILEO LOOP NEOBLADDER      MEDIPORT INSERTION, SINGLE      PROSTATE SURGERY  10/2018    SMALL INTESTINE SURGERY  10/2018    XI ROBOTIC COLECTOMY, PARTIAL N/A 12/14/2023    Procedure: XI ROBOTIC COLECTOMY, PARTIAL;  Surgeon: Aris Scott MD;  Location: [facility] OR;  Service: Oncology;  Laterality: N/A;  TRANSVERSE COLON; POSSIBLE OPEN CASE       Allergy:  Review of patient's allergies indicates:   Allergen Reactions    Pcn  [penicillins] Hives        Current Medications:    Current Outpatient Medications:     calcitRIOL (ROCALTROL) 0.5 MCG Cap, TAKE 1 CAPSULE(0.5 MCG) BY MOUTH DAILY, Disp: 90 capsule, Rfl: 0    patiromer calcium sorbitex (VELTASSA) 8.4 gram PwPk, Take 1 packet (8.4 g total) by mouth every other day., Disp: 45 packet, Rfl: 3    tadalafiL (CIALIS) 10 MG tablet, Take 1 tablet (10 mg total) by mouth daily as needed for Erectile Dysfunction., Disp: 10 tablet, Rfl: 11    sodium bicarbonate 650 MG tablet, Take 2 tablets (1,300 mg total) by mouth 2 (two) times daily., Disp: 120 tablet, Rfl: 11     Social History:   reports that he quit smoking about 37 years ago. His smoking use included cigarettes. He has been exposed to tobacco smoke. His smokeless tobacco use includes chew. He reports that he does not drink alcohol and does not use drugs.   Patient is on short term disability as of now. Employed with [employer] as a .     Does not smoke tobacco currently, quit smoking in 1987, but does use chewing tobacco since 1987 and uses 4 times per day. Denies EtOH and recreational drug use.     Lives in home with wife and cat. No children.     Family History: Father, Brother - CHF     Immunization History   Administered Date(s) Administered    COVID-19, vector-nr, rS-Ad26, PF (Live) 04/07/2021    Influenza - Quadrivalent - PF *Preferred* (6 months and older) 12/11/2023    Pneumococcal Conjugate - 20 Valent 12/11/2023    Zoster Recombinant 04/04/2024       Review of Systems   HENT:  Negative for hearing loss.    Eyes:  Negative for discharge.   Respiratory:  Negative for wheezing.    Cardiovascular:  Negative for chest pain and palpitations.   Gastrointestinal:  Negative for blood in stool, constipation, diarrhea and vomiting.   Genitourinary:  Negative for hematuria and urgency.   Musculoskeletal:  Negative for neck pain.   Neurological:  Negative for weakness and headaches.   Endo/Heme/Allergies:  Negative for  polydipsia.       Objective:      Physical Exam  Vitals:    10/15/24 1416 10/15/24 1422   BP: (!) 146/77 130/74   Pulse: 80    Resp: 20    Temp: 98.2 °F (36.8 °C)    TempSrc: Oral    SpO2: 98%    Weight: 95.4 kg (210 lb 6.4 oz)         Wt Readings from Last 3 Encounters:   10/15/24 95.4 kg (210 lb 6.4 oz)   10/09/24 96.6 kg (213 lb)   10/08/24 96.7 kg (213 lb 3.2 oz)       Physical Exam  Vitals and nursing note reviewed.   Constitutional:       General: He is not in acute distress.     Appearance: He is not ill-appearing.   HENT:      Head: Normocephalic and atraumatic.      Nose: No congestion.      Mouth/Throat:      Mouth: Mucous membranes are moist.      Pharynx: Oropharynx is clear. No oropharyngeal exudate.   Eyes:      Extraocular Movements: Extraocular movements intact.      Conjunctiva/sclera: Conjunctivae normal.      Pupils: Pupils are equal, round, and reactive to light.   Cardiovascular:      Rate and Rhythm: Normal rate and regular rhythm.      Pulses: Normal pulses.      Heart sounds: No murmur heard.  Pulmonary:      Effort: Pulmonary effort is normal. No respiratory distress.      Breath sounds: No wheezing, rhonchi or rales.   Abdominal:      General: Abdomen is flat. Bowel sounds are normal. There is no distension.      Palpations: Abdomen is soft. There is no mass.      Tenderness: There is no abdominal tenderness. There is no guarding.   Musculoskeletal:      Cervical back: Normal range of motion.      Right lower leg: No edema.      Left lower leg: No edema.   Lymphadenopathy:      Cervical: No cervical adenopathy.   Skin:     General: Skin is warm and dry.      Capillary Refill: Capillary refill takes less than 2 seconds.      Findings: No rash.   Neurological:      General: No focal deficit present.      Mental Status: He is alert and oriented to person, place, and time. Mental status is at baseline.      Motor: No weakness.          Body mass index is 29.34 kg/m².      Lab Visit on  10/15/2024   Component Date Value Ref Range Status    Sodium 10/15/2024 139  136 - 145 mmol/L Final    Potassium 10/15/2024 3.8  3.5 - 5.1 mmol/L Final    Chloride 10/15/2024 110 (H)  98 - 107 mmol/L Final    CO2 10/15/2024 21 (L)  23 - 31 mmol/L Final    Glucose 10/15/2024 104  82 - 115 mg/dL Final    Blood Urea Nitrogen 10/15/2024 66.4 (H)  8.4 - 25.7 mg/dL Final    Creatinine 10/15/2024 4.88 (H)  0.73 - 1.18 mg/dL Final    Calcium 10/15/2024 9.5  8.8 - 10.0 mg/dL Final    Protein Total 10/15/2024 7.3  5.8 - 7.6 gm/dL Final    Albumin 10/15/2024 3.8  3.4 - 4.8 g/dL Final    Globulin 10/15/2024 3.5  2.4 - 3.5 gm/dL Final    Albumin/Globulin Ratio 10/15/2024 1.1  1.1 - 2.0 ratio Final    Bilirubin Total 10/15/2024 0.5  <=1.5 mg/dL Final    ALP 10/15/2024 56  40 - 150 unit/L Final    ALT 10/15/2024 10  0 - 55 unit/L Final    AST 10/15/2024 12  5 - 34 unit/L Final    eGFR 10/15/2024 13  mL/min/1.73/m2 Final    Anion Gap 10/15/2024 8.0  mEq/L Final    BUN/Creatinine Ratio 10/15/2024 14   Final    PARATHYROID HORMONE INTACT 10/15/2024 58.4  8.7 - 77.0 pg/mL Final    WBC 10/15/2024 6.41  4.50 - 11.50 x10(3)/mcL Final    RBC 10/15/2024 4.58 (L)  4.70 - 6.10 x10(6)/mcL Final    Hgb 10/15/2024 14.4  14.0 - 18.0 g/dL Final    Hct 10/15/2024 43.9  42.0 - 52.0 % Final    MCV 10/15/2024 95.9 (H)  80.0 - 94.0 fL Final    MCH 10/15/2024 31.4 (H)  27.0 - 31.0 pg Final    MCHC 10/15/2024 32.8 (L)  33.0 - 36.0 g/dL Final    RDW 10/15/2024 12.7  11.5 - 17.0 % Final    Platelet 10/15/2024 142  130 - 400 x10(3)/mcL Final    MPV 10/15/2024 10.6 (H)  7.4 - 10.4 fL Final    Neut % 10/15/2024 59.1  % Final    Lymph % 10/15/2024 27.1  % Final    Mono % 10/15/2024 6.1  % Final    Eos % 10/15/2024 6.7  % Final    Basophil % 10/15/2024 0.8  % Final    Lymph # 10/15/2024 1.74  0.6 - 4.6 x10(3)/mcL Final    Neut # 10/15/2024 3.79  2.1 - 9.2 x10(3)/mcL Final    Mono # 10/15/2024 0.39  0.1 - 1.3 x10(3)/mcL Final    Eos # 10/15/2024 0.43  0 - 0.9  x10(3)/mcL Final    Baso # 10/15/2024 0.05  <=0.2 x10(3)/mcL Final    IG# 10/15/2024 0.01  0 - 0.04 x10(3)/mcL Final    IG% 10/15/2024 0.2  % Final    NRBC% 10/15/2024 0.0  % Final   Lab Visit on 10/09/2024   Component Date Value Ref Range Status    WBC 10/09/2024 7.48  4.50 - 11.50 x10(3)/mcL Final    RBC 10/09/2024 4.82  4.70 - 6.10 x10(6)/mcL Final    Hgb 10/09/2024 15.2  14.0 - 18.0 g/dL Final    Hct 10/09/2024 46.1  42.0 - 52.0 % Final    MCV 10/09/2024 95.6 (H)  80.0 - 94.0 fL Final    MCH 10/09/2024 31.5 (H)  27.0 - 31.0 pg Final    MCHC 10/09/2024 33.0  33.0 - 36.0 g/dL Final    RDW 10/09/2024 13.0  11.5 - 17.0 % Final    Platelet 10/09/2024 142  130 - 400 x10(3)/mcL Final    MPV 10/09/2024 10.4  7.4 - 10.4 fL Final    NRBC% 10/09/2024 0.0  % Final    Sodium 10/09/2024 143  136 - 145 mmol/L Final    Potassium 10/09/2024 3.9  3.5 - 5.1 mmol/L Final    Chloride 10/09/2024 109 (H)  98 - 107 mmol/L Final    CO2 10/09/2024 21 (L)  23 - 31 mmol/L Final    Glucose 10/09/2024 91  82 - 115 mg/dL Final    Blood Urea Nitrogen 10/09/2024 76.7 (H)  8.4 - 25.7 mg/dL Final    Creatinine 10/09/2024 4.90 (H)  0.73 - 1.18 mg/dL Final    Calcium 10/09/2024 9.2  8.8 - 10.0 mg/dL Final    Protein Total 10/09/2024 6.9  5.8 - 7.6 gm/dL Final    Albumin 10/09/2024 3.9  3.4 - 4.8 g/dL Final    Globulin 10/09/2024 3.0  2.4 - 3.5 gm/dL Final    Albumin/Globulin Ratio 10/09/2024 1.3  1.1 - 2.0 ratio Final    Bilirubin Total 10/09/2024 0.6  <=1.5 mg/dL Final    ALP 10/09/2024 61  40 - 150 unit/L Final    ALT 10/09/2024 11  0 - 55 unit/L Final    AST 10/09/2024 9  5 - 34 unit/L Final    eGFR 10/09/2024 13  mL/min/1.73/m2 Final    Anion Gap 10/09/2024 13.0  mEq/L Final    BUN/Creatinine Ratio 10/09/2024 16   Final    QRS Duration 10/09/2024 98  ms Final    OHS QTC Calculation 10/09/2024 408  ms Final   Lab Visit on 10/01/2024   Component Date Value Ref Range Status    Sodium 10/01/2024 139  136 - 145 mmol/L Final    Potassium 10/01/2024  4.3  3.5 - 5.1 mmol/L Final    Chloride 10/01/2024 112 (H)  98 - 107 mmol/L Final    CO2 10/01/2024 18 (L)  23 - 31 mmol/L Final    Glucose 10/01/2024 84  82 - 115 mg/dL Final    Blood Urea Nitrogen 10/01/2024 74.8 (H)  8.4 - 25.7 mg/dL Final    Creatinine 10/01/2024 5.49 (H)  0.73 - 1.18 mg/dL Final    Calcium 10/01/2024 9.5  8.8 - 10.0 mg/dL Final    Protein Total 10/01/2024 7.5  5.8 - 7.6 gm/dL Final    Albumin 10/01/2024 4.4  3.4 - 4.8 g/dL Final    Globulin 10/01/2024 3.1  2.4 - 3.5 gm/dL Final    Albumin/Globulin Ratio 10/01/2024 1.4  1.1 - 2.0 ratio Final    Bilirubin Total 10/01/2024 0.7  <=1.5 mg/dL Final    ALP 10/01/2024 61  40 - 150 unit/L Final    ALT 10/01/2024 11  0 - 55 unit/L Final    AST 10/01/2024 11  5 - 34 unit/L Final    eGFR 10/01/2024 11  mL/min/1.73/m2 Final    Anion Gap 10/01/2024 9.0  mEq/L Final    BUN/Creatinine Ratio 10/01/2024 14   Final    Carcinoembryonic Antigen 10/01/2024 3.30 (H)  0.00 - 3.00 ng/mL Final    WBC 10/01/2024 7.77  4.50 - 11.50 x10(3)/mcL Final    RBC 10/01/2024 4.81  4.70 - 6.10 x10(6)/mcL Final    Hgb 10/01/2024 14.9  14.0 - 18.0 g/dL Final    Hct 10/01/2024 45.7  42.0 - 52.0 % Final    MCV 10/01/2024 95.0 (H)  80.0 - 94.0 fL Final    MCH 10/01/2024 31.0  27.0 - 31.0 pg Final    MCHC 10/01/2024 32.6 (L)  33.0 - 36.0 g/dL Final    RDW 10/01/2024 12.8  11.5 - 17.0 % Final    Platelet 10/01/2024 154  130 - 400 x10(3)/mcL Final    MPV 10/01/2024 10.1  7.4 - 10.4 fL Final    Neut % 10/01/2024 59.4  % Final    Lymph % 10/01/2024 26.9  % Final    Mono % 10/01/2024 7.7  % Final    Eos % 10/01/2024 5.1  % Final    Basophil % 10/01/2024 0.8  % Final    Lymph # 10/01/2024 2.09  0.6 - 4.6 x10(3)/mcL Final    Neut # 10/01/2024 4.61  2.1 - 9.2 x10(3)/mcL Final    Mono # 10/01/2024 0.60  0.1 - 1.3 x10(3)/mcL Final    Eos # 10/01/2024 0.40  0 - 0.9 x10(3)/mcL Final    Baso # 10/01/2024 0.06  <=0.2 x10(3)/mcL Final    IG# 10/01/2024 0.01  0 - 0.04 x10(3)/mcL Final    IG% 10/01/2024  0.1  % Final   Lab Visit on 09/03/2024   Component Date Value Ref Range Status    Urine Culture 09/03/2024 No Growth   Final   Office Visit on 08/12/2024   Component Date Value Ref Range Status    Color, UA 08/12/2024 Light Yellow   Final    Spec Grav UA 08/12/2024 1.015   Final    pH, UA 08/12/2024 7.0   Final    WBC, UA 08/12/2024 Large   Final    Nitrite, UA 08/12/2024 Positive   Final    Protein, POC 08/12/2024 30   Final    Glucose, UA 08/12/2024 Negative   Final    Ketones, UA 08/12/2024 Negative   Final    Urobilinogen, UA 08/12/2024 0.2   Final    Bilirubin, POC 08/12/2024 Negative   Final    Blood, UA 08/12/2024 Moderate   Final    Urine Culture 08/12/2024 >/= 100,000 colonies/ml Streptococcus mitis/oralis (A)   Final    Chlamydia trachomatis PCR 08/12/2024 Not Detected  Not Detected Final    N. gonorrhea PCR 08/12/2024 Not Detected  Not Detected Final    Source 08/12/2024 Penis   Final    SOURCE: 08/12/2024 URETHRAL   Final    T.vaginalis, Misc, amplified RNA 08/12/2024 Negative  Negative Final       -------------------ADDITIONAL INFORMATION-------------------  This test has been modified from the 's   instructions. Its performance characteristics were   determined by [facility] in a manner consistent with CLIA   requirements. This test has not been cleared or approved by   the U.[place]. Food and Drug Administration.     Test Performed by:  [facility]  [address] [place] [address]  : Sarika Gregg Ph.D.; CLIA# [id number]   Lab Visit on 07/31/2024   Component Date Value Ref Range Status    Sodium 07/31/2024 140  136 - 145 mmol/L Final    Potassium 07/31/2024 4.1  3.5 - 5.1 mmol/L Final    Chloride 07/31/2024 108 (H)  98 - 107 mmol/L Final    CO2 07/31/2024 24  23 - 31 mmol/L Final    Glucose 07/31/2024 119 (H)  82 - 115 mg/dL Final    Blood Urea Nitrogen 07/31/2024 61.2 (H)  8.4 - 25.7 mg/dL Final    Creatinine 07/31/2024 5.32  (H)  0.73 - 1.18 mg/dL Final    Calcium 07/31/2024 9.1  8.8 - 10.0 mg/dL Final    Protein Total 07/31/2024 7.4  5.8 - 7.6 gm/dL Final    Albumin 07/31/2024 4.0  3.4 - 4.8 g/dL Final    Globulin 07/31/2024 3.4  2.4 - 3.5 gm/dL Final    Albumin/Globulin Ratio 07/31/2024 1.2  1.1 - 2.0 ratio Final    Bilirubin Total 07/31/2024 0.7  <=1.5 mg/dL Final    ALP 07/31/2024 53  40 - 150 unit/L Final    ALT 07/31/2024 11  0 - 55 unit/L Final    AST 07/31/2024 9  5 - 34 unit/L Final    eGFR 07/31/2024 11  mL/min/1.73/m2 Final    Anion Gap 07/31/2024 8.0  mEq/L Final    BUN/Creatinine Ratio 07/31/2024 12   Final    Phosphorus Level 07/31/2024 3.8  2.3 - 4.7 mg/dL Final    PARATHYROID HORMONE INTACT 07/31/2024 106.0 (H)  8.7 - 77.0 pg/mL Final   Lab Visit on 07/08/2024   Component Date Value Ref Range Status    Iron Binding Capacity Unsaturated 07/08/2024 146  69 - 240 ug/dL Final    Iron Level 07/08/2024 78  65 - 175 ug/dL Final    Transferrin 07/08/2024 196  163 - 344 mg/dL Final    Iron Binding Capacity Total 07/08/2024 224 (L)  250 - 450 ug/dL Final    Iron Saturation 07/08/2024 35  20 - 50 % Final    Ferritin Level 07/08/2024 169.38  21.81 - 274.66 ng/mL Final    Sodium 07/08/2024 139  136 - 145 mmol/L Final    Potassium 07/08/2024 4.7  3.5 - 5.1 mmol/L Final    Chloride 07/08/2024 109 (H)  98 - 107 mmol/L Final    CO2 07/08/2024 19 (L)  23 - 31 mmol/L Final    Glucose 07/08/2024 88  82 - 115 mg/dL Final    Blood Urea Nitrogen 07/08/2024 75.7 (H)  8.4 - 25.7 mg/dL Final    Creatinine 07/08/2024 4.96 (H)  0.73 - 1.18 mg/dL Final    Calcium 07/08/2024 8.7 (L)  8.8 - 10.0 mg/dL Final    Protein Total 07/08/2024 7.1  5.8 - 7.6 gm/dL Final    Albumin 07/08/2024 4.1  3.4 - 4.8 g/dL Final    Globulin 07/08/2024 3.0  2.4 - 3.5 gm/dL Final    Albumin/Globulin Ratio 07/08/2024 1.4  1.1 - 2.0 ratio Final    Bilirubin Total 07/08/2024 0.5  <=1.5 mg/dL Final    ALP 07/08/2024 55  40 - 150 unit/L Final    ALT 07/08/2024 14  0 - 55 unit/L  Final    AST 07/08/2024 9  5 - 34 unit/L Final    eGFR 07/08/2024 12  mL/min/1.73/m2 Final    Anion Gap 07/08/2024 11.0  mEq/L Final    BUN/Creatinine Ratio 07/08/2024 15   Final    Carcinoembryonic Antigen 07/08/2024 2.48  0.00 - 3.00 ng/mL Final    WBC 07/08/2024 7.19  4.50 - 11.50 x10(3)/mcL Final    RBC 07/08/2024 4.93  4.70 - 6.10 x10(6)/mcL Final    Hgb 07/08/2024 15.4  14.0 - 18.0 g/dL Final    Hct 07/08/2024 46.6  42.0 - 52.0 % Final    MCV 07/08/2024 94.5 (H)  80.0 - 94.0 fL Final    MCH 07/08/2024 31.2 (H)  27.0 - 31.0 pg Final    MCHC 07/08/2024 33.0  33.0 - 36.0 g/dL Final    RDW 07/08/2024 13.1  11.5 - 17.0 % Final    Platelet 07/08/2024 136  130 - 400 x10(3)/mcL Final    MPV 07/08/2024 10.5 (H)  7.4 - 10.4 fL Final    Neut % 07/08/2024 57.7  % Final    Lymph % 07/08/2024 28.4  % Final    Mono % 07/08/2024 8.5  % Final    Eos % 07/08/2024 4.7  % Final    Basophil % 07/08/2024 0.6  % Final    Lymph # 07/08/2024 2.04  0.6 - 4.6 x10(3)/mcL Final    Neut # 07/08/2024 4.15  2.1 - 9.2 x10(3)/mcL Final    Mono # 07/08/2024 0.61  0.1 - 1.3 x10(3)/mcL Final    Eos # 07/08/2024 0.34  0 - 0.9 x10(3)/mcL Final    Baso # 07/08/2024 0.04  <=0.2 x10(3)/mcL Final    IG# 07/08/2024 0.01  0 - 0.04 x10(3)/mcL Final    IG% 07/08/2024 0.1  % Final   Lab Visit on 06/05/2024   Component Date Value Ref Range Status    Prostate Specific Antigen 06/05/2024 <0.10  <=4.00 ng/mL Final    Sodium 06/05/2024 141  136 - 145 mmol/L Final    Potassium 06/05/2024 4.1  3.5 - 5.1 mmol/L Final    Chloride 06/05/2024 107  98 - 107 mmol/L Final    CO2 06/05/2024 24  23 - 31 mmol/L Final    Glucose 06/05/2024 72 (L)  82 - 115 mg/dL Final    Blood Urea Nitrogen 06/05/2024 70.3 (H)  8.4 - 25.7 mg/dL Final    Creatinine 06/05/2024 5.61 (H)  0.73 - 1.18 mg/dL Final    Calcium 06/05/2024 9.5  8.8 - 10.0 mg/dL Final    Protein Total 06/05/2024 7.3  5.8 - 7.6 gm/dL Final    Albumin 06/05/2024 3.9  3.4 - 4.8 g/dL Final    Globulin 06/05/2024 3.4  2.4 -  3.5 gm/dL Final    Albumin/Globulin Ratio 06/05/2024 1.1  1.1 - 2.0 ratio Final    Bilirubin Total 06/05/2024 0.6  <=1.5 mg/dL Final    ALP 06/05/2024 56  40 - 150 unit/L Final    ALT 06/05/2024 13  0 - 55 unit/L Final    AST 06/05/2024 11  5 - 34 unit/L Final    eGFR 06/05/2024 11  mL/min/1.73/m2 Final    Anion Gap 06/05/2024 10.0  mEq/L Final    BUN/Creatinine Ratio 06/05/2024 13   Final    PARATHYROID HORMONE INTACT 06/05/2024 118.7 (H)  8.7 - 77.0 pg/mL Final    Phosphorus Level 06/05/2024 3.9  2.3 - 4.7 mg/dL Final    WBC 06/05/2024 7.43  4.50 - 11.50 x10(3)/mcL Final    RBC 06/05/2024 4.62 (L)  4.70 - 6.10 x10(6)/mcL Final    Hgb 06/05/2024 14.4  14.0 - 18.0 g/dL Final    Hct 06/05/2024 43.6  42.0 - 52.0 % Final    MCV 06/05/2024 94.4 (H)  80.0 - 94.0 fL Final    MCH 06/05/2024 31.2 (H)  27.0 - 31.0 pg Final    MCHC 06/05/2024 33.0  33.0 - 36.0 g/dL Final    RDW 06/05/2024 13.4  11.5 - 17.0 % Final    Platelet 06/05/2024 129 (L)  130 - 400 x10(3)/mcL Final    MPV 06/05/2024 10.3  7.4 - 10.4 fL Final    Neut % 06/05/2024 62.3  % Final    Lymph % 06/05/2024 24.2  % Final    Mono % 06/05/2024 8.5  % Final    Eos % 06/05/2024 4.0  % Final    Basophil % 06/05/2024 0.9  % Final    Lymph # 06/05/2024 1.80  0.6 - 4.6 x10(3)/mcL Final    Neut # 06/05/2024 4.62  2.1 - 9.2 x10(3)/mcL Final    Mono # 06/05/2024 0.63  0.1 - 1.3 x10(3)/mcL Final    Eos # 06/05/2024 0.30  0 - 0.9 x10(3)/mcL Final    Baso # 06/05/2024 0.07  <=0.2 x10(3)/mcL Final    IG# 06/05/2024 0.01  0 - 0.04 x10(3)/mcL Final    IG% 06/05/2024 0.1  % Final    NRBC% 06/05/2024 0.0  % Final           Assessment:       Plan  1. Healthcare maintenance  - Only needs Tetanus vaccination, otherwise all healthcare maintenance UTD   - Encouraged healthy lifestyle modifications including low protein, renal diet and physical activity as tolerated     3. CKD G5/A3 s/p AVG placement on   - Continue routine follow-up with [facility]  - Refilled Sodium Bicarb 1300mg  BID     4. 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 Scott with surgical oncology and Dr. Gaspar with heme/onc  - Encouraged completion of recommended PET imaging to evaluate colorectal ca     5. Hx of bladder cancer s/p total cystectomy and prostatectomy in 2018 with neobladder creation  6. Chronic hydronephrosis   - Continue routine follow-up with [facility]     7. Chewing Tobacco User   - Cessation advised, patient verbalized understanding  - Declined Nicotine lozenges and gum   - Declined Chantix due to potential side effects  - Nicotine patch at high dose did not curb patient cravings while hopitalized   - Will revisit at next clinic appointment     ED precautions given.     Patient case and plan of care discussed with Dr. Anam Cardenas     Disposition: RTC in 6 months or sooner if needed    Syed Denton MD   Internal Medicine - [facility]

## 2024-10-16 ENCOUNTER — TELEPHONE (OUTPATIENT)
Dept: HEMATOLOGY/ONCOLOGY | Facility: CLINIC | Age: 64
End: 2024-10-16
Payer: COMMERCIAL

## 2024-10-16 DIAGNOSIS — D12.6 TUBULAR ADENOMA OF COLON: ICD-10-CM

## 2024-10-16 DIAGNOSIS — C18.4 ADENOCARCINOMA OF TRANSVERSE COLON: Primary | ICD-10-CM

## 2024-10-16 DIAGNOSIS — N18.4 STAGE 4 CHRONIC KIDNEY DISEASE: ICD-10-CM

## 2024-10-16 NOTE — PROGRESS NOTES
I have reviewed and concur with the resident's history, physical, assessment, and plan.  I have discussed with him all issues related to the diagnosis, workup and treatment plan. Care provided as reasonable and necessary.    Anam Cardenas MD  [facility]

## 2024-10-17 ENCOUNTER — OFFICE VISIT (OUTPATIENT)
Dept: NEPHROLOGY | Facility: CLINIC | Age: 64
End: 2024-10-17
Payer: COMMERCIAL

## 2024-10-17 VITALS
BODY MASS INDEX: 29.56 KG/M2 | WEIGHT: 211.19 LBS | HEIGHT: 71 IN | RESPIRATION RATE: 20 BRPM | DIASTOLIC BLOOD PRESSURE: 70 MMHG | HEART RATE: 64 BPM | TEMPERATURE: 98 F | SYSTOLIC BLOOD PRESSURE: 132 MMHG | OXYGEN SATURATION: 98 %

## 2024-10-17 DIAGNOSIS — N18.5 CKD STAGE G5/A3, GFR <15 AND ALBUMIN CREATININE RATIO >300 MG/G: Primary | ICD-10-CM

## 2024-10-17 DIAGNOSIS — E87.20 METABOLIC ACIDOSIS: ICD-10-CM

## 2024-10-17 DIAGNOSIS — E87.5 HYPERKALEMIA: ICD-10-CM

## 2024-10-17 DIAGNOSIS — N25.81 SECONDARY HYPERPARATHYROIDISM OF RENAL ORIGIN: ICD-10-CM

## 2024-10-17 PROCEDURE — 99214 OFFICE O/P EST MOD 30 MIN: CPT | Mod: PBBFAC | Performed by: NURSE PRACTITIONER

## 2024-10-17 RX ORDER — POLYETHYLENE GLYCOL 3350, SODIUM SULFATE, SODIUM CHLORIDE, POTASSIUM CHLORIDE, SODIUM ASCORBATE, AND ASCORBIC ACID 7.5-2.691G
2000 KIT ORAL ONCE
Qty: 1 KIT | Refills: 0 | Status: SHIPPED | OUTPATIENT
Start: 2024-10-17 | End: 2024-10-17

## 2024-10-17 RX ORDER — CALCITRIOL 0.5 UG/1
0.5 CAPSULE ORAL EVERY OTHER DAY
Start: 2024-10-17 | End: 2025-04-15

## 2024-10-17 NOTE — PROGRESS NOTES
[facility]  Nephrology Clinic Note    Chief complaint: Chronic Kidney Disease (Follow up)    History of present illness:   Bridget Cardenas Jr. is a 63 y.o. White male with past medical history of bladder cancer, status post total cystectomy and prostatectomy in 2018 with neobladder creation (patient self catheterizes 6 times a day); advanced chronic kidney disease, chronic hydronephrosis, metabolic acidosis. 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  Patient tolerated surgery without complications.  Presents for follow-up appointment in Nephrology Clinic.    Review of Systems  12 point review of systems conducted, negative except as stated in the history of present illness.    Allergies: Patient is allergic to pcn [penicillins].     Past Medical History:  has a past medical history of Cancer, Malignant neoplasm of transverse colon, Obesity, and Renal disorder.    Procedure History:  has a past surgical history that includes colonoscopy, with polypectomy using snare (Left, 10/25/2023); Esophagogastroduodenoscopy (Left, 10/25/2023); colonoscopy, with 1 or more biopsies (N/A, 10/25/2023); Colonoscopy (N/A, 10/25/2023); Prostate surgery (10/2018); Small intestine surgery (10/2018); Fracture surgery (7/2018); Hernia repair (11/2010); cystectomy; Mediport insertion, single; Ileo loop neobladder; xi robotic colectomy, partial (N/A, 12/14/2023); and AV fistula placement (Right, 01/15/2024).    Family History: family history includes Heart disease in his brother and father.    Social History:  reports that he quit smoking about 37 years ago. His smoking use included cigarettes. He has been exposed to tobacco smoke. His smokeless tobacco use includes chew. He reports that he does not drink alcohol and does not use drugs.    Physical exam  /70 (BP Location: Left arm, Patient  "Position: Sitting)   Pulse 64   Temp 97.8 °F (36.6 °C) (Oral)   Resp 20   Ht 5' 11" (1.803 m)   Wt 95.8 kg (211 lb 3.2 oz)   SpO2 98%   BMI 29.46 kg/m²   General appearance: Patient is in no acute distress.  Skin: No rashes or wounds.  HEENT: PERRLA, EOMI, no scleral icterus, no JVD. Neck is supple.  Chest: Respirations are unlabored. Lungs sounds are clear.   Heart: S1, S2.   Abdomen: Benign. + small incisional non incarcerated hernia to mid abdomen noted  : Deferred.  Extremities: No edema, peripheral pulses are palpable.  Right upper extremity AV fistula with audible bruit and palpable thrill.  Neuro: No focal deficits.     Home Medications:    Current Outpatient Medications:     patiromer calcium sorbitex (VELTASSA) 8.4 gram PwPk, Take 1 packet (8.4 g total) by mouth every other day., Disp: 45 packet, Rfl: 3    sodium bicarbonate 650 MG tablet, Take 2 tablets (1,300 mg total) by mouth 2 (two) times daily., Disp: 120 tablet, Rfl: 11    tadalafiL (CIALIS) 10 MG tablet, Take 1 tablet (10 mg total) by mouth daily as needed for Erectile Dysfunction., Disp: 10 tablet, Rfl: 11    calcitRIOL (ROCALTROL) 0.5 MCG Cap, Take 1 capsule (0.5 mcg total) by mouth every other day., Disp: , Rfl:     Laboratory data    Lab Results   Component Value Date    WBC 6.41 10/15/2024    HGB 14.4 10/15/2024    HCT 43.9 10/15/2024     10/15/2024    IRON 78 07/08/2024    TIBC 224 (L) 07/08/2024    LABIRON 35 07/08/2024    FERRITIN 169.38 07/08/2024     10/15/2024    K 3.8 10/15/2024    CO2 21 (L) 10/15/2024    BUN 66.4 (H) 10/15/2024    CREATININE 4.88 (H) 10/15/2024    EGFRNORACEVR 13 10/15/2024    GLUCOSE 104 10/15/2024    CALCIUM 9.5 10/15/2024    ALKPHOS 56 10/15/2024    LABPROT 7.3 10/15/2024    ALBUMIN 3.8 10/15/2024    BILIDIR 0.2 10/20/2023    IBILI 0.20 05/27/2021    AST 12 10/15/2024    ALT 10 10/15/2024    MG 1.50 (L) 12/15/2023    PHOS 3.8 07/31/2024      Lab Results   Component Value Date    PTH 58.4 " 10/15/2024    HBIISRGQ10IO 23.9 (L) 12/11/2023    HIV Nonreactive 12/11/2023    HEPCAB Nonreactive 12/11/2023    HEPBCAB Nonreactive 12/11/2023     Urine:  Lab Results   Component Value Date    APPEARANCEUA Turbid (A) 11/28/2023    SGUA 1.010 11/28/2023    PROTEINUA 1+ (A) 11/28/2023    KETONESUA Negative 11/28/2023    LEUKOCYTESUR 500 (A) 11/28/2023    RBCUA 6-10 (A) 11/28/2023    WBCUA >100 (A) 11/28/2023    BACTERIA Occ (A) 11/28/2023    SQEPUA None Seen 11/28/2023    HYALINECASTS None Seen 11/28/2023    CREATRANDUR 65.0 11/28/2023    PROTEINURINE 32.6 11/28/2023    UPROTCREA 0.5 11/28/2023         Imaging  Reviewed    Impression    ICD-10-CM ICD-9-CM   1. CKD stage G5/A3, GFR <15 and albumin creatinine ratio >300 mg/g  N18.5 585.5   2. Metabolic acidosis  E87.20 276.2   3. Hyperkalemia  E87.5 276.7   4. Secondary hyperparathyroidism of renal origin  N25.81 588.81   5. BMI 29.0-29.9,adult  Z68.29 V85.25        Plan  CKD stage G5/A3, GFR <15 and albumin creatinine ratio >300 mg/g  Possibly chronic tubular interstitial disease secondary to chronic obstructive uropathy.  Estimated GFR is 11 mL/min.  Patient has multiple complications advanced kidney dysfunction (metabolic acidosis, hyperkalemia, secondary hyperparathyroidism), that are controlled with medications at present.  Patient is not a candidate for kidney transplantation due recent diagnosis of malignancy, is not a candidate for peritoneal dialysis due to multiple abdominal surgeries.  There are no acute indications for initiation of hemodialysis.  Patient is status post AV fistula creation, fistula is mature.  Emergency department precautions discussed.  Will continue close monitoring.      Metabolic acidosis  Continue sodium bicarbonate supplementation.     Hyperkalemia  Hyperkalemia is controlled, continue potassium binder     Secondary hyperparathyroidism of renal origin  iPTH is suppressed. Will decrease calcitriol dose to 0.5 mcg every other day.      BMI 29.0-29.9,adult  Lifestyle and dietary interventions discussed, patient encouraged to maintain non-sedentary lifestyle and well-balanced diet.       Follow up in about 3 months (around 1/17/2025).     Orders Placed This Encounter   Procedures    Comprehensive Metabolic Panel     Standing Status:   Future     Standing Expiration Date:   2/7/2025    Phosphorus     Standing Status:   Future     Standing Expiration Date:   2/7/2025    PTH, Intact     Standing Status:   Future     Standing Expiration Date:   2/7/2025    Protein/Creatinine Ratio, Urine     Standing Status:   Future     Standing Expiration Date:   2/7/2025     Order Specific Question:   Specimen Source     Answer:   Urine    Urinalysis, Reflex to Urine Culture     Standing Status:   Future     Standing Expiration Date:   2/7/2025     Order Specific Question:   Specimen Source     Answer:   Urine        Mayra Grimaldo NP  [facility] Nephrology

## 2024-10-23 ENCOUNTER — PATIENT MESSAGE (OUTPATIENT)
Dept: SURGERY | Facility: HOSPITAL | Age: 64
End: 2024-10-23
Payer: COMMERCIAL

## 2024-10-23 NOTE — PRE-PROCEDURE INSTRUCTIONS
"[facility]: Outpatient Surgery  Preprocedure Check-In Instructions     Your arrival time for your surgery or procedure is 6:30 am.  We ask patients to arrive about 2 hours before surgery to allow for enough time to review your health history & medications, start your IV, complete any outstanding labwork or tests, and meet your Anesthesiologist.    Expectations: "Because of inconsistent procedure completion times, an unexpected wait may occur. The Physicians would like you to be here to prepare in the event they run ahead of time. We will make you as comfortable as possible and keep you informed. We apologize in advance if this happens."    You will arrive at [facility], [facility] [place], [place].  Enter through the [address] entrance next to the Emergency Room, and come to the 6th floor to the Outpatient Surgery Department.     Visitory Policy:  You are allowed 2 adult visitors to be with you in the hospital. All hospital visitors should be in good current health.  No small children.     What to Bring:  Please have your ID, insurance cards, and all home medication bottles with you at check in.  Bring your CPAP machine if one is used at home.     Fasting:  Nothing to eat after midnight the night before your procedure. This includes no gum and/or tobacco products. You may have clear liquids limited to: WATER, GATORADE/POWERADE/PEDIALYTE, AND/OR APPLE JUICE up until 2 hours before your arrival time.   Follow your doctor's instructions for taking any medications on the morning of your procedure.  If no instructions for taking medications were given, do not take any medications but bring your medications in their bottles to your procedure check in.     Follow your doctor's preoperative instructions regarding skin prep, bowel prep, bathing, or showering prior to your procedure.  If any special soaps were provided to you, please use according to your doctor's instructions. If no " instructions were given from your doctor, take a good bath or shower with antibacterial soap the night before and the morning of your procedure.  On the morning of procedure, wear loose, comfortable clothing.  No lotions, makeup, perfumes, colognes, deodorant, or jewelry to your procedure.  Removable items (glasses, contact lenses, dentures, retainers, hearing aids) need to be removed for your procedure.  Bring your storage containers for these items if you wear them.     Artificial nails, body jewelry, eyelash extensions, and/or hair extensions with metal clips are not allowed during your surgery.  If you currently wear any of these items, please arrange for them to be removed prior to your arrival to the hospital.     Outpatient or Same Day Surgeries:  Any patients receiving sedation/anesthesia are advised not to drive for 24 hours after their procedure.  We do not allow patients to drive themselves home after discharge.  If you are going home after your procedure, please have someone available to drive you home from the hospital.        You may call the Outpatient Surgery Department at [phone] with any questions or concerns.  We are looking forward to meeting you and taking great care of you for your procedure.  Thank you for choosing [facility] for your surgical needs.

## 2024-10-24 ENCOUNTER — HOSPITAL ENCOUNTER (OUTPATIENT)
Facility: HOSPITAL | Age: 64
Discharge: HOME | End: 2024-10-24
Attending: SURGERY | Admitting: SURGERY
Payer: COMMERCIAL

## 2024-10-24 DIAGNOSIS — K43.2 INCISIONAL HERNIA OF ANTERIOR ABDOMINAL WALL WITHOUT OBSTRUCTION OR GANGRENE: ICD-10-CM

## 2024-10-24 DIAGNOSIS — K43.2 INCISIONAL HERNIA: ICD-10-CM

## 2024-10-24 LAB
ANION GAP SERPL CALC-SCNC: 9 MEQ/L
BUN SERPL-MCNC: 60.7 MG/DL (ref 8.4–25.7)
CALCIUM SERPL-MCNC: 8.1 MG/DL (ref 8.8–10)
CHLORIDE SERPL-SCNC: 108 MMOL/L (ref 98–107)
CO2 SERPL-SCNC: 22 MMOL/L (ref 23–31)
CREAT SERPL-MCNC: 4.58 MG/DL (ref 0.72–1.25)
CREAT/UREA NIT SERPL: 13
GFR SERPLBLD CREATININE-BSD FMLA CKD-EPI: 14 ML/MIN/1.73/M2
GLUCOSE SERPL-MCNC: 84 MG/DL (ref 82–115)
POTASSIUM SERPL-SCNC: 4.4 MMOL/L (ref 3.5–5.1)
SODIUM SERPL-SCNC: 139 MMOL/L (ref 136–145)

## 2024-10-24 PROCEDURE — 25000003 PHARM REV CODE 250: Performed by: SURGERY

## 2024-10-24 PROCEDURE — 37000008 HC ANESTHESIA 1ST 15 MINUTES: Performed by: SURGERY

## 2024-10-24 PROCEDURE — 63600175 PHARM REV CODE 636 W HCPCS: Performed by: ANESTHESIOLOGY

## 2024-10-24 PROCEDURE — C9290 INJ, BUPIVACAINE LIPOSOME: HCPCS | Performed by: SURGERY

## 2024-10-24 PROCEDURE — 36000711: Performed by: SURGERY

## 2024-10-24 PROCEDURE — 36000710: Performed by: SURGERY

## 2024-10-24 PROCEDURE — C1781 MESH (IMPLANTABLE): HCPCS | Performed by: SURGERY

## 2024-10-24 PROCEDURE — 80048 BASIC METABOLIC PNL TOTAL CA: CPT | Performed by: ANESTHESIOLOGY

## 2024-10-24 PROCEDURE — 37000009 HC ANESTHESIA EA ADD 15 MINS: Performed by: SURGERY

## 2024-10-24 PROCEDURE — 27201423 OPTIME MED/SURG SUP & DEVICES STERILE SUPPLY: Performed by: SURGERY

## 2024-10-24 PROCEDURE — 63600175 PHARM REV CODE 636 W HCPCS: Mod: JZ,JG | Performed by: SURGERY

## 2024-10-24 PROCEDURE — 63600175 PHARM REV CODE 636 W HCPCS

## 2024-10-24 PROCEDURE — 71000016 HC POSTOP RECOV ADDL HR: Performed by: SURGERY

## 2024-10-24 PROCEDURE — 49591 RPR AA HRN 1ST < 3 CM RDC: CPT | Mod: ,,, | Performed by: SURGERY

## 2024-10-24 PROCEDURE — 71000033 HC RECOVERY, INTIAL HOUR: Performed by: SURGERY

## 2024-10-24 PROCEDURE — 25000003 PHARM REV CODE 250: Performed by: ANESTHESIOLOGY

## 2024-10-24 PROCEDURE — 71000015 HC POSTOP RECOV 1ST HR: Performed by: SURGERY

## 2024-10-24 PROCEDURE — 36415 COLL VENOUS BLD VENIPUNCTURE: CPT | Performed by: ANESTHESIOLOGY

## 2024-10-24 PROCEDURE — A4216 STERILE WATER/SALINE, 10 ML: HCPCS | Performed by: SURGERY

## 2024-10-24 PROCEDURE — 63600175 PHARM REV CODE 636 W HCPCS: Performed by: SURGERY

## 2024-10-24 PROCEDURE — 25000003 PHARM REV CODE 250

## 2024-10-24 DEVICE — GORE SYNECOR INTRAPERITONEAL 12CM CIRCULAR BIOMATERIAL
Type: IMPLANTABLE DEVICE | Site: ABDOMEN | Status: FUNCTIONAL
Brand: GORE SYNECOR BIOMATERIAL

## 2024-10-24 RX ORDER — ONDANSETRON HYDROCHLORIDE 2 MG/ML
INJECTION, SOLUTION INTRAVENOUS
Status: DISCONTINUED | OUTPATIENT
Start: 2024-10-24 | End: 2024-10-24

## 2024-10-24 RX ORDER — CLINDAMYCIN PHOSPHATE 600 MG/50ML
600 INJECTION, SOLUTION INTRAVENOUS
Status: DISCONTINUED | OUTPATIENT
Start: 2024-10-24 | End: 2024-10-24

## 2024-10-24 RX ORDER — ROCURONIUM BROMIDE 10 MG/ML
INJECTION, SOLUTION INTRAVENOUS
Status: DISCONTINUED | OUTPATIENT
Start: 2024-10-24 | End: 2024-10-24

## 2024-10-24 RX ORDER — CLINDAMYCIN PHOSPHATE 150 MG/ML
600 INJECTION, SOLUTION INTRAVENOUS ONCE
Status: DISCONTINUED | OUTPATIENT
Start: 2024-10-25 | End: 2024-10-24

## 2024-10-24 RX ORDER — ONDANSETRON HYDROCHLORIDE 2 MG/ML
4 INJECTION, SOLUTION INTRAVENOUS DAILY PRN
Status: DISCONTINUED | OUTPATIENT
Start: 2024-10-24 | End: 2024-10-24 | Stop reason: HOSPADM

## 2024-10-24 RX ORDER — ENOXAPARIN SODIUM 100 MG/ML
30 INJECTION SUBCUTANEOUS EVERY 24 HOURS
Status: DISCONTINUED | OUTPATIENT
Start: 2024-10-24 | End: 2024-10-24

## 2024-10-24 RX ORDER — HYDROMORPHONE HYDROCHLORIDE 2 MG/ML
INJECTION, SOLUTION INTRAMUSCULAR; INTRAVENOUS; SUBCUTANEOUS
Status: DISCONTINUED | OUTPATIENT
Start: 2024-10-24 | End: 2024-10-24

## 2024-10-24 RX ORDER — BUPIVACAINE HYDROCHLORIDE 5 MG/ML
INJECTION, SOLUTION EPIDURAL; INTRACAUDAL
Status: DISCONTINUED | OUTPATIENT
Start: 2024-10-24 | End: 2024-10-24 | Stop reason: HOSPADM

## 2024-10-24 RX ORDER — LIDOCAINE HYDROCHLORIDE 20 MG/ML
INJECTION, SOLUTION EPIDURAL; INFILTRATION; INTRACAUDAL; PERINEURAL
Status: DISCONTINUED | OUTPATIENT
Start: 2024-10-24 | End: 2024-10-24

## 2024-10-24 RX ORDER — CLINDAMYCIN PHOSPHATE 150 MG/ML
600 INJECTION, SOLUTION INTRAVENOUS
Status: DISCONTINUED | OUTPATIENT
Start: 2024-10-24 | End: 2024-10-24

## 2024-10-24 RX ORDER — MIDAZOLAM HYDROCHLORIDE 1 MG/ML
INJECTION INTRAMUSCULAR; INTRAVENOUS
Status: DISCONTINUED | OUTPATIENT
Start: 2024-10-24 | End: 2024-10-24

## 2024-10-24 RX ORDER — HALOPERIDOL 5 MG/ML
0.5 INJECTION INTRAMUSCULAR EVERY 10 MIN PRN
Status: DISCONTINUED | OUTPATIENT
Start: 2024-10-24 | End: 2024-10-24 | Stop reason: HOSPADM

## 2024-10-24 RX ORDER — PROPOFOL 10 MG/ML
VIAL (ML) INTRAVENOUS
Status: DISCONTINUED | OUTPATIENT
Start: 2024-10-24 | End: 2024-10-24

## 2024-10-24 RX ORDER — DIPHENHYDRAMINE HYDROCHLORIDE 50 MG/ML
25 INJECTION INTRAMUSCULAR; INTRAVENOUS EVERY 6 HOURS PRN
Status: DISCONTINUED | OUTPATIENT
Start: 2024-10-24 | End: 2024-10-24 | Stop reason: HOSPADM

## 2024-10-24 RX ORDER — OXYCODONE HYDROCHLORIDE 5 MG/1
5 TABLET ORAL
Status: DISCONTINUED | OUTPATIENT
Start: 2024-10-24 | End: 2024-10-24 | Stop reason: HOSPADM

## 2024-10-24 RX ORDER — SODIUM CHLORIDE 0.9 % (FLUSH) 0.9 %
SYRINGE (ML) INJECTION
Status: DISCONTINUED | OUTPATIENT
Start: 2024-10-24 | End: 2024-10-24 | Stop reason: HOSPADM

## 2024-10-24 RX ORDER — HYDROMORPHONE HYDROCHLORIDE 2 MG/ML
0.2 INJECTION, SOLUTION INTRAMUSCULAR; INTRAVENOUS; SUBCUTANEOUS EVERY 5 MIN PRN
Status: DISCONTINUED | OUTPATIENT
Start: 2024-10-24 | End: 2024-10-24 | Stop reason: HOSPADM

## 2024-10-24 RX ORDER — FENTANYL CITRATE 50 UG/ML
INJECTION, SOLUTION INTRAMUSCULAR; INTRAVENOUS
Status: DISCONTINUED | OUTPATIENT
Start: 2024-10-24 | End: 2024-10-24

## 2024-10-24 RX ORDER — ONDANSETRON 4 MG/1
4 TABLET, ORALLY DISINTEGRATING ORAL EVERY 6 HOURS PRN
Status: DISCONTINUED | OUTPATIENT
Start: 2024-10-24 | End: 2024-10-24 | Stop reason: HOSPADM

## 2024-10-24 RX ORDER — ACETAMINOPHEN 10 MG/ML
INJECTION, SOLUTION INTRAVENOUS
Status: DISCONTINUED | OUTPATIENT
Start: 2024-10-24 | End: 2024-10-24

## 2024-10-24 RX ORDER — CLINDAMYCIN PHOSPHATE 600 MG/50ML
600 INJECTION, SOLUTION INTRAVENOUS
Status: DISCONTINUED | OUTPATIENT
Start: 2024-10-24 | End: 2024-10-24 | Stop reason: HOSPADM

## 2024-10-24 RX ORDER — ENOXAPARIN SODIUM 100 MG/ML
30 INJECTION SUBCUTANEOUS EVERY 24 HOURS
Status: DISCONTINUED | OUTPATIENT
Start: 2024-10-24 | End: 2024-10-24 | Stop reason: HOSPADM

## 2024-10-24 RX ORDER — HYDROCODONE BITARTRATE AND ACETAMINOPHEN 5; 325 MG/1; MG/1
1 TABLET ORAL EVERY 6 HOURS PRN
Qty: 10 TABLET | Refills: 0 | Status: SHIPPED | OUTPATIENT
Start: 2024-10-24

## 2024-10-24 RX ORDER — GLUCAGON 1 MG
1 KIT INJECTION
Status: DISCONTINUED | OUTPATIENT
Start: 2024-10-24 | End: 2024-10-24 | Stop reason: HOSPADM

## 2024-10-24 RX ADMIN — LIDOCAINE HYDROCHLORIDE 80 MG: 20 INJECTION, SOLUTION INTRAVENOUS at 08:10

## 2024-10-24 RX ADMIN — MIDAZOLAM HYDROCHLORIDE 2 MG: 1 INJECTION, SOLUTION INTRAMUSCULAR; INTRAVENOUS at 08:10

## 2024-10-24 RX ADMIN — HYDROMORPHONE HYDROCHLORIDE 1 MG: 2 INJECTION, SOLUTION INTRAMUSCULAR; INTRAVENOUS; SUBCUTANEOUS at 09:10

## 2024-10-24 RX ADMIN — HYDROMORPHONE HYDROCHLORIDE 0.6 MG: 2 INJECTION, SOLUTION INTRAMUSCULAR; INTRAVENOUS; SUBCUTANEOUS at 09:10

## 2024-10-24 RX ADMIN — OXYCODONE HYDROCHLORIDE 5 MG: 5 TABLET ORAL at 10:10

## 2024-10-24 RX ADMIN — SUGAMMADEX 200 MG: 100 INJECTION, SOLUTION INTRAVENOUS at 09:10

## 2024-10-24 RX ADMIN — ONDANSETRON 4 MG: 2 INJECTION INTRAMUSCULAR; INTRAVENOUS at 10:10

## 2024-10-24 RX ADMIN — CLINDAMYCIN PHOSPHATE 600 MG: 600 INJECTION, SOLUTION INTRAVENOUS at 08:10

## 2024-10-24 RX ADMIN — PROPOFOL 175 MG: 10 INJECTION, EMULSION INTRAVENOUS at 08:10

## 2024-10-24 RX ADMIN — SODIUM CHLORIDE, SODIUM GLUCONATE, SODIUM ACETATE, POTASSIUM CHLORIDE AND MAGNESIUM CHLORIDE: 526; 502; 368; 37; 30 INJECTION, SOLUTION INTRAVENOUS at 08:10

## 2024-10-24 RX ADMIN — ROCURONIUM BROMIDE 70 MG: 10 SOLUTION INTRAVENOUS at 08:10

## 2024-10-24 RX ADMIN — FENTANYL CITRATE 100 MCG: 50 INJECTION, SOLUTION INTRAMUSCULAR; INTRAVENOUS at 08:10

## 2024-10-24 RX ADMIN — ACETAMINOPHEN 1000 MG: 10 INJECTION, SOLUTION INTRAVENOUS at 08:10

## 2024-10-24 RX ADMIN — HYDROMORPHONE HYDROCHLORIDE 0.4 MG: 2 INJECTION, SOLUTION INTRAMUSCULAR; INTRAVENOUS; SUBCUTANEOUS at 09:10

## 2024-10-24 RX ADMIN — HYDROMORPHONE HYDROCHLORIDE 0.2 MG: 2 INJECTION INTRAMUSCULAR; INTRAVENOUS; SUBCUTANEOUS at 10:10

## 2024-10-24 RX ADMIN — ENOXAPARIN SODIUM 30 MG: 30 INJECTION SUBCUTANEOUS at 07:10

## 2024-10-24 RX ADMIN — ONDANSETRON 4 MG: 2 INJECTION INTRAMUSCULAR; INTRAVENOUS at 09:10

## 2024-10-24 NOTE — PROGRESS NOTES
Pt Hx and procedure discussed in detail. Post op orders reviewed. Pt attached to v/s machine and vitals reviewed. Post op orders reviewed. Procedure site assessed. Informed nurse and family member of loose skin bulge site that was outlined on arrival to PACU during handoff from OR team. Family and RN will inform Dr. Scott when he comes to talk to family about the procedure. Everyone understands pt info with no further questions.

## 2024-10-24 NOTE — H&P
[facility] - Periop Services  General Surgery  History & Physical    Patient Name: Bridget Cardenas Jr.  MRN: [id number]  Admission Date: 10/24/2024  Attending Physician: Dr Aris Scott   Primary Care Provider: Syed Denton MD    Patient information was obtained from patient and past medical records.     Subjective:       History of Present Illness:  63-year-old male known to us for previous transverse colectomy for colon cancer now complains of a bulging beneath his extraction incision in the epigastrium. No nausea vomiting constipation or obstructive symptoms. He reports the bulging has increased in size over time.         No current facility-administered medications on file prior to encounter.     Current Outpatient Medications on File Prior to Encounter   Medication Sig    patiromer calcium sorbitex (VELTASSA) 8.4 gram PwPk Take 1 packet (8.4 g total) by mouth every other day.    tadalafiL (CIALIS) 10 MG tablet Take 1 tablet (10 mg total) by mouth daily as needed for Erectile Dysfunction.       Review of patient's allergies indicates:   Allergen Reactions    Pcn [penicillins] Hives       Past Medical History:   Diagnosis Date    Cancer     hx of bladder cancer    Malignant neoplasm of transverse colon     Obesity     Renal disorder     CKD 5     Past Surgical History:   Procedure Laterality Date    AV FISTULA PLACEMENT Right 01/15/2024    COLONOSCOPY N/A 10/25/2023    Procedure: COLONOSCOPY, WITH HEMORRHAGE CONTROL;  Surgeon: Angy Mcguire MD;  Location: [facility] ENDOSCOPY;  Service: Gastroenterology;  Laterality: N/A;  sigmoid clips    COLONOSCOPY, WITH 1 OR MORE BIOPSIES N/A 10/25/2023    Procedure: COLONOSCOPY, WITH 1 OR MORE BIOPSIES;  Surgeon: Angy Mcguire MD;  Location: [facility] ENDOSCOPY;  Service: Gastroenterology;  Laterality: N/A;  transverse colon mass    COLONOSCOPY, WITH POLYPECTOMY USING SNARE Left 10/25/2023    Procedure: COLONOSCOPY, WITH POLYPECTOMY USING SNARE;   Surgeon: Angy Mcguire MD;  Location: [facility] ENDOSCOPY;  Service: Gastroenterology;  Laterality: Left;    CYSTECTOMY      ESOPHAGOGASTRODUODENOSCOPY Left 10/25/2023    Procedure: EGD (ESOPHAGOGASTRODUODENOSCOPY);  Surgeon: Angy Mcguire MD;  Location: [facility] ENDOSCOPY;  Service: Gastroenterology;  Laterality: Left;    FRACTURE SURGERY  2018    Collar none    HERNIA REPAIR  2010    ILEO LOOP NEOBLADDER      MEDIPORT INSERTION, SINGLE      PROSTATE SURGERY  10/2018    SMALL INTESTINE SURGERY  10/2018    XI ROBOTIC COLECTOMY, PARTIAL N/A 2023    Procedure: XI ROBOTIC COLECTOMY, PARTIAL;  Surgeon: Aris Scott MD;  Location: [facility] OR;  Service: Oncology;  Laterality: N/A;  TRANSVERSE COLON; POSSIBLE OPEN CASE     Family History       Problem Relation (Age of Onset)    Heart disease Father, Brother          Tobacco Use    Smoking status: Former     Current packs/day: 0.00     Types: Cigarettes     Quit date:      Years since quittin.8     Passive exposure: Current    Smokeless tobacco: Current     Types: Chew   Substance and Sexual Activity    Alcohol use: Never    Drug use: Never    Sexual activity: Yes     Partners: Female     Birth control/protection: None     Review of Systems   Constitutional:  Negative for chills and fever.   HENT: Negative.     Eyes: Negative.    Respiratory:  Negative for chest tightness and shortness of breath.    Cardiovascular:  Negative for chest pain.   Gastrointestinal: Negative.  Negative for nausea and vomiting.   Endocrine: Negative.    Genitourinary: Negative.    Musculoskeletal: Negative.    Allergic/Immunologic: Negative.    Neurological: Negative.    Hematological: Negative.    Psychiatric/Behavioral: Negative.       Objective:     Vital Signs (Most Recent):  Temp: 98.4 °F (36.9 °C) (10/24/24 0702)  Pulse: 60 (10/24/24 0702)  Resp: 18 (10/24/24 0702)  BP: 118/73 (10/24/24 0702)  SpO2: 97 % (10/24/24 0702) Vital Signs (24h Range):  Temp:  [98.4 °F  "(36.9 °C)] 98.4 °F (36.9 °C)  Pulse:  [60] 60  Resp:  [18] 18  SpO2:  [97 %] 97 %  BP: (118)/(73) 118/73     Weight: 93 kg (205 lb)  Body mass index is 28.59 kg/m².    Physical Exam  Constitutional:       General: He is not in acute distress.  HENT:      Head: Atraumatic.      Mouth/Throat:      Mouth: Mucous membranes are moist.   Eyes:      Extraocular Movements: Extraocular movements intact.      Conjunctiva/sclera: Conjunctivae normal.   Pulmonary:      Effort: Pulmonary effort is normal. No respiratory distress.      Breath sounds: Normal breath sounds.   Abdominal:      General: There is no distension.      Palpations: Abdomen is soft.   Musculoskeletal:         General: No swelling.   Skin:     General: Skin is warm and dry.   Neurological:      General: No focal deficit present.      Mental Status: He is alert and oriented to person, place, and time.         Significant Labs:  CBC: No results for input(s): "WBC", "RBC", "HGB", "HCT", "PLT", "MCV", "MCH", "MCHC" in the last 168 hours.  BMP: No results for input(s): "GLU", "NA", "K", "CL", "CO2", "BUN", "CREATININE", "CALCIUM", "MG" in the last 168 hours.    Significant Diagnostics:  I have reviewed all pertinent imaging results/findings within the past 24 hours.    Assessment/Plan:     4 cm reducible incisional hernia in the epigastrium, enlarging pending laparoscopic/robotic incisional hernia repair with mesh 10/24/24       No interval change to medical history or home medications. No oral anticoagulants. Pre and post operative instructions given verbally and in print. Patient verbalized understanding. Consent signed.       VTE Risk Mitigation (From admission, onward)           Ordered     enoxaparin injection 30 mg  Daily         10/24/24 0650     Place sequential compression device  Until discontinued         10/24/24 0647                    Yuli Sanchez NP  General Surgery  [facility] - Periop Services  "

## 2024-10-24 NOTE — ANESTHESIA POSTPROCEDURE EVALUATION
Anesthesia Post Evaluation    Patient: Bridget Cardenas Jr.    Procedure(s) Performed: Procedure(s) (LRB):  ROBOTIC REPAIR, HERNIA, VENTRAL (N/A)    Final Anesthesia Type: general      Patient location during evaluation: PACU  Patient participation: Yes- Able to Participate  Level of consciousness: awake  Post-procedure vital signs: reviewed and stable  Pain management: adequate  Airway patency: patent  ARTEMIO mitigation strategies: Multimodal analgesia  PONV status at discharge: No PONV  Anesthetic complications: no      Cardiovascular status: blood pressure returned to baseline  Respiratory status: spontaneous ventilation  Hydration status: euvolemic  Follow-up not needed.              Vitals Value Taken Time   /64 10/24/24 1000   Temp 36.5 °C (97.7 °F) 10/24/24 1000   Pulse 63 10/24/24 1008   Resp 14 10/24/24 1008   SpO2 95 % 10/24/24 1008   Vitals shown include unfiled device data.      No case tracking events are documented in the log.      Pain/Lotus Score: Lotus Score: 5 (10/24/2024 10:00 AM)

## 2024-10-24 NOTE — ANESTHESIA PROCEDURE NOTES
Intubation    Date/Time: 10/24/2024 8:31 AM    Performed by: Mary Hdez CRNA  Authorized by: Malcom Main MD    Intubation:     Induction:  Intravenous    Intubated:  Postinduction    Mask Ventilation:  Easy mask    Attempts:  1    Attempted By:  CRNA    Method of Intubation:  Direct    Blade:  Mejias 2    Laryngeal View Grade: Grade I - full view of cords      Difficult Airway Encountered?: No      Complications:  None    Airway Device:  Oral endotracheal tube    Airway Device Size:  7.5    Style/Cuff Inflation:  Cuffed (inflated to minimal occlusive pressure)    Inflation Amount (mL):  5    Tube secured:  23    Secured at:  The lips    Placement Verified By:  Capnometry    Complicating Factors:  None    Findings Post-Intubation:  BS equal bilateral and atraumatic/condition of teeth unchanged

## 2024-10-24 NOTE — TRANSFER OF CARE
"Anesthesia Transfer of Care Note    Patient: Bridget Cardenas Jr.    Procedure(s) Performed: Procedure(s) (LRB):  ROBOTIC REPAIR, HERNIA, VENTRAL (N/A)    Patient location: PACU    Anesthesia Type: general    Transport from OR: Transported from OR on room air with adequate spontaneous ventilation    Post pain: adequate analgesia    Post assessment: no apparent anesthetic complications    Post vital signs: stable    Level of consciousness: awake    Nausea/Vomiting: no nausea/vomiting    Complications: none    Transfer of care protocol was followed    Last vitals: Visit Vitals  /73   Pulse 60   Temp 36.9 °C (98.4 °F) (Oral)   Resp 18   Ht 5' 11" (1.803 m)   Wt 93 kg (205 lb)   SpO2 97%   BMI 28.59 kg/m²     "

## 2024-10-24 NOTE — ANESTHESIA PREPROCEDURE EVALUATION
10/24/2024  Bridget Cardenas Jr. is a 63 y.o., male.    Bridget Cardenas Jr. is a 63 y.o. White male with past medical history of bladder cancer, status post total cystectomy and prostatectomy in 2018 with neobladder creation (patient self catheterizes 6 times a day); advanced chronic kidney disease, chronic hydronephrosis, metabolic acidosis. 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  Patient tolerated surgery without complications.     Now presents with ventral hernia.    Past Medical History:   Diagnosis Date    Cancer     hx of bladder cancer    Malignant neoplasm of transverse colon     Obesity     Renal disorder     CKD 5     Past Surgical History:   Procedure Laterality Date    AV FISTULA PLACEMENT Right 01/15/2024    COLONOSCOPY N/A 10/25/2023    Procedure: COLONOSCOPY, WITH HEMORRHAGE CONTROL;  Surgeon: Angy Mcguire MD;  Location: [facility] ENDOSCOPY;  Service: Gastroenterology;  Laterality: N/A;  sigmoid clips    COLONOSCOPY, WITH 1 OR MORE BIOPSIES N/A 10/25/2023    Procedure: COLONOSCOPY, WITH 1 OR MORE BIOPSIES;  Surgeon: Angy Mcguire MD;  Location: [facility] ENDOSCOPY;  Service: Gastroenterology;  Laterality: N/A;  transverse colon mass    COLONOSCOPY, WITH POLYPECTOMY USING SNARE Left 10/25/2023    Procedure: COLONOSCOPY, WITH POLYPECTOMY USING SNARE;  Surgeon: Angy Mcguire MD;  Location: [facility] ENDOSCOPY;  Service: Gastroenterology;  Laterality: Left;    CYSTECTOMY      ESOPHAGOGASTRODUODENOSCOPY Left 10/25/2023    Procedure: EGD (ESOPHAGOGASTRODUODENOSCOPY);  Surgeon: Angy Mcguire MD;  Location: [facility] ENDOSCOPY;  Service: Gastroenterology;  Laterality: Left;    FRACTURE SURGERY  7/2018    Collar none    HERNIA REPAIR  11/2010    ILEO LOOP NEOBLADDER       MEDIPORT INSERTION, SINGLE      PROSTATE SURGERY  10/2018    SMALL INTESTINE SURGERY  10/2018    XI ROBOTIC COLECTOMY, PARTIAL N/A 12/14/2023    Procedure: XI ROBOTIC COLECTOMY, PARTIAL;  Surgeon: Aris Scott MD;  Location: [facility];  Service: Oncology;  Laterality: N/A;  TRANSVERSE COLON; POSSIBLE OPEN CASE     No current facility-administered medications on file prior to encounter.     Current Outpatient Medications on File Prior to Encounter   Medication Sig Dispense Refill    patiromer calcium sorbitex (VELTASSA) 8.4 gram PwPk Take 1 packet (8.4 g total) by mouth every other day. 45 packet 3    tadalafiL (CIALIS) 10 MG tablet Take 1 tablet (10 mg total) by mouth daily as needed for Erectile Dysfunction. 10 tablet 11       10/24/24 BMP:      Pre-op Assessment    I have reviewed the Patient Summary Reports.     I have reviewed the Nursing Notes. I have reviewed the NPO Status.      Review of Systems  Social:  Former Smoker       Hematology/Oncology:                      Current/Recent Cancer.                Renal/:  Chronic Renal Disease, CKD                    Physical Exam  General: Cooperative, Alert and Oriented    Airway:  Mallampati: II   Mouth Opening: Normal  TM Distance: Normal  Tongue: Normal  Neck ROM: Normal ROM    Dental:  Intact        Anesthesia Plan  Type of Anesthesia, risks & benefits discussed:    Anesthesia Type: Gen ETT  Intra-op Monitoring Plan: Standard ASA Monitors  Post Op Pain Control Plan: multimodal analgesia  Induction:  IV  Airway Plan: Direct, Post-Induction  Informed Consent: Informed consent signed with the Patient and all parties understand the risks and agree with anesthesia plan.  All questions answered. Patient consented to blood products? Yes  ASA Score: 3  Day of Surgery Review of History & Physical: H&P Update referred to the surgeon/provider.I have interviewed and examined the patient. I have reviewed the patient's H&P dated: There are no significant changes.      Ready For Surgery From Anesthesia Perspective.     .

## 2024-10-24 NOTE — OP NOTE
Date:  10/24/2024    Surgeon:  Aris Scott MD    Assistant:  Yuli BERNARD    Preoperative Diagnosis:  Epigastric incisional hernia, 3 cm    Postoperative Diagnosis:  Same    Procedure: Laparoscopic/robotic umbilical hernia repair with mesh    Anesthesia: GETA    Estimated Blood Loss: 15cc           Intraoperative findings:  Epigastric incisional hernia defect closed primarily and reinforced with underlay mesh reinforcement using a circular 8 cm [place] IP permanent mesh    Procedure Details: After informed consent was obtained the patient was brought to the operating room placed in the supine position.  General endotracheal anesthesia was administered without difficulty.  The patient's abdomen was prepped and draped in sterile fashion.  After infiltration with local anesthesia right upper quadrant incision was made and an optical trocar was used to enter the peritoneal cavity under direct vision.  Pneumoperitoneum was achieved without difficulty.  Additional robotic ports were placed in the right lateral abdomen under direct vision.  The patient was tilted towards the left.  The hernia defect was identified, it measured approximately 3 cm in maximum diameter.  The contents were reduced along with the hernia sac.  The preperitoneal fat and falciform ligament were taken down exposing the posterior fascia.  Insufflation was decreased to 8 mmHg.  The hernia defect was closed primarily using running 0 V-lock suture.  Using a ruler internally, I measured for a 3-4 cm over lap of the fascial closure, a 8 cm circular [place] IP mesh was then selected and inserted into the abdominal cavity after being soaked in antimicrobial solution.  It was suspended to the repair using 3-0 absorbable V-lock.  It was then secured circumferentially using running 0 V-lock suture, with care to avoid any folding or redundancy of the mesh.  The absorbable suture was then used to secure the interior of the mesh to the posterior abdominal wall  eliminating the dead space.  The suture lines were hemostatic.  The mesh was in excellent position.  Liposomal bupivacaine was then injected around the suture line in the preperitoneal position under direct vision.  Ports were removed, pneumoperitoneum was relieved, port sites were closed with absorbable suture and sterile dressings were applied.  The patient tolerated the procedure well.    Brief Discharge Note:    Outcome: Satisfactory  Disposition: Discharged home postoperatively in good condition  Followup:  2 weeks  Final Diagnosis same as postoperative diagnosis      Aris Scott MD  Surgical Oncology  Complex General, Gastrointestinal and Hepatobiliary Surgery

## 2024-10-24 NOTE — DISCHARGE INSTRUCTIONS
NO DRIVING AND NO ALCOHOL consumption FOR 24 HOURS or while taking narcotic pain medications.    Keep sites CLEAN AND DRY for 48 hours. OK to shower afterwards. Do NOT submerge incision under water. Do not apply lotions, creams, or ointments.     NO heavy lifting. DO NOT lift objects greater than 10 lbs. Your doctor will advise at your follow up appointment when to resume normal activity.     You may have received Exparel (indicated by the BLUE armband) which is an injection used to ease pain at the surgery site. This drug may cause short-term loss of feeling and motor activity in the body. This may last for up to 5 days. Please see attached for additional information.     Monitor for infection: chills, fever (100.4 F), redness or drainage at surgical site. Notify your doctor if any of these occur.     Report to your nearest ER if you experience any SUDDEN/SEVERE abdominal pain, trouble breathing, uncontrolled pain, profound weakness.

## 2024-10-24 NOTE — DISCHARGE SUMMARY
"[facility]  Periop Services  General Surgery  Discharge Summary      Patient Name: Bridget Cardenas Jr.  MRN: [id number]  Admission Date: 10/24/2024  Hospital Length of Stay: 0 days  Discharge Date and Time:  10/24/2024 9:56 AM  Attending Physician: Aris Scott MD   Discharging Provider: Yuli Sanchez NP  Primary Care Provider: Syed Denton MD    HPI:   No notes on file    Procedure(s) (LRB):  ROBOTIC REPAIR, HERNIA, VENTRAL (N/A)      Indwelling Lines/Drains at time of discharge:   Lines/Drains/Airways       Central Venous Catheter Line  Duration                  PowerPort A Cath Single Lumen 11/09/23 1507 Atrial Left 349 days                  Hospital Course: Status post laparoscopic/robotic incisional hernia repair 10/24/24. Tolerated procedure well. Stable for discharge home.     Goals of Care Treatment Preferences:  Code Status: Full Code      Consults:     Significant Diagnostic Studies: Labs: BMP:   Recent Labs   Lab 10/24/24  0733      K 4.4   *   CO2 22*   BUN 60.7*   CREATININE 4.58*   CALCIUM 8.1*    and CBC No results for input(s): "WBC", "HGB", "HCT", "PLT" in the last 48 hours.    Pending Diagnostic Studies:       None          Final Active Diagnoses:    Diagnosis Date Noted POA    PRINCIPAL PROBLEM:  Incisional hernia of anterior abdominal wall without obstruction or gangrene [K43.2] 10/24/2024 Unknown      Problems Resolved During this Admission:      Discharged Condition: stable    Disposition: Home or Self Care    Follow Up:    Patient Instructions:      Diet Adult Regular     Order Specific Question Answer Comments   Additional restrictions: Light/GI Soft      Lifting restrictions   Order Comments: Do not lift objects heavier than 10 pounds until cleared by Dr Scott     No driving until:   Order Comments: No driving for 2 - 3 days and if still taking pain medication     No dressing needed   Order Comments: Can shower post op day 1, 10/25. "     Medications:  Reconciled Home Medications:      Medication List        START taking these medications      HYDROcodone-acetaminophen 5-325 mg per tablet  Commonly known as: NORCO  Take 1 tablet by mouth every 6 (six) hours as needed for Pain.            CONTINUE taking these medications      calcitRIOL 0.5 MCG Cap  Commonly known as: ROCALTROL  Take 1 capsule (0.5 mcg total) by mouth every other day.     patiromer calcium sorbitex 8.4 gram Pwpk  Commonly known as: VELTASSA  Take 1 packet (8.4 g total) by mouth every other day.     sodium bicarbonate 650 MG tablet  Take 2 tablets (1,300 mg total) by mouth 2 (two) times daily.     tadalafiL 10 MG tablet  Commonly known as: CIALIS  Take 1 tablet (10 mg total) by mouth daily as needed for Erectile Dysfunction.            Time spent on the discharge of patient: 35 minutes    Yuli Sacnhez NP  General Surgery  [facility]

## 2024-10-24 NOTE — HOSPITAL COURSE
Status post laparoscopic/robotic incisional hernia repair 10/24/24. Tolerated procedure well. Stable for discharge home.

## 2024-10-25 ENCOUNTER — ANESTHESIA (OUTPATIENT)
Dept: SURGERY | Facility: HOSPITAL | Age: 64
End: 2024-10-25
Payer: COMMERCIAL

## 2024-10-25 VITALS
DIASTOLIC BLOOD PRESSURE: 69 MMHG | HEIGHT: 71 IN | BODY MASS INDEX: 28.7 KG/M2 | HEART RATE: 60 BPM | OXYGEN SATURATION: 98 % | RESPIRATION RATE: 18 BRPM | WEIGHT: 205 LBS | TEMPERATURE: 98 F | SYSTOLIC BLOOD PRESSURE: 107 MMHG

## 2024-10-28 ENCOUNTER — PATIENT MESSAGE (OUTPATIENT)
Dept: ADMINISTRATIVE | Facility: OTHER | Age: 64
End: 2024-10-28
Payer: COMMERCIAL

## 2024-10-29 ENCOUNTER — PATIENT MESSAGE (OUTPATIENT)
Dept: ADMINISTRATIVE | Facility: OTHER | Age: 64
End: 2024-10-29
Payer: COMMERCIAL

## 2024-10-29 ENCOUNTER — ANESTHESIA EVENT (OUTPATIENT)
Dept: ENDOSCOPY | Facility: HOSPITAL | Age: 64
End: 2024-10-29
Payer: COMMERCIAL

## 2024-10-29 RX ORDER — SODIUM CHLORIDE, SODIUM LACTATE, POTASSIUM CHLORIDE, CALCIUM CHLORIDE 600; 310; 30; 20 MG/100ML; MG/100ML; MG/100ML; MG/100ML
INJECTION, SOLUTION INTRAVENOUS CONTINUOUS
OUTPATIENT
Start: 2024-10-29

## 2024-10-29 RX ORDER — LIDOCAINE HYDROCHLORIDE 10 MG/ML
1 INJECTION, SOLUTION EPIDURAL; INFILTRATION; INTRACAUDAL; PERINEURAL ONCE
OUTPATIENT
Start: 2024-10-29 | End: 2024-10-29

## 2024-10-30 ENCOUNTER — ANESTHESIA (OUTPATIENT)
Dept: ENDOSCOPY | Facility: HOSPITAL | Age: 64
End: 2024-10-30
Payer: COMMERCIAL

## 2024-10-30 ENCOUNTER — HOSPITAL ENCOUNTER (OUTPATIENT)
Facility: HOSPITAL | Age: 64
Discharge: HOME | End: 2024-10-30
Attending: INTERNAL MEDICINE | Admitting: INTERNAL MEDICINE
Payer: COMMERCIAL

## 2024-10-30 VITALS
OXYGEN SATURATION: 93 % | HEART RATE: 78 BPM | HEIGHT: 71 IN | RESPIRATION RATE: 16 BRPM | WEIGHT: 208.38 LBS | SYSTOLIC BLOOD PRESSURE: 168 MMHG | TEMPERATURE: 98 F | BODY MASS INDEX: 29.17 KG/M2 | DIASTOLIC BLOOD PRESSURE: 84 MMHG

## 2024-10-30 DIAGNOSIS — Z85.038 PERSONAL HISTORY OF COLON CANCER: ICD-10-CM

## 2024-10-30 PROCEDURE — 25000003 PHARM REV CODE 250: Performed by: INTERNAL MEDICINE

## 2024-10-30 PROCEDURE — 88305 TISSUE EXAM BY PATHOLOGIST: CPT | Mod: TC,91 | Performed by: INTERNAL MEDICINE

## 2024-10-30 PROCEDURE — 27201423 OPTIME MED/SURG SUP & DEVICES STERILE SUPPLY: Performed by: INTERNAL MEDICINE

## 2024-10-30 PROCEDURE — 63600175 PHARM REV CODE 636 W HCPCS: Performed by: NURSE ANESTHETIST, CERTIFIED REGISTERED

## 2024-10-30 PROCEDURE — 37000009 HC ANESTHESIA EA ADD 15 MINS: Performed by: INTERNAL MEDICINE

## 2024-10-30 PROCEDURE — 37000008 HC ANESTHESIA 1ST 15 MINUTES: Performed by: INTERNAL MEDICINE

## 2024-10-30 PROCEDURE — 45385 COLONOSCOPY W/LESION REMOVAL: CPT | Mod: 33 | Performed by: INTERNAL MEDICINE

## 2024-10-30 RX ORDER — DEXTROMETHORPHAN/PSEUDOEPHED 2.5-7.5/.8
DROPS ORAL
Status: COMPLETED | OUTPATIENT
Start: 2024-10-30 | End: 2024-10-30

## 2024-10-30 RX ORDER — PROPOFOL 10 MG/ML
VIAL (ML) INTRAVENOUS
Status: DISCONTINUED | OUTPATIENT
Start: 2024-10-30 | End: 2024-10-30

## 2024-10-30 RX ORDER — LIDOCAINE HYDROCHLORIDE 20 MG/ML
INJECTION, SOLUTION EPIDURAL; INFILTRATION; INTRACAUDAL; PERINEURAL
Status: DISCONTINUED | OUTPATIENT
Start: 2024-10-30 | End: 2024-10-30

## 2024-10-30 RX ADMIN — PROPOFOL 50 MG: 10 INJECTION, EMULSION INTRAVENOUS at 11:10

## 2024-10-30 RX ADMIN — PROPOFOL 100 MG: 10 INJECTION, EMULSION INTRAVENOUS at 10:10

## 2024-10-30 RX ADMIN — LIDOCAINE HYDROCHLORIDE 50 MG: 20 INJECTION, SOLUTION EPIDURAL; INFILTRATION; INTRACAUDAL; PERINEURAL at 10:10

## 2024-10-30 RX ADMIN — PROPOFOL 50 MG: 10 INJECTION, EMULSION INTRAVENOUS at 10:10

## 2024-10-30 NOTE — PROVATION PATIENT INSTRUCTIONS
Discharge Summary/Instructions after an Endoscopic Procedure  Patient Name: Bridget Cardenas  Patient MRN: [id number]  Patient YOB: 1960 Wednesday, October 30, 2024  Angy Mcguire MD  Dear patient,  As a result of recent federal legislation (The Federal Cures Act), you may   receive lab or pathology results from your procedure in your [facility]   account before your physician is able to contact you. Your physician or   their representative will relay the results to you with their   recommendations at their soonest availability.  Thank you,  RESTRICTIONS:  During your procedure today, you received medications for sedation.  These   medications may affect your judgment, balance and coordination.  Therefore,   for 24 hours, you have the following restrictions:   - DO NOT drive a car, operate machinery, make legal/financial decisions,   sign important papers or drink alcohol.    ACTIVITY:  Today: no heavy lifting, straining or running due to procedural   sedation/anesthesia.  The following day: return to full activity including work.  DIET:  Eat and drink normally unless instructed otherwise.     TREATMENT FOR COMMON SIDE EFFECTS:  - Mild abdominal pain, nausea, belching, bloating or excessive gas:  rest,   eat lightly and use a heating pad.  - Sore Throat: treat with throat lozenges and/or gargle with warm salt   water.  - Because air was used during the procedure, expelling large amounts of air   from your rectum or belching is normal.  - If a bowel prep was taken, you may not have a bowel movement for 1-3 days.    This is normal.  SYMPTOMS TO WATCH FOR AND REPORT TO YOUR PHYSICIAN:  1. Abdominal pain or bloating, other than gas cramps.  2. Chest pain.  3. Back pain.  4. Signs of infection such as: chills or fever occurring within 24 hours   after the procedure.  5. Rectal bleeding, which would show as bright red, maroon, or black stools.   (A tablespoon of blood from the rectum is not serious,  especially if   hemorrhoids are present.)  6. Vomiting.  7. Weakness or dizziness.  GO DIRECTLY TO THE NEAREST EMERGENCY ROOM IF YOU HAVE ANY OF THE FOLLOWING:      Difficulty breathing              Chills and/or fever over 101 F   Persistent vomiting and/or vomiting blood   Severe abdominal pain   Severe chest pain   Black, tarry stools   Bleeding- more than one tablespoon   Any other symptom or condition that you feel may need urgent attention  Your doctor recommends these additional instructions:  If any biopsies were taken, your doctors clinic will contact you in 1 to 2   weeks with any results.  - Patient has a contact number available for emergencies.  The signs and   symptoms of potential delayed complications were discussed with the   patient.  Return to normal activities tomorrow.  Written discharge   instructions were provided to the patient.   - Discharge patient to home.   - Resume previous diet.   - Continue present medications.   - Await pathology results.   - Repeat colonoscopy in 3 years for surveillance.  For questions, problems or results please call your physician - Angy Mcguire MD at Work:  [phone], Work:  [phone].  [facility] , EMERGENCY ROOM PHONE NUMBER: [phone]  IF A COMPLICATION OR EMERGENCY SITUATION ARISES AND YOU ARE UNABLE TO REACH   YOUR PHYSICIAN - GO DIRECTLY TO THE EMERGENCY ROOM.  Angy Mcguire MD  10/30/2024 11:27:26 AM  This report has been verified and signed electronically.  Dear patient,  As a result of recent federal legislation (The Federal Cures Act), you may   receive lab or pathology results from your procedure in your [facility]   account before your physician is able to contact you. Your physician or   their representative will relay the results to you with their   recommendations at their soonest availability.  Thank you,  PROVATION

## 2024-10-30 NOTE — H&P
Colonoscopy History and Physical    Patient Name: Bridget Cardenas Jr.  MRN: [id number]  : 1960  Date of Procedure:  10/30/2024  Referring Physician: Angy Mcguire MD  Primary Physician: Syed Denton MD  Procedure Physician: Angy Mcguire MD, MPH     Procedure - Colonoscopy  ASA - per anesthesia  Mallampati - per anesthesia  History of Anesthesia problems - no  Family history Anesthesia problems -  no   Plan of anesthesia - General    Diagnosis: previous colon cancer  Chief Complaint: Same as above    HPI: Patient is an 63 y.o. male is here for the above.     Mr. 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     He was hospitalized in 2023 for severe LILIANA (Hgb 3.5 g/dL on presentation).  GI was consulted and EGD and colonoscopy performed.  2023: EGD: normal  2023: Colonoscopy: transverse colon mass (adenocarcinoma), 9 mm tubular adenoma in transverse removed, 10 mm tubular adenoma in descending removed, 30-35 mm broad based pedunculated tubulovillous adenoma with high grade dysplasia sigmoid polyp removed with snare cautery, 15 mm tubulovillous adenoma in the rectum removed.  MSI-High was detected by PCR     PET CT without any evidence for metastatic disease.     He underwent laparoscopic/robotic transverse colon resection in 2023 with Dr. Scott.  Stage IIA, T3N0M0.  He is followed by oncology and observation was recommended.  CEA has remained normal (normal pre-resection as well).     He overall has done well.  He underwent laparoscopic/robotic umbilical hernia repair with mesh on 2024.  He has daily stools with occasional hard stools with straining.  He denies incomplete evacuation.  He denies any rectal bleeding or  melena.  He denies any abdominal pain, weight loss.     He denies any family history of colon polyps or colon cancer.           ROS:  Constitutional: No fevers, chills, No weight loss  CV: No chest pain  Pulm: No cough, No shortness of breath  GI: see HPI    Medical History:   Past Medical History:   Diagnosis Date    Cancer     hx of bladder cancer    Malignant neoplasm of transverse colon     Obesity     Renal disorder     CKD 5         Surgical History:   Past Surgical History:   Procedure Laterality Date    AV FISTULA PLACEMENT Right 01/15/2024    COLONOSCOPY N/A 10/25/2023    Procedure: COLONOSCOPY, WITH HEMORRHAGE CONTROL;  Surgeon: Angy Mcguire MD;  Location: [facility] ENDOSCOPY;  Service: Gastroenterology;  Laterality: N/A;  sigmoid clips    COLONOSCOPY, WITH 1 OR MORE BIOPSIES N/A 10/25/2023    Procedure: COLONOSCOPY, WITH 1 OR MORE BIOPSIES;  Surgeon: Angy Mcguire MD;  Location: [facility] ENDOSCOPY;  Service: Gastroenterology;  Laterality: N/A;  transverse colon mass    COLONOSCOPY, WITH POLYPECTOMY USING SNARE Left 10/25/2023    Procedure: COLONOSCOPY, WITH POLYPECTOMY USING SNARE;  Surgeon: Angy Mcguire MD;  Location: [facility] ENDOSCOPY;  Service: Gastroenterology;  Laterality: Left;    CYSTECTOMY      ESOPHAGOGASTRODUODENOSCOPY Left 10/25/2023    Procedure: EGD (ESOPHAGOGASTRODUODENOSCOPY);  Surgeon: Angy Mcguire MD;  Location: [facility] ENDOSCOPY;  Service: Gastroenterology;  Laterality: Left;    FRACTURE SURGERY  7/2018    Collar none    HERNIA REPAIR  11/2010    ILEO LOOP NEOBLADDER      MEDIPORT INSERTION, SINGLE      PROSTATE SURGERY  10/2018    ROBOT-ASSISTED LAPAROSCOPIC REPAIR OF VENTRAL HERNIA N/A 10/24/2024    Procedure: ROBOTIC REPAIR, HERNIA, VENTRAL;  Surgeon: Aris Scott MD;  Location: [facility] OR;  Service: Oncology;  Laterality: N/A;  INCISIONAL HERNIA REPAIR WITH MESH //  XX    SMALL INTESTINE SURGERY  10/2018    XI ROBOTIC COLECTOMY, PARTIAL N/A 12/14/2023     Procedure: XI ROBOTIC COLECTOMY, PARTIAL;  Surgeon: Aris Scott MD;  Location: [facility];  Service: Oncology;  Laterality: N/A;  TRANSVERSE COLON; POSSIBLE OPEN CASE       Family History:   Family History   Problem Relation Name Age of Onset    Heart disease Father      Heart disease Brother     .    Social History:   Social History     Socioeconomic History    Marital status:    Tobacco Use    Smoking status: Former     Current packs/day: 0.00     Types: Cigarettes     Quit date:      Years since quittin.8     Passive exposure: Current    Smokeless tobacco: Current     Types: Chew   Substance and Sexual Activity    Alcohol use: Never    Drug use: Never    Sexual activity: Yes     Partners: Female     Birth control/protection: None     Social Drivers of Health     Financial Resource Strain: Low Risk  (2024)    Overall Financial Resource Strain (CARDIA)     Difficulty of Paying Living Expenses: Not hard at all   Food Insecurity: No Food Insecurity (2024)    Hunger Vital Sign     Worried About Running Out of Food in the Last Year: Never true     Ran Out of Food in the Last Year: Never true   Transportation Needs: No Transportation Needs (2024)    PRAPARE - Transportation     Lack of Transportation (Medical): No     Lack of Transportation (Non-Medical): No   Physical Activity: Insufficiently Active (2024)    Exercise Vital Sign     Days of Exercise per Week: 5 days     Minutes of Exercise per Session: 10 min   Stress: No Stress Concern Present (2024)    [employer] - Occupational Stress Questionnaire     Feeling of Stress : Not at all   Housing Stability: Low Risk  (2024)    Housing Stability Vital Sign     Unable to Pay for Housing in the Last Year: No     Number of Places Lived in the Last Year: 1     Unstable Housing in the Last Year: No       Review of patient's allergies indicates:   Allergen Reactions    Pcn [penicillins] Hives  "      Medications:   Medications Prior to Admission   Medication Sig Dispense Refill Last Dose/Taking    calcitRIOL (ROCALTROL) 0.5 MCG Cap Take 1 capsule (0.5 mcg total) by mouth every other day.   10/29/2024    HYDROcodone-acetaminophen (NORCO) 5-325 mg per tablet Take 1 tablet by mouth every 6 (six) hours as needed for Pain. 10 tablet 0 Past Month    patiromer calcium sorbitex (VELTASSA) 8.4 gram PwPk Take 1 packet (8.4 g total) by mouth every other day. 45 packet 3 10/29/2024    sodium bicarbonate 650 MG tablet Take 2 tablets (1,300 mg total) by mouth 2 (two) times daily. 120 tablet 11 10/29/2024    tadalafiL (CIALIS) 10 MG tablet Take 1 tablet (10 mg total) by mouth daily as needed for Erectile Dysfunction. 10 tablet 11 Past Week         Physical Exam:    Vital Signs:   Vitals:    10/30/24 0814   BP: 130/85   Pulse: 97   Resp: 18   Temp: 97.9 °F (36.6 °C)     /85   Pulse 97   Temp 97.9 °F (36.6 °C) (Oral)   Resp 18   Ht 5' 11" (1.803 m)   Wt 94.5 kg (208 lb 6.4 oz)   SpO2 97%   BMI 29.07 kg/m²     General:          Well appearing in no acute distress  Lungs: Clear to auscultation bilaterally, respirations unlabored  Heart: Regular rate and rhythm, S1 and S2 normal, no obvious murmurs  Abdomen:         Soft, non-tender, bowel sounds normal, no masses, no organomegaly        Labs:  Lab Results   Component Value Date    WBC 6.41 10/15/2024    HGB 14.4 10/15/2024    HCT 43.9 10/15/2024    MCV 95.9 (H) 10/15/2024     10/15/2024     Lab Results   Component Value Date    INR 1.1 12/15/2023    APTT 28.7 12/15/2023     Lab Results   Component Value Date     10/24/2024    K 4.4 10/24/2024    CO2 22 (L) 10/24/2024    BUN 60.7 (H) 10/24/2024    CREATININE 4.58 (H) 10/24/2024    LABPROT 7.3 10/15/2024    ALBUMIN 3.8 10/15/2024    BILITOT 0.5 10/15/2024    ALKPHOS 56 10/15/2024    ALT 10 10/15/2024    AST 12 10/15/2024         Assessment and Plan:    History reviewed, vital signs satisfactory, " cardiopulmonary status satisfactory.  I have explained the sedation options, risks, benefits, and alternatives of this endoscopic procedure to the patient including but not limited to bleeding, inflammation, infection, perforation, and death.  All questions were answered and the patient consented to proceed with procedure as planned.   The patient is deemed an appropriate candidate for the sedation as planned.      Angy Mcguire MD, MPH   of Clinical Medicine  Gastroenterology and Hepatology  [facility]    10/30/2024  9:02 AM

## 2024-10-31 ENCOUNTER — PATIENT MESSAGE (OUTPATIENT)
Dept: ADMINISTRATIVE | Facility: OTHER | Age: 64
End: 2024-10-31
Payer: COMMERCIAL

## 2024-10-31 LAB
ESTROGEN SERPL-MCNC: NORMAL PG/ML
INSULIN SERPL-ACNC: NORMAL U[IU]/ML
LAB AP CLINICAL INFORMATION: NORMAL
LAB AP GROSS DESCRIPTION: NORMAL
LAB AP REPORT FOOTNOTES: NORMAL
T3RU NFR SERPL: NORMAL %

## 2024-11-01 ENCOUNTER — TELEPHONE (OUTPATIENT)
Dept: ENDOSCOPY | Facility: HOSPITAL | Age: 64
End: 2024-11-01
Payer: COMMERCIAL

## 2024-11-18 ENCOUNTER — INFUSION (OUTPATIENT)
Dept: INFUSION THERAPY | Facility: HOSPITAL | Age: 64
End: 2024-11-18
Attending: INTERNAL MEDICINE
Payer: COMMERCIAL

## 2024-11-18 VITALS
OXYGEN SATURATION: 98 % | SYSTOLIC BLOOD PRESSURE: 144 MMHG | HEART RATE: 62 BPM | DIASTOLIC BLOOD PRESSURE: 71 MMHG | RESPIRATION RATE: 18 BRPM | TEMPERATURE: 99 F

## 2024-11-18 DIAGNOSIS — C18.4 ADENOCARCINOMA OF TRANSVERSE COLON: Primary | ICD-10-CM

## 2024-11-18 PROCEDURE — 63600175 PHARM REV CODE 636 W HCPCS: Performed by: NURSE PRACTITIONER

## 2024-11-18 PROCEDURE — 96523 IRRIG DRUG DELIVERY DEVICE: CPT

## 2024-11-18 PROCEDURE — 25000003 PHARM REV CODE 250: Performed by: NURSE PRACTITIONER

## 2024-11-18 PROCEDURE — A4216 STERILE WATER/SALINE, 10 ML: HCPCS | Performed by: NURSE PRACTITIONER

## 2024-11-18 RX ORDER — HEPARIN 100 UNIT/ML
500 SYRINGE INTRAVENOUS
OUTPATIENT
Start: 2025-02-06

## 2024-11-18 RX ORDER — SODIUM CHLORIDE 0.9 % (FLUSH) 0.9 %
10 SYRINGE (ML) INJECTION
OUTPATIENT
Start: 2025-02-06

## 2024-11-18 RX ORDER — HEPARIN 100 UNIT/ML
500 SYRINGE INTRAVENOUS
Status: DISCONTINUED | OUTPATIENT
Start: 2024-11-18 | End: 2024-11-18 | Stop reason: HOSPADM

## 2024-11-18 RX ORDER — SODIUM CHLORIDE 0.9 % (FLUSH) 0.9 %
10 SYRINGE (ML) INJECTION
Status: DISCONTINUED | OUTPATIENT
Start: 2024-11-18 | End: 2024-11-18 | Stop reason: HOSPADM

## 2024-11-18 RX ADMIN — HEPARIN 500 UNITS: 100 SYRINGE at 03:11

## 2024-11-18 RX ADMIN — SODIUM CHLORIDE, PRESERVATIVE FREE 10 ML: 5 INJECTION INTRAVENOUS at 03:11

## 2024-11-18 NOTE — PLAN OF CARE
MPF performed (Q3M) tolerated well; next appointments discussed with patient; discharged home in stable condition.

## 2024-11-26 ENCOUNTER — OFFICE VISIT (OUTPATIENT)
Dept: SURGICAL ONCOLOGY | Facility: CLINIC | Age: 64
End: 2024-11-26
Payer: COMMERCIAL

## 2024-11-26 VITALS
SYSTOLIC BLOOD PRESSURE: 148 MMHG | HEART RATE: 62 BPM | BODY MASS INDEX: 29.49 KG/M2 | DIASTOLIC BLOOD PRESSURE: 79 MMHG | WEIGHT: 210.63 LBS | HEIGHT: 71 IN

## 2024-11-26 DIAGNOSIS — K43.2 INCISIONAL HERNIA OF ANTERIOR ABDOMINAL WALL WITHOUT OBSTRUCTION OR GANGRENE: Primary | ICD-10-CM

## 2024-11-26 PROCEDURE — 99999 PR PBB SHADOW E&M-EST. PATIENT-LVL III: CPT | Mod: PBBFAC,,, | Performed by: SURGERY

## 2024-11-26 NOTE — PROGRESS NOTES
Patient returned to clinic for post operative visit today. Laparoscopic incision sites healing well. No acute complaints. Abdomen soft and non tender. Denies constipation.   Stressed importance of lifting restriction until 6 weeks post op.      RTC as needed.

## 2024-12-16 ENCOUNTER — PATIENT MESSAGE (OUTPATIENT)
Dept: NEPHROLOGY | Facility: CLINIC | Age: 64
End: 2024-12-16
Payer: COMMERCIAL

## 2024-12-17 DIAGNOSIS — N25.81 SECONDARY HYPERPARATHYROIDISM OF RENAL ORIGIN: ICD-10-CM

## 2024-12-17 RX ORDER — SODIUM BICARBONATE 650 MG/1
1300 TABLET ORAL 2 TIMES DAILY
Qty: 360 TABLET | Refills: 3 | Status: SHIPPED | OUTPATIENT
Start: 2024-12-17 | End: 2025-12-17

## 2024-12-17 RX ORDER — CALCITRIOL 0.5 UG/1
0.5 CAPSULE ORAL EVERY OTHER DAY
Qty: 45 CAPSULE | Refills: 1 | Status: SHIPPED | OUTPATIENT
Start: 2024-12-17 | End: 2025-06-15

## 2025-01-05 PROBLEM — C18.4 ADENOCARCINOMA OF TRANSVERSE COLON: Status: RESOLVED | Noted: 2023-10-30 | Resolved: 2025-01-05

## 2025-01-17 ENCOUNTER — LAB VISIT (OUTPATIENT)
Dept: LAB | Facility: HOSPITAL | Age: 65
End: 2025-01-17
Attending: NURSE PRACTITIONER
Payer: COMMERCIAL

## 2025-01-17 DIAGNOSIS — D12.6 TUBULAR ADENOMA OF COLON: ICD-10-CM

## 2025-01-17 DIAGNOSIS — N18.4 STAGE 4 CHRONIC KIDNEY DISEASE: ICD-10-CM

## 2025-01-17 DIAGNOSIS — N18.5 CKD STAGE G5/A3, GFR <15 AND ALBUMIN CREATININE RATIO >300 MG/G: ICD-10-CM

## 2025-01-17 DIAGNOSIS — C18.4 ADENOCARCINOMA OF TRANSVERSE COLON: ICD-10-CM

## 2025-01-17 LAB
ALBUMIN SERPL-MCNC: 3.8 G/DL (ref 3.4–4.8)
ALBUMIN/GLOB SERPL: 1.1 RATIO (ref 1.1–2)
ALP SERPL-CCNC: 54 UNIT/L (ref 40–150)
ALT SERPL-CCNC: 10 UNIT/L (ref 0–55)
ANION GAP SERPL CALC-SCNC: 9 MEQ/L
AST SERPL-CCNC: 10 UNIT/L (ref 5–34)
BACTERIA #/AREA URNS AUTO: ABNORMAL /HPF
BASOPHILS # BLD AUTO: 0.03 X10(3)/MCL
BASOPHILS NFR BLD AUTO: 0.6 %
BILIRUB SERPL-MCNC: 0.7 MG/DL
BILIRUB UR QL STRIP.AUTO: NEGATIVE
BUN SERPL-MCNC: 62.6 MG/DL (ref 8.4–25.7)
CALCIUM SERPL-MCNC: 8.4 MG/DL (ref 8.8–10)
CEA SERPL-MCNC: 2.74 NG/ML (ref 0–3)
CHLORIDE SERPL-SCNC: 110 MMOL/L (ref 98–107)
CLARITY UR: ABNORMAL
CO2 SERPL-SCNC: 22 MMOL/L (ref 23–31)
COLOR UR AUTO: ABNORMAL
CREAT SERPL-MCNC: 4.36 MG/DL (ref 0.72–1.25)
CREAT UR-MCNC: 49.3 MG/DL (ref 63–166)
CREAT/UREA NIT SERPL: 14
EOSINOPHIL # BLD AUTO: 0.49 X10(3)/MCL (ref 0–0.9)
EOSINOPHIL NFR BLD AUTO: 9.1 %
ERYTHROCYTE [DISTWIDTH] IN BLOOD BY AUTOMATED COUNT: 13 % (ref 11.5–17)
GFR SERPLBLD CREATININE-BSD FMLA CKD-EPI: 14 ML/MIN/1.73/M2
GLOBULIN SER-MCNC: 3.4 GM/DL (ref 2.4–3.5)
GLUCOSE SERPL-MCNC: 105 MG/DL (ref 82–115)
GLUCOSE UR QL STRIP: NORMAL
HCT VFR BLD AUTO: 45.1 % (ref 42–52)
HGB BLD-MCNC: 14.9 G/DL (ref 14–18)
HGB UR QL STRIP: ABNORMAL
HYALINE CASTS #/AREA URNS LPF: ABNORMAL /LPF
IMM GRANULOCYTES # BLD AUTO: 0.01 X10(3)/MCL (ref 0–0.04)
IMM GRANULOCYTES NFR BLD AUTO: 0.2 %
KETONES UR QL STRIP: NEGATIVE
LEUKOCYTE ESTERASE UR QL STRIP: 500
LYMPHOCYTES # BLD AUTO: 1.56 X10(3)/MCL (ref 0.6–4.6)
LYMPHOCYTES NFR BLD AUTO: 29.1 %
MCH RBC QN AUTO: 31.1 PG (ref 27–31)
MCHC RBC AUTO-ENTMCNC: 33 G/DL (ref 33–36)
MCV RBC AUTO: 94.2 FL (ref 80–94)
MONOCYTES # BLD AUTO: 0.33 X10(3)/MCL (ref 0.1–1.3)
MONOCYTES NFR BLD AUTO: 6.1 %
NEUTROPHILS # BLD AUTO: 2.95 X10(3)/MCL (ref 2.1–9.2)
NEUTROPHILS NFR BLD AUTO: 54.9 %
NITRITE UR QL STRIP: ABNORMAL
NRBC BLD AUTO-RTO: 0 %
PH UR STRIP: 7 [PH]
PHOSPHATE SERPL-MCNC: 3.4 MG/DL (ref 2.3–4.7)
PLATELET # BLD AUTO: 129 X10(3)/MCL (ref 130–400)
PLATELETS.RETICULATED NFR BLD AUTO: 3.9 % (ref 0.9–11.2)
PMV BLD AUTO: 11 FL (ref 7.4–10.4)
POTASSIUM SERPL-SCNC: 3.8 MMOL/L (ref 3.5–5.1)
PROT SERPL-MCNC: 7.2 GM/DL (ref 5.8–7.6)
PROT UR QL STRIP: ABNORMAL
PROT UR STRIP-MCNC: 19.7 MG/DL
PTH-INTACT SERPL-MCNC: 179.4 PG/ML (ref 8.7–77)
RBC # BLD AUTO: 4.79 X10(6)/MCL (ref 4.7–6.1)
RBC #/AREA URNS AUTO: ABNORMAL /HPF
SODIUM SERPL-SCNC: 141 MMOL/L (ref 136–145)
SP GR UR STRIP.AUTO: 1.01 (ref 1–1.03)
SQUAMOUS #/AREA URNS LPF: ABNORMAL /HPF
URINE PROTEIN/CREATININE RATIO (OLG): 0.4
UROBILINOGEN UR STRIP-ACNC: NORMAL
WBC # BLD AUTO: 5.37 X10(3)/MCL (ref 4.5–11.5)
WBC #/AREA URNS AUTO: >100 /HPF

## 2025-01-17 PROCEDURE — 36415 COLL VENOUS BLD VENIPUNCTURE: CPT

## 2025-01-17 PROCEDURE — 87086 URINE CULTURE/COLONY COUNT: CPT

## 2025-01-17 PROCEDURE — 84100 ASSAY OF PHOSPHORUS: CPT

## 2025-01-17 PROCEDURE — 83970 ASSAY OF PARATHORMONE: CPT

## 2025-01-17 PROCEDURE — 80053 COMPREHEN METABOLIC PANEL: CPT

## 2025-01-17 PROCEDURE — 85025 COMPLETE CBC W/AUTO DIFF WBC: CPT

## 2025-01-17 PROCEDURE — 81001 URINALYSIS AUTO W/SCOPE: CPT

## 2025-01-17 PROCEDURE — 82378 CARCINOEMBRYONIC ANTIGEN: CPT

## 2025-01-17 PROCEDURE — 84156 ASSAY OF PROTEIN URINE: CPT

## 2025-01-20 LAB — BACTERIA UR CULT: NORMAL

## 2025-01-22 ENCOUNTER — OFFICE VISIT (OUTPATIENT)
Dept: NEPHROLOGY | Facility: CLINIC | Age: 65
End: 2025-01-22
Payer: COMMERCIAL

## 2025-01-22 DIAGNOSIS — E87.20 METABOLIC ACIDOSIS: ICD-10-CM

## 2025-01-22 DIAGNOSIS — Z91.89 AT HIGH RISK FOR HYPERKALEMIA: ICD-10-CM

## 2025-01-22 DIAGNOSIS — N25.81 SECONDARY HYPERPARATHYROIDISM OF RENAL ORIGIN: ICD-10-CM

## 2025-01-22 DIAGNOSIS — N18.5 CKD STAGE G5/A3, GFR <15 AND ALBUMIN CREATININE RATIO >300 MG/G: Primary | ICD-10-CM

## 2025-01-22 PROCEDURE — 3066F NEPHROPATHY DOC TX: CPT | Mod: CPTII,95,, | Performed by: NURSE PRACTITIONER

## 2025-01-22 PROCEDURE — 98006 SYNCH AUDIO-VIDEO EST MOD 30: CPT | Mod: 95,,, | Performed by: NURSE PRACTITIONER

## 2025-01-22 PROCEDURE — 1159F MED LIST DOCD IN RCRD: CPT | Mod: CPTII,95,, | Performed by: NURSE PRACTITIONER

## 2025-01-22 PROCEDURE — 1160F RVW MEDS BY RX/DR IN RCRD: CPT | Mod: CPTII,95,, | Performed by: NURSE PRACTITIONER

## 2025-01-22 NOTE — PROGRESS NOTES
This is a telemedicine note.   I have reviewed the patient's name and date of birth.  I have verbally reviewed the past medical history, active medication list, and allergies with the patient.  I have verified that this patient is in the state of [place].  Patient was treated using telemedicine, real-time audio and video, according to [facility] protocols. I, distant provider, conducted the visit from [facility] and [facility] in [place], [place]. The patient participated in the visit at a non-[facility] location. Patient was located at: Home. I am licensed in the state where the patient stated they are located. The patient stated that they understood and accepted the privacy and security risks to their information in their location.    Verbal consent to participate in interactive audio & video visit was obtained. Patient was explained that:  - Our sessions are not being recorded and that personal health information is protected   - I would evaluate the patient and recommend diagnostics and treatments based on my assessment  - The team will provide follow up care in person if/when the patient needs it.    Total time spent with patient was 30 minutes, over 50% of which was used for education and counseling regarding medical conditions, current medications including risk/benefit and side effects/adverse events, over-the-counter medication uses/doses, home self-care and contact precautions, and red flags and indications for immediate medical attention. The patient is receptive, expresses understanding and is agreeable to plan. All questions answered and concerns addressed.    [facility]  Nephrology Clinic Note    Chief complaint:  Management of chronic kidney disease     History of present illness:   Bridget Cardenas Jr. is a 64 y.o. White male with past medical history of bladder cancer, status post total cystectomy and prostatectomy in 2018 with neobladder creation (patient self  catheterizes 6 times a day); advanced chronic kidney disease, chronic hydronephrosis, metabolic acidosis. 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  Presents for follow-up appointment in Nephrology Clinic.  Denies complaints.    Review of Systems  12 point review of systems conducted, negative except as stated in the history of present illness.    Allergies: Patient is allergic to pcn [penicillins].     Past Medical History:  has a past medical history of Cancer, Malignant neoplasm of transverse colon, Obesity, and Renal disorder.    Procedure History:  has a past surgical history that includes colonoscopy, with polypectomy using snare (Left, 10/25/2023); Esophagogastroduodenoscopy (Left, 10/25/2023); colonoscopy, with 1 or more biopsies (N/A, 10/25/2023); Colonoscopy (N/A, 10/25/2023); Prostate surgery (10/2018); Small intestine surgery (10/2018); Fracture surgery (7/2018); Hernia repair (11/2010); cystectomy; Mediport insertion, single; Ileo loop neobladder; xi robotic colectomy, partial (N/A, 12/14/2023); AV fistula placement (Right, 01/15/2024); Robot-assisted laparoscopic repair of ventral hernia (N/A, 10/24/2024); and colonoscopy, with polypectomy using snare (N/A, 10/30/2024).    Family History: family history includes Heart disease in his brother and father.    Social History:  reports that he quit smoking about 38 years ago. His smoking use included cigarettes. He has been exposed to tobacco smoke. His smokeless tobacco use includes chew. He reports that he does not drink alcohol and does not use drugs.    Physical exam  General appearance: Patient is in no acute distress.    Home Medications:    Current Outpatient Medications:     calcitRIOL (ROCALTROL) 0.5 MCG Cap, Take 1 capsule (0.5 mcg total) by mouth every other day., Disp: 45 capsule, Rfl: 1    patiromer calcium sorbitex (VELTASSA)  8.4 gram PwPk, Take 1 packet (8.4 g total) by mouth every other day., Disp: 45 packet, Rfl: 3    sodium bicarbonate 650 MG tablet, Take 2 tablets (1,300 mg total) by mouth 2 (two) times daily., Disp: 360 tablet, Rfl: 3    tadalafiL (CIALIS) 10 MG tablet, Take 1 tablet (10 mg total) by mouth daily as needed for Erectile Dysfunction., Disp: 10 tablet, Rfl: 11    Laboratory data    Lab Results   Component Value Date    WBC 5.37 01/17/2025    HGB 14.9 01/17/2025    HCT 45.1 01/17/2025     (L) 01/17/2025    IRON 78 07/08/2024    TIBC 224 (L) 07/08/2024    LABIRON 35 07/08/2024    FERRITIN 169.38 07/08/2024     01/17/2025    K 3.8 01/17/2025    CO2 22 (L) 01/17/2025    BUN 62.6 (H) 01/17/2025    CREATININE 4.36 (H) 01/17/2025    EGFRNORACEVR 14 01/17/2025    GLUCOSE 105 01/17/2025    CALCIUM 8.4 (L) 01/17/2025    ALKPHOS 54 01/17/2025    LABPROT 7.2 01/17/2025    ALBUMIN 3.8 01/17/2025    BILIDIR 0.2 10/20/2023    IBILI 0.20 05/27/2021    AST 10 01/17/2025    ALT 10 01/17/2025    MG 1.50 (L) 12/15/2023    PHOS 3.4 01/17/2025      Lab Results   Component Value Date    .4 (H) 01/17/2025    JGTDSYPI20FQ 23.9 (L) 12/11/2023    HIV Nonreactive 12/11/2023    HEPCAB Nonreactive 12/11/2023    HEPBCAB Nonreactive 12/11/2023     Urine:  Lab Results   Component Value Date    APPEARANCEUA Turbid (A) 01/17/2025    SGUA 1.008 01/17/2025    PROTEINUA Trace (A) 01/17/2025    KETONESUA Negative 01/17/2025    LEUKOCYTESUR 500 (A) 01/17/2025    RBCUA 6-10 (A) 01/17/2025    WBCUA >100 (A) 01/17/2025    BACTERIA Moderate (A) 01/17/2025    SQEPUA None Seen 01/17/2025    HYALINECASTS None Seen 01/17/2025    CREATRANDUR 49.3 (L) 01/17/2025    PROTEINURINE 19.7 01/17/2025    UPROTCREA 0.4 01/17/2025         Imaging  CT Abdomen Pelvis  Without Contrast 07/02/2024  Images were reviewed in lung, soft tissue, and bone windows.  Exam quality: Overall adequate for evaluation, although non-contrast protocol limits assessment of the  regional vascular structures and visualized solid organs.  Lines/tubes: Left chest wall subcutaneous port and associated catheter remain in similar position.  Cardiovascular: Stable heart chamber size. No pericardial effusion.  Thoracic and abdominopelvic vasculature is without significant interval change.  Lungs/Pleura: Central airways are widely patent. No acute or focal lung field process. No pleural thickening, effusion, or pneumothorax.  Hepatobiliary/Pancreas: Within limitations of non-contrast assessment, no definite new or enlarging space-occupying lesion is identified involving the liver or pancreas.  The gallbladder is unremarkable.  No biliary or pancreatic duct dilatation.  Spleen: No interval change.  Adrenal/: No suspicious adrenal or renal lesions. No obstructive uropathy or evidence of acute urologic abnormality. Reproductive structures are unchanged.  GI tract: The esophagus is unremarkable. No evidence of high-grade mechanical bowel obstruction.  Previously visualized area of mural soft tissue prominence at the transverse colon is no longer clearly identified.  Multiple sites of anastomotic suture at the upper abdomen are again noted, with apparent new site of anastomosis at the transverse colon.  No convincing evidence of new focal abnormality or acute process along the GI tract.  Musculoskeletal: No interval change.  Lymph nodes: No development of pathologic jesi enlargement or necrotic adenopathy.  Other findings: No significant interval change of the thyroid. No free intra-abdominal fluid or air, and no drainable collections.  IMPRESSION  1. Interval changes suggestive of partial transverse colectomy, with no definite residual mass-like lesion identified.  2. No changes appreciated to suggest new/worsened metastatic disease through the chest, abdomen, or pelvis.  3. Chronic secondary details grossly unchanged from the comparison PET-CT appearance.      Impression    ICD-10-CM ICD-9-CM   1.  CKD stage G5/A3, GFR <15 and albumin creatinine ratio >300 mg/g  N18.5 585.5   2. Metabolic acidosis  E87.20 276.2   3. Secondary hyperparathyroidism of renal origin  N25.81 588.81   4. At high risk for hyperkalemia  Z91.89 [id number]        Plan  CKD stage G5/A3, GFR <15 and albumin creatinine ratio >300 mg/g  -     CBC Auto Differential; Future; Expected date: 04/10/2025  -     Comprehensive Metabolic Panel; Future; Expected date: 04/10/2025  -     PTH, Intact; Future; Expected date: 04/10/2025  -     Phosphorus; Future; Expected date: 04/10/2025  Possibly chronic tubular interstitial disease secondary to chronic obstructive uropathy.  Estimated GFR is 14 mL/min.  Patient has multiple complications advanced kidney dysfunction (metabolic acidosis, hyperkalemia, secondary hyperparathyroidism), that are controlled with medications at present.  Patient is not a candidate for kidney transplantation due recent diagnosis of malignancy, is not a candidate for peritoneal dialysis due to multiple abdominal surgeries.  There are no acute indications for initiation of hemodialysis.  Patient is status post AV fistula creation, fistula is mature.  Emergency department precautions discussed.  Will continue close monitoring.      Metabolic acidosis  Continue sodium bicarbonate tablets at current dose.      Secondary hyperparathyroidism of renal origin  Continue calcitriol as ordered.      At high risk for hyperkalemia  Continue Veltassa as prescribed to prevent development of life-threatening hyperkalemia.      Follow up in about 3 months (around 4/22/2025).       Mayra Grimaldo NP  [facility]

## 2025-01-30 DIAGNOSIS — R33.9 URINARY RETENTION: Primary | ICD-10-CM

## 2025-04-20 NOTE — PROGRESS NOTES
[facility]  Nephrology Clinic Note    Chief complaint: Follow-up and Chronic Kidney Disease (RTC, took meds, re-dx for insurance purposes,  +thrill, bruit WAYNE fistula, wife present)    History of present illness:   Bridget Cardenas Jr. is a 64 y.o. White male with past medical history of bladder cancer, status post total cystectomy and prostatectomy in 2018 with neobladder creation (patient self catheterizes 6 times a day); advanced chronic kidney disease, chronic hydronephrosis, metabolic acidosis. 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  Presents for follow-up appointment in Nephrology Clinic. Denies complaints.     Review of Systems  12 point review of systems conducted, negative except as stated in the history of present illness.    Allergies: Patient is allergic to pcn [penicillins].     Past Medical History:  has a past medical history of Cancer, Malignant neoplasm of transverse colon, Obesity, and Renal disorder.    Procedure History:  has a past surgical history that includes colonoscopy, with polypectomy using snare (Left, 10/25/2023); Esophagogastroduodenoscopy (Left, 10/25/2023); colonoscopy, with 1 or more biopsies (N/A, 10/25/2023); Colonoscopy (N/A, 10/25/2023); Prostate surgery (10/2018); Small intestine surgery (10/2018); Fracture surgery (7/2018); Hernia repair (11/2010); cystectomy; Mediport insertion, single; Ileo loop neobladder; xi robotic colectomy, partial (N/A, 12/14/2023); AV fistula placement (Right, 01/15/2024); Robot-assisted laparoscopic repair of ventral hernia (N/A, 10/24/2024); and colonoscopy, with polypectomy using snare (N/A, 10/30/2024).    Family History: family history includes Heart disease in his brother and father.    Social History:  reports that he quit smoking about 38 years ago. His smoking use included cigarettes. He has been  "exposed to tobacco smoke. His smokeless tobacco use includes chew. He reports that he does not drink alcohol and does not use drugs.    Physical exam  /70   Pulse 61   Temp 97.6 °F (36.4 °C) (Oral)   Resp 18   Ht 5' 10.87" (1.8 m)   Wt 97.1 kg (214 lb 1.1 oz)   SpO2 100%   BMI 29.97 kg/m²   General appearance: Patient is in no acute distress.  Skin: No rashes or wounds.  HEENT: PERRLA, EOMI, no scleral icterus, no JVD. Neck is supple.  Chest: Respirations are unlabored. Lungs sounds are clear.   Heart: S1, S2.   Abdomen: Benign.  : Deferred.  Extremities: No edema, peripheral pulses are palpable. Right UE AVF with + thrill and bruit.   Neuro: No focal deficits.     Home Medications:  Current Medications[1]    Laboratory data    Lab Results   Component Value Date    WBC 6.66 04/21/2025    HGB 16.0 04/21/2025    HCT 48.6 04/21/2025     (L) 04/21/2025    IRON 78 07/08/2024    TIBC 224 (L) 07/08/2024    LABIRON 35 07/08/2024    FERRITIN 169.38 07/08/2024     04/21/2025    K 3.9 04/21/2025    CO2 21 (L) 04/21/2025    BUN 62.2 (H) 04/21/2025    CREATININE 4.54 (H) 04/21/2025    EGFRNORACEVR 14 04/21/2025    GLUCOSE 127 (H) 04/21/2025    CALCIUM 8.7 (L) 04/21/2025    ALKPHOS 59 04/21/2025    LABPROT 7.7 (H) 04/21/2025    ALBUMIN 3.9 04/21/2025    BILIDIR 0.2 10/20/2023    IBILI 0.20 05/27/2021    AST 11 04/21/2025    ALT 15 04/21/2025    MG 1.50 (L) 12/15/2023    PHOS 3.3 04/21/2025      Lab Results   Component Value Date    .8 (H) 04/21/2025    [id number] 23.9 (L) 12/11/2023    HIV Nonreactive 12/11/2023    HEPCAB Nonreactive 12/11/2023    HEPBCAB Nonreactive 12/11/2023     Urine:  Lab Results   Component Value Date    APPEARANCEUA Turbid (A) 01/17/2025    SGUA 1.008 01/17/2025    PROTEINUA Trace (A) 01/17/2025    KETONESUA Negative 01/17/2025    LEUKOCYTESUR 500 (A) 01/17/2025    RBCUA 6-10 (A) 01/17/2025    WBCUA >100 (A) 01/17/2025    BACTERIA Moderate (A) 01/17/2025    SQEPUA None " Seen 01/17/2025    HYALINECASTS None Seen 01/17/2025    CREATRANDUR 49.3 (L) 01/17/2025    PROTEINURINE 19.7 01/17/2025    UPROTCREA 0.4 01/17/2025         Imaging  CT Abdomen Pelvis  Without Contrast 07/02/2024  Images were reviewed in lung, soft tissue, and bone windows.  Exam quality: Overall adequate for evaluation, although non-contrast protocol limits assessment of the regional vascular structures and visualized solid organs.  Lines/tubes: Left chest wall subcutaneous port and associated catheter remain in similar position.  Cardiovascular: Stable heart chamber size. No pericardial effusion.  Thoracic and abdominopelvic vasculature is without significant interval change.  Lungs/Pleura: Central airways are widely patent. No acute or focal lung field process. No pleural thickening, effusion, or pneumothorax.  Hepatobiliary/Pancreas: Within limitations of non-contrast assessment, no definite new or enlarging space-occupying lesion is identified involving the liver or pancreas.  The gallbladder is unremarkable.  No biliary or pancreatic duct dilatation.  Spleen: No interval change.  Adrenal/: No suspicious adrenal or renal lesions. No obstructive uropathy or evidence of acute urologic abnormality. Reproductive structures are unchanged.  GI tract: The esophagus is unremarkable. No evidence of high-grade mechanical bowel obstruction.  Previously visualized area of mural soft tissue prominence at the transverse colon is no longer clearly identified.  Multiple sites of anastomotic suture at the upper abdomen are again noted, with apparent new site of anastomosis at the transverse colon.  No convincing evidence of new focal abnormality or acute process along the GI tract.  Musculoskeletal: No interval change.  Lymph nodes: No development of pathologic jesi enlargement or necrotic adenopathy.  Other findings: No significant interval change of the thyroid. No free intra-abdominal fluid or air, and no drainable  collections.  IMPRESSION  1. Interval changes suggestive of partial transverse colectomy, with no definite residual mass-like lesion identified.  2. No changes appreciated to suggest new/worsened metastatic disease through the chest, abdomen, or pelvis.  3. Chronic secondary details grossly unchanged from the comparison PET-CT appearance.    Impression    ICD-10-CM ICD-9-CM   1. ESRD (end stage renal disease)  N18.6 585.6   2. Metabolic acidosis  E87.20 276.2   3. Secondary hyperparathyroidism of renal origin  N25.81 588.81   4. At high risk for hyperkalemia  Z91.89 [id number]   5. Tobacco abuse  Z72.0 305.1        Plan  ESRD (end stage renal disease)  -     Comprehensive Metabolic Panel; Future; Expected date: 07/12/2025  -     Microalbumin/Creatinine Ratio, Urine; Future; Expected date: 07/12/2025  -     Urinalysis, Reflex to Urine Culture; Future; Expected date: 07/12/2025  -     Phosphorus; Future; Expected date: 07/12/2025  -     PTH, Intact; Future; Expected date: 07/12/2025  Possibly chronic tubular interstitial disease secondary to chronic obstructive uropathy.  Estimated GFR is 14 mL/min.  Patient has multiple complications advanced kidney dysfunction (metabolic acidosis, hyperkalemia, secondary hyperparathyroidism), that are controlled with medications at present.  Patient is not a candidate for kidney transplantation due recent diagnosis of malignancy, is not a candidate for peritoneal dialysis due to multiple abdominal surgeries.  There are no acute indications for initiation of hemodialysis.  Patient is status post AV fistula creation, fistula is mature.  Emergency department precautions discussed.  Will continue close monitoring.      Metabolic acidosis  Continue NaHCO3.     Secondary hyperparathyroidism of renal origin  iPTH is at goal. Continue calcitriol.     At high risk for hyperkalemia  Continue Veltassa as prescribed to prevent development of life-threatening hyperkalemia.    Tobacco abuse  Patient  was counseled on importance of tobacco use cessation.  Strongly encouraged to quit, offered a referral to a smoking cessation program.      Follow up in about 3 months (around 7/22/2025).       Mayra Grimaldo NP  [facility]            [1]   Current Outpatient Medications:     calcitRIOL (ROCALTROL) 0.5 MCG Cap, Take 1 capsule (0.5 mcg total) by mouth every other day., Disp: 45 capsule, Rfl: 1    patiromer calcium sorbitex (VELTASSA) 8.4 gram PwPk, Take 1 packet (8.4 g total) by mouth every other day., Disp: 45 packet, Rfl: 3    sodium bicarbonate 650 MG tablet, Take 2 tablets (1,300 mg total) by mouth 2 (two) times daily., Disp: 360 tablet, Rfl: 3    tadalafiL (CIALIS) 10 MG tablet, Take 1 tablet (10 mg total) by mouth daily as needed for Erectile Dysfunction., Disp: 10 tablet, Rfl: 11

## 2025-04-21 ENCOUNTER — LAB VISIT (OUTPATIENT)
Dept: LAB | Facility: HOSPITAL | Age: 65
End: 2025-04-21
Attending: NURSE PRACTITIONER
Payer: COMMERCIAL

## 2025-04-21 DIAGNOSIS — N18.5 CKD STAGE G5/A3, GFR <15 AND ALBUMIN CREATININE RATIO >300 MG/G: ICD-10-CM

## 2025-04-21 DIAGNOSIS — R33.9 URINARY RETENTION: ICD-10-CM

## 2025-04-21 LAB
ALBUMIN SERPL-MCNC: 3.9 G/DL (ref 3.4–4.8)
ALBUMIN/GLOB SERPL: 1 RATIO (ref 1.1–2)
ALP SERPL-CCNC: 59 UNIT/L (ref 40–150)
ALT SERPL-CCNC: 15 UNIT/L (ref 0–55)
ANION GAP SERPL CALC-SCNC: 8 MEQ/L
AST SERPL-CCNC: 11 UNIT/L (ref 11–45)
BASOPHILS # BLD AUTO: 0.06 X10(3)/MCL
BASOPHILS NFR BLD AUTO: 0.9 %
BILIRUB SERPL-MCNC: 0.8 MG/DL
BUN SERPL-MCNC: 62.2 MG/DL (ref 8.4–25.7)
CALCIUM SERPL-MCNC: 8.7 MG/DL (ref 8.8–10)
CHLORIDE SERPL-SCNC: 109 MMOL/L (ref 98–107)
CO2 SERPL-SCNC: 21 MMOL/L (ref 23–31)
CREAT SERPL-MCNC: 4.54 MG/DL (ref 0.72–1.25)
CREAT/UREA NIT SERPL: 14
EOSINOPHIL # BLD AUTO: 0.44 X10(3)/MCL (ref 0–0.9)
EOSINOPHIL NFR BLD AUTO: 6.6 %
ERYTHROCYTE [DISTWIDTH] IN BLOOD BY AUTOMATED COUNT: 13 % (ref 11.5–17)
GFR SERPLBLD CREATININE-BSD FMLA CKD-EPI: 14 ML/MIN/1.73/M2
GLOBULIN SER-MCNC: 3.8 GM/DL (ref 2.4–3.5)
GLUCOSE SERPL-MCNC: 127 MG/DL (ref 82–115)
HCT VFR BLD AUTO: 48.6 % (ref 42–52)
HGB BLD-MCNC: 16 G/DL (ref 14–18)
IMM GRANULOCYTES # BLD AUTO: 0.03 X10(3)/MCL (ref 0–0.04)
IMM GRANULOCYTES NFR BLD AUTO: 0.5 %
LYMPHOCYTES # BLD AUTO: 1.7 X10(3)/MCL (ref 0.6–4.6)
LYMPHOCYTES NFR BLD AUTO: 25.5 %
MCH RBC QN AUTO: 31.4 PG (ref 27–31)
MCHC RBC AUTO-ENTMCNC: 32.9 G/DL (ref 33–36)
MCV RBC AUTO: 95.5 FL (ref 80–94)
MONOCYTES # BLD AUTO: 0.45 X10(3)/MCL (ref 0.1–1.3)
MONOCYTES NFR BLD AUTO: 6.8 %
NEUTROPHILS # BLD AUTO: 3.98 X10(3)/MCL (ref 2.1–9.2)
NEUTROPHILS NFR BLD AUTO: 59.7 %
NRBC BLD AUTO-RTO: 0 %
PHOSPHATE SERPL-MCNC: 3.3 MG/DL (ref 2.3–4.7)
PLATELET # BLD AUTO: 126 X10(3)/MCL (ref 130–400)
PLATELETS.RETICULATED NFR BLD AUTO: 2.8 % (ref 0.9–11.2)
PMV BLD AUTO: 11.3 FL (ref 7.4–10.4)
POTASSIUM SERPL-SCNC: 3.9 MMOL/L (ref 3.5–5.1)
PROT SERPL-MCNC: 7.7 GM/DL (ref 5.8–7.6)
PSA SERPL-MCNC: <0.1 NG/ML
PTH-INTACT SERPL-MCNC: 189.8 PG/ML (ref 8.7–77)
RBC # BLD AUTO: 5.09 X10(6)/MCL (ref 4.7–6.1)
SODIUM SERPL-SCNC: 138 MMOL/L (ref 136–145)
WBC # BLD AUTO: 6.66 X10(3)/MCL (ref 4.5–11.5)

## 2025-04-21 PROCEDURE — 80053 COMPREHEN METABOLIC PANEL: CPT

## 2025-04-21 PROCEDURE — 84100 ASSAY OF PHOSPHORUS: CPT

## 2025-04-21 PROCEDURE — 83970 ASSAY OF PARATHORMONE: CPT

## 2025-04-21 PROCEDURE — 84153 ASSAY OF PSA TOTAL: CPT

## 2025-04-21 PROCEDURE — 36415 COLL VENOUS BLD VENIPUNCTURE: CPT

## 2025-04-21 PROCEDURE — 85025 COMPLETE CBC W/AUTO DIFF WBC: CPT

## 2025-04-22 ENCOUNTER — OFFICE VISIT (OUTPATIENT)
Dept: NEPHROLOGY | Facility: CLINIC | Age: 65
End: 2025-04-22
Payer: COMMERCIAL

## 2025-04-22 VITALS
HEIGHT: 71 IN | DIASTOLIC BLOOD PRESSURE: 70 MMHG | RESPIRATION RATE: 18 BRPM | TEMPERATURE: 98 F | WEIGHT: 214.06 LBS | SYSTOLIC BLOOD PRESSURE: 138 MMHG | BODY MASS INDEX: 29.97 KG/M2 | HEART RATE: 61 BPM | OXYGEN SATURATION: 100 %

## 2025-04-22 DIAGNOSIS — Z91.89 AT HIGH RISK FOR HYPERKALEMIA: ICD-10-CM

## 2025-04-22 DIAGNOSIS — N25.81 SECONDARY HYPERPARATHYROIDISM OF RENAL ORIGIN: ICD-10-CM

## 2025-04-22 DIAGNOSIS — Z72.0 TOBACCO ABUSE: ICD-10-CM

## 2025-04-22 DIAGNOSIS — N18.6 ESRD (END STAGE RENAL DISEASE): Primary | ICD-10-CM

## 2025-04-22 DIAGNOSIS — E87.20 METABOLIC ACIDOSIS: ICD-10-CM

## 2025-04-22 PROCEDURE — 99214 OFFICE O/P EST MOD 30 MIN: CPT | Mod: S$PBB,,, | Performed by: NURSE PRACTITIONER

## 2025-04-22 PROCEDURE — 3008F BODY MASS INDEX DOCD: CPT | Mod: CPTII,,, | Performed by: NURSE PRACTITIONER

## 2025-04-22 PROCEDURE — 1160F RVW MEDS BY RX/DR IN RCRD: CPT | Mod: CPTII,,, | Performed by: NURSE PRACTITIONER

## 2025-04-22 PROCEDURE — 1159F MED LIST DOCD IN RCRD: CPT | Mod: CPTII,,, | Performed by: NURSE PRACTITIONER

## 2025-04-22 PROCEDURE — 3078F DIAST BP <80 MM HG: CPT | Mod: CPTII,,, | Performed by: NURSE PRACTITIONER

## 2025-04-22 PROCEDURE — 3066F NEPHROPATHY DOC TX: CPT | Mod: CPTII,,, | Performed by: NURSE PRACTITIONER

## 2025-04-22 PROCEDURE — 99214 OFFICE O/P EST MOD 30 MIN: CPT | Mod: PBBFAC | Performed by: NURSE PRACTITIONER

## 2025-04-22 PROCEDURE — 3075F SYST BP GE 130 - 139MM HG: CPT | Mod: CPTII,,, | Performed by: NURSE PRACTITIONER

## 2025-05-01 ENCOUNTER — OFFICE VISIT (OUTPATIENT)
Dept: INTERNAL MEDICINE | Facility: CLINIC | Age: 65
End: 2025-05-01
Payer: COMMERCIAL

## 2025-05-01 ENCOUNTER — PATIENT MESSAGE (OUTPATIENT)
Dept: NEPHROLOGY | Facility: CLINIC | Age: 65
End: 2025-05-01
Payer: COMMERCIAL

## 2025-05-01 VITALS
TEMPERATURE: 99 F | BODY MASS INDEX: 30.35 KG/M2 | OXYGEN SATURATION: 98 % | WEIGHT: 216.81 LBS | DIASTOLIC BLOOD PRESSURE: 76 MMHG | RESPIRATION RATE: 20 BRPM | HEART RATE: 80 BPM | SYSTOLIC BLOOD PRESSURE: 135 MMHG

## 2025-05-01 DIAGNOSIS — F17.229 CHEWING TOBACCO NICOTINE DEPENDENCE WITH NICOTINE-INDUCED DISORDER: ICD-10-CM

## 2025-05-01 DIAGNOSIS — L57.0 AK (ACTINIC KERATOSIS): ICD-10-CM

## 2025-05-01 DIAGNOSIS — L98.9 PRECANCEROUS SKIN LESION: ICD-10-CM

## 2025-05-01 DIAGNOSIS — I12.0 BENIGN HYPERTENSIVE CKD, STAGE 5 CHRONIC KIDNEY DISEASE OR END STAGE RENAL DISEASE: Primary | ICD-10-CM

## 2025-05-01 DIAGNOSIS — Z12.83 SCREENING EXAM FOR SKIN CANCER: ICD-10-CM

## 2025-05-01 DIAGNOSIS — I10 HYPERTENSION GOAL BP (BLOOD PRESSURE) < 130/80: ICD-10-CM

## 2025-05-01 DIAGNOSIS — C18.4 ADENOCARCINOMA OF TRANSVERSE COLON: ICD-10-CM

## 2025-05-01 DIAGNOSIS — Z85.51 HISTORY OF BLADDER CANCER: ICD-10-CM

## 2025-05-01 PROCEDURE — 99215 OFFICE O/P EST HI 40 MIN: CPT | Mod: PBBFAC

## 2025-05-01 RX ORDER — AMLODIPINE BESYLATE 2.5 MG/1
2.5 TABLET ORAL DAILY
Qty: 30 TABLET | Refills: 11 | Status: SHIPPED | OUTPATIENT
Start: 2025-05-01 | End: 2026-05-01

## 2025-05-01 RX ORDER — ACETAMINOPHEN 500 MG
1 TABLET ORAL ONCE
Qty: 1 EACH | Refills: 0 | Status: SHIPPED | OUTPATIENT
Start: 2025-05-01 | End: 2025-05-01

## 2025-05-01 NOTE — PROGRESS NOTES
I have reviewed and agree with the resident's findings, including all diagnostic interpretations and plans as written.    Pt is here for follow up. No acute issues. BP mildly elevated. Agree with low dose amlodipine for goal BP <130/80 per KDIGO guidelines. Referring to derm w hx of AK's. Pt to continue to follow up with nephrology. Remainder of care as documented per resident.    Snow Amaro MD

## 2025-05-01 NOTE — PROGRESS NOTES
IM Clinic Note    Patient Name: Bridget Cardenas Jr.  YOB: 1960   MRN: [id number]  Date: 05/01/2025  Home Address: [place] [address]      Subjective:     Chief Complaint:   Chief Complaint   Patient presents with    Follow-up     Medication Management        HPI:  Bridget Cardenas Jr. is a 62 y.o. male with past medical history of bladder cancer status post total cystectomy and prostatectomy in 2018 with neobladder creation now self catheterizes 6 x/day, transverse colonic mass s/p laparoscopic partial colectomy done on 12/14/23, CKD stage G5/A3 s/p AVG placement on 1/23/24, chronic hydronephrosis who presents to [facility] for routine follow-up. Patient does not have any acute complaints today and is asymptomatic at this time. Continues to use chewing dip, has no interest in cessation at this time. Denies fevers, chills,  chest pain, shortness of breath, trouble breathing, leg swelling, weakness, focal neurological deficit, and confusion.     Available notes and lab results since last visit were reviewed.     Care Team     [facility] Nephrology- referred for non-anion gap metabolic acidosis and CKD. Not a kidney transplant candidate due to previous cancer diagnosis now in active chemotherapy. AVG placed on 1/23/24, currently maturing. Continue current medication until HD initiated. Next appointment scheduled for 7/22/25.   [facility] Urology- follows for chronic hydronephrosis. Seen on 8/12/24 was told to follow-up in 6 months. Retroperitoneal U/S consistent with bilateral hydronephrosis and neobladder distension. No follow-up in place.   [facility] Heme/Onc: Follows for multiple cancers and mediport placement. Last seen in 10/2024, was told to reschedule appointment till 1/2025. But patient cancelled, no follow-up in place.   [facility]- referred to Dr. Aris Scott for evaluation and treatment of transverse colon mass. S/p partial laparoscopic colectomy now with elevated CEA, repeat PET later this month.  Pet scan completed on 11/8/23 not consistent with metastasis. Last seen 10/9/24, Next follow-up scheduled for 4/8/24. Hernia repair scheduled for 10/24/24. Repeat colonoscopy scheduled for 10/30/24. Now follow-up PRN.    Past Medical History:   Diagnosis Date    Cancer     hx of bladder cancer    Malignant neoplasm of transverse colon     Obesity     Renal disorder     CKD 5        Past Surgical History:   Procedure Laterality Date    AV FISTULA PLACEMENT Right 01/15/2024    COLONOSCOPY N/A 10/25/2023    Procedure: COLONOSCOPY, WITH HEMORRHAGE CONTROL;  Surgeon: Angy Mcguire MD;  Location: [facility];  Service: Gastroenterology;  Laterality: N/A;  sigmoid clips    COLONOSCOPY, WITH 1 OR MORE BIOPSIES N/A 10/25/2023    Procedure: COLONOSCOPY, WITH 1 OR MORE BIOPSIES;  Surgeon: Angy Mcguire MD;  Location: [facility];  Service: Gastroenterology;  Laterality: N/A;  transverse colon mass    COLONOSCOPY, WITH POLYPECTOMY USING SNARE Left 10/25/2023    Procedure: COLONOSCOPY, WITH POLYPECTOMY USING SNARE;  Surgeon: Angy Mcguire MD;  Location: [facility];  Service: Gastroenterology;  Laterality: Left;    COLONOSCOPY, WITH POLYPECTOMY USING SNARE N/A 10/30/2024    Procedure: COLONOSCOPY, WITH POLYPECTOMY USING SNARE;  Surgeon: Angy Mcguire MD;  Location: [facility];  Service: Gastroenterology;  Laterality: N/A;    CYSTECTOMY      ESOPHAGOGASTRODUODENOSCOPY Left 10/25/2023    Procedure: EGD (ESOPHAGOGASTRODUODENOSCOPY);  Surgeon: Angy Mcguire MD;  Location: [facility];  Service: Gastroenterology;  Laterality: Left;    FRACTURE SURGERY  7/2018    Collar none    HERNIA REPAIR  11/2010    ILEO LOOP NEOBLADDER      MEDIPORT INSERTION, SINGLE      PROSTATE SURGERY  10/2018    ROBOT-ASSISTED LAPAROSCOPIC REPAIR OF VENTRAL HERNIA N/A 10/24/2024    Procedure: ROBOTIC REPAIR, HERNIA, VENTRAL;  Surgeon: Aris Scott MD;  Location: [facility];  Service: Oncology;   Laterality: N/A;  INCISIONAL HERNIA REPAIR WITH MESH //  XX    SMALL INTESTINE SURGERY  10/2018    XI ROBOTIC COLECTOMY, PARTIAL N/A 12/14/2023    Procedure: XI ROBOTIC COLECTOMY, PARTIAL;  Surgeon: Aris Scott MD;  Location: [facility];  Service: Oncology;  Laterality: N/A;  TRANSVERSE COLON; POSSIBLE OPEN CASE       Allergy:  Review of patient's allergies indicates:   Allergen Reactions    Pcn [penicillins] Hives        Current Medications:    Current Outpatient Medications:     calcitRIOL (ROCALTROL) 0.5 MCG Cap, Take 1 capsule (0.5 mcg total) by mouth every other day., Disp: 45 capsule, Rfl: 1    patiromer calcium sorbitex (VELTASSA) 8.4 gram PwPk, Take 1 packet (8.4 g total) by mouth every other day., Disp: 45 packet, Rfl: 3    sodium bicarbonate 650 MG tablet, Take 2 tablets (1,300 mg total) by mouth 2 (two) times daily., Disp: 360 tablet, Rfl: 3    tadalafiL (CIALIS) 10 MG tablet, Take 1 tablet (10 mg total) by mouth daily as needed for Erectile Dysfunction., Disp: 10 tablet, Rfl: 11    amLODIPine (NORVASC) 2.5 MG tablet, Take 1 tablet (2.5 mg total) by mouth once daily., Disp: 30 tablet, Rfl: 11    blood pressure monitor Kit, 1 each by Misc.(Non-Drug; Combo Route) route once. for 1 dose, Disp: 1 each, Rfl: 0     Social History:   reports that he quit smoking about 38 years ago. His smoking use included cigarettes. He has been exposed to tobacco smoke. His smokeless tobacco use includes chew. He reports that he does not drink alcohol and does not use drugs.   Patient is on short term disability as of now. Employed with [employer] as a .     Does not smoke tobacco currently, quit smoking in 1987, but does use chewing tobacco since 1987 and uses 4 times per day. Denies EtOH and recreational drug use.     Lives in home with wife and cat. No children.     Family History: Father, Brother - CHF     Immunization History   Administered Date(s) Administered    COVID-19, vector-nr, rS-Ad26, PF ([facility])  04/07/2021    Influenza - Quadrivalent - PF *Preferred* (6 months and older) 12/11/2023    Pneumococcal Conjugate - 20 Valent 12/11/2023    Zoster Recombinant 04/04/2024       Review of Systems   HENT:  Negative for hearing loss.    Eyes:  Negative for discharge.   Respiratory:  Negative for wheezing.    Cardiovascular:  Negative for chest pain and palpitations.   Gastrointestinal:  Negative for blood in stool, constipation, diarrhea and vomiting.   Genitourinary:  Negative for hematuria and urgency.   Musculoskeletal:  Negative for neck pain.   Neurological:  Negative for weakness and headaches.   Endo/Heme/Allergies:  Negative for polydipsia.       Objective:      Physical Exam  Vitals:    05/01/25 1454   BP: 135/76   BP Location: Left arm   Patient Position: Sitting   Pulse: 80   Resp: 20   Temp: 98.6 °F (37 °C)   TempSrc: Oral   SpO2: 98%   Weight: 98.3 kg (216 lb 12.8 oz)        Wt Readings from Last 3 Encounters:   05/01/25 98.3 kg (216 lb 12.8 oz)   04/22/25 97.1 kg (214 lb 1.1 oz)   11/26/24 95.5 kg (210 lb 9.6 oz)       Physical Exam  Vitals and nursing note reviewed.   Constitutional:       General: He is not in acute distress.     Appearance: He is not ill-appearing.   HENT:      Head: Normocephalic and atraumatic.      Nose: No congestion.      Mouth/Throat:      Mouth: Mucous membranes are moist.      Pharynx: Oropharynx is clear. No oropharyngeal exudate.   Eyes:      Extraocular Movements: Extraocular movements intact.      Conjunctiva/sclera: Conjunctivae normal.      Pupils: Pupils are equal, round, and reactive to light.   Cardiovascular:      Rate and Rhythm: Normal rate and regular rhythm.      Pulses: Normal pulses.      Heart sounds: No murmur heard.  Pulmonary:      Effort: Pulmonary effort is normal. No respiratory distress.      Breath sounds: No wheezing, rhonchi or rales.   Abdominal:      General: Abdomen is flat. Bowel sounds are normal. There is no distension.      Palpations: Abdomen  is soft. There is no mass.      Tenderness: There is no abdominal tenderness. There is no guarding.   Musculoskeletal:      Cervical back: Normal range of motion.      Right lower leg: No edema.      Left lower leg: No edema.   Lymphadenopathy:      Cervical: No cervical adenopathy.   Skin:     General: Skin is warm and dry.      Capillary Refill: Capillary refill takes less than 2 seconds.      Findings: No rash.   Neurological:      General: No focal deficit present.      Mental Status: He is alert and oriented to person, place, and time. Mental status is at baseline.      Motor: No weakness.          Body mass index is 30.35 kg/m².      Assessment:       Plan  CKD 5/ESRD s/p AVF placement   BP goal <130/80   - Continue routine follow-up with [facility]  - AVF is mature at this time   - Initiating Amlodipine 2.5mg qd for BP goal  - Paper order for BP monitor and blood pressure logs given  - Instructed to hold and call into clinic if blood pressures are too low   - Can drop off blood pressure logs in 1 month for review and titration      Actinic Keratosis   Precancerous Skin Lesion    - Referred to Dermatology     ACC of transverse colon s/p laparoscopic partial colectomy done on 12/14/23  - Continue routine follow-up with Dr. Scott with surgical oncology and Dr. Gaspar with heme/onc  - Encouraged completion of recommended PET imaging to evaluate colorectal ca     Hx of bladder cancer s/p total cystectomy and prostatectomy in 2018 with neobladder creation  Chronic hydronephrosis   - Continue routine follow-up with [facility] Urology     Chewing Tobacco User   - Cessation advised, patient verbalized understanding  - Declined Nicotine lozenges and gum   - Declined Chantix due to potential side effects  - Nicotine patch at high dose did not curb patient cravings while hopitalized   - Will revisit at next clinic appointment   - Recommended routine follow-up with dentist and oral ca screenings     ED precautions given.      Patient case and plan of care discussed with Dr. Snow Amaro    Disposition: RTC in 6 months or sooner if needed    Syed Denton MD   Internal Medicine - [facility]

## 2025-07-11 DIAGNOSIS — E87.5 HYPERKALEMIA: ICD-10-CM

## 2025-07-13 DIAGNOSIS — N25.81 SECONDARY HYPERPARATHYROIDISM OF RENAL ORIGIN: ICD-10-CM

## 2025-07-13 RX ORDER — CALCITRIOL 0.5 UG/1
CAPSULE ORAL
Qty: 45 CAPSULE | Refills: 1 | Status: SHIPPED | OUTPATIENT
Start: 2025-07-13

## 2025-07-21 ENCOUNTER — LAB VISIT (OUTPATIENT)
Dept: LAB | Facility: HOSPITAL | Age: 65
End: 2025-07-21
Attending: NURSE PRACTITIONER
Payer: COMMERCIAL

## 2025-07-21 DIAGNOSIS — N18.6 ESRD (END STAGE RENAL DISEASE): ICD-10-CM

## 2025-07-21 LAB
ALBUMIN SERPL-MCNC: 3.7 G/DL (ref 3.4–4.8)
ALBUMIN/CREAT UR: 149.5 MG/GM CR (ref 0–30)
ALBUMIN/GLOB SERPL: 1 RATIO (ref 1.1–2)
ALP SERPL-CCNC: 57 UNIT/L (ref 40–150)
ALT SERPL-CCNC: 17 UNIT/L (ref 0–55)
ANION GAP SERPL CALC-SCNC: 7 MEQ/L
AST SERPL-CCNC: 10 UNIT/L (ref 11–45)
BACTERIA #/AREA URNS AUTO: ABNORMAL /HPF
BILIRUB SERPL-MCNC: 1 MG/DL
BILIRUB UR QL STRIP.AUTO: NEGATIVE
BUN SERPL-MCNC: 51.1 MG/DL (ref 8.4–25.7)
CALCIUM SERPL-MCNC: 8.3 MG/DL (ref 8.8–10)
CHLORIDE SERPL-SCNC: 109 MMOL/L (ref 98–107)
CLARITY UR: CLEAR
CO2 SERPL-SCNC: 25 MMOL/L (ref 23–31)
COLOR UR AUTO: ABNORMAL
CREAT SERPL-MCNC: 4.16 MG/DL (ref 0.72–1.25)
CREAT UR-MCNC: 64.4 MG/DL (ref 63–166)
CREAT/UREA NIT SERPL: 12
GFR SERPLBLD CREATININE-BSD FMLA CKD-EPI: 15 ML/MIN/1.73/M2
GLOBULIN SER-MCNC: 3.8 GM/DL (ref 2.4–3.5)
GLUCOSE SERPL-MCNC: 118 MG/DL (ref 82–115)
GLUCOSE UR QL STRIP: NORMAL
HGB UR QL STRIP: ABNORMAL
HYALINE CASTS #/AREA URNS LPF: ABNORMAL /LPF
KETONES UR QL STRIP: NEGATIVE
LEUKOCYTE ESTERASE UR QL STRIP: 500
MICROALBUMIN UR-MCNC: 96.3 UG/ML
NITRITE UR QL STRIP: NEGATIVE
PH UR STRIP: 6.5 [PH]
PHOSPHATE SERPL-MCNC: 3.3 MG/DL (ref 2.3–4.7)
POTASSIUM SERPL-SCNC: 4.2 MMOL/L (ref 3.5–5.1)
PROT SERPL-MCNC: 7.5 GM/DL (ref 5.8–7.6)
PROT UR QL STRIP: ABNORMAL
PTH-INTACT SERPL-MCNC: 169.8 PG/ML (ref 8.7–77)
RBC #/AREA URNS AUTO: ABNORMAL /HPF
SODIUM SERPL-SCNC: 141 MMOL/L (ref 136–145)
SP GR UR STRIP.AUTO: 1.01 (ref 1–1.03)
SQUAMOUS #/AREA URNS LPF: ABNORMAL /HPF
UROBILINOGEN UR STRIP-ACNC: NORMAL
WBC #/AREA URNS AUTO: ABNORMAL /HPF

## 2025-07-21 PROCEDURE — 87086 URINE CULTURE/COLONY COUNT: CPT

## 2025-07-21 PROCEDURE — 84100 ASSAY OF PHOSPHORUS: CPT

## 2025-07-21 PROCEDURE — 82043 UR ALBUMIN QUANTITATIVE: CPT

## 2025-07-21 PROCEDURE — 80053 COMPREHEN METABOLIC PANEL: CPT

## 2025-07-21 PROCEDURE — 36415 COLL VENOUS BLD VENIPUNCTURE: CPT

## 2025-07-21 PROCEDURE — 81001 URINALYSIS AUTO W/SCOPE: CPT

## 2025-07-21 PROCEDURE — 83970 ASSAY OF PARATHORMONE: CPT

## 2025-07-22 ENCOUNTER — OFFICE VISIT (OUTPATIENT)
Dept: NEPHROLOGY | Facility: CLINIC | Age: 65
End: 2025-07-22
Payer: COMMERCIAL

## 2025-07-22 VITALS
OXYGEN SATURATION: 97 % | TEMPERATURE: 97 F | WEIGHT: 217.19 LBS | BODY MASS INDEX: 31.09 KG/M2 | DIASTOLIC BLOOD PRESSURE: 80 MMHG | RESPIRATION RATE: 20 BRPM | HEIGHT: 70 IN | HEART RATE: 69 BPM | SYSTOLIC BLOOD PRESSURE: 130 MMHG

## 2025-07-22 DIAGNOSIS — Z91.89 AT HIGH RISK FOR HYPERKALEMIA: ICD-10-CM

## 2025-07-22 DIAGNOSIS — N18.5 CKD STAGE G5/A3, GFR <15 AND ALBUMIN CREATININE RATIO >300 MG/G: Primary | ICD-10-CM

## 2025-07-22 DIAGNOSIS — N25.81 SECONDARY HYPERPARATHYROIDISM OF RENAL ORIGIN: ICD-10-CM

## 2025-07-22 DIAGNOSIS — R03.0 ELEVATED BLOOD PRESSURE READING: ICD-10-CM

## 2025-07-22 DIAGNOSIS — E87.20 METABOLIC ACIDOSIS: ICD-10-CM

## 2025-07-22 DIAGNOSIS — Z72.0 TOBACCO ABUSE: ICD-10-CM

## 2025-07-22 PROCEDURE — 99214 OFFICE O/P EST MOD 30 MIN: CPT | Mod: PBBFAC | Performed by: NURSE PRACTITIONER

## 2025-07-22 PROCEDURE — 99214 OFFICE O/P EST MOD 30 MIN: CPT | Mod: S$PBB,,, | Performed by: NURSE PRACTITIONER

## 2025-07-22 PROCEDURE — 3008F BODY MASS INDEX DOCD: CPT | Mod: CPTII,,, | Performed by: NURSE PRACTITIONER

## 2025-07-22 PROCEDURE — 3075F SYST BP GE 130 - 139MM HG: CPT | Mod: CPTII,,, | Performed by: NURSE PRACTITIONER

## 2025-07-22 PROCEDURE — 3066F NEPHROPATHY DOC TX: CPT | Mod: CPTII,,, | Performed by: NURSE PRACTITIONER

## 2025-07-22 PROCEDURE — 3079F DIAST BP 80-89 MM HG: CPT | Mod: CPTII,,, | Performed by: NURSE PRACTITIONER

## 2025-07-22 PROCEDURE — 1160F RVW MEDS BY RX/DR IN RCRD: CPT | Mod: CPTII,,, | Performed by: NURSE PRACTITIONER

## 2025-07-22 PROCEDURE — 1159F MED LIST DOCD IN RCRD: CPT | Mod: CPTII,,, | Performed by: NURSE PRACTITIONER

## 2025-07-22 PROCEDURE — 3060F POS MICROALBUMINURIA REV: CPT | Mod: CPTII,,, | Performed by: NURSE PRACTITIONER

## 2025-07-22 NOTE — PROGRESS NOTES
[facility]  Nephrology Clinic      MRN: [id number]    Patient's demographics: Bridget Cardenas Jr. is a 64 y.o. White male born on 1960    Chief Complaint: Chronic Kidney Disease (Follow up)    History of present illness:   The patient presents to Nephrology clinic today for ongoing management of chronic kidney disease. The patient's pertinent chronic problems include bladder cancer, status post total cystectomy and prostatectomy in 2018 with neobladder creation (patient self catheterizes 6 times a day); chronic hydronephrosis, metabolic acidosis, and secondary hyperparathyroidism of renal origin. 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  Today patient denies complaints.  Compliant with medication regimen.     Review of Systems  12 point review of systems conducted, negative except as stated in the history of present illness.    Allergies: Patient is allergic to pcn [penicillins].     Past Medical History:  has a past medical history of Cancer, Malignant neoplasm of transverse colon, Obesity, and Renal disorder.    Procedure History:  has a past surgical history that includes colonoscopy, with polypectomy using snare (Left, 10/25/2023); Esophagogastroduodenoscopy (Left, 10/25/2023); colonoscopy, with 1 or more biopsies (N/A, 10/25/2023); Colonoscopy (N/A, 10/25/2023); Prostate surgery (10/2018); Small intestine surgery (10/2018); Fracture surgery (7/2018); Hernia repair (11/2010); cystectomy; Mediport insertion, single; Ileo loop neobladder; xi robotic colectomy, partial (N/A, 12/14/2023); AV fistula placement (Right, 01/15/2024); Robot-assisted laparoscopic repair of ventral hernia (N/A, 10/24/2024); and colonoscopy, with polypectomy using snare (N/A, 10/30/2024).    Family History: family history includes Heart disease in his brother and father.    Social History:  " reports that he quit smoking about 38 years ago. His smoking use included cigarettes. He has been exposed to tobacco smoke. His smokeless tobacco use includes chew. He reports that he does not drink alcohol and does not use drugs.    Physical exam  /80 (BP Location: Left arm, Patient Position: Sitting)   Pulse 69   Temp 97.4 °F (36.3 °C) (Oral)   Resp 20   Ht 5' 10" (1.778 m)   Wt 98.5 kg (217 lb 3.2 oz)   SpO2 97%   BMI 31.16 kg/m²   General appearance: Patient is in no acute distress.  Skin: No rashes or wounds.  HEENT: PERRLA, EOMI, no scleral icterus, no JVD. Neck is supple.  Chest: Respirations are unlabored. Lungs sounds are clear.   Heart: S1, S2.   Abdomen: Benign.  : Deferred.  Extremities: No edema, peripheral pulses are palpable. Right UE AVF with + thrill and bruit.   Neuro: No focal deficits.     Home Medications:  Current Medications[1]    Laboratory data    Lab Results   Component Value Date    WBC 6.66 04/21/2025    HGB 16.0 04/21/2025    HCT 48.6 04/21/2025     (L) 04/21/2025    IRON 78 07/08/2024    TIBC 224 (L) 07/08/2024    LABIRON 35 07/08/2024    FERRITIN 169.38 07/08/2024     07/21/2025    K 4.2 07/21/2025    CO2 25 07/21/2025    BUN 51.1 (H) 07/21/2025    CREATININE 4.16 (H) 07/21/2025    EGFRNORACEVR 15 07/21/2025    CALCIUM 8.3 (L) 07/21/2025    ALKPHOS 57 07/21/2025    ALBUMIN 3.7 07/21/2025    BILIDIR 0.2 10/20/2023    IBILI 0.20 05/27/2021    AST 10 (L) 07/21/2025    ALT 17 07/21/2025    MG 1.50 (L) 12/15/2023    PHOS 3.3 07/21/2025      Lab Results   Component Value Date    .8 (H) 07/21/2025    UUYHIDCB02MK 23.9 (L) 12/11/2023    HIV Nonreactive 12/11/2023    HEPCAB Nonreactive 12/11/2023    HEPBCAB Nonreactive 12/11/2023     Urine:  Lab Results   Component Value Date    APPEARANCEUA Clear 07/21/2025    SGUA 1.009 07/21/2025    PROTEINUA Trace (A) 07/21/2025    KETONESUA Negative 07/21/2025    LEUKOCYTESUR 500 (A) 07/21/2025    RBCUA 11-20 (A) " 07/21/2025    WBCUA 51-99 (A) 07/21/2025    BACTERIA Many (A) 07/21/2025    SQEPUA None Seen 07/21/2025    HYALINECASTS None Seen 07/21/2025    CREATRANDUR 64.4 07/21/2025    PROTEINURINE 19.7 01/17/2025    UPROTCREA 0.4 01/17/2025    MICALBCREAT 149.5 (H) 07/21/2025      Microbiology Results (Last 14 Days)       Procedure Component Value Units Date/Time    Urine culture [[id number]] Collected: 07/21/25 0743    Order Status: Completed Specimen: Urine Updated: 07/22/25 0658     Urine Culture No Growth At 24 Hours            Imaging  CT Abdomen Pelvis  Without Contrast 07/02/2024  Images were reviewed in lung, soft tissue, and bone windows.  Exam quality: Overall adequate for evaluation, although non-contrast protocol limits assessment of the regional vascular structures and visualized solid organs.  Lines/tubes: Left chest wall subcutaneous port and associated catheter remain in similar position.  Cardiovascular: Stable heart chamber size. No pericardial effusion.  Thoracic and abdominopelvic vasculature is without significant interval change.  Lungs/Pleura: Central airways are widely patent. No acute or focal lung field process. No pleural thickening, effusion, or pneumothorax.  Hepatobiliary/Pancreas: Within limitations of non-contrast assessment, no definite new or enlarging space-occupying lesion is identified involving the liver or pancreas.  The gallbladder is unremarkable.  No biliary or pancreatic duct dilatation.  Spleen: No interval change.  Adrenal/: No suspicious adrenal or renal lesions. No obstructive uropathy or evidence of acute urologic abnormality. Reproductive structures are unchanged.  GI tract: The esophagus is unremarkable. No evidence of high-grade mechanical bowel obstruction.  Previously visualized area of mural soft tissue prominence at the transverse colon is no longer clearly identified.  Multiple sites of anastomotic suture at the upper abdomen are again noted, with apparent new site of  anastomosis at the transverse colon.  No convincing evidence of new focal abnormality or acute process along the GI tract.  Musculoskeletal: No interval change.  Lymph nodes: No development of pathologic jesi enlargement or necrotic adenopathy.  Other findings: No significant interval change of the thyroid. No free intra-abdominal fluid or air, and no drainable collections.  IMPRESSION  1. Interval changes suggestive of partial transverse colectomy, with no definite residual mass-like lesion identified.  2. No changes appreciated to suggest new/worsened metastatic disease through the chest, abdomen, or pelvis.  3. Chronic secondary details grossly unchanged from the comparison PET-CT appearance.    Impression    ICD-10-CM ICD-9-CM   1. CKD stage G5/A3, GFR <15 and albumin creatinine ratio >300 mg/g  N18.5 585.5   2. At high risk for hyperkalemia  Z91.89 [id number]   3. Secondary hyperparathyroidism of renal origin  N25.81 588.81   4. Metabolic acidosis  E87.20 276.2   5. Tobacco abuse  Z72.0 305.1   6. Elevated blood pressure reading  R03.0 796.2        Plan  CKD stage G5/A3, GFR <15 and albumin creatinine ratio >300 mg/g  -     CBC Auto Differential; Future; Expected date: 11/12/2025  -     Comprehensive Metabolic Panel; Future; Expected date: 11/12/2025  Possibly chronic tubular interstitial disease secondary to chronic obstructive uropathy.  Estimated GFR is 15 mL/min.  Patient has multiple complications advanced kidney dysfunction (metabolic acidosis, hyperkalemia, secondary hyperparathyroidism), that are controlled with medications at present.  Patient is not a candidate for kidney transplantation due recent diagnosis of malignancy, is not a candidate for peritoneal dialysis due to multiple abdominal surgeries.  There are no acute indications for initiation of hemodialysis.  Patient is status post AV fistula creation, fistula is mature.  Emergency department precautions discussed.  Will continue close monitoring.       At high risk for hyperkalemia  Continue Veltassa to prevent development of life-threatening hyperkalemia.    Secondary hyperparathyroidism of renal origin  -     PTH, Intact; Future; Expected date: 11/12/2025  -     Vitamin D; Future; Expected date: 11/12/2025  Continue calcitriol.  Patient has mild hypocalcemia, he was advised to start taking calcium+ vitamin-D supplement over-the-counter.  Will recheck intact PTH, vitamin-D level, and calcium level prior to next visit.      Metabolic acidosis  Continue sodium bicarbonate as prescribed.    Tobacco abuse  Patient was counseled on importance of tobacco use cessation.  Strongly encouraged to quit, offered a referral to a smoking cessation program.    Elevated blood pressure reading  Patient is not willing to start amlodipine at this time.      Patient Counseling:  - Discussed the importance of monitoring blood pressure consistently, including proper techniques for accurate measurement:    - Use a chair with a back, keep legs uncrossed, and rest for ten minutes before measuring.    - Avoid smoking, caffeine, and soda for 30 minutes prior to measurement.  - Explained that the prescribed blood pressure medication is a low dose and neutral for kidney function, intended to prevent potential acceleration of kidney damage if blood pressure consistently exceeds 140/90.  - Encouraged communication regarding any questions or issues that arise between appointments.    Follow up in about 4 months (around 11/22/2025).       Mayra Grimaldo NP  [facility]ph[facility]          [1]   Current Outpatient Medications:     calcitRIOL (ROCALTROL) 0.5 MCG Cap, TAKE 1 CAPSULE(0.5 MCG) BY MOUTH EVERY OTHER DAY, Disp: 45 capsule, Rfl: 1    patiromer calcium sorbitex (VELTASSA) 8.4 gram PwPk, Take 1 packet (8.4 g total) by mouth every other day., Disp: 45 packet, Rfl: 3    sodium bicarbonate 650 MG tablet, Take 2 tablets (1,300 mg total) by mouth 2 (two) times daily., Disp: 360 tablet, Rfl: 3     tadalafiL (CIALIS) 10 MG tablet, Take 1 tablet (10 mg total) by mouth daily as needed for Erectile Dysfunction., Disp: 10 tablet, Rfl: 11    amLODIPine (NORVASC) 2.5 MG tablet, Take 1 tablet (2.5 mg total) by mouth once daily., Disp: 30 tablet, Rfl: 11     supervision

## 2025-07-23 LAB — BACTERIA UR CULT: NORMAL

## (undated) DEVICE — CANNULA SEAL 12MM

## (undated) DEVICE — SUT VICRYL PLUS 3-0 SH 18IN

## (undated) DEVICE — SEALER VESSEL EXTEND

## (undated) DEVICE — SNARE EXACTO COLD

## (undated) DEVICE — GLOVE PROTEXIS HYDROGEL SZ7

## (undated) DEVICE — KIT GEN LAPAROSCOPY LAFAYETTE

## (undated) DEVICE — NDL HYPO REG 25G X 1 1/2

## (undated) DEVICE — SOL ELECTROLUBE ANTI-STIC

## (undated) DEVICE — SUT PDS PLUS MONO 1 CT 36IN

## (undated) DEVICE — GLOVE PROTEXIS BLUE LATEX 7

## (undated) DEVICE — CONTAINER SPECIMEN SCREW 4OZ

## (undated) DEVICE — SUT 3/0 27IN PDS II VIO MO

## (undated) DEVICE — KIT SURGICAL COLON .25 1.1OZ

## (undated) DEVICE — SUT SILK 3-0 SH 18IN BLACK

## (undated) DEVICE — TRAY CATH FOL SIL URIMTR 16FR

## (undated) DEVICE — KIT SURGICAL TURNOVER

## (undated) DEVICE — ADHESIVE DERMABOND ADVANCED

## (undated) DEVICE — PORT ACCESS 8MM W/120MM LOW

## (undated) DEVICE — RELOAD SUREFORM 60 3.5 BLU 6R

## (undated) DEVICE — CARTRIDGE BABCOCK GRASPER 5X45

## (undated) DEVICE — LIDOCAINE HCI VISCOUS SOL 2%

## (undated) DEVICE — Device

## (undated) DEVICE — KIT AIRSEAL BIFURCT FLTR TB

## (undated) DEVICE — ELECTRODE PATIENT RETURN DISP

## (undated) DEVICE — DRAPE STERI LONG

## (undated) DEVICE — SUT VICRYL+ 27 UR-6 VIOL

## (undated) DEVICE — MARKER ENDOSCOPIC SPOT EX

## (undated) DEVICE — COVER TIP CURVED SCISSORS XI

## (undated) DEVICE — HOLDER STRIP-T SELF ADH 2X10IN

## (undated) DEVICE — ELECTRODE REM PLYHSV RETURN 9

## (undated) DEVICE — GLOVE PROTEXIS HYDROGEL SZ6.5

## (undated) DEVICE — SUT VLOC 90 3-0 V-20 NDL 6

## (undated) DEVICE — SEAL UNIVERSAL 5MM-8MM XI

## (undated) DEVICE — AGENT EVERLIFT SUBMCSL LFT 5ML

## (undated) DEVICE — SOL IRRI STRL WATER 1000ML

## (undated) DEVICE — MANIFOLD 4 PORT

## (undated) DEVICE — SUT MFIL VIOL PDS II TP-1 30IN

## (undated) DEVICE — TRAP ETRAP POLYP 50 TRAY

## (undated) DEVICE — IRRIGATOR SUCTION W/TIP

## (undated) DEVICE — SOL CLEARIFY VISUALIZATION LAP

## (undated) DEVICE — KIT AIRSEAL CANN CAP STD 8MM

## (undated) DEVICE — FORCEP ALLIGATOR 2.8MM W/NDL

## (undated) DEVICE — COVER MAYO STAND REINFRCD 30

## (undated) DEVICE — PAD PINK TRENDELENBURG POS XL

## (undated) DEVICE — LEGGING SURG CONV 43X28IN

## (undated) DEVICE — SYR 10CC LUER LOCK

## (undated) DEVICE — OBTURATOR BLADELESS 8MM XI CLR

## (undated) DEVICE — CANNULA REDUCER 12-8MM

## (undated) DEVICE — SET TRI-LUMEN FILTERED TUBE

## (undated) DEVICE — NDL 20GX1-1/2IN IB

## (undated) DEVICE — DRAPE COLUMN DAVINCI XI

## (undated) DEVICE — DRAPE ARM DAVINCI XI

## (undated) DEVICE — SUT VLOC 2-0 6IN 1/2 CIRCLE TP

## (undated) DEVICE — SUT MCRYL PLUS 4-0 PS2 27IN

## (undated) DEVICE — STAPLER SUREFORM 60 SPU

## (undated) DEVICE — SCISSOR CURVED ENDOPATH 5MM

## (undated) DEVICE — ROTH NET PLATINUM

## (undated) DEVICE — BAG INZII TISS RETRV 12/15MM

## (undated) DEVICE — DEVICE RESOLUTION 360 CLIP

## (undated) DEVICE — COVER PROBE US 5.5X58L NON LTX

## (undated) DEVICE — TROCAR KII FIOS ZTHREAD 11X100

## (undated) DEVICE — GELPOINT MINI KIT ENDOSCOPIC

## (undated) DEVICE — SYR ONLY LUER LOCK 20CC